# Patient Record
Sex: FEMALE | Race: WHITE | NOT HISPANIC OR LATINO | URBAN - METROPOLITAN AREA
[De-identification: names, ages, dates, MRNs, and addresses within clinical notes are randomized per-mention and may not be internally consistent; named-entity substitution may affect disease eponyms.]

---

## 2018-09-05 ENCOUNTER — INPATIENT HOSPITAL (OUTPATIENT)
Dept: URBAN - METROPOLITAN AREA HOSPITAL 76 | Facility: HOSPITAL | Age: 68
End: 2018-09-05

## 2018-09-05 DIAGNOSIS — A04.71 ENTEROCOLITIS DUE TO CLOSTRIDIUM DIFFICILE, RECURRENT: ICD-10-CM

## 2018-09-05 PROCEDURE — 99222 1ST HOSP IP/OBS MODERATE 55: CPT | Performed by: NURSE PRACTITIONER

## 2018-09-06 ENCOUNTER — INPATIENT HOSPITAL (OUTPATIENT)
Dept: URBAN - METROPOLITAN AREA HOSPITAL 76 | Facility: HOSPITAL | Age: 68
End: 2018-09-06

## 2018-09-06 DIAGNOSIS — A04.71 ENTEROCOLITIS DUE TO CLOSTRIDIUM DIFFICILE, RECURRENT: ICD-10-CM

## 2018-09-06 PROCEDURE — 99231 SBSQ HOSP IP/OBS SF/LOW 25: CPT | Performed by: NURSE PRACTITIONER

## 2018-09-07 ENCOUNTER — INPATIENT HOSPITAL (OUTPATIENT)
Dept: URBAN - METROPOLITAN AREA HOSPITAL 76 | Facility: HOSPITAL | Age: 68
End: 2018-09-07
Payer: OTHER GOVERNMENT

## 2018-09-07 DIAGNOSIS — A04.71 ENTEROCOLITIS DUE TO CLOSTRIDIUM DIFFICILE, RECURRENT: ICD-10-CM

## 2018-09-07 PROCEDURE — 99231 SBSQ HOSP IP/OBS SF/LOW 25: CPT | Performed by: NURSE PRACTITIONER

## 2020-01-07 ENCOUNTER — HOSPITAL ENCOUNTER (INPATIENT)
Facility: HOSPITAL | Age: 70
LOS: 4 days | Discharge: HOME | End: 2020-01-11
Attending: FAMILY MEDICINE | Admitting: FAMILY MEDICINE

## 2020-01-07 ENCOUNTER — APPOINTMENT (OUTPATIENT)
Dept: GENERAL RADIOLOGY | Facility: HOSPITAL | Age: 70
End: 2020-01-07

## 2020-01-07 PROBLEM — J18.9 PNEUMONIA: Status: ACTIVE | Noted: 2020-01-07

## 2020-01-07 LAB
ALBUMIN SERPL-MCNC: 4 G/DL (ref 3.5–5.2)
ALBUMIN/GLOB SERPL: 1.3 G/DL
ALP SERPL-CCNC: 78 U/L (ref 39–117)
ALT SERPL W P-5'-P-CCNC: 20 U/L (ref 1–33)
ANION GAP SERPL CALCULATED.3IONS-SCNC: 10 MMOL/L (ref 5–15)
AST SERPL-CCNC: 17 U/L (ref 1–32)
B PERT DNA SPEC QL NAA+PROBE: NOT DETECTED
BASOPHILS # BLD AUTO: 0.1 10*3/MM3 (ref 0–0.2)
BASOPHILS NFR BLD AUTO: 0.8 % (ref 0–1.5)
BILIRUB SERPL-MCNC: 0.3 MG/DL (ref 0.2–1.2)
BUN BLD-MCNC: 8 MG/DL (ref 8–23)
BUN/CREAT SERPL: 12.9 (ref 7–25)
C PNEUM DNA NPH QL NAA+NON-PROBE: NOT DETECTED
CALCIUM SPEC-SCNC: 9.5 MG/DL (ref 8.6–10.5)
CHLORIDE SERPL-SCNC: 106 MMOL/L (ref 98–107)
CO2 SERPL-SCNC: 23 MMOL/L (ref 22–29)
CREAT BLD-MCNC: 0.62 MG/DL (ref 0.57–1)
DEPRECATED RDW RBC AUTO: 46.8 FL (ref 37–54)
EOSINOPHIL # BLD AUTO: 0.2 10*3/MM3 (ref 0–0.4)
EOSINOPHIL NFR BLD AUTO: 2.1 % (ref 0.3–6.2)
ERYTHROCYTE [DISTWIDTH] IN BLOOD BY AUTOMATED COUNT: 14.5 % (ref 12.3–15.4)
FLUAV H1 2009 PAND RNA NPH QL NAA+PROBE: NOT DETECTED
FLUAV H1 HA GENE NPH QL NAA+PROBE: NOT DETECTED
FLUAV H3 RNA NPH QL NAA+PROBE: NOT DETECTED
FLUAV SUBTYP SPEC NAA+PROBE: NOT DETECTED
FLUBV RNA ISLT QL NAA+PROBE: NOT DETECTED
GFR SERPL CREATININE-BSD FRML MDRD: 95 ML/MIN/1.73
GLOBULIN UR ELPH-MCNC: 3.2 GM/DL
GLUCOSE BLD-MCNC: 128 MG/DL (ref 65–99)
GLUCOSE BLDC GLUCOMTR-MCNC: 104 MG/DL (ref 70–105)
GLUCOSE BLDC GLUCOMTR-MCNC: 154 MG/DL (ref 70–105)
HADV DNA SPEC NAA+PROBE: NOT DETECTED
HCOV 229E RNA SPEC QL NAA+PROBE: NOT DETECTED
HCOV HKU1 RNA SPEC QL NAA+PROBE: NOT DETECTED
HCOV NL63 RNA SPEC QL NAA+PROBE: NOT DETECTED
HCOV OC43 RNA SPEC QL NAA+PROBE: NOT DETECTED
HCT VFR BLD AUTO: 41.3 % (ref 34–46.6)
HGB BLD-MCNC: 13.8 G/DL (ref 12–15.9)
HMPV RNA NPH QL NAA+NON-PROBE: NOT DETECTED
HPIV1 RNA SPEC QL NAA+PROBE: NOT DETECTED
HPIV2 RNA SPEC QL NAA+PROBE: NOT DETECTED
HPIV3 RNA NPH QL NAA+PROBE: NOT DETECTED
HPIV4 P GENE NPH QL NAA+PROBE: NOT DETECTED
LYMPHOCYTES # BLD AUTO: 2 10*3/MM3 (ref 0.7–3.1)
LYMPHOCYTES NFR BLD AUTO: 24.7 % (ref 19.6–45.3)
M PNEUMO IGG SER IA-ACNC: NOT DETECTED
MCH RBC QN AUTO: 30.6 PG (ref 26.6–33)
MCHC RBC AUTO-ENTMCNC: 33.3 G/DL (ref 31.5–35.7)
MCV RBC AUTO: 91.8 FL (ref 79–97)
MONOCYTES # BLD AUTO: 0.9 10*3/MM3 (ref 0.1–0.9)
MONOCYTES NFR BLD AUTO: 10.8 % (ref 5–12)
NEUTROPHILS # BLD AUTO: 4.9 10*3/MM3 (ref 1.7–7)
NEUTROPHILS NFR BLD AUTO: 61.6 % (ref 42.7–76)
NRBC BLD AUTO-RTO: 0.1 /100 WBC (ref 0–0.2)
PLATELET # BLD AUTO: 185 10*3/MM3 (ref 140–450)
PMV BLD AUTO: 8 FL (ref 6–12)
POTASSIUM BLD-SCNC: 3.9 MMOL/L (ref 3.5–5.2)
PROT SERPL-MCNC: 7.2 G/DL (ref 6–8.5)
RBC # BLD AUTO: 4.5 10*6/MM3 (ref 3.77–5.28)
RHINOVIRUS RNA SPEC NAA+PROBE: NOT DETECTED
RSV RNA NPH QL NAA+NON-PROBE: DETECTED
SODIUM BLD-SCNC: 139 MMOL/L (ref 136–145)
WBC NRBC COR # BLD: 7.9 10*3/MM3 (ref 3.4–10.8)

## 2020-01-07 PROCEDURE — 94799 UNLISTED PULMONARY SVC/PX: CPT

## 2020-01-07 PROCEDURE — 82962 GLUCOSE BLOOD TEST: CPT

## 2020-01-07 PROCEDURE — 80053 COMPREHEN METABOLIC PANEL: CPT | Performed by: FAMILY MEDICINE

## 2020-01-07 PROCEDURE — 0099U HC BIOFIRE FILMARRAY RESP PANEL 1: CPT | Performed by: FAMILY MEDICINE

## 2020-01-07 PROCEDURE — 71046 X-RAY EXAM CHEST 2 VIEWS: CPT

## 2020-01-07 PROCEDURE — 87040 BLOOD CULTURE FOR BACTERIA: CPT | Performed by: FAMILY MEDICINE

## 2020-01-07 PROCEDURE — 94640 AIRWAY INHALATION TREATMENT: CPT

## 2020-01-07 PROCEDURE — 25010000002 METHYLPREDNISOLONE PER 125 MG: Performed by: FAMILY MEDICINE

## 2020-01-07 PROCEDURE — 85025 COMPLETE CBC W/AUTO DIFF WBC: CPT | Performed by: FAMILY MEDICINE

## 2020-01-07 PROCEDURE — 25010000002 LEVOFLOXACIN PER 250 MG: Performed by: FAMILY MEDICINE

## 2020-01-07 RX ORDER — LISINOPRIL 40 MG/1
40 TABLET ORAL NIGHTLY
COMMUNITY

## 2020-01-07 RX ORDER — BUDESONIDE 0.5 MG/2ML
0.5 INHALANT ORAL
Status: DISCONTINUED | OUTPATIENT
Start: 2020-01-07 | End: 2020-01-11 | Stop reason: HOSPADM

## 2020-01-07 RX ORDER — METOPROLOL SUCCINATE 50 MG/1
50 TABLET, EXTENDED RELEASE ORAL DAILY
Status: DISCONTINUED | OUTPATIENT
Start: 2020-01-08 | End: 2020-01-11 | Stop reason: HOSPADM

## 2020-01-07 RX ORDER — METHYLPREDNISOLONE SODIUM SUCCINATE 125 MG/2ML
60 INJECTION, POWDER, LYOPHILIZED, FOR SOLUTION INTRAMUSCULAR; INTRAVENOUS EVERY 8 HOURS
Status: DISCONTINUED | OUTPATIENT
Start: 2020-01-07 | End: 2020-01-10

## 2020-01-07 RX ORDER — ONDANSETRON 2 MG/ML
4 INJECTION INTRAMUSCULAR; INTRAVENOUS EVERY 6 HOURS PRN
Status: DISCONTINUED | OUTPATIENT
Start: 2020-01-07 | End: 2020-01-11 | Stop reason: HOSPADM

## 2020-01-07 RX ORDER — BISACODYL 5 MG/1
5 TABLET, DELAYED RELEASE ORAL DAILY PRN
Status: DISCONTINUED | OUTPATIENT
Start: 2020-01-07 | End: 2020-01-11 | Stop reason: HOSPADM

## 2020-01-07 RX ORDER — SUCRALFATE 1 G/1
1 TABLET ORAL 2 TIMES DAILY
COMMUNITY
End: 2023-03-01

## 2020-01-07 RX ORDER — ATORVASTATIN CALCIUM 20 MG/1
20 TABLET, FILM COATED ORAL DAILY
Status: DISCONTINUED | OUTPATIENT
Start: 2020-01-08 | End: 2020-01-11 | Stop reason: HOSPADM

## 2020-01-07 RX ORDER — PREDNISONE 20 MG/1
20 TABLET ORAL DAILY
COMMUNITY
End: 2020-07-21

## 2020-01-07 RX ORDER — OXYBUTYNIN CHLORIDE 5 MG/1
5 TABLET, EXTENDED RELEASE ORAL DAILY
Status: DISCONTINUED | OUTPATIENT
Start: 2020-01-08 | End: 2020-01-11 | Stop reason: HOSPADM

## 2020-01-07 RX ORDER — SERTRALINE HYDROCHLORIDE 100 MG/1
100 TABLET, FILM COATED ORAL DAILY
Status: DISCONTINUED | OUTPATIENT
Start: 2020-01-08 | End: 2020-01-11 | Stop reason: HOSPADM

## 2020-01-07 RX ORDER — AZELASTINE 1 MG/ML
2 SPRAY, METERED NASAL 2 TIMES DAILY
Status: DISCONTINUED | OUTPATIENT
Start: 2020-01-08 | End: 2020-01-11 | Stop reason: HOSPADM

## 2020-01-07 RX ORDER — ALUMINA, MAGNESIA, AND SIMETHICONE 2400; 2400; 240 MG/30ML; MG/30ML; MG/30ML
15 SUSPENSION ORAL EVERY 6 HOURS PRN
Status: DISCONTINUED | OUTPATIENT
Start: 2020-01-07 | End: 2020-01-11 | Stop reason: HOSPADM

## 2020-01-07 RX ORDER — FERROUS SULFATE TAB EC 324 MG (65 MG FE EQUIVALENT) 324 (65 FE) MG
324 TABLET DELAYED RESPONSE ORAL
Status: DISCONTINUED | OUTPATIENT
Start: 2020-01-08 | End: 2020-01-11 | Stop reason: HOSPADM

## 2020-01-07 RX ORDER — MONTELUKAST SODIUM 10 MG/1
10 TABLET ORAL NIGHTLY
Status: DISCONTINUED | OUTPATIENT
Start: 2020-01-07 | End: 2020-01-11 | Stop reason: HOSPADM

## 2020-01-07 RX ORDER — LEVOFLOXACIN 5 MG/ML
750 INJECTION, SOLUTION INTRAVENOUS EVERY 24 HOURS
Status: DISCONTINUED | OUTPATIENT
Start: 2020-01-08 | End: 2020-01-11 | Stop reason: HOSPADM

## 2020-01-07 RX ORDER — IPRATROPIUM BROMIDE AND ALBUTEROL SULFATE 2.5; .5 MG/3ML; MG/3ML
3 SOLUTION RESPIRATORY (INHALATION)
Status: DISCONTINUED | OUTPATIENT
Start: 2020-01-07 | End: 2020-01-11 | Stop reason: HOSPADM

## 2020-01-07 RX ORDER — LISINOPRIL 20 MG/1
40 TABLET ORAL DAILY
Status: DISCONTINUED | OUTPATIENT
Start: 2020-01-08 | End: 2020-01-11 | Stop reason: HOSPADM

## 2020-01-07 RX ORDER — COLESEVELAM 180 1/1
625 TABLET ORAL 2 TIMES DAILY WITH MEALS
COMMUNITY
End: 2023-03-01

## 2020-01-07 RX ORDER — MONTELUKAST SODIUM 10 MG/1
10 TABLET ORAL NIGHTLY
COMMUNITY

## 2020-01-07 RX ORDER — COLESEVELAM 180 1/1
1875 TABLET ORAL 2 TIMES DAILY WITH MEALS
Status: DISCONTINUED | OUTPATIENT
Start: 2020-01-07 | End: 2020-01-11 | Stop reason: HOSPADM

## 2020-01-07 RX ORDER — IPRATROPIUM BROMIDE AND ALBUTEROL SULFATE 2.5; .5 MG/3ML; MG/3ML
3 SOLUTION RESPIRATORY (INHALATION) AS NEEDED
COMMUNITY
End: 2023-03-01

## 2020-01-07 RX ORDER — ALBUTEROL SULFATE 90 UG/1
2 AEROSOL, METERED RESPIRATORY (INHALATION) EVERY 6 HOURS PRN
COMMUNITY

## 2020-01-07 RX ORDER — LEVOFLOXACIN 5 MG/ML
750 INJECTION, SOLUTION INTRAVENOUS ONCE
Status: COMPLETED | OUTPATIENT
Start: 2020-01-07 | End: 2020-01-07

## 2020-01-07 RX ORDER — SERTRALINE HYDROCHLORIDE 100 MG/1
100 TABLET, FILM COATED ORAL DAILY
COMMUNITY
End: 2021-05-11

## 2020-01-07 RX ORDER — SODIUM CHLORIDE 0.9 % (FLUSH) 0.9 %
10 SYRINGE (ML) INJECTION EVERY 12 HOURS SCHEDULED
Status: DISCONTINUED | OUTPATIENT
Start: 2020-01-07 | End: 2020-01-11 | Stop reason: HOSPADM

## 2020-01-07 RX ORDER — MELATONIN
5000 DAILY
COMMUNITY
End: 2020-07-21 | Stop reason: ALTCHOICE

## 2020-01-07 RX ORDER — SODIUM CHLORIDE 0.9 % (FLUSH) 0.9 %
10 SYRINGE (ML) INJECTION AS NEEDED
Status: DISCONTINUED | OUTPATIENT
Start: 2020-01-07 | End: 2020-01-11 | Stop reason: HOSPADM

## 2020-01-07 RX ORDER — FAMOTIDINE 40 MG/1
40 TABLET, FILM COATED ORAL NIGHTLY
COMMUNITY
End: 2020-01-11 | Stop reason: HOSPADM

## 2020-01-07 RX ORDER — MELATONIN
1000 DAILY
Status: DISCONTINUED | OUTPATIENT
Start: 2020-01-08 | End: 2020-01-11 | Stop reason: HOSPADM

## 2020-01-07 RX ORDER — METOPROLOL SUCCINATE 50 MG/1
50 TABLET, EXTENDED RELEASE ORAL DAILY
COMMUNITY

## 2020-01-07 RX ORDER — FERROUS SULFATE 325(65) MG
325 TABLET ORAL
COMMUNITY
End: 2022-07-13

## 2020-01-07 RX ORDER — BISACODYL 10 MG
10 SUPPOSITORY, RECTAL RECTAL DAILY PRN
Status: DISCONTINUED | OUTPATIENT
Start: 2020-01-07 | End: 2020-01-11 | Stop reason: HOSPADM

## 2020-01-07 RX ORDER — AZELASTINE 1 MG/ML
2 SPRAY, METERED NASAL AS NEEDED
COMMUNITY
End: 2023-03-01

## 2020-01-07 RX ORDER — IPRATROPIUM BROMIDE AND ALBUTEROL SULFATE 2.5; .5 MG/3ML; MG/3ML
3 SOLUTION RESPIRATORY (INHALATION)
Status: DISCONTINUED | OUTPATIENT
Start: 2020-01-07 | End: 2020-01-07

## 2020-01-07 RX ORDER — ATORVASTATIN CALCIUM 20 MG/1
20 TABLET, FILM COATED ORAL DAILY
COMMUNITY

## 2020-01-07 RX ORDER — SUCRALFATE 1 G/1
1 TABLET ORAL 2 TIMES DAILY
Status: DISCONTINUED | OUTPATIENT
Start: 2020-01-07 | End: 2020-01-11 | Stop reason: HOSPADM

## 2020-01-07 RX ORDER — PANTOPRAZOLE SODIUM 40 MG/1
40 TABLET, DELAYED RELEASE ORAL
Status: DISCONTINUED | OUTPATIENT
Start: 2020-01-08 | End: 2020-01-11 | Stop reason: HOSPADM

## 2020-01-07 RX ORDER — IPRATROPIUM BROMIDE AND ALBUTEROL SULFATE 2.5; .5 MG/3ML; MG/3ML
3 SOLUTION RESPIRATORY (INHALATION) EVERY 6 HOURS
Status: DISCONTINUED | OUTPATIENT
Start: 2020-01-07 | End: 2020-01-07

## 2020-01-07 RX ADMIN — LEVOFLOXACIN 750 MG: 5 INJECTION, SOLUTION INTRAVENOUS at 18:17

## 2020-01-07 RX ADMIN — SUCRALFATE 1 G: 1 TABLET ORAL at 20:42

## 2020-01-07 RX ADMIN — COLESEVELAM HYDROCHLORIDE 1875 MG: 625 TABLET, FILM COATED ORAL at 18:15

## 2020-01-07 RX ADMIN — MONTELUKAST SODIUM 10 MG: 10 TABLET, FILM COATED ORAL at 20:41

## 2020-01-07 RX ADMIN — Medication 10 ML: at 20:42

## 2020-01-07 RX ADMIN — METHYLPREDNISOLONE SODIUM SUCCINATE 60 MG: 125 INJECTION, POWDER, FOR SOLUTION INTRAMUSCULAR; INTRAVENOUS at 18:15

## 2020-01-07 RX ADMIN — IPRATROPIUM BROMIDE AND ALBUTEROL SULFATE 3 ML: .5; 3 SOLUTION RESPIRATORY (INHALATION) at 17:02

## 2020-01-07 RX ADMIN — BUDESONIDE 0.5 MG: 0.5 INHALANT RESPIRATORY (INHALATION) at 19:57

## 2020-01-07 NOTE — PLAN OF CARE
Problem: Patient Care Overview  Goal: Plan of Care Review  Outcome: Ongoing (interventions implemented as appropriate)  Flowsheets (Taken 1/7/2020 4056)  Progress: no change  Plan of Care Reviewed With: patient  Outcome Summary: pt arrived to floor at 1350 this afternoon. IV placed in R) wrist. 22g. waiting for orders from MD. MD aware of pt arrival. will contto monitor.

## 2020-01-08 LAB
ALBUMIN SERPL-MCNC: 4.1 G/DL (ref 3.5–5.2)
ALBUMIN/GLOB SERPL: 1.1 G/DL
ALP SERPL-CCNC: 95 U/L (ref 39–117)
ALT SERPL W P-5'-P-CCNC: 30 U/L (ref 1–33)
ANION GAP SERPL CALCULATED.3IONS-SCNC: 11 MMOL/L (ref 5–15)
AST SERPL-CCNC: 23 U/L (ref 1–32)
BACTERIA UR QL AUTO: ABNORMAL /HPF
BASOPHILS # BLD AUTO: 0 10*3/MM3 (ref 0–0.2)
BASOPHILS NFR BLD AUTO: 0.7 % (ref 0–1.5)
BILIRUB SERPL-MCNC: 0.3 MG/DL (ref 0.2–1.2)
BILIRUB UR QL STRIP: NEGATIVE
BUN BLD-MCNC: 12 MG/DL (ref 8–23)
BUN/CREAT SERPL: 19.4 (ref 7–25)
CALCIUM SPEC-SCNC: 10 MG/DL (ref 8.6–10.5)
CHLORIDE SERPL-SCNC: 106 MMOL/L (ref 98–107)
CLARITY UR: CLEAR
CO2 SERPL-SCNC: 22 MMOL/L (ref 22–29)
COLOR UR: YELLOW
CREAT BLD-MCNC: 0.62 MG/DL (ref 0.57–1)
DEPRECATED RDW RBC AUTO: 45.9 FL (ref 37–54)
EOSINOPHIL # BLD AUTO: 0 10*3/MM3 (ref 0–0.4)
EOSINOPHIL NFR BLD AUTO: 0.1 % (ref 0.3–6.2)
ERYTHROCYTE [DISTWIDTH] IN BLOOD BY AUTOMATED COUNT: 14.2 % (ref 12.3–15.4)
GFR SERPL CREATININE-BSD FRML MDRD: 95 ML/MIN/1.73
GLOBULIN UR ELPH-MCNC: 3.6 GM/DL
GLUCOSE BLD-MCNC: 166 MG/DL (ref 65–99)
GLUCOSE BLDC GLUCOMTR-MCNC: 161 MG/DL (ref 70–105)
GLUCOSE BLDC GLUCOMTR-MCNC: 162 MG/DL (ref 70–105)
GLUCOSE BLDC GLUCOMTR-MCNC: 175 MG/DL (ref 70–105)
GLUCOSE BLDC GLUCOMTR-MCNC: 189 MG/DL (ref 70–105)
GLUCOSE UR STRIP-MCNC: NEGATIVE MG/DL
HCT VFR BLD AUTO: 42.8 % (ref 34–46.6)
HGB BLD-MCNC: 14.4 G/DL (ref 12–15.9)
HGB UR QL STRIP.AUTO: NEGATIVE
HYALINE CASTS UR QL AUTO: ABNORMAL /LPF
KETONES UR QL STRIP: NEGATIVE
LEUKOCYTE ESTERASE UR QL STRIP.AUTO: ABNORMAL
LYMPHOCYTES # BLD AUTO: 0.8 10*3/MM3 (ref 0.7–3.1)
LYMPHOCYTES NFR BLD AUTO: 11.8 % (ref 19.6–45.3)
MCH RBC QN AUTO: 30.9 PG (ref 26.6–33)
MCHC RBC AUTO-ENTMCNC: 33.8 G/DL (ref 31.5–35.7)
MCV RBC AUTO: 91.7 FL (ref 79–97)
MONOCYTES # BLD AUTO: 0.1 10*3/MM3 (ref 0.1–0.9)
MONOCYTES NFR BLD AUTO: 1.1 % (ref 5–12)
NEUTROPHILS # BLD AUTO: 6.1 10*3/MM3 (ref 1.7–7)
NEUTROPHILS NFR BLD AUTO: 86.3 % (ref 42.7–76)
NITRITE UR QL STRIP: NEGATIVE
NRBC BLD AUTO-RTO: 0.1 /100 WBC (ref 0–0.2)
PH UR STRIP.AUTO: 5.5 [PH] (ref 5–8)
PLATELET # BLD AUTO: 206 10*3/MM3 (ref 140–450)
PMV BLD AUTO: 8.4 FL (ref 6–12)
POTASSIUM BLD-SCNC: 4.8 MMOL/L (ref 3.5–5.2)
PROT SERPL-MCNC: 7.7 G/DL (ref 6–8.5)
PROT UR QL STRIP: NEGATIVE
RBC # BLD AUTO: 4.66 10*6/MM3 (ref 3.77–5.28)
RBC # UR: ABNORMAL /HPF
REF LAB TEST METHOD: ABNORMAL
SODIUM BLD-SCNC: 139 MMOL/L (ref 136–145)
SP GR UR STRIP: 1.02 (ref 1–1.03)
SQUAMOUS #/AREA URNS HPF: ABNORMAL /HPF
UROBILINOGEN UR QL STRIP: ABNORMAL
WBC NRBC COR # BLD: 7.1 10*3/MM3 (ref 3.4–10.8)
WBC UR QL AUTO: ABNORMAL /HPF

## 2020-01-08 PROCEDURE — 81001 URINALYSIS AUTO W/SCOPE: CPT | Performed by: FAMILY MEDICINE

## 2020-01-08 PROCEDURE — 25010000002 LEVOFLOXACIN PER 250 MG: Performed by: FAMILY MEDICINE

## 2020-01-08 PROCEDURE — 82962 GLUCOSE BLOOD TEST: CPT

## 2020-01-08 PROCEDURE — 25010000002 METHYLPREDNISOLONE PER 125 MG: Performed by: FAMILY MEDICINE

## 2020-01-08 PROCEDURE — 85025 COMPLETE CBC W/AUTO DIFF WBC: CPT | Performed by: FAMILY MEDICINE

## 2020-01-08 PROCEDURE — 94799 UNLISTED PULMONARY SVC/PX: CPT

## 2020-01-08 PROCEDURE — 25010000002 ENOXAPARIN PER 10 MG: Performed by: FAMILY MEDICINE

## 2020-01-08 PROCEDURE — 80053 COMPREHEN METABOLIC PANEL: CPT | Performed by: FAMILY MEDICINE

## 2020-01-08 RX ADMIN — MELATONIN 1000 UNITS: at 09:31

## 2020-01-08 RX ADMIN — SUCRALFATE 1 G: 1 TABLET ORAL at 21:34

## 2020-01-08 RX ADMIN — COLESEVELAM HYDROCHLORIDE 1875 MG: 625 TABLET, FILM COATED ORAL at 17:39

## 2020-01-08 RX ADMIN — ATORVASTATIN CALCIUM 20 MG: 20 TABLET, FILM COATED ORAL at 09:59

## 2020-01-08 RX ADMIN — AZELASTINE 2 SPRAY: 1 SPRAY, METERED NASAL at 10:01

## 2020-01-08 RX ADMIN — FERROUS SULFATE TAB EC 324 MG (65 MG FE EQUIVALENT) 324 MG: 324 (65 FE) TABLET DELAYED RESPONSE at 09:31

## 2020-01-08 RX ADMIN — MONTELUKAST SODIUM 10 MG: 10 TABLET, FILM COATED ORAL at 21:34

## 2020-01-08 RX ADMIN — COLESEVELAM HYDROCHLORIDE 1875 MG: 625 TABLET, FILM COATED ORAL at 09:31

## 2020-01-08 RX ADMIN — IPRATROPIUM BROMIDE AND ALBUTEROL SULFATE 3 ML: .5; 3 SOLUTION RESPIRATORY (INHALATION) at 19:40

## 2020-01-08 RX ADMIN — ENOXAPARIN SODIUM 40 MG: 40 INJECTION SUBCUTANEOUS at 17:38

## 2020-01-08 RX ADMIN — METOPROLOL SUCCINATE 50 MG: 50 TABLET, EXTENDED RELEASE ORAL at 09:31

## 2020-01-08 RX ADMIN — IPRATROPIUM BROMIDE AND ALBUTEROL SULFATE 3 ML: .5; 3 SOLUTION RESPIRATORY (INHALATION) at 07:04

## 2020-01-08 RX ADMIN — Medication 10 ML: at 09:33

## 2020-01-08 RX ADMIN — SUCRALFATE 1 G: 1 TABLET ORAL at 09:30

## 2020-01-08 RX ADMIN — LISINOPRIL 40 MG: 20 TABLET ORAL at 09:30

## 2020-01-08 RX ADMIN — IPRATROPIUM BROMIDE AND ALBUTEROL SULFATE 3 ML: .5; 3 SOLUTION RESPIRATORY (INHALATION) at 11:42

## 2020-01-08 RX ADMIN — BUDESONIDE 0.5 MG: 0.5 INHALANT RESPIRATORY (INHALATION) at 19:40

## 2020-01-08 RX ADMIN — PANTOPRAZOLE SODIUM 40 MG: 40 TABLET, DELAYED RELEASE ORAL at 05:40

## 2020-01-08 RX ADMIN — LEVOFLOXACIN 750 MG: 5 INJECTION, SOLUTION INTRAVENOUS at 17:39

## 2020-01-08 RX ADMIN — METHYLPREDNISOLONE SODIUM SUCCINATE 60 MG: 125 INJECTION, POWDER, FOR SOLUTION INTRAMUSCULAR; INTRAVENOUS at 09:29

## 2020-01-08 RX ADMIN — Medication 10 ML: at 21:35

## 2020-01-08 RX ADMIN — SERTRALINE 100 MG: 100 TABLET, FILM COATED ORAL at 09:30

## 2020-01-08 RX ADMIN — BUDESONIDE 0.5 MG: 0.5 INHALANT RESPIRATORY (INHALATION) at 07:04

## 2020-01-08 RX ADMIN — METHYLPREDNISOLONE SODIUM SUCCINATE 60 MG: 125 INJECTION, POWDER, FOR SOLUTION INTRAMUSCULAR; INTRAVENOUS at 17:39

## 2020-01-08 RX ADMIN — OXYBUTYNIN CHLORIDE 5 MG: 5 TABLET, EXTENDED RELEASE ORAL at 09:30

## 2020-01-08 RX ADMIN — IPRATROPIUM BROMIDE AND ALBUTEROL SULFATE 3 ML: .5; 3 SOLUTION RESPIRATORY (INHALATION) at 15:24

## 2020-01-08 RX ADMIN — METHYLPREDNISOLONE SODIUM SUCCINATE 60 MG: 125 INJECTION, POWDER, FOR SOLUTION INTRAMUSCULAR; INTRAVENOUS at 01:29

## 2020-01-08 RX ADMIN — AZELASTINE 2 SPRAY: 1 SPRAY, METERED NASAL at 21:34

## 2020-01-08 NOTE — PLAN OF CARE
Problem: Patient Care Overview  Goal: Plan of Care Review  Outcome: Ongoing (interventions implemented as appropriate)  Flowsheets (Taken 1/8/2020 1603)  Progress: improving  Plan of Care Reviewed With: patient  Outcome Summary: Patient tested positive for RSV. Receiving IV antibiotics and steroids. No complaints voiced. On room air.     Problem: Patient Care Overview  Goal: Interprofessional Rounds/Family Conf  Outcome: Ongoing (interventions implemented as appropriate)  Flowsheets (Taken 1/8/2020 1603)  Summary: Patient plans to discharge home with son when medically ready.  Participants: ; nursing; pharmacy; patient

## 2020-01-08 NOTE — PLAN OF CARE
Problem: Patient Care Overview  Goal: Plan of Care Review  Outcome: Ongoing (interventions implemented as appropriate)  Flowsheets  Taken 1/8/2020 9758  Progress: improving  Outcome Summary: Patient has rested well through the night. Iv antibiotics and steroids administered. Respiratory panel positive for RSV.  Taken 1/7/2020 1901  Plan of Care Reviewed With: patient;son

## 2020-01-08 NOTE — H&P
Patient Care Team:  Candi Benson MD as PCP - General (Family Medicine)    Chief complaint SOB and cough    Subjective     Patient is a 69 y.o. female presents with increased SOB, cough, decreased appetite and feeling bad for the past several days, pt was seen in my office one day last week, was given IM Steroid, then sent home with po abx and steroid, pt felt better for couple of days, then started feeling bad, and continued to get worse, which brought her to my office    Review of Systems   Constitutional: Positive for activity change, appetite change, diaphoresis and fatigue.   Eyes: Negative.    Respiratory: Positive for cough, chest tightness, shortness of breath and wheezing.    Cardiovascular: Negative.    Gastrointestinal: Positive for nausea.   Endocrine: Negative.    Genitourinary: Negative.    Musculoskeletal: Positive for back pain.   Neurological: Positive for dizziness, weakness, light-headedness and headaches.           History  Past Medical History:   Diagnosis Date   • Arthritis    • Asthma    • Cancer (CMS/[facility])    • COPD (chronic obstructive pulmonary disease) (CMS/[facility])    • Coronary artery disease    • Elevated cholesterol    • GERD (gastroesophageal reflux disease)    • History of transfusion    • Hypertension    • Sleep apnea      Past Surgical History:   Procedure Laterality Date   • ABDOMINAL SURGERY     • ANKLE SURGERY       No family history on file.  Social History     Tobacco Use   • Smoking status: Never Smoker   • Smokeless tobacco: Never Used   Substance Use Topics   • Alcohol use: Not on file   • Drug use: Not on file     Medications Prior to Admission   Medication Sig Dispense Refill Last Dose   • albuterol sulfate  (90 Base) MCG/ACT inhaler Inhale 2 puffs Every 6 (Six) Hours.   1/7/2020 at Unknown time   • ALENDRONATE SODIUM PO Take 70 mg by mouth 1 (One) Time Per Week. Patient states takes on Wednesdays   Past Week at Unknown time   • atorvastatin (LIPITOR) 20  MG tablet Take 20 mg by mouth Daily.   1/7/2020 at Unknown time   • azelastine (ASTELIN) 0.1 % nasal spray 2 sprays into the nostril(s) as directed by provider 2 (Two) Times a Day. Use in each nostril as directed   1/7/2020 at Unknown time   • cholecalciferol (VITAMIN D3) 25 MCG (1000 UT) tablet Take 5,000 Units by mouth Daily. Per MD Benson office med list takes once weekly. Per patient has been taking over the counter vitamin daily   1/7/2020 at Unknown time   • colesevelam (WELCHOL) 625 MG tablet Take 1,875 mg by mouth 2 (Two) Times a Day With Meals.   1/7/2020 at Unknown time   • famotidine (PEPCID) 40 MG tablet Take 40 mg by mouth Every Night.   1/6/2020 at Unknown time   • ferrous sulfate 325 (65 FE) MG tablet Take 325 mg by mouth Daily With Breakfast.   1/7/2020 at Unknown time   • ipratropium-albuterol (DUO-NEB) 0.5-2.5 mg/3 ml nebulizer Take 3 mL by nebulization Every 6 (Six) Hours.   1/7/2020 at Unknown time   • lisinopril (PRINIVIL,ZESTRIL) 40 MG tablet Take 40 mg by mouth Daily.   1/7/2020 at Unknown time   • metoprolol succinate XL (TOPROL-XL) 50 MG 24 hr tablet Take 50 mg by mouth Daily.   1/7/2020 at Unknown time   • Mirabegron ER (MYRBETRIQ) 50 MG tablet sustained-release 24 hour 24 hr tablet Take 50 mg by mouth Daily.   1/7/2020 at Unknown time   • montelukast (SINGULAIR) 10 MG tablet Take 10 mg by mouth Every Night.   1/6/2020 at Unknown time   • predniSONE (DELTASONE) 20 MG tablet Take 20 mg by mouth Daily.   1/7/2020 at Unknown time   • sertraline (ZOLOFT) 100 MG tablet Take 100 mg by mouth Daily.   1/7/2020 at Unknown time   • sucralfate (CARAFATE) 1 g tablet Take 1 g by mouth 2 (Two) Times a Day.   1/7/2020 at Unknown time     Allergies:  Penicillins    Objective     Vital Signs  Temp:  [98.2 °F (36.8 °C)] 98.2 °F (36.8 °C)  Heart Rate:  [107-114] 107  Resp:  [20-23] 20  BP: (103)/(59) 103/59    Physical Exam:       General Appearance:    Alert, cooperative, in mild acute distress   Eyes:             Lids and lashes normal, conjunctivae and sclerae normal, no   icterus, no pallor, corneas clear, PERRLA   Ears:    Ears appear intact with no abnormalities noted   Throat:   No oral lesions, no thrush, oral mucosa moist   Neck:   No adenopathy, supple, trachea midline, no thyromegaly, no   carotid bruit, no JVD   Lungs:     Bilateral wheezing,respirations regular, even and                  Unlabored     Heart:    Regular rhythm and normal rate, normal S1 and S2, no            murmur, no gallop, no rub, no click   Abdomen:     Normal bowel sounds, no masses, no organomegaly, soft        non-tender, non-distended, no guarding, no rebound                tenderness   Extremities:   Moves all extremities well, no edema, no cyanosis, no             redness   Pulses:   Pulses palpable and equal bilaterally   Skin:   No bleeding, bruising or rash   Neurologic:   Cranial nerves 2 - 12 grossly intact, sensation intact, DTR       present and equal bilaterally       Results Review:  Lab Results (last 48 hours)     Procedure Component Value Units Date/Time    Respiratory Panel, PCR - Swab, Nasopharynx [[id number]]  (Abnormal) Collected:  01/07/20 1735    Specimen:  Swab from Nasopharynx Updated:  01/07/20 1912     ADENOVIRUS, PCR Not Detected     Coronavirus 229E Not Detected     Coronavirus HKU1 Not Detected     Coronavirus NL63 Not Detected     Coronavirus OC43 Not Detected     Human Metapneumovirus Not Detected     Human Rhinovirus/Enterovirus Not Detected     Influenza B PCR Not Detected     Parainfluenza Virus 1 Not Detected     Parainfluenza Virus 2 Not Detected     Parainfluenza Virus 3 Not Detected     Parainfluenza Virus 4 Not Detected     Bordetella pertussis pcr Not Detected     Influenza A H1 2009 PCR Not Detected     Chlamydophila pneumoniae PCR Not Detected     Mycoplasma pneumo by PCR Not Detected     Influenza A PCR Not Detected     Influenza A H3 Not Detected     Influenza A H1 Not Detected     RSV, PCR  Detected    Comprehensive Metabolic Panel [[id number]]  (Abnormal) Collected:  01/07/20 1729    Specimen:  Blood Updated:  01/07/20 1831     Glucose 128 mg/dL      BUN 8 mg/dL      Creatinine 0.62 mg/dL      Sodium 139 mmol/L      Potassium 3.9 mmol/L      Chloride 106 mmol/L      CO2 23.0 mmol/L      Calcium 9.5 mg/dL      Total Protein 7.2 g/dL      Albumin 4.00 g/dL      ALT (SGPT) 20 U/L      AST (SGOT) 17 U/L      Alkaline Phosphatase 78 U/L      Total Bilirubin 0.3 mg/dL      eGFR Non African Amer 95 mL/min/1.73      Globulin 3.2 gm/dL      A/G Ratio 1.3 g/dL      BUN/Creatinine Ratio 12.9     Anion Gap 10.0 mmol/L     Narrative:       GFR Normal >60  Chronic Kidney Disease <60  Kidney Failure <15      POC Glucose Once [[id number]]  (Normal) Collected:  01/07/20 1806    Specimen:  Blood Updated:  01/07/20 1808     Glucose 104 mg/dL      Comment: Serial Number: [id number]:  [id number]       CBC & Differential [[id number]] Collected:  01/07/20 1729    Specimen:  Blood Updated:  01/07/20 1756    Narrative:       The following orders were created for panel order CBC & Differential.  Procedure                               Abnormality         Status                     ---------                               -----------         ------                     CBC Auto Differential[[id number]]        Normal              Final result                 Please view results for these tests on the individual orders.    CBC Auto Differential [[id number]]  (Normal) Collected:  01/07/20 1729    Specimen:  Blood Updated:  01/07/20 1756     WBC 7.90 10*3/mm3      RBC 4.50 10*6/mm3      Hemoglobin 13.8 g/dL      Hematocrit 41.3 %      MCV 91.8 fL      MCH 30.6 pg      MCHC 33.3 g/dL      RDW 14.5 %      RDW-SD 46.8 fl      MPV 8.0 fL      Platelets 185 10*3/mm3      Neutrophil % 61.6 %      Lymphocyte % 24.7 %      Monocyte % 10.8 %      Eosinophil % 2.1 %      Basophil % 0.8 %      Neutrophils, Absolute 4.90 10*3/mm3       Lymphocytes, Absolute 2.00 10*3/mm3      Monocytes, Absolute 0.90 10*3/mm3      Eosinophils, Absolute 0.20 10*3/mm3      Basophils, Absolute 0.10 10*3/mm3      nRBC 0.1 /100 WBC     Blood Culture - Blood, Hand, Left [[id number]] Collected:  01/07/20 1730    Specimen:  Blood from Hand, Left Updated:  01/07/20 1750    Blood Culture - Blood, Hand, Right [[id number]] Collected:  01/07/20 1734    Specimen:  Blood from Hand, Right Updated:  01/07/20 1750          Imaging Results (Last 24 Hours)     Procedure Component Value Units Date/Time    XR Chest PA & Lateral [[id number]] Collected:  01/07/20 1925     Updated:  01/07/20 1928    Narrative:       XR CHEST PA AND LATERAL-     Date of Exam: 1/7/2020 7:15 PM     Indication: shortness of breath.     Comparison: None available.     Technique: Two views of the chest were obtained.     FINDINGS:  Heart size and pulmonary vessels are within normal limits.  There is  elevation of the right hemidiaphragm.  Lungs are clear bilaterally.   There are no pleural effusions.  There is a large hiatal hernia.  Bony  thorax is unremarkable.       Impression:          1. No acute cardiopulmonary disease.  2.  Large hiatal hernia.        Electronically Signed By-Maldonado Dexter On:1/7/2020 7:26 PM  This report was finalized on [id number] by  Maldonado Dexter, .           I reviewed the patient's new clinical results    Assessment/Plan     Active Problems:  Acute respiratory distress - symptomatic treatment, O2  RSV Bronchiolitis - Failure to out pt therapy, Start on IV Steroid, nebs and O2  CAD - Stable  Hyperlipidemia - continue statins  Stress ulcer/DVT prophylaxis              Plan for disposition: Home at discharge    Candi Benson MD  01/07/20  8:10 PM       01-Mar-2017 16:59

## 2020-01-08 NOTE — PROGRESS NOTES
Discharge Planning Assessment   Chris     Patient Name: Monse Donnelly  MRN: [id number]  Today's Date: 1/8/2020    Admit Date: 1/7/2020    Discharge Needs Assessment     Row Name 01/08/20 1301       Living Environment    Lives With  child(letitia), adult    Current Living Arrangements  home/apartment/condo    Primary Care Provided by  self    Provides Primary Care For  no one    Family Caregiver if Needed  child(letitia), adult    Able to Return to Prior Arrangements  yes       Resource/Environmental Concerns    Resource/Environmental Concerns  none    Transportation Concerns  car, none       Transition Planning    Patient/Family Anticipates Transition to  home with family    Patient/Family Anticipated Services at Transition  none    Transportation Anticipated  car, drives self;family or friend will provide       Discharge Needs Assessment    Readmission Within the Last 30 Days  no previous admission in last 30 days    Concerns to be Addressed  no discharge needs identified;denies needs/concerns at this time    Equipment Currently Used at Home  nebulizer;grab bar;bath bench    Anticipated Changes Related to Illness  none    Equipment Needed After Discharge  none        Discharge Plan     Row Name 01/08/20 1302       Plan    Plan  Anticipate routine home.     Patient/Family in Agreement with Plan  yes    Plan Comments  Met with patient at bedside who reports no anticipated needs at discharge. Patient reports that she lives with her son, typically drives but also has her son and brother who are available to help as needed. Barrier: IV antibiotics, IV steroids.         Expected Discharge Date and Time     Expected Discharge Date Expected Discharge Time    Stiven 10, 2020         Demographic Summary     Row Name 01/08/20 1300       General Information    Admission Type  inpatient    Arrived From  home    Required Notices Provided  Important Message from Medicare    Referral Source  admission list    Reason for Consult  discharge  planning        Functional Status     Row Name 01/08/20 1301       Functional Status    Usual Activity Tolerance  moderate    Current Activity Tolerance  moderate       Functional Status, IADL    Medications  independent    Meal Preparation  independent    Housekeeping  independent    Laundry  independent    Shopping  independent       Mental Status    General Appearance WDL  WDL       Mental Status Summary    Recent Changes in Mental Status/Cognitive Functioning  no changes          Patient Forms     Row Name 01/08/20 1301       Patient Forms    Important Message from Medicare (IMM)  Delivered 1/7          Aleena Perdomo  [phone]

## 2020-01-09 LAB
ALBUMIN SERPL-MCNC: 3.8 G/DL (ref 3.5–5.2)
ALBUMIN/GLOB SERPL: 1.2 G/DL
ALP SERPL-CCNC: 79 U/L (ref 39–117)
ALT SERPL W P-5'-P-CCNC: 24 U/L (ref 1–33)
ANION GAP SERPL CALCULATED.3IONS-SCNC: 11 MMOL/L (ref 5–15)
AST SERPL-CCNC: 15 U/L (ref 1–32)
BASOPHILS # BLD AUTO: 0 10*3/MM3 (ref 0–0.2)
BASOPHILS NFR BLD AUTO: 0.2 % (ref 0–1.5)
BILIRUB SERPL-MCNC: 0.2 MG/DL (ref 0.2–1.2)
BUN BLD-MCNC: 17 MG/DL (ref 8–23)
BUN/CREAT SERPL: 26.6 (ref 7–25)
CALCIUM SPEC-SCNC: 9.7 MG/DL (ref 8.6–10.5)
CHLORIDE SERPL-SCNC: 109 MMOL/L (ref 98–107)
CO2 SERPL-SCNC: 22 MMOL/L (ref 22–29)
CREAT BLD-MCNC: 0.64 MG/DL (ref 0.57–1)
DEPRECATED RDW RBC AUTO: 47.7 FL (ref 37–54)
EOSINOPHIL # BLD AUTO: 0 10*3/MM3 (ref 0–0.4)
EOSINOPHIL NFR BLD AUTO: 0 % (ref 0.3–6.2)
ERYTHROCYTE [DISTWIDTH] IN BLOOD BY AUTOMATED COUNT: 14.7 % (ref 12.3–15.4)
GFR SERPL CREATININE-BSD FRML MDRD: 92 ML/MIN/1.73
GLOBULIN UR ELPH-MCNC: 3.3 GM/DL
GLUCOSE BLD-MCNC: 150 MG/DL (ref 65–99)
GLUCOSE BLDC GLUCOMTR-MCNC: 120 MG/DL (ref 70–105)
GLUCOSE BLDC GLUCOMTR-MCNC: 124 MG/DL (ref 70–105)
GLUCOSE BLDC GLUCOMTR-MCNC: 138 MG/DL (ref 70–105)
GLUCOSE BLDC GLUCOMTR-MCNC: 143 MG/DL (ref 70–105)
HCT VFR BLD AUTO: 40.9 % (ref 34–46.6)
HGB BLD-MCNC: 13.7 G/DL (ref 12–15.9)
LYMPHOCYTES # BLD AUTO: 1 10*3/MM3 (ref 0.7–3.1)
LYMPHOCYTES NFR BLD AUTO: 7.6 % (ref 19.6–45.3)
MCH RBC QN AUTO: 30.3 PG (ref 26.6–33)
MCHC RBC AUTO-ENTMCNC: 33.4 G/DL (ref 31.5–35.7)
MCV RBC AUTO: 90.7 FL (ref 79–97)
MONOCYTES # BLD AUTO: 0.5 10*3/MM3 (ref 0.1–0.9)
MONOCYTES NFR BLD AUTO: 4.3 % (ref 5–12)
NEUTROPHILS # BLD AUTO: 11.1 10*3/MM3 (ref 1.7–7)
NEUTROPHILS NFR BLD AUTO: 87.9 % (ref 42.7–76)
NRBC BLD AUTO-RTO: 0.1 /100 WBC (ref 0–0.2)
PLATELET # BLD AUTO: 233 10*3/MM3 (ref 140–450)
PMV BLD AUTO: 7.9 FL (ref 6–12)
POTASSIUM BLD-SCNC: 4.4 MMOL/L (ref 3.5–5.2)
PROT SERPL-MCNC: 7.1 G/DL (ref 6–8.5)
RBC # BLD AUTO: 4.51 10*6/MM3 (ref 3.77–5.28)
SODIUM BLD-SCNC: 142 MMOL/L (ref 136–145)
WBC NRBC COR # BLD: 12.6 10*3/MM3 (ref 3.4–10.8)

## 2020-01-09 PROCEDURE — 87070 CULTURE OTHR SPECIMN AEROBIC: CPT | Performed by: FAMILY MEDICINE

## 2020-01-09 PROCEDURE — 25010000002 METHYLPREDNISOLONE PER 125 MG: Performed by: FAMILY MEDICINE

## 2020-01-09 PROCEDURE — 82962 GLUCOSE BLOOD TEST: CPT

## 2020-01-09 PROCEDURE — 25010000002 LEVOFLOXACIN PER 250 MG: Performed by: FAMILY MEDICINE

## 2020-01-09 PROCEDURE — 94799 UNLISTED PULMONARY SVC/PX: CPT

## 2020-01-09 PROCEDURE — 87205 SMEAR GRAM STAIN: CPT | Performed by: FAMILY MEDICINE

## 2020-01-09 PROCEDURE — 25010000002 ENOXAPARIN PER 10 MG: Performed by: FAMILY MEDICINE

## 2020-01-09 PROCEDURE — 85025 COMPLETE CBC W/AUTO DIFF WBC: CPT | Performed by: FAMILY MEDICINE

## 2020-01-09 PROCEDURE — 80053 COMPREHEN METABOLIC PANEL: CPT | Performed by: FAMILY MEDICINE

## 2020-01-09 RX ADMIN — METHYLPREDNISOLONE SODIUM SUCCINATE 60 MG: 125 INJECTION, POWDER, FOR SOLUTION INTRAMUSCULAR; INTRAVENOUS at 02:15

## 2020-01-09 RX ADMIN — LEVOFLOXACIN 750 MG: 5 INJECTION, SOLUTION INTRAVENOUS at 17:16

## 2020-01-09 RX ADMIN — ALUMINUM HYDROXIDE, MAGNESIUM HYDROXIDE, AND DIMETHICONE 15 ML: 400; 400; 40 SUSPENSION ORAL at 19:18

## 2020-01-09 RX ADMIN — Medication 10 ML: at 09:48

## 2020-01-09 RX ADMIN — MELATONIN 1000 UNITS: at 09:47

## 2020-01-09 RX ADMIN — PANTOPRAZOLE SODIUM 40 MG: 40 TABLET, DELAYED RELEASE ORAL at 05:36

## 2020-01-09 RX ADMIN — SUCRALFATE 1 G: 1 TABLET ORAL at 21:19

## 2020-01-09 RX ADMIN — FERROUS SULFATE TAB EC 324 MG (65 MG FE EQUIVALENT) 324 MG: 324 (65 FE) TABLET DELAYED RESPONSE at 09:47

## 2020-01-09 RX ADMIN — METOPROLOL SUCCINATE 50 MG: 50 TABLET, EXTENDED RELEASE ORAL at 09:47

## 2020-01-09 RX ADMIN — METHYLPREDNISOLONE SODIUM SUCCINATE 60 MG: 125 INJECTION, POWDER, FOR SOLUTION INTRAMUSCULAR; INTRAVENOUS at 17:16

## 2020-01-09 RX ADMIN — Medication 10 ML: at 21:20

## 2020-01-09 RX ADMIN — IPRATROPIUM BROMIDE AND ALBUTEROL SULFATE 3 ML: .5; 3 SOLUTION RESPIRATORY (INHALATION) at 14:23

## 2020-01-09 RX ADMIN — BUDESONIDE 0.5 MG: 0.5 INHALANT RESPIRATORY (INHALATION) at 07:27

## 2020-01-09 RX ADMIN — SUCRALFATE 1 G: 1 TABLET ORAL at 09:47

## 2020-01-09 RX ADMIN — ENOXAPARIN SODIUM 40 MG: 40 INJECTION SUBCUTANEOUS at 17:16

## 2020-01-09 RX ADMIN — COLESEVELAM HYDROCHLORIDE 1875 MG: 625 TABLET, FILM COATED ORAL at 09:47

## 2020-01-09 RX ADMIN — IPRATROPIUM BROMIDE AND ALBUTEROL SULFATE 3 ML: .5; 3 SOLUTION RESPIRATORY (INHALATION) at 07:27

## 2020-01-09 RX ADMIN — ATORVASTATIN CALCIUM 20 MG: 20 TABLET, FILM COATED ORAL at 09:47

## 2020-01-09 RX ADMIN — IPRATROPIUM BROMIDE AND ALBUTEROL SULFATE 3 ML: .5; 3 SOLUTION RESPIRATORY (INHALATION) at 19:30

## 2020-01-09 RX ADMIN — LISINOPRIL 40 MG: 20 TABLET ORAL at 09:47

## 2020-01-09 RX ADMIN — MONTELUKAST SODIUM 10 MG: 10 TABLET, FILM COATED ORAL at 21:19

## 2020-01-09 RX ADMIN — AZELASTINE 2 SPRAY: 1 SPRAY, METERED NASAL at 09:48

## 2020-01-09 RX ADMIN — OXYBUTYNIN CHLORIDE 5 MG: 5 TABLET, EXTENDED RELEASE ORAL at 09:47

## 2020-01-09 RX ADMIN — AZELASTINE 2 SPRAY: 1 SPRAY, METERED NASAL at 21:19

## 2020-01-09 RX ADMIN — SERTRALINE 100 MG: 100 TABLET, FILM COATED ORAL at 09:47

## 2020-01-09 RX ADMIN — METHYLPREDNISOLONE SODIUM SUCCINATE 60 MG: 125 INJECTION, POWDER, FOR SOLUTION INTRAMUSCULAR; INTRAVENOUS at 09:48

## 2020-01-09 RX ADMIN — BUDESONIDE 0.5 MG: 0.5 INHALANT RESPIRATORY (INHALATION) at 19:30

## 2020-01-09 RX ADMIN — COLESEVELAM HYDROCHLORIDE 1875 MG: 625 TABLET, FILM COATED ORAL at 17:22

## 2020-01-09 NOTE — PLAN OF CARE
Problem: Patient Care Overview  Goal: Plan of Care Review  Outcome: Ongoing (interventions implemented as appropriate)  Flowsheets  Taken 1/9/2020 2564  Progress: improving  Outcome Summary: Patient has rested through the night. Continues to receive iv steroids and breathing treatments. No complaints of pain.  Taken 1/8/2020 1901  Plan of Care Reviewed With: patient

## 2020-01-09 NOTE — PROGRESS NOTES
LOS: 1 day   Patient Care Team:  Candi Benson MD as PCP - General (Family Medicine)        Subjective     Interval History:     Patient Complaints: Still increase SOB and cough    Review of Systems     Objective     Vital Signs  Temp:  [97.6 °F (36.4 °C)-99 °F (37.2 °C)] 98.4 °F (36.9 °C)  Heart Rate:  [] 100  Resp:  [18-22] 18  BP: (121-135)/(79-84) 121/80    Physical Exam:     General Appearance:    Alert, cooperative, in mild acute distress   Ears:    Ears appear intact with no abnormalities noted   Throat:   No oral lesions, no thrush, oral mucosa moist   Neck:   No adenopathy, supple, trachea midline, no thyromegaly, no   carotid bruit, no JVD   Lungs:    wheezing ,respirations regular, even and                  Unlabored     Heart:    Regular rhythm and normal rate, normal S1 and S2, no            murmur, no gallop, no rub, no click   Abdomen:     Normal bowel sounds, no masses, no organomegaly, soft        non-tender, non-distended, no guarding, no rebound                tenderness   Extremities:   Moves all extremities well, no edema, no cyanosis, no             redness   Skin:   No bleeding, bruising or rash   Neurologic:   Cranial nerves 2 - 12 grossly intact, sensation intact, DTR       present and equal bilaterally        Results Review:    Lab Results (last 24 hours)     Procedure Component Value Units Date/Time    POC Glucose Once [[id number]]  (Abnormal) Collected:  01/08/20 2059    Specimen:  Blood Updated:  01/08/20 2102     Glucose 162 mg/dL      Comment: Serial Number: [id number]:  [id number]       Blood Culture - Blood, Hand, Right [[id number]] Collected:  01/07/20 1734    Specimen:  Blood from Hand, Right Updated:  01/08/20 1801     Blood Culture No growth at 24 hours    Blood Culture - Blood, Hand, Left [[id number]] Collected:  01/07/20 1730    Specimen:  Blood from Hand, Left Updated:  01/08/20 1801     Blood Culture No growth at 24 hours    POC Glucose Once  [[id number]]  (Abnormal) Collected:  01/08/20 1614    Specimen:  Blood Updated:  01/08/20 1615     Glucose 175 mg/dL      Comment: Serial Number: [id number]:  [id number]       POC Glucose Once [[id number]]  (Abnormal) Collected:  01/08/20 1137    Specimen:  Blood Updated:  01/08/20 1151     Glucose 189 mg/dL      Comment: Serial Number: [id number]:  [id number]       POC Glucose Once [[id number]]  (Abnormal) Collected:  01/08/20 0715    Specimen:  Blood Updated:  01/08/20 0717     Glucose 161 mg/dL      Comment: Serial Number: [id number]:  [id number]       Comprehensive Metabolic Panel [[id number]]  (Abnormal) Collected:  01/08/20 0304    Specimen:  Blood Updated:  01/08/20 0435     Glucose 166 mg/dL      BUN 12 mg/dL      Creatinine 0.62 mg/dL      Sodium 139 mmol/L      Potassium 4.8 mmol/L      Chloride 106 mmol/L      CO2 22.0 mmol/L      Calcium 10.0 mg/dL      Total Protein 7.7 g/dL      Albumin 4.10 g/dL      ALT (SGPT) 30 U/L      AST (SGOT) 23 U/L      Alkaline Phosphatase 95 U/L      Total Bilirubin 0.3 mg/dL      eGFR Non African Amer 95 mL/min/1.73      Globulin 3.6 gm/dL      A/G Ratio 1.1 g/dL      BUN/Creatinine Ratio 19.4     Anion Gap 11.0 mmol/L     Narrative:       GFR Normal >60  Chronic Kidney Disease <60  Kidney Failure <15      Urinalysis With Culture If Indicated - Urine, Clean Catch [[id number]]  (Abnormal) Collected:  01/08/20 0319    Specimen:  Urine, Clean Catch Updated:  01/08/20 0354     Color, UA Yellow     Appearance, UA Clear     pH, UA 5.5     Specific Gravity, UA 1.017     Glucose, UA Negative     Ketones, UA Negative     Bilirubin, UA Negative     Blood, UA Negative     Protein, UA Negative     Leuk Esterase, UA Small (1+)     Nitrite, UA Negative     Urobilinogen, UA 0.2 E.U./dL    Urinalysis, Microscopic Only - Urine, Clean Catch [[id number]]  (Abnormal) Collected:  01/08/20 0319    Specimen:  Urine, Clean Catch Updated:  01/08/20 0354     RBC, UA 0-2 /HPF      WBC,  UA 3-5 /HPF      Bacteria, UA None Seen /HPF      Squamous Epithelial Cells, UA None Seen /HPF      Hyaline Casts, UA 0-2 /LPF      Methodology Automated Microscopy    CBC & Differential [[id number]] Collected:  01/08/20 0304    Specimen:  Blood Updated:  01/08/20 0353    Narrative:       The following orders were created for panel order CBC & Differential.  Procedure                               Abnormality         Status                     ---------                               -----------         ------                     CBC Auto Differential[[id number]]        Abnormal            Final result                 Please view results for these tests on the individual orders.    CBC Auto Differential [[id number]]  (Abnormal) Collected:  01/08/20 0304    Specimen:  Blood Updated:  01/08/20 0353     WBC 7.10 10*3/mm3      RBC 4.66 10*6/mm3      Hemoglobin 14.4 g/dL      Hematocrit 42.8 %      MCV 91.7 fL      MCH 30.9 pg      MCHC 33.8 g/dL      RDW 14.2 %      RDW-SD 45.9 fl      MPV 8.4 fL      Platelets 206 10*3/mm3      Neutrophil % 86.3 %      Lymphocyte % 11.8 %      Monocyte % 1.1 %      Eosinophil % 0.1 %      Basophil % 0.7 %      Neutrophils, Absolute 6.10 10*3/mm3      Lymphocytes, Absolute 0.80 10*3/mm3      Monocytes, Absolute 0.10 10*3/mm3      Eosinophils, Absolute 0.00 10*3/mm3      Basophils, Absolute 0.00 10*3/mm3      nRBC 0.1 /100 WBC     POC Glucose Once [[id number]]  (Abnormal) Collected:  01/07/20 2123    Specimen:  Blood Updated:  01/07/20 2136     Glucose 154 mg/dL      Comment: Serial Number: [id number]:  [id number]            Imaging Results (Last 24 Hours)     ** No results found for the last 24 hours. **           I reviewed the patient's other test results and agree with the interpretation    Medication Review:     Current Facility-Administered Medications:   •  aluminum-magnesium hydroxide-simethicone (MAALOX MAX) 400-400-40 MG/5ML suspension 15 mL, 15 mL, Oral, Q6H PRN, Marcelino,  MD Candi  •  atorvastatin (LIPITOR) tablet 20 mg, 20 mg, Oral, Daily, Candi Benson MD, 20 mg at 01/08/20 0959  •  azelastine (ASTELIN) nasal spray 2 spray, 2 spray, Each Nare, BID, Candi Benson MD, 2 spray at 01/08/20 1001  •  bisacodyl (DULCOLAX) EC tablet 5 mg, 5 mg, Oral, Daily PRN, Candi Benson MD  •  bisacodyl (DULCOLAX) suppository 10 mg, 10 mg, Rectal, Daily PRN, Candi Benson MD  •  budesonide (PULMICORT) nebulizer solution 0.5 mg, 0.5 mg, Nebulization, BID - RT, Candi Benson MD, 0.5 mg at 01/08/20 1940  •  cholecalciferol (VITAMIN D3) tablet 1,000 Units, 1,000 Units, Oral, Daily, Candi Benson MD, 1,000 Units at 01/08/20 0931  •  colesevelam (WELCHOL) tablet 1,875 mg, 1,875 mg, Oral, BID With Meals, Candi Benson MD, 1,875 mg at 01/08/20 1739  •  enoxaparin (LOVENOX) syringe 40 mg, 40 mg, Subcutaneous, Q24H, Candi Benson MD, 40 mg at 01/08/20 1738  •  ferrous sulfate EC tablet 324 mg, 324 mg, Oral, Daily With Breakfast, Candi Benson MD, 324 mg at 01/08/20 0931  •  ipratropium-albuterol (DUO-NEB) nebulizer solution 3 mL, 3 mL, Nebulization, 4x Daily - RT, Candi Benson MD, 3 mL at 01/08/20 1940  •  levoFLOXacin (LEVAQUIN) 750 mg/150 mL D5W (premix) (LEVAQUIN) 750 mg, 750 mg, Intravenous, Q24H, Candi Benson MD, 750 mg at 01/08/20 1739  •  lisinopril (PRINIVIL,ZESTRIL) tablet 40 mg, 40 mg, Oral, Daily, Candi Benson MD, 40 mg at 01/08/20 0930  •  magnesium hydroxide (MILK OF MAGNESIA) suspension 2400 mg/10mL 10 mL, 10 mL, Oral, Daily PRN, Candi Benson MD  •  methylPREDNISolone sodium succinate (SOLU-Medrol) injection 60 mg, 60 mg, Intravenous, Q8H, Candi Benson MD, 60 mg at 01/08/20 1739  •  metoprolol succinate XL (TOPROL-XL) 24 hr tablet 50 mg, 50 mg, Oral, Daily, Candi Benson MD, 50 mg at 01/08/20 0931  •  montelukast (SINGULAIR) tablet 10 mg,  10 mg, Oral, Nightly, Candi Benson MD, 10 mg at 01/07/20 2041  •  ondansetron (ZOFRAN) injection 4 mg, 4 mg, Intravenous, Q6H PRN, Candi Benson MD  •  oxybutynin XL (DITROPAN-XL) 24 hr tablet 5 mg, 5 mg, Oral, Daily, Candi Benson MD, 5 mg at 01/08/20 0930  •  pantoprazole (PROTONIX) EC tablet 40 mg, 40 mg, Oral, Q AM, Candi Benson MD, 40 mg at 01/08/20 0540  •  sertraline (ZOLOFT) tablet 100 mg, 100 mg, Oral, Daily, Candi Benson MD, 100 mg at 01/08/20 0930  •  sodium chloride 0.9 % flush 10 mL, 10 mL, Intravenous, Q12H, Candi Benson MD, 10 mL at 01/08/20 0933  •  sodium chloride 0.9 % flush 10 mL, 10 mL, Intravenous, PRN, Candi Benson MD  •  sucralfate (CARAFATE) tablet 1 g, 1 g, Oral, BID, Candi Benson MD, 1 g at 01/08/20 0930    I have reviewed medication list.    Assessment/Plan     Active Problems:   Acute respiratory distress - symptomatic treatment, O2  RSV Bronchiolitis - Failure to out pt therapy, Started on IV Steroid, nebs and O2   Hyperglycemia - Because of steroid, continue SSI  CAD - Stable  Hyperlipidemia - continue statins  Stress ulcer/DVT prophylaxis              Plan for disposition: Home  At discharge    Candi Benson MD  01/08/20  9:11 PM

## 2020-01-10 LAB
ALBUMIN SERPL-MCNC: 3.6 G/DL (ref 3.5–5.2)
ALBUMIN/GLOB SERPL: 1.2 G/DL
ALP SERPL-CCNC: 72 U/L (ref 39–117)
ALT SERPL W P-5'-P-CCNC: 21 U/L (ref 1–33)
ANION GAP SERPL CALCULATED.3IONS-SCNC: 10 MMOL/L (ref 5–15)
AST SERPL-CCNC: 15 U/L (ref 1–32)
BASOPHILS # BLD AUTO: 0 10*3/MM3 (ref 0–0.2)
BASOPHILS NFR BLD AUTO: 0.1 % (ref 0–1.5)
BILIRUB SERPL-MCNC: <0.2 MG/DL (ref 0.2–1.2)
BUN BLD-MCNC: 21 MG/DL (ref 8–23)
BUN/CREAT SERPL: 36.8 (ref 7–25)
CALCIUM SPEC-SCNC: 9.6 MG/DL (ref 8.6–10.5)
CHLORIDE SERPL-SCNC: 107 MMOL/L (ref 98–107)
CO2 SERPL-SCNC: 21 MMOL/L (ref 22–29)
CREAT BLD-MCNC: 0.57 MG/DL (ref 0.57–1)
DEPRECATED RDW RBC AUTO: 46.4 FL (ref 37–54)
EOSINOPHIL # BLD AUTO: 0 10*3/MM3 (ref 0–0.4)
EOSINOPHIL NFR BLD AUTO: 0 % (ref 0.3–6.2)
ERYTHROCYTE [DISTWIDTH] IN BLOOD BY AUTOMATED COUNT: 14.3 % (ref 12.3–15.4)
GFR SERPL CREATININE-BSD FRML MDRD: 105 ML/MIN/1.73
GLOBULIN UR ELPH-MCNC: 3.1 GM/DL
GLUCOSE BLD-MCNC: 128 MG/DL (ref 65–99)
GLUCOSE BLDC GLUCOMTR-MCNC: 118 MG/DL (ref 70–105)
GLUCOSE BLDC GLUCOMTR-MCNC: 140 MG/DL (ref 70–105)
GLUCOSE BLDC GLUCOMTR-MCNC: 168 MG/DL (ref 70–105)
HCT VFR BLD AUTO: 40.6 % (ref 34–46.6)
HGB BLD-MCNC: 13.4 G/DL (ref 12–15.9)
LYMPHOCYTES # BLD AUTO: 1.3 10*3/MM3 (ref 0.7–3.1)
LYMPHOCYTES NFR BLD AUTO: 8.8 % (ref 19.6–45.3)
MCH RBC QN AUTO: 30.2 PG (ref 26.6–33)
MCHC RBC AUTO-ENTMCNC: 33.1 G/DL (ref 31.5–35.7)
MCV RBC AUTO: 91.3 FL (ref 79–97)
MONOCYTES # BLD AUTO: 0.6 10*3/MM3 (ref 0.1–0.9)
MONOCYTES NFR BLD AUTO: 4 % (ref 5–12)
NEUTROPHILS # BLD AUTO: 12.5 10*3/MM3 (ref 1.7–7)
NEUTROPHILS NFR BLD AUTO: 87.1 % (ref 42.7–76)
NRBC BLD AUTO-RTO: 0.1 /100 WBC (ref 0–0.2)
PLATELET # BLD AUTO: 227 10*3/MM3 (ref 140–450)
PMV BLD AUTO: 9.2 FL (ref 6–12)
POTASSIUM BLD-SCNC: 4.2 MMOL/L (ref 3.5–5.2)
PROT SERPL-MCNC: 6.7 G/DL (ref 6–8.5)
RBC # BLD AUTO: 4.44 10*6/MM3 (ref 3.77–5.28)
SODIUM BLD-SCNC: 138 MMOL/L (ref 136–145)
WBC NRBC COR # BLD: 14.3 10*3/MM3 (ref 3.4–10.8)

## 2020-01-10 PROCEDURE — 25010000002 METHYLPREDNISOLONE PER 125 MG: Performed by: FAMILY MEDICINE

## 2020-01-10 PROCEDURE — 85025 COMPLETE CBC W/AUTO DIFF WBC: CPT | Performed by: FAMILY MEDICINE

## 2020-01-10 PROCEDURE — 94799 UNLISTED PULMONARY SVC/PX: CPT

## 2020-01-10 PROCEDURE — 80053 COMPREHEN METABOLIC PANEL: CPT | Performed by: FAMILY MEDICINE

## 2020-01-10 PROCEDURE — 82962 GLUCOSE BLOOD TEST: CPT

## 2020-01-10 PROCEDURE — 25010000002 LEVOFLOXACIN PER 250 MG: Performed by: FAMILY MEDICINE

## 2020-01-10 PROCEDURE — 25010000002 ENOXAPARIN PER 10 MG: Performed by: FAMILY MEDICINE

## 2020-01-10 RX ORDER — METHYLPREDNISOLONE SODIUM SUCCINATE 40 MG/ML
40 INJECTION, POWDER, LYOPHILIZED, FOR SOLUTION INTRAMUSCULAR; INTRAVENOUS EVERY 12 HOURS
Status: DISCONTINUED | OUTPATIENT
Start: 2020-01-11 | End: 2020-01-11 | Stop reason: HOSPADM

## 2020-01-10 RX ADMIN — IPRATROPIUM BROMIDE AND ALBUTEROL SULFATE 3 ML: .5; 3 SOLUTION RESPIRATORY (INHALATION) at 18:55

## 2020-01-10 RX ADMIN — SUCRALFATE 1 G: 1 TABLET ORAL at 20:33

## 2020-01-10 RX ADMIN — COLESEVELAM HYDROCHLORIDE 1875 MG: 625 TABLET, FILM COATED ORAL at 08:14

## 2020-01-10 RX ADMIN — METOPROLOL SUCCINATE 50 MG: 50 TABLET, EXTENDED RELEASE ORAL at 08:15

## 2020-01-10 RX ADMIN — SERTRALINE 100 MG: 100 TABLET, FILM COATED ORAL at 08:14

## 2020-01-10 RX ADMIN — METHYLPREDNISOLONE SODIUM SUCCINATE 60 MG: 125 INJECTION, POWDER, FOR SOLUTION INTRAMUSCULAR; INTRAVENOUS at 17:24

## 2020-01-10 RX ADMIN — IPRATROPIUM BROMIDE AND ALBUTEROL SULFATE 3 ML: .5; 3 SOLUTION RESPIRATORY (INHALATION) at 11:22

## 2020-01-10 RX ADMIN — AZELASTINE 2 SPRAY: 1 SPRAY, METERED NASAL at 20:35

## 2020-01-10 RX ADMIN — IPRATROPIUM BROMIDE AND ALBUTEROL SULFATE 3 ML: .5; 3 SOLUTION RESPIRATORY (INHALATION) at 07:02

## 2020-01-10 RX ADMIN — IPRATROPIUM BROMIDE AND ALBUTEROL SULFATE 3 ML: .5; 3 SOLUTION RESPIRATORY (INHALATION) at 15:13

## 2020-01-10 RX ADMIN — BUDESONIDE 0.5 MG: 0.5 INHALANT RESPIRATORY (INHALATION) at 07:02

## 2020-01-10 RX ADMIN — OXYBUTYNIN CHLORIDE 5 MG: 5 TABLET, EXTENDED RELEASE ORAL at 08:15

## 2020-01-10 RX ADMIN — LEVOFLOXACIN 750 MG: 5 INJECTION, SOLUTION INTRAVENOUS at 17:24

## 2020-01-10 RX ADMIN — MELATONIN 1000 UNITS: at 08:15

## 2020-01-10 RX ADMIN — METHYLPREDNISOLONE SODIUM SUCCINATE 60 MG: 125 INJECTION, POWDER, FOR SOLUTION INTRAMUSCULAR; INTRAVENOUS at 08:17

## 2020-01-10 RX ADMIN — ATORVASTATIN CALCIUM 20 MG: 20 TABLET, FILM COATED ORAL at 08:15

## 2020-01-10 RX ADMIN — MAGNESIUM HYDROXIDE 10 ML: 2400 SUSPENSION ORAL at 20:34

## 2020-01-10 RX ADMIN — PANTOPRAZOLE SODIUM 40 MG: 40 TABLET, DELAYED RELEASE ORAL at 05:51

## 2020-01-10 RX ADMIN — Medication 10 ML: at 08:15

## 2020-01-10 RX ADMIN — COLESEVELAM HYDROCHLORIDE 1875 MG: 625 TABLET, FILM COATED ORAL at 17:23

## 2020-01-10 RX ADMIN — ENOXAPARIN SODIUM 40 MG: 40 INJECTION SUBCUTANEOUS at 17:24

## 2020-01-10 RX ADMIN — FERROUS SULFATE TAB EC 324 MG (65 MG FE EQUIVALENT) 324 MG: 324 (65 FE) TABLET DELAYED RESPONSE at 08:15

## 2020-01-10 RX ADMIN — MONTELUKAST SODIUM 10 MG: 10 TABLET, FILM COATED ORAL at 20:33

## 2020-01-10 RX ADMIN — LISINOPRIL 40 MG: 20 TABLET ORAL at 08:15

## 2020-01-10 RX ADMIN — AZELASTINE 2 SPRAY: 1 SPRAY, METERED NASAL at 08:16

## 2020-01-10 RX ADMIN — BUDESONIDE 0.5 MG: 0.5 INHALANT RESPIRATORY (INHALATION) at 18:55

## 2020-01-10 RX ADMIN — SUCRALFATE 1 G: 1 TABLET ORAL at 08:14

## 2020-01-10 RX ADMIN — METHYLPREDNISOLONE SODIUM SUCCINATE 60 MG: 125 INJECTION, POWDER, FOR SOLUTION INTRAMUSCULAR; INTRAVENOUS at 02:14

## 2020-01-10 RX ADMIN — Medication 10 ML: at 20:33

## 2020-01-10 NOTE — PROGRESS NOTES
LOS: 2 days   Patient Care Team:  Candi Benson MD as PCP - General (Family Medicine)        Subjective     Interval History:     Patient Complaints: still SOB and cough, feeling better    Review of Systems     Objective     Vital Signs  Temp:  [97.3 °F (36.3 °C)-98.2 °F (36.8 °C)] 98.2 °F (36.8 °C)  Heart Rate:  [] 101  Resp:  [18-22] 20  BP: (112-137)/(74-90) 124/74    Physical Exam:     General Appearance:    Alert, cooperative, no acute distress   Ears:    Ears appear intact with no abnormalities noted   Throat:   No oral lesions, no thrush, oral mucosa moist   Neck:   No adenopathy, supple, trachea midline, no thyromegaly, no   carotid bruit, no JVD   Lungs:    wheezing ,respirations regular, even and                  Unlabored     Heart:    Regular rhythm and normal rate, normal S1 and S2, no            murmur, no gallop, no rub, no click   Abdomen:     Normal bowel sounds, no masses, no organomegaly, soft        non-tender, non-distended, no guarding, no rebound                tenderness   Extremities:   Moves all extremities well, no edema, no cyanosis, no             redness   Skin:   No bleeding, bruising or rash   Neurologic:   Cranial nerves 2 - 12 grossly intact, sensation intact, DTR       present and equal bilaterally        Results Review:    Lab Results (last 24 hours)     Procedure Component Value Units Date/Time    Blood Culture - Blood, Hand, Right [[id number]] Collected:  01/07/20 1734    Specimen:  Blood from Hand, Right Updated:  01/09/20 1800     Blood Culture No growth at 2 days    Blood Culture - Blood, Hand, Left [[id number]] Collected:  01/07/20 1730    Specimen:  Blood from Hand, Left Updated:  01/09/20 1800     Blood Culture No growth at 2 days    POC Glucose Once [[id number]]  (Abnormal) Collected:  01/09/20 1645    Specimen:  Blood Updated:  01/09/20 1656     Glucose 143 mg/dL      Comment: Serial Number: [id number]:  [id number]       POC Glucose Once  [[id number]]  (Abnormal) Collected:  01/09/20 1144    Specimen:  Blood Updated:  01/09/20 1200     Glucose 120 mg/dL      Comment: Serial Number: [id number]:  [id number]       Respiratory Culture - Sputum, Cough [[id number]] Collected:  01/09/20 1002    Specimen:  Sputum from Cough Updated:  01/09/20 1144     Gram Stain Few (2+) WBCs per low power field      Few (2+) Epithelial cells per low power field      Few (2+) Mixed bacterial morphotypes seen on Gram Stain    POC Glucose Once [[id number]]  (Abnormal) Collected:  01/09/20 0723    Specimen:  Blood Updated:  01/09/20 0726     Glucose 138 mg/dL      Comment: Serial Number: [id number]:  [id number]       Comprehensive Metabolic Panel [[id number]]  (Abnormal) Collected:  01/09/20 0404    Specimen:  Blood Updated:  01/09/20 0511     Glucose 150 mg/dL      BUN 17 mg/dL      Creatinine 0.64 mg/dL      Sodium 142 mmol/L      Potassium 4.4 mmol/L      Chloride 109 mmol/L      CO2 22.0 mmol/L      Calcium 9.7 mg/dL      Total Protein 7.1 g/dL      Albumin 3.80 g/dL      ALT (SGPT) 24 U/L      AST (SGOT) 15 U/L      Alkaline Phosphatase 79 U/L      Total Bilirubin 0.2 mg/dL      eGFR Non African Amer 92 mL/min/1.73      Globulin 3.3 gm/dL      A/G Ratio 1.2 g/dL      BUN/Creatinine Ratio 26.6     Anion Gap 11.0 mmol/L     Narrative:       GFR Normal >60  Chronic Kidney Disease <60  Kidney Failure <15      CBC & Differential [[id number]] Collected:  01/09/20 0404    Specimen:  Blood Updated:  01/09/20 0449    Narrative:       The following orders were created for panel order CBC & Differential.  Procedure                               Abnormality         Status                     ---------                               -----------         ------                     CBC Auto Differential[[id number]]        Abnormal            Final result                 Please view results for these tests on the individual orders.    CBC Auto Differential [[id number]]  (Abnormal)  Collected:  01/09/20 0404    Specimen:  Blood Updated:  01/09/20 0449     WBC 12.60 10*3/mm3      RBC 4.51 10*6/mm3      Hemoglobin 13.7 g/dL      Hematocrit 40.9 %      MCV 90.7 fL      MCH 30.3 pg      MCHC 33.4 g/dL      RDW 14.7 %      RDW-SD 47.7 fl      MPV 7.9 fL      Platelets 233 10*3/mm3      Neutrophil % 87.9 %      Lymphocyte % 7.6 %      Monocyte % 4.3 %      Eosinophil % 0.0 %      Basophil % 0.2 %      Neutrophils, Absolute 11.10 10*3/mm3      Lymphocytes, Absolute 1.00 10*3/mm3      Monocytes, Absolute 0.50 10*3/mm3      Eosinophils, Absolute 0.00 10*3/mm3      Basophils, Absolute 0.00 10*3/mm3      nRBC 0.1 /100 WBC     POC Glucose Once [[id number]]  (Abnormal) Collected:  01/08/20 2059    Specimen:  Blood Updated:  01/08/20 2102     Glucose 162 mg/dL      Comment: Serial Number: [id number]:  [id number]            Imaging Results (Last 24 Hours)     ** No results found for the last 24 hours. **           I reviewed the patient's other test results and agree with the interpretation    Medication Review:     Current Facility-Administered Medications:   •  aluminum-magnesium hydroxide-simethicone (MAALOX MAX) 400-400-40 MG/5ML suspension 15 mL, 15 mL, Oral, Q6H PRN, Candi Benson MD, 15 mL at 01/09/20 1918  •  atorvastatin (LIPITOR) tablet 20 mg, 20 mg, Oral, Daily, Candi Benson MD, 20 mg at 01/09/20 0947  •  azelastine (ASTELIN) nasal spray 2 spray, 2 spray, Each Nare, BID, Candi Benson MD, 2 spray at 01/09/20 0948  •  bisacodyl (DULCOLAX) EC tablet 5 mg, 5 mg, Oral, Daily PRN, Candi Benson MD  •  bisacodyl (DULCOLAX) suppository 10 mg, 10 mg, Rectal, Daily PRN, Candi Benson MD  •  budesonide (PULMICORT) nebulizer solution 0.5 mg, 0.5 mg, Nebulization, BID - RT, Candi Benson MD, 0.5 mg at 01/09/20 0727  •  cholecalciferol (VITAMIN D3) tablet 1,000 Units, 1,000 Units, Oral, Daily, Candi Benson MD, 1,000 Units at 01/09/20  0947  •  colesevelam (WELCHOL) tablet 1,875 mg, 1,875 mg, Oral, BID With Meals, Candi Benson MD, 1,875 mg at 01/09/20 1722  •  enoxaparin (LOVENOX) syringe 40 mg, 40 mg, Subcutaneous, Q24H, Candi Benson MD, 40 mg at 01/09/20 1716  •  ferrous sulfate EC tablet 324 mg, 324 mg, Oral, Daily With Breakfast, Candi Benson MD, 324 mg at 01/09/20 0947  •  ipratropium-albuterol (DUO-NEB) nebulizer solution 3 mL, 3 mL, Nebulization, 4x Daily - RT, Candi Benson MD, 3 mL at 01/09/20 1423  •  levoFLOXacin (LEVAQUIN) 750 mg/150 mL D5W (premix) (LEVAQUIN) 750 mg, 750 mg, Intravenous, Q24H, Candi Benson MD, 750 mg at 01/09/20 1716  •  lisinopril (PRINIVIL,ZESTRIL) tablet 40 mg, 40 mg, Oral, Daily, Candi Benson MD, 40 mg at 01/09/20 0947  •  magnesium hydroxide (MILK OF MAGNESIA) suspension 2400 mg/10mL 10 mL, 10 mL, Oral, Daily PRN, Candi Benson MD  •  methylPREDNISolone sodium succinate (SOLU-Medrol) injection 60 mg, 60 mg, Intravenous, Q8H, Candi Benson MD, 60 mg at 01/09/20 1716  •  metoprolol succinate XL (TOPROL-XL) 24 hr tablet 50 mg, 50 mg, Oral, Daily, Candi Benson MD, 50 mg at 01/09/20 0947  •  montelukast (SINGULAIR) tablet 10 mg, 10 mg, Oral, Nightly, Candi Benson MD, 10 mg at 01/08/20 2134  •  ondansetron (ZOFRAN) injection 4 mg, 4 mg, Intravenous, Q6H PRN, Candi Benson MD  •  oxybutynin XL (DITROPAN-XL) 24 hr tablet 5 mg, 5 mg, Oral, Daily, Candi Benson MD, 5 mg at 01/09/20 0947  •  pantoprazole (PROTONIX) EC tablet 40 mg, 40 mg, Oral, Q AM, Candi Benson MD, 40 mg at 01/09/20 0536  •  sertraline (ZOLOFT) tablet 100 mg, 100 mg, Oral, Daily, Candi Benson MD, 100 mg at 01/09/20 0947  •  sodium chloride 0.9 % flush 10 mL, 10 mL, Intravenous, Q12H, Candi Benson MD, 10 mL at 01/09/20 0948  •  sodium chloride 0.9 % flush 10 mL, 10 mL, Intravenous, PRN, Marcelino,  MD Candi  •  sucralfate (CARAFATE) tablet 1 g, 1 g, Oral, BID, Candi Benson MD, 1 g at 01/09/20 0947    I have reviewed medication list.    Assessment/Plan     Active Problems:   Acute respiratory distress - symptomatic treatment, O2  RSV Bronchiolitis - Failure to out pt therapy,  on IV Steroid, nebs and O2   Hyperglycemia - Because of steroid, continue SSI  CAD - Stable  Hyperlipidemia - continue statins  Stress ulcer/DVT prophylaxis              Plan for disposition: Home  At discharge    Candi Benson MD  01/09/20  7:27 PM

## 2020-01-10 NOTE — PROGRESS NOTES
Continued Stay Note  NIKI Perez     Patient Name: Monse Donnelly  MRN: [id number]  Today's Date: 1/10/2020    Admit Date: 1/7/2020    Discharge Plan     Row Name 01/10/20 0950       Plan    Plan  Anticipate routine home.     Plan Comments  Barrier: IV antibiotics, IV steroids.         Expected Discharge Date and Time     Expected Discharge Date Expected Discharge Time    Stiven 10, 2020           Aleena Perdomo  [phone]

## 2020-01-10 NOTE — PLAN OF CARE
Problem: Patient Care Overview  Goal: Plan of Care Review  Outcome: Ongoing (interventions implemented as appropriate)  Flowsheets  Taken 1/10/2020 0450  Progress: improving  Outcome Summary: Patient has slept well through the night. Has had no complaints. IV steroids and breathing treatments administered .  Taken 1/9/2020 1901  Plan of Care Reviewed With: patient

## 2020-01-11 VITALS
HEART RATE: 71 BPM | OXYGEN SATURATION: 95 % | HEIGHT: 59 IN | SYSTOLIC BLOOD PRESSURE: 147 MMHG | TEMPERATURE: 97.7 F | DIASTOLIC BLOOD PRESSURE: 91 MMHG | WEIGHT: 175.27 LBS | RESPIRATION RATE: 18 BRPM | BODY MASS INDEX: 35.33 KG/M2

## 2020-01-11 LAB
ALBUMIN SERPL-MCNC: 3.5 G/DL (ref 3.5–5.2)
ALBUMIN/GLOB SERPL: 1.3 G/DL
ALP SERPL-CCNC: 65 U/L (ref 39–117)
ALT SERPL W P-5'-P-CCNC: 21 U/L (ref 1–33)
ANION GAP SERPL CALCULATED.3IONS-SCNC: 9 MMOL/L (ref 5–15)
AST SERPL-CCNC: 13 U/L (ref 1–32)
BACTERIA SPEC RESP CULT: NORMAL
BASOPHILS # BLD AUTO: 0 10*3/MM3 (ref 0–0.2)
BASOPHILS NFR BLD AUTO: 0.2 % (ref 0–1.5)
BILIRUB SERPL-MCNC: 0.2 MG/DL (ref 0.2–1.2)
BUN BLD-MCNC: 17 MG/DL (ref 8–23)
BUN/CREAT SERPL: 29.8 (ref 7–25)
CALCIUM SPEC-SCNC: 9.2 MG/DL (ref 8.6–10.5)
CHLORIDE SERPL-SCNC: 108 MMOL/L (ref 98–107)
CO2 SERPL-SCNC: 24 MMOL/L (ref 22–29)
CREAT BLD-MCNC: 0.57 MG/DL (ref 0.57–1)
DEPRECATED RDW RBC AUTO: 45.9 FL (ref 37–54)
EOSINOPHIL # BLD AUTO: 0 10*3/MM3 (ref 0–0.4)
EOSINOPHIL NFR BLD AUTO: 0 % (ref 0.3–6.2)
ERYTHROCYTE [DISTWIDTH] IN BLOOD BY AUTOMATED COUNT: 14.1 % (ref 12.3–15.4)
GFR SERPL CREATININE-BSD FRML MDRD: 105 ML/MIN/1.73
GLOBULIN UR ELPH-MCNC: 2.8 GM/DL
GLUCOSE BLD-MCNC: 122 MG/DL (ref 65–99)
GLUCOSE BLDC GLUCOMTR-MCNC: 105 MG/DL (ref 70–105)
GLUCOSE BLDC GLUCOMTR-MCNC: 134 MG/DL (ref 70–105)
GRAM STN SPEC: NORMAL
HCT VFR BLD AUTO: 41.1 % (ref 34–46.6)
HGB BLD-MCNC: 13.5 G/DL (ref 12–15.9)
LYMPHOCYTES # BLD AUTO: 1.3 10*3/MM3 (ref 0.7–3.1)
LYMPHOCYTES NFR BLD AUTO: 11.6 % (ref 19.6–45.3)
MCH RBC QN AUTO: 29.9 PG (ref 26.6–33)
MCHC RBC AUTO-ENTMCNC: 32.8 G/DL (ref 31.5–35.7)
MCV RBC AUTO: 91.3 FL (ref 79–97)
MONOCYTES # BLD AUTO: 0.7 10*3/MM3 (ref 0.1–0.9)
MONOCYTES NFR BLD AUTO: 5.9 % (ref 5–12)
NEUTROPHILS # BLD AUTO: 9.2 10*3/MM3 (ref 1.7–7)
NEUTROPHILS NFR BLD AUTO: 82.3 % (ref 42.7–76)
NRBC BLD AUTO-RTO: 0 /100 WBC (ref 0–0.2)
PLATELET # BLD AUTO: 248 10*3/MM3 (ref 140–450)
PMV BLD AUTO: 8.4 FL (ref 6–12)
POTASSIUM BLD-SCNC: 4.5 MMOL/L (ref 3.5–5.2)
PROT SERPL-MCNC: 6.3 G/DL (ref 6–8.5)
RBC # BLD AUTO: 4.51 10*6/MM3 (ref 3.77–5.28)
SODIUM BLD-SCNC: 141 MMOL/L (ref 136–145)
WBC NRBC COR # BLD: 11.2 10*3/MM3 (ref 3.4–10.8)

## 2020-01-11 PROCEDURE — 85025 COMPLETE CBC W/AUTO DIFF WBC: CPT | Performed by: FAMILY MEDICINE

## 2020-01-11 PROCEDURE — 82962 GLUCOSE BLOOD TEST: CPT

## 2020-01-11 PROCEDURE — 94799 UNLISTED PULMONARY SVC/PX: CPT

## 2020-01-11 PROCEDURE — 80053 COMPREHEN METABOLIC PANEL: CPT | Performed by: FAMILY MEDICINE

## 2020-01-11 PROCEDURE — 25010000002 METHYLPREDNISOLONE PER 40 MG: Performed by: FAMILY MEDICINE

## 2020-01-11 RX ORDER — PANTOPRAZOLE SODIUM 40 MG/1
40 TABLET, DELAYED RELEASE ORAL DAILY
Qty: 30 TABLET | Refills: 0 | Status: SHIPPED | OUTPATIENT
Start: 2020-01-11 | End: 2020-02-10

## 2020-01-11 RX ADMIN — Medication 10 ML: at 09:14

## 2020-01-11 RX ADMIN — SERTRALINE 100 MG: 100 TABLET, FILM COATED ORAL at 09:13

## 2020-01-11 RX ADMIN — OXYBUTYNIN CHLORIDE 5 MG: 5 TABLET, EXTENDED RELEASE ORAL at 09:13

## 2020-01-11 RX ADMIN — AZELASTINE 2 SPRAY: 1 SPRAY, METERED NASAL at 09:16

## 2020-01-11 RX ADMIN — IPRATROPIUM BROMIDE AND ALBUTEROL SULFATE 3 ML: .5; 3 SOLUTION RESPIRATORY (INHALATION) at 07:48

## 2020-01-11 RX ADMIN — METOPROLOL SUCCINATE 50 MG: 50 TABLET, EXTENDED RELEASE ORAL at 09:13

## 2020-01-11 RX ADMIN — PANTOPRAZOLE SODIUM 40 MG: 40 TABLET, DELAYED RELEASE ORAL at 05:11

## 2020-01-11 RX ADMIN — COLESEVELAM HYDROCHLORIDE 1875 MG: 625 TABLET, FILM COATED ORAL at 09:13

## 2020-01-11 RX ADMIN — MELATONIN 1000 UNITS: at 09:13

## 2020-01-11 RX ADMIN — LISINOPRIL 40 MG: 20 TABLET ORAL at 09:12

## 2020-01-11 RX ADMIN — ATORVASTATIN CALCIUM 20 MG: 20 TABLET, FILM COATED ORAL at 09:13

## 2020-01-11 RX ADMIN — METHYLPREDNISOLONE SODIUM SUCCINATE 40 MG: 40 INJECTION, POWDER, FOR SOLUTION INTRAMUSCULAR; INTRAVENOUS at 05:11

## 2020-01-11 RX ADMIN — FERROUS SULFATE TAB EC 324 MG (65 MG FE EQUIVALENT) 324 MG: 324 (65 FE) TABLET DELAYED RESPONSE at 09:13

## 2020-01-11 RX ADMIN — IPRATROPIUM BROMIDE AND ALBUTEROL SULFATE 3 ML: .5; 3 SOLUTION RESPIRATORY (INHALATION) at 11:42

## 2020-01-11 RX ADMIN — SUCRALFATE 1 G: 1 TABLET ORAL at 09:13

## 2020-01-11 RX ADMIN — BUDESONIDE 0.5 MG: 0.5 INHALANT RESPIRATORY (INHALATION) at 07:48

## 2020-01-11 NOTE — PROGRESS NOTES
LOS: 3 days   Patient Care Team:  Candi Benson MD as PCP - General (Family Medicine)        Subjective     Interval History:     Patient Complaints: still SOB and cough, feeling better    Review of Systems     Objective     Vital Signs  Temp:  [97.7 °F (36.5 °C)-98.4 °F (36.9 °C)] 97.7 °F (36.5 °C)  Heart Rate:  [63-94] 90  Resp:  [15-17] 16  BP: (108-149)/(67-87) 149/87    Physical Exam:     General Appearance:    Alert, cooperative, no acute distress   Ears:    Ears appear intact with no abnormalities noted   Throat:   No oral lesions, no thrush, oral mucosa moist   Neck:   No adenopathy, supple, trachea midline, no thyromegaly, no   carotid bruit, no JVD   Lungs:    wheezing ,respirations regular, even and                  Unlabored     Heart:    Regular rhythm and normal rate, normal S1 and S2, no            murmur, no gallop, no rub, no click   Abdomen:     Normal bowel sounds, no masses, no organomegaly, soft        non-tender, non-distended, no guarding, no rebound                tenderness   Extremities:   Moves all extremities well, no edema, no cyanosis, no             redness   Skin:   No bleeding, bruising or rash   Neurologic:   Cranial nerves 2 - 12 grossly intact, sensation intact, DTR       present and equal bilaterally        Results Review:    Lab Results (last 24 hours)     Procedure Component Value Units Date/Time    Blood Culture - Blood, Hand, Right [[id number]] Collected:  01/07/20 1734    Specimen:  Blood from Hand, Right Updated:  01/10/20 1800     Blood Culture No growth at 3 days    Blood Culture - Blood, Hand, Left [[id number]] Collected:  01/07/20 1730    Specimen:  Blood from Hand, Left Updated:  01/10/20 1800     Blood Culture No growth at 3 days    POC Glucose Once [[id number]]  (Abnormal) Collected:  01/10/20 1641    Specimen:  Blood Updated:  01/10/20 1646     Glucose 168 mg/dL      Comment: Serial Number: [id number]:  [id number]       Respiratory Culture -  Sputum, Cough [[id number]] Collected:  01/09/20 1002    Specimen:  Sputum from Cough Updated:  01/10/20 1125     Respiratory Culture Light growth (2+) Normal Respiratory Pari     Gram Stain Few (2+) WBCs per low power field      Few (2+) Epithelial cells per low power field      Few (2+) Mixed bacterial morphotypes seen on Gram Stain    POC Glucose Once [[id number]]  (Abnormal) Collected:  01/10/20 0753    Specimen:  Blood Updated:  01/10/20 0755     Glucose 118 mg/dL      Comment: Serial Number: [id number]:  [id number]       Comprehensive Metabolic Panel [[id number]]  (Abnormal) Collected:  01/10/20 0320    Specimen:  Blood Updated:  01/10/20 0450     Glucose 128 mg/dL      BUN 21 mg/dL      Creatinine 0.57 mg/dL      Sodium 138 mmol/L      Potassium 4.2 mmol/L      Chloride 107 mmol/L      CO2 21.0 mmol/L      Calcium 9.6 mg/dL      Total Protein 6.7 g/dL      Albumin 3.60 g/dL      ALT (SGPT) 21 U/L      AST (SGOT) 15 U/L      Alkaline Phosphatase 72 U/L      Total Bilirubin <0.2 mg/dL      eGFR Non African Amer 105 mL/min/1.73      Globulin 3.1 gm/dL      A/G Ratio 1.2 g/dL      BUN/Creatinine Ratio 36.8     Anion Gap 10.0 mmol/L     Narrative:       GFR Normal >60  Chronic Kidney Disease <60  Kidney Failure <15      CBC & Differential [[id number]] Collected:  01/10/20 0320    Specimen:  Blood Updated:  01/10/20 0434    Narrative:       The following orders were created for panel order CBC & Differential.  Procedure                               Abnormality         Status                     ---------                               -----------         ------                     CBC Auto Differential[[id number]]        Abnormal            Final result                 Please view results for these tests on the individual orders.    CBC Auto Differential [[id number]]  (Abnormal) Collected:  01/10/20 0320    Specimen:  Blood Updated:  01/10/20 0434     WBC 14.30 10*3/mm3      RBC 4.44 10*6/mm3      Hemoglobin 13.4  g/dL      Hematocrit 40.6 %      MCV 91.3 fL      MCH 30.2 pg      MCHC 33.1 g/dL      RDW 14.3 %      RDW-SD 46.4 fl      MPV 9.2 fL      Platelets 227 10*3/mm3      Neutrophil % 87.1 %      Lymphocyte % 8.8 %      Monocyte % 4.0 %      Eosinophil % 0.0 %      Basophil % 0.1 %      Neutrophils, Absolute 12.50 10*3/mm3      Lymphocytes, Absolute 1.30 10*3/mm3      Monocytes, Absolute 0.60 10*3/mm3      Eosinophils, Absolute 0.00 10*3/mm3      Basophils, Absolute 0.00 10*3/mm3      nRBC 0.1 /100 WBC     POC Glucose Once [[id number]]  (Abnormal) Collected:  01/09/20 2136    Specimen:  Blood Updated:  01/09/20 2138     Glucose 124 mg/dL      Comment: Serial Number: [id number]:  [id number]            Imaging Results (Last 24 Hours)     ** No results found for the last 24 hours. **           I reviewed the patient's other test results and agree with the interpretation    Medication Review:     Current Facility-Administered Medications:   •  aluminum-magnesium hydroxide-simethicone (MAALOX MAX) 400-400-40 MG/5ML suspension 15 mL, 15 mL, Oral, Q6H PRN, Candi Benson MD, 15 mL at 01/09/20 1918  •  atorvastatin (LIPITOR) tablet 20 mg, 20 mg, Oral, Daily, Candi Benson MD, 20 mg at 01/10/20 0815  •  azelastine (ASTELIN) nasal spray 2 spray, 2 spray, Each Nare, BID, Candi Benson MD, 2 spray at 01/10/20 0816  •  bisacodyl (DULCOLAX) EC tablet 5 mg, 5 mg, Oral, Daily PRN, Candi Benson MD  •  bisacodyl (DULCOLAX) suppository 10 mg, 10 mg, Rectal, Daily PRN, Candi Benson MD  •  budesonide (PULMICORT) nebulizer solution 0.5 mg, 0.5 mg, Nebulization, BID - RT, Candi Benson MD, 0.5 mg at 01/10/20 1855  •  cholecalciferol (VITAMIN D3) tablet 1,000 Units, 1,000 Units, Oral, Daily, aCndi Benson MD, 1,000 Units at 01/10/20 0815  •  colesevelam (WELCHOL) tablet 1,875 mg, 1,875 mg, Oral, BID With Meals, Candi Benson MD, 1,875 mg at 01/10/20 1723  •   enoxaparin (LOVENOX) syringe 40 mg, 40 mg, Subcutaneous, Q24H, Candi Benson MD, 40 mg at 01/10/20 1724  •  ferrous sulfate EC tablet 324 mg, 324 mg, Oral, Daily With Breakfast, Candi Benson MD, 324 mg at 01/10/20 0815  •  ipratropium-albuterol (DUO-NEB) nebulizer solution 3 mL, 3 mL, Nebulization, 4x Daily - RT, Candi Benson MD, 3 mL at 01/10/20 1855  •  levoFLOXacin (LEVAQUIN) 750 mg/150 mL D5W (premix) (LEVAQUIN) 750 mg, 750 mg, Intravenous, Q24H, Candi Benson MD, 750 mg at 01/10/20 1724  •  lisinopril (PRINIVIL,ZESTRIL) tablet 40 mg, 40 mg, Oral, Daily, Candi Benson MD, 40 mg at 01/10/20 0815  •  magnesium hydroxide (MILK OF MAGNESIA) suspension 2400 mg/10mL 10 mL, 10 mL, Oral, Daily PRN, Candi Benson MD  •  [START ON 1/11/2020] methylPREDNISolone sodium succinate (SOLU-Medrol) injection 40 mg, 40 mg, Intravenous, Q12H, Candi Benson MD  •  metoprolol succinate XL (TOPROL-XL) 24 hr tablet 50 mg, 50 mg, Oral, Daily, Candi Benson MD, 50 mg at 01/10/20 0815  •  montelukast (SINGULAIR) tablet 10 mg, 10 mg, Oral, Nightly, Candi Benson MD, 10 mg at 01/09/20 2119  •  ondansetron (ZOFRAN) injection 4 mg, 4 mg, Intravenous, Q6H PRN, Candi Benson MD  •  oxybutynin XL (DITROPAN-XL) 24 hr tablet 5 mg, 5 mg, Oral, Daily, Candi Benson MD, 5 mg at 01/10/20 0815  •  pantoprazole (PROTONIX) EC tablet 40 mg, 40 mg, Oral, Q AM, Candi Benson MD, 40 mg at 01/10/20 0551  •  sertraline (ZOLOFT) tablet 100 mg, 100 mg, Oral, Daily, Candi Benson MD, 100 mg at 01/10/20 0814  •  sodium chloride 0.9 % flush 10 mL, 10 mL, Intravenous, Q12H, Candi Benson MD, 10 mL at 01/10/20 0815  •  sodium chloride 0.9 % flush 10 mL, 10 mL, Intravenous, PRN, Candi Benson MD  •  sucralfate (CARAFATE) tablet 1 g, 1 g, Oral, BID, Candi Benson MD, 1 g at 01/10/20 0814    I have reviewed  medication list.    Assessment/Plan     Active Problems:   Acute respiratory distress - symptomatic treatment, O2  RSV Bronchiolitis - Failure to out pt therapy,  Wean off  IV Steroid, nebs and O2   Hyperglycemia - Because of steroid, continue SSI  CAD - Stable  Hyperlipidemia - continue statins  Stress ulcer/DVT prophylaxis              Plan for disposition: Home  At discharge in     Candi Benson MD  01/10/20  7:57 PM

## 2020-01-11 NOTE — PLAN OF CARE
Problem: Patient Care Overview  Goal: Plan of Care Review  Outcome: Ongoing (interventions implemented as appropriate)  Flowsheets  Taken 1/11/2020 0258  Progress: improving  Outcome Summary: patient still with some shortness of breath and cough, room air and still on scheduled IV steroids and Breathing treatment.  Taken 1/10/2020 2015  Plan of Care Reviewed With: patient  Goal: Individualization and Mutuality  Outcome: Ongoing (interventions implemented as appropriate)  Goal: Discharge Needs Assessment  Outcome: Ongoing (interventions implemented as appropriate)  Goal: Interprofessional Rounds/Family Conf  Outcome: Ongoing (interventions implemented as appropriate)     Problem: Pain, Chronic (Adult)  Goal: Identify Related Risk Factors and Signs and Symptoms  Outcome: Ongoing (interventions implemented as appropriate)  Goal: Acceptable Pain/Comfort Level and Functional Ability  Outcome: Ongoing (interventions implemented as appropriate)     Problem: Pneumonia (Adult)  Goal: Signs and Symptoms of Listed Potential Problems Will be Absent, Minimized or Managed (Pneumonia)  Outcome: Ongoing (interventions implemented as appropriate)     Problem: Breathing Pattern Ineffective (Adult)  Goal: Identify Related Risk Factors and Signs and Symptoms  Outcome: Ongoing (interventions implemented as appropriate)  Goal: Effective Oxygenation/Ventilation  Outcome: Ongoing (interventions implemented as appropriate)  Goal: Anxiety/Fear Reduction  Outcome: Ongoing (interventions implemented as appropriate)     Problem: Chronic Obstructive Pulmonary Disease (Adult)  Goal: Signs and Symptoms of Listed Potential Problems Will be Absent, Minimized or Managed (Chronic Obstructive Pulmonary Disease)  Outcome: Ongoing (interventions implemented as appropriate)     Problem: Fall Risk (Adult)  Goal: Identify Related Risk Factors and Signs and Symptoms  Outcome: Ongoing (interventions implemented as appropriate)  Goal: Absence of  Fall  Outcome: Ongoing (interventions implemented as appropriate)

## 2020-01-11 NOTE — SIGNIFICANT NOTE
01/11/20 1242   Discharge of Care   Discharge Mode wheel chair   Discharge Destination home   Discharged Accompanied by family member/friend   Discharge Teaching Done  Yes   Learning Method Explanation;Written Materials   Patient's brother is transporting her from [facility] to her home.

## 2020-01-12 ENCOUNTER — READMISSION MANAGEMENT (OUTPATIENT)
Dept: CALL CENTER | Facility: HOSPITAL | Age: 70
End: 2020-01-12

## 2020-01-12 LAB
BACTERIA SPEC AEROBE CULT: NORMAL
BACTERIA SPEC AEROBE CULT: NORMAL

## 2020-01-12 NOTE — OUTREACH NOTE
Prep Survey      Responses   Facility patient discharged from?  [facility]   Is patient eligible?  Yes   Discharge diagnosis  RSV Bronchiolitis    Does the patient have one of the following disease processes/diagnoses(primary or secondary)?  Other   Does the patient have Home health ordered?  No   Is there a DME ordered?  No   Prep survey completed?  Yes          Karin Stapleton RN

## 2020-01-12 NOTE — DISCHARGE SUMMARY
Date of Discharge:  1/11/2020    Discharge Diagnosis: Acute respiratory distress  RSV Bronchiolitis   CAD  Hyperlipidemia    Presenting Problem/History of Present Illness  Active Hospital Problems    Diagnosis  POA   • Pneumonia [J18.9]  Yes      Resolved Hospital Problems   No resolved problems to display.          Hospital Course  Patient is a 69 y.o. female presented with increased SOB and cough even with treatment was admitted and started on IV Steroid, nebs and O2 for hypoxia, patient was positive for RSV, pt slowly improved with SOB and cough on IV Steroid, .      Procedures Performed         Consults:   Consults     No orders found from 12/9/2019 to 1/8/2020.          Pertinent Test Results:  Lab Results (last 24 hours)     Procedure Component Value Units Date/Time    POC Glucose Once [[id number]]  (Abnormal) Collected:  01/11/20 1213    Specimen:  Blood Updated:  01/11/20 1222     Glucose 134 mg/dL      Comment: Serial Number: [id number]:  [id number]       Respiratory Culture - Sputum, Cough [[id number]] Collected:  01/09/20 1002    Specimen:  Sputum from Cough Updated:  01/11/20 0954     Respiratory Culture Light growth (2+) Normal Respiratory Pari     Gram Stain Few (2+) WBCs per low power field      Few (2+) Epithelial cells per low power field      Few (2+) Mixed bacterial morphotypes seen on Gram Stain    POC Glucose Once [[id number]]  (Normal) Collected:  01/11/20 0819    Specimen:  Blood Updated:  01/11/20 0820     Glucose 105 mg/dL      Comment: Serial Number: [id number]:  [id number]       Comprehensive Metabolic Panel [[id number]]  (Abnormal) Collected:  01/11/20 0349    Specimen:  Blood Updated:  01/11/20 0452     Glucose 122 mg/dL      BUN 17 mg/dL      Creatinine 0.57 mg/dL      Sodium 141 mmol/L      Potassium 4.5 mmol/L      Chloride 108 mmol/L      CO2 24.0 mmol/L      Calcium 9.2 mg/dL      Total Protein 6.3 g/dL      Albumin 3.50 g/dL      ALT (SGPT) 21 U/L      AST (SGOT) 13 U/L       Alkaline Phosphatase 65 U/L      Total Bilirubin 0.2 mg/dL      eGFR Non African Amer 105 mL/min/1.73      Globulin 2.8 gm/dL      A/G Ratio 1.3 g/dL      BUN/Creatinine Ratio 29.8     Anion Gap 9.0 mmol/L     Narrative:       GFR Normal >60  Chronic Kidney Disease <60  Kidney Failure <15      CBC & Differential [[id number]] Collected:  01/11/20 0349    Specimen:  Blood Updated:  01/11/20 0421    Narrative:       The following orders were created for panel order CBC & Differential.  Procedure                               Abnormality         Status                     ---------                               -----------         ------                     CBC Auto Differential[[id number]]        Abnormal            Final result                 Please view results for these tests on the individual orders.    CBC Auto Differential [[id number]]  (Abnormal) Collected:  01/11/20 0349    Specimen:  Blood Updated:  01/11/20 0421     WBC 11.20 10*3/mm3      RBC 4.51 10*6/mm3      Hemoglobin 13.5 g/dL      Hematocrit 41.1 %      MCV 91.3 fL      MCH 29.9 pg      MCHC 32.8 g/dL      RDW 14.1 %      RDW-SD 45.9 fl      MPV 8.4 fL      Platelets 248 10*3/mm3      Neutrophil % 82.3 %      Lymphocyte % 11.6 %      Monocyte % 5.9 %      Eosinophil % 0.0 %      Basophil % 0.2 %      Neutrophils, Absolute 9.20 10*3/mm3      Lymphocytes, Absolute 1.30 10*3/mm3      Monocytes, Absolute 0.70 10*3/mm3      Eosinophils, Absolute 0.00 10*3/mm3      Basophils, Absolute 0.00 10*3/mm3      nRBC 0.0 /100 WBC     POC Glucose Once [[id number]]  (Abnormal) Collected:  01/10/20 2147    Specimen:  Blood Updated:  01/10/20 2148     Glucose 140 mg/dL      Comment: Serial Number: [id number]:  [id number]            I have reviewed lab results.    Condition on Discharge:  Resolved     Vital Signs  Temp:  [97.7 °F (36.5 °C)] 97.7 °F (36.5 °C)  Heart Rate:  [60-74] 71  Resp:  [16-18] 18  BP: (147)/(91) 147/91    Physical Exam:     General Appearance:     Alert, cooperative, in no acute distress   Ears:    Ears appear intact with no abnormalities noted   Throat:   No oral lesions, no thrush, oral mucosa moist   Neck:   No adenopathy, supple, trachea midline, no thyromegaly, no   carotid bruit, no JVD   Lungs:     Occasional wheezing to auscultation,respirations regular, even and                  Unlabored     Heart:    Regular rhythm and normal rate, normal S1 and S2, no            murmur, no gallop, no rub, no click   Abdomen:     Normal bowel sounds, no masses, no organomegaly, soft        non-tender, non-distended, no guarding, no rebound                tenderness   Extremities:   Moves all extremities well, no edema, no cyanosis, no             redness   Skin:   No bleeding, bruising or rash   Neurologic:   Cranial nerves 2 - 12 grossly intact, sensation intact, DTR       present and equal bilaterally       Discharge Disposition  Home or Self Care    Discharge Medications     Discharge Medications      New Medications      Instructions Start Date   pantoprazole 40 MG EC tablet  Commonly known as:  PROTONIX  Replaces:  famotidine 40 MG tablet   40 mg, Oral, Daily         Continue These Medications      Instructions Start Date   albuterol sulfate  (90 Base) MCG/ACT inhaler  Commonly known as:  PROVENTIL HFA;VENTOLIN HFA;PROAIR HFA   2 puffs, Inhalation, Every 6 Hours      ALENDRONATE SODIUM PO   70 mg, Oral, Weekly, Patient states takes on Wednesdays       atorvastatin 20 MG tablet  Commonly known as:  LIPITOR   20 mg, Oral, Daily      azelastine 0.1 % nasal spray  Commonly known as:  ASTELIN   2 sprays, Nasal, 2 Times Daily, Use in each nostril as directed       cholecalciferol 25 MCG (1000 UT) tablet  Commonly known as:  VITAMIN D3   5,000 Units, Oral, Daily, Per MD Benson office med list takes once weekly. Per patient has been taking over the counter vitamin daily       ferrous sulfate 325 (65 FE) MG tablet   325 mg, Oral, Daily With Breakfast       ipratropium-albuterol 0.5-2.5 mg/3 ml nebulizer  Commonly known as:  DUO-NEB   3 mL, Nebulization, Every 6 Hours      lisinopril 40 MG tablet  Commonly known as:  PRINIVIL,ZESTRIL   40 mg, Oral, Daily      metoprolol succinate XL 50 MG 24 hr tablet  Commonly known as:  TOPROL-XL   50 mg, Oral, Daily      Mirabegron ER 50 MG tablet sustained-release 24 hour 24 hr tablet  Commonly known as:  MYRBETRIQ   50 mg, Oral, Daily      montelukast 10 MG tablet  Commonly known as:  SINGULAIR   10 mg, Oral, Nightly      predniSONE 20 MG tablet  Commonly known as:  DELTASONE   20 mg, Oral, Daily      sertraline 100 MG tablet  Commonly known as:  ZOLOFT   100 mg, Oral, Daily      sucralfate 1 g tablet  Commonly known as:  CARAFATE   1 g, Oral, 2 Times Daily      WELCHOL 625 MG tablet  Generic drug:  colesevelam   1,875 mg, Oral, 2 Times Daily With Meals         Stop These Medications    famotidine 40 MG tablet  Commonly known as:  PEPCID  Replaced by:  pantoprazole 40 MG EC tablet            Discharge Diet:   Diet Instructions     Diet: Renal      Discharge Diet:  Renal          Activity at Discharge:   Activity Instructions     Gradually Increase Activity Until at Pre-Hospitalization Level            Follow-up Appointments  No future appointments.  Additional Instructions for the Follow-ups that You Need to Schedule     Call MD With Problems / Concerns   As directed      Instructions: Called [phone] if fever greater then 101, nausea/vomiting, chest pain, shortness of breath or abdominal pain    Order Comments:  Instructions: Called [phone] if fever greater then 101, nausea/vomiting, chest pain, shortness of breath or abdominal pain          Discharge Follow-up with PCP   As directed       Currently Documented PCP:    Candi Benson MD    PCP Phone Number:    [phone]     Follow Up Details:  Hospital Follow up appointment with Dr. Benson on Wednesday 1/15/2020 145 pm.   Office number [phone]                Test Results Pending at Discharge   Order Current Status    Blood Culture - Blood, Hand, Left Preliminary result    Blood Culture - Blood, Hand, Right Preliminary result           Candi Benson MD  01/11/20  10:11 PM

## 2020-01-14 LAB — GLUCOSE BLDC GLUCOMTR-MCNC: 141 MG/DL (ref 70–105)

## 2020-01-15 ENCOUNTER — READMISSION MANAGEMENT (OUTPATIENT)
Dept: CALL CENTER | Facility: HOSPITAL | Age: 70
End: 2020-01-15

## 2020-01-15 NOTE — OUTREACH NOTE
Medical Week 1 Survey      Responses   Facility patient discharged from?  [facility]   Does the patient have one of the following disease processes/diagnoses(primary or secondary)?  Other   Is there a successful TCM telephone encounter documented?  No   Week 1 attempt successful?  No   Unsuccessful attempts  Attempt 4 [No answer/Voicemail not set up]          Shannon Serrano LPN

## 2020-01-22 ENCOUNTER — READMISSION MANAGEMENT (OUTPATIENT)
Dept: CALL CENTER | Facility: HOSPITAL | Age: 70
End: 2020-01-22

## 2020-01-22 NOTE — OUTREACH NOTE
Medical Week 2 Survey      Responses   Facility patient discharged from?  [facility]   Does the patient have one of the following disease processes/diagnoses(primary or secondary)?  Other   Week 2 attempt successful?  No   Revoke  Decline to participate [No answer/Voicemail not set up]          Shannon Serrano LPN

## 2020-02-21 ENCOUNTER — TRANSCRIBE ORDERS (OUTPATIENT)
Dept: ADMINISTRATIVE | Facility: HOSPITAL | Age: 70
End: 2020-02-21

## 2020-02-21 DIAGNOSIS — R07.9 CHEST PAIN, UNSPECIFIED TYPE: Primary | ICD-10-CM

## 2020-03-04 ENCOUNTER — HOSPITAL ENCOUNTER (OUTPATIENT)
Dept: NUCLEAR MEDICINE | Facility: HOSPITAL | Age: 70
Discharge: HOME | End: 2020-03-04

## 2020-03-04 DIAGNOSIS — R07.9 CHEST PAIN, UNSPECIFIED TYPE: ICD-10-CM

## 2020-03-04 PROCEDURE — 25010000002 REGADENOSON 0.4 MG/5ML SOLUTION: Performed by: FAMILY MEDICINE

## 2020-03-04 PROCEDURE — 0 TECHNETIUM SESTAMIBI: Performed by: FAMILY MEDICINE

## 2020-03-04 PROCEDURE — A9500 TC99M SESTAMIBI: HCPCS | Performed by: FAMILY MEDICINE

## 2020-03-04 PROCEDURE — 93016 CV STRESS TEST SUPVJ ONLY: CPT | Performed by: NURSE PRACTITIONER

## 2020-03-04 PROCEDURE — 78452 HT MUSCLE IMAGE SPECT MULT: CPT

## 2020-03-04 PROCEDURE — 93017 CV STRESS TEST TRACING ONLY: CPT

## 2020-03-04 RX ADMIN — REGADENOSON 0.4 MG: 0.08 INJECTION, SOLUTION INTRAVENOUS at 12:30

## 2020-03-04 RX ADMIN — TECHNETIUM TC 99M SESTAMIBI 1 DOSE: 1 INJECTION INTRAVENOUS at 12:30

## 2020-03-04 RX ADMIN — TECHNETIUM TC 99M SESTAMIBI 1 DOSE: 1 INJECTION INTRAVENOUS at 11:00

## 2020-03-06 LAB
BH CV NUCLEAR PRIOR STUDY: 3
BH CV STRESS BP STAGE 1: NORMAL
BH CV STRESS BP STAGE 2: NORMAL
BH CV STRESS BP STAGE 3: NORMAL
BH CV STRESS BP STAGE 4: NORMAL
BH CV STRESS COMMENTS STAGE 1: NORMAL
BH CV STRESS COMMENTS STAGE 2: NORMAL
BH CV STRESS DOSE REGADENOSON STAGE 1: 0.4
BH CV STRESS DURATION MIN STAGE 1: 0
BH CV STRESS DURATION MIN STAGE 2: 4
BH CV STRESS DURATION SEC STAGE 1: 10
BH CV STRESS DURATION SEC STAGE 2: 0
BH CV STRESS HR STAGE 1: 100
BH CV STRESS HR STAGE 2: 102
BH CV STRESS HR STAGE 3: 102
BH CV STRESS HR STAGE 4: 95
BH CV STRESS PROTOCOL 1: NORMAL
BH CV STRESS RECOVERY BP: NORMAL MMHG
BH CV STRESS RECOVERY HR: 87 BPM
BH CV STRESS STAGE 1: 1
BH CV STRESS STAGE 2: 2
BH CV STRESS STAGE 3: 3
BH CV STRESS STAGE 4: 4
LV EF NUC BP: 70 %
MAXIMAL PREDICTED HEART RATE: 151 BPM
PERCENT MAX PREDICTED HR: 67.55 %
STRESS BASELINE BP: NORMAL MMHG
STRESS BASELINE HR: 62 BPM
STRESS PERCENT HR: 79 %
STRESS POST PEAK BP: NORMAL MMHG
STRESS POST PEAK HR: 102 BPM
STRESS TARGET HR: 128 BPM

## 2020-03-06 PROCEDURE — 93018 CV STRESS TEST I&R ONLY: CPT | Performed by: INTERNAL MEDICINE

## 2020-03-06 PROCEDURE — 78452 HT MUSCLE IMAGE SPECT MULT: CPT | Performed by: INTERNAL MEDICINE

## 2020-07-20 ENCOUNTER — OFFICE VISIT (OUTPATIENT)
Dept: BARIATRICS/WEIGHT MGMT | Facility: CLINIC | Age: 70
End: 2020-07-20

## 2020-07-20 VITALS
OXYGEN SATURATION: 95 % | WEIGHT: 176 LBS | RESPIRATION RATE: 18 BRPM | TEMPERATURE: 97.5 F | DIASTOLIC BLOOD PRESSURE: 96 MMHG | BODY MASS INDEX: 35.48 KG/M2 | SYSTOLIC BLOOD PRESSURE: 138 MMHG | HEIGHT: 59 IN | HEART RATE: 77 BPM

## 2020-07-20 DIAGNOSIS — R22.31 AXILLARY MASS, RIGHT: Primary | ICD-10-CM

## 2020-07-20 DIAGNOSIS — N63.31 MASS OF AXILLARY TAIL OF RIGHT BREAST: ICD-10-CM

## 2020-07-20 PROCEDURE — 99204 OFFICE O/P NEW MOD 45 MIN: CPT | Performed by: SURGERY

## 2020-07-20 RX ORDER — OMEPRAZOLE 20 MG/1
20 CAPSULE, DELAYED RELEASE ORAL DAILY
COMMUNITY
End: 2020-09-03

## 2020-07-20 RX ORDER — ASPIRIN 81 MG/1
81 TABLET ORAL DAILY
COMMUNITY
Start: 2015-11-11 | End: 2021-05-05

## 2020-07-20 RX ORDER — NITROGLYCERIN 0.4 MG/1
0.4 TABLET SUBLINGUAL
COMMUNITY
Start: 2015-11-11 | End: 2021-05-05

## 2020-07-20 RX ORDER — SULFAMETHOXAZOLE AND TRIMETHOPRIM 800; 160 MG/1; MG/1
1 TABLET ORAL 2 TIMES DAILY
Qty: 14 TABLET | Refills: 0 | Status: SHIPPED | OUTPATIENT
Start: 2020-07-20 | End: 2020-07-27

## 2020-07-20 NOTE — PROGRESS NOTES
HISTORY AND PHYSICAL      Patient Care Team:  Candi Benson MD as PCP - General (Family Medicine)    Chief complaint right axillary mass and right breast cellulitis    Subjective     Patient is a 69 y.o. female presents with 1 month history of right axillary mass and right breast cellulitis.  She called her primary care physician who sent her to me immediately.  She says she had a normal mammogram 2 years ago [facility]..  She has no family history of breast cancer.  She also experiences some pain down along the right arm.      Review of Systems   Pertinent items are noted in HPI    History  Past Medical History:   Diagnosis Date   • Arthritis    • Asthma    • Cancer (CMS/HCC)    • COPD (chronic obstructive pulmonary disease) (CMS/HCC)    • Coronary artery disease    • Elevated cholesterol    • GERD (gastroesophageal reflux disease)    • History of transfusion    • Hypertension    • Sleep apnea      Past Surgical History:   Procedure Laterality Date   • ABDOMINAL SURGERY     • ANKLE SURGERY       Family History   Problem Relation Age of Onset   • Heart disease Father    • Stroke Father    • Diabetes Father    • Diabetes Son      Social History     Tobacco Use   • Smoking status: Never Smoker   • Smokeless tobacco: Never Used   Substance Use Topics   • Alcohol use: Never     Frequency: Never   • Drug use: Never       (Not in a hospital admission)  Allergies:  Penicillins    Objective     Vital Signs  Temp:  [97.5 °F (36.4 °C)] 97.5 °F (36.4 °C)  Heart Rate:  [77] 77  Resp:  [18] 18  BP: (138)/(96) 138/96    Physical Exam:      General Appearance:    Alert, cooperative, in no acute distress   Head:    Normocephalic, without obvious abnormality, atraumatic   Eyes:            Lids and lashes normal, conjunctivae and sclerae normal, no   icterus, no pallor, corneas clear, PERRLA   Ears:    Ears appear intact with no abnormalities noted   Throat:   No oral lesions, no thrush, oral mucosa moist    Neck:   No adenopathy, supple, trachea midline, no thyromegaly, no   carotid bruit, no JVD   Back:     No kyphosis present, no scoliosis present, no skin lesions,      erythema or scars, no tenderness to percussion or                   palpation,   range of motion normal   Lungs:     Clear to auscultation,respirations regular, even and                  unlabored    Heart:    Regular rhythm and normal rate, normal S1 and S2, no            murmur, no gallop, no rub, no click   Chest Wall:   She has very large pendulous breasts bilaterally.  The right breast has cellulitis changes and peau d'orange along the majority of the breast and there is also a palpable 4 to 5 cm axillary mass of the anterior axilla.  There is no other palpable abnormality.  Left breast has no palpable abnormality and no skin changes.   Abdomen:     Normal bowel sounds, no masses, no organomegaly, soft        non-tender, non-distended, no guarding, no rebound                tenderness   Rectal:     Deferred   Extremities:   Moves all extremities well, no edema, no cyanosis, no             redness   Pulses:   Pulses palpable and equal bilaterally   Skin:   No bleeding, bruising or rash   Lymph nodes:   No palpable adenopathy   Neurologic:   Cranial nerves 2 - 12 grossly intact, sensation intact, DTR       present and equal bilaterally     Lab Results (last 24 hours)     ** No results found for the last 24 hours. **          Imaging Results (Last 24 Hours)     ** No results found for the last 24 hours. **          Results Review:    I reviewed the patient's new clinical results.  I reviewed the patient's new imaging results and agree with the interpretation.    Assessment/Plan     69-year-old lady presents with cellulitis of the right breast with changes of peau d'orange as well as a palpable right axillary mass.  Will order a stat biopsy of the right axillary mass that could potentially be a tail of the breast mass.  We will also get a  mammogram.  I will continue antibiotics in case this is related to infection.    Taylor Kaye MD  07/20/20  16:21

## 2020-07-21 DIAGNOSIS — N63.0 BREAST MASS: Primary | ICD-10-CM

## 2020-07-22 ENCOUNTER — HOSPITAL ENCOUNTER (OUTPATIENT)
Dept: MAMMOGRAPHY | Facility: HOSPITAL | Age: 70
Discharge: HOME | End: 2020-07-22
Admitting: RADIOLOGY

## 2020-07-22 ENCOUNTER — HOSPITAL ENCOUNTER (OUTPATIENT)
Dept: ULTRASOUND IMAGING | Facility: HOSPITAL | Age: 70
Discharge: HOME | End: 2020-07-22

## 2020-07-22 ENCOUNTER — HOSPITAL ENCOUNTER (OUTPATIENT)
Dept: MAMMOGRAPHY | Facility: HOSPITAL | Age: 70
Discharge: HOME | End: 2020-07-22

## 2020-07-22 DIAGNOSIS — N63.0 BREAST MASS: ICD-10-CM

## 2020-07-22 DIAGNOSIS — R22.31 AXILLARY MASS, RIGHT: ICD-10-CM

## 2020-07-22 DIAGNOSIS — N63.31 MASS OF AXILLARY TAIL OF RIGHT BREAST: ICD-10-CM

## 2020-07-22 PROCEDURE — 88305 TISSUE EXAM BY PATHOLOGIST: CPT | Performed by: SURGERY

## 2020-07-22 PROCEDURE — 77066 DX MAMMO INCL CAD BI: CPT

## 2020-07-22 PROCEDURE — 88377 M/PHMTRC ALYS ISHQUANT/SEMIQ: CPT

## 2020-07-22 PROCEDURE — 88342 IMHCHEM/IMCYTCHM 1ST ANTB: CPT | Performed by: SURGERY

## 2020-07-22 PROCEDURE — 76642 ULTRASOUND BREAST LIMITED: CPT

## 2020-07-22 PROCEDURE — 88341 IMHCHEM/IMCYTCHM EA ADD ANTB: CPT | Performed by: SURGERY

## 2020-07-22 PROCEDURE — G0279 TOMOSYNTHESIS, MAMMO: HCPCS

## 2020-07-22 PROCEDURE — 88360 TUMOR IMMUNOHISTOCHEM/MANUAL: CPT | Performed by: SURGERY

## 2020-07-22 PROCEDURE — 88360 TUMOR IMMUNOHISTOCHEM/MANUAL: CPT

## 2020-07-22 PROCEDURE — 25010000003 LIDOCAINE 1 % SOLUTION: Performed by: SURGERY

## 2020-07-22 RX ORDER — LIDOCAINE HYDROCHLORIDE AND EPINEPHRINE BITARTRATE 20; .01 MG/ML; MG/ML
10 INJECTION, SOLUTION SUBCUTANEOUS ONCE
Status: COMPLETED | OUTPATIENT
Start: 2020-07-22 | End: 2020-07-22

## 2020-07-22 RX ORDER — LIDOCAINE HYDROCHLORIDE 10 MG/ML
10 INJECTION, SOLUTION INFILTRATION; PERINEURAL ONCE
Status: COMPLETED | OUTPATIENT
Start: 2020-07-22 | End: 2020-07-22

## 2020-07-22 RX ADMIN — LIDOCAINE HYDROCHLORIDE 5 ML: 10 INJECTION, SOLUTION INFILTRATION; PERINEURAL at 15:15

## 2020-07-22 RX ADMIN — LIDOCAINE HYDROCHLORIDE AND EPINEPHRINE 8 ML: 20; 10 INJECTION, SOLUTION INFILTRATION; PERINEURAL at 14:27

## 2020-07-27 ENCOUNTER — PREP FOR SURGERY (OUTPATIENT)
Dept: OTHER | Facility: HOSPITAL | Age: 70
End: 2020-07-27

## 2020-07-27 DIAGNOSIS — N63.0 BREAST MASS: Primary | ICD-10-CM

## 2020-07-27 RX ORDER — PANTOPRAZOLE SODIUM 40 MG/1
40 TABLET, DELAYED RELEASE ORAL DAILY
COMMUNITY
End: 2020-09-03 | Stop reason: SDUPTHER

## 2020-07-27 RX ORDER — MULTIVITAMIN/IRON/FOLIC ACID 18MG-0.4MG
1 TABLET ORAL DAILY
COMMUNITY
End: 2021-05-05

## 2020-07-28 ENCOUNTER — CONSULT (OUTPATIENT)
Dept: ONCOLOGY | Facility: CLINIC | Age: 70
End: 2020-07-28

## 2020-07-28 ENCOUNTER — APPOINTMENT (OUTPATIENT)
Dept: LAB | Facility: HOSPITAL | Age: 70
End: 2020-07-28

## 2020-07-28 ENCOUNTER — LAB (OUTPATIENT)
Dept: LAB | Facility: HOSPITAL | Age: 70
End: 2020-07-28

## 2020-07-28 VITALS
BODY MASS INDEX: 35.48 KG/M2 | DIASTOLIC BLOOD PRESSURE: 87 MMHG | HEART RATE: 73 BPM | SYSTOLIC BLOOD PRESSURE: 128 MMHG | WEIGHT: 176 LBS | RESPIRATION RATE: 16 BRPM | HEIGHT: 59 IN | TEMPERATURE: 97.9 F

## 2020-07-28 DIAGNOSIS — C50.911 MALIGNANT NEOPLASM OF RIGHT FEMALE BREAST, UNSPECIFIED ESTROGEN RECEPTOR STATUS, UNSPECIFIED SITE OF BREAST (HCC): Primary | ICD-10-CM

## 2020-07-28 DIAGNOSIS — C50.611 MALIGNANT NEOPLASM OF AXILLARY TAIL OF RIGHT FEMALE BREAST, UNSPECIFIED ESTROGEN RECEPTOR STATUS (HCC): ICD-10-CM

## 2020-07-28 LAB
ALBUMIN SERPL-MCNC: 4.8 G/DL (ref 3.5–5.2)
ALBUMIN/GLOB SERPL: 1.5 G/DL
ALP SERPL-CCNC: 79 U/L (ref 39–117)
ALT SERPL W P-5'-P-CCNC: 37 U/L (ref 1–33)
ANION GAP SERPL CALCULATED.3IONS-SCNC: 15 MMOL/L (ref 5–15)
APTT PPP: 18.8 SECONDS (ref 24–31)
AST SERPL-CCNC: 32 U/L (ref 1–32)
BASOPHILS # BLD AUTO: 0.1 10*3/MM3 (ref 0–0.2)
BASOPHILS NFR BLD AUTO: 1.5 % (ref 0–1.5)
BILIRUB SERPL-MCNC: 0.2 MG/DL (ref 0–1.2)
BUN SERPL-MCNC: 18 MG/DL (ref 8–23)
BUN SERPL-MCNC: ABNORMAL MG/DL
BUN/CREAT SERPL: ABNORMAL
CALCIUM SPEC-SCNC: 10.2 MG/DL (ref 8.6–10.5)
CHLORIDE SERPL-SCNC: 104 MMOL/L (ref 98–107)
CO2 SERPL-SCNC: 21 MMOL/L (ref 22–29)
CREAT SERPL-MCNC: 0.66 MG/DL (ref 0.57–1)
DEPRECATED RDW RBC AUTO: 47.3 FL (ref 37–54)
EOSINOPHIL # BLD AUTO: 0.2 10*3/MM3 (ref 0–0.4)
EOSINOPHIL NFR BLD AUTO: 2.1 % (ref 0.3–6.2)
ERYTHROCYTE [DISTWIDTH] IN BLOOD BY AUTOMATED COUNT: 15 % (ref 12.3–15.4)
GFR SERPL CREATININE-BSD FRML MDRD: 89 ML/MIN/1.73
GLOBULIN UR ELPH-MCNC: 3.1 GM/DL
GLUCOSE SERPL-MCNC: 101 MG/DL (ref 65–99)
HCT VFR BLD AUTO: 45.3 % (ref 34–46.6)
HGB BLD-MCNC: 15.2 G/DL (ref 12–15.9)
INR PPP: 0.94 (ref 0.9–1.1)
LYMPHOCYTES # BLD AUTO: 2.2 10*3/MM3 (ref 0.7–3.1)
LYMPHOCYTES NFR BLD AUTO: 27.4 % (ref 19.6–45.3)
MCH RBC QN AUTO: 30.5 PG (ref 26.6–33)
MCHC RBC AUTO-ENTMCNC: 33.6 G/DL (ref 31.5–35.7)
MCV RBC AUTO: 90.8 FL (ref 79–97)
MONOCYTES # BLD AUTO: 0.5 10*3/MM3 (ref 0.1–0.9)
MONOCYTES NFR BLD AUTO: 6.7 % (ref 5–12)
NEUTROPHILS NFR BLD AUTO: 4.9 10*3/MM3 (ref 1.7–7)
NEUTROPHILS NFR BLD AUTO: 62.3 % (ref 42.7–76)
NRBC BLD AUTO-RTO: 0.1 /100 WBC (ref 0–0.2)
PLATELET # BLD AUTO: 305 10*3/MM3 (ref 140–450)
PMV BLD AUTO: 8.6 FL (ref 6–12)
POTASSIUM SERPL-SCNC: 3.9 MMOL/L (ref 3.5–5.2)
PROT SERPL-MCNC: 7.9 G/DL (ref 6–8.5)
PROTHROMBIN TIME: 10 SECONDS (ref 9.6–11.7)
RBC # BLD AUTO: 4.99 10*6/MM3 (ref 3.77–5.28)
SODIUM SERPL-SCNC: 140 MMOL/L (ref 136–145)
WBC # BLD AUTO: 7.9 10*3/MM3 (ref 3.4–10.8)

## 2020-07-28 PROCEDURE — 85610 PROTHROMBIN TIME: CPT

## 2020-07-28 PROCEDURE — 86300 IMMUNOASSAY TUMOR CA 15-3: CPT | Performed by: INTERNAL MEDICINE

## 2020-07-28 PROCEDURE — C9803 HOPD COVID-19 SPEC COLLECT: HCPCS

## 2020-07-28 PROCEDURE — 85025 COMPLETE CBC W/AUTO DIFF WBC: CPT | Performed by: INTERNAL MEDICINE

## 2020-07-28 PROCEDURE — 99215 OFFICE O/P EST HI 40 MIN: CPT | Performed by: INTERNAL MEDICINE

## 2020-07-28 PROCEDURE — U0004 COV-19 TEST NON-CDC HGH THRU: HCPCS

## 2020-07-28 PROCEDURE — 85730 THROMBOPLASTIN TIME PARTIAL: CPT

## 2020-07-28 PROCEDURE — 80053 COMPREHEN METABOLIC PANEL: CPT | Performed by: INTERNAL MEDICINE

## 2020-07-28 PROCEDURE — U0002 COVID-19 LAB TEST NON-CDC: HCPCS

## 2020-07-28 PROCEDURE — 36415 COLL VENOUS BLD VENIPUNCTURE: CPT | Performed by: INTERNAL MEDICINE

## 2020-07-28 RX ORDER — BUDESONIDE 0.25 MG/2ML
0.25 INHALANT ORAL AS NEEDED
COMMUNITY
End: 2021-05-05

## 2020-07-28 NOTE — PROGRESS NOTES
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 Donnelly reports she skipped a few mammograms ever since her  passed away in 2016.  She started noticing a mass in the right axilla and sought medical attention.  · 2020 bilateral diagnostic digital mammograms and ultrasound.  A mass is present corresponding to the palpable abnormality measuring up to 4.5 cm.  Anterior to the mass there is an additional mass seen within the upper outer quadrant of the right breast measuring up to 2.8 cm.  Sonographic evaluation of the palpable abnormality within the right axillary tail region corresponds to a heterogeneous hypoechoic mass measuring up to 4.1 cm.  Within the posterior aspect of the right breast seen anteriorly there is an additional mass measuring up to 3 cm which also demonstrates irregular margins and heterogeneous hypoechoic signal.  No additional mass lesions are identified.  · 2020 -ultrasound-guided core biopsy of the right breast, and clip placement.     Right breast, core biopsy:     Poorly differentiated ductal adenocarcinoma     Rochester score (tubules=3, nuclear pleomorphism=3, mitoses=3) total 9 out of 9     Maximum size of tumor measured on a glass slide is 9 mm     Negative for lymphovascular invasion   The biopsy was stained via IHC utilizing appropriate controls for the presence of p63, returned  negative within the tumor cells, therefore argues against metaplastic carcinoma   Estrogen receptor assay +8% progesterone receptor assay negative.  HER-2/aileen pending  · Patient was sent to the cancer care center by Dr. Kaye and seen initially by me on 2020.      Past  Medical History:   Diagnosis Date   • Arthritis    • Asthma    • Cancer (CMS/HCC)    • COPD (chronic obstructive pulmonary disease) (CMS/[facility])    • Coronary artery disease    • Elevated cholesterol    • GERD (gastroesophageal reflux disease)    • H/O Bell's palsy    • History of transfusion    • Hypertension    • Sleep apnea        Past Surgical History:   Procedure Laterality Date   • ABDOMINAL SURGERY     • ANKLE SURGERY     • TUBAL ABDOMINAL LIGATION     Conization for presumed cervix cancer in her 50s      Current Outpatient Medications:   •  albuterol sulfate  (90 Base) MCG/ACT inhaler, Inhale 2 puffs Every 6 (Six) Hours., Disp: , Rfl:   •  ALENDRONATE SODIUM PO, Take 70 mg by mouth 1 (One) Time Per Week. Patient states takes on Wednesdays, Disp: , Rfl:   •  atorvastatin (LIPITOR) 20 MG tablet, Take 20 mg by mouth Daily., Disp: , Rfl:   •  azelastine (ASTELIN) 0.1 % nasal spray, 2 sprays into the nostril(s) as directed by provider 2 (Two) Times a Day. Use in each nostril as directed, Disp: , Rfl:   •  budesonide (PULMICORT) 0.25 MG/2ML nebulizer solution, Take 0.25 mg by nebulization Daily. Patient does not know strength / dose, Disp: , Rfl:   •  colesevelam (WELCHOL) 625 MG tablet, Take 1,875 mg by mouth 2 (Two) Times a Day With Meals., Disp: , Rfl:   •  ferrous sulfate 325 (65 FE) MG tablet, Take 325 mg by mouth Daily With Breakfast., Disp: , Rfl:   •  lisinopril (PRINIVIL,ZESTRIL) 40 MG tablet, Take 40 mg by mouth Daily. Do not take dos, Disp: , Rfl:   •  metoprolol succinate XL (TOPROL-XL) 50 MG 24 hr tablet, Take 50 mg by mouth Daily., Disp: , Rfl:   •  montelukast (SINGULAIR) 10 MG tablet, Take 10 mg by mouth Every Night., Disp: , Rfl:   •  Multiple Vitamins-Minerals (CENTRUM ADULTS) tablet, Take 1 tablet by mouth Daily. Do not take after 7/27 for surgery, Disp: , Rfl:   •  pantoprazole (PROTONIX) 40 MG EC tablet, Take 40 mg by mouth Daily., Disp: , Rfl:   •  sertraline (ZOLOFT) 100 MG tablet, Take  100 mg by mouth Daily., Disp: , Rfl:   •  sucralfate (CARAFATE) 1 g tablet, Take 1 g by mouth 2 (Two) Times a Day., Disp: , Rfl:   •  aspirin 81 MG EC tablet, Take 81 mg by mouth Daily., Disp: , Rfl:   •  ipratropium-albuterol (DUO-NEB) 0.5-2.5 mg/3 ml nebulizer, Take 3 mL by nebulization Every 6 (Six) Hours., Disp: , Rfl:   •  nitroglycerin (NITROSTAT) 0.4 MG SL tablet, Place 0.4 mg under the tongue., Disp: , Rfl:   •  omeprazole (priLOSEC) 20 MG capsule, Take 20 mg by mouth Daily., Disp: , Rfl:     Allergies   Allergen Reactions   • Penicillins Shortness Of Breath   • Adhesive Tape Rash         There is no immunization history on file for this patient.    Family History   Problem Relation Age of Onset   • Heart disease Father    • Stroke Father    • Diabetes Father    • Diabetes Son        Cancer-related family history is not on file.    Social History     Tobacco Use   • Smoking status: Never Smoker   • Smokeless tobacco: Never Used   Substance Use Topics   • Alcohol use: Never     Frequency: Never   • Drug use: Never     Subjective:  Patient is my office for initial visit.  Tolerated the biopsy well.  Had extensive bruising.  Reports allergy to the tape that was used.  Denies pain.  Reports that she feels a cysts in her left breast occasionally.  ROS:    Review of Systems   Constitutional: Negative for fever.   HENT: Negative for nosebleeds and trouble swallowing.    Eyes: Negative for visual disturbance.   Respiratory: Negative for cough, shortness of breath and wheezing.    Cardiovascular: Negative for chest pain.   Gastrointestinal: Negative for abdominal pain and blood in stool.   Endocrine: Negative for cold intolerance.   Genitourinary: Negative for dysuria and hematuria.   Musculoskeletal: Negative for joint swelling.   Skin: Negative for rash.   Allergic/Immunologic: Negative for environmental allergies.   Neurological: Negative for seizures.   Hematological: Does not bruise/bleed easily.  "  Psychiatric/Behavioral: The patient is not nervous/anxious.    MD performed ROS and are negative except as mentioned in Subjective.      Objective:    Vitals:    07/28/20 1439   BP: 128/87   Pulse: 73   Resp: 16   Temp: 97.9 °F (36.6 °C)   TempSrc: Skin   Weight: 79.8 kg (176 lb)   Height: 149.9 cm (59\")   PainSc:   6   PainLoc: Hip  Comment: always hurts.     Body mass index is 35.55 kg/m².    ECOG1      Physical Exam:  Physical Exam   Constitutional: She is oriented to person, place, and time. No distress.   Moderately built obese   HENT:   Head: Normocephalic and atraumatic.   Eyes: Conjunctivae and EOM are normal. Right eye exhibits no discharge. Left eye exhibits no discharge. No scleral icterus.   Neck: Normal range of motion. Neck supple. No thyromegaly present.   Cardiovascular: Normal rate, regular rhythm and normal heart sounds. Exam reveals no gallop and no friction rub.   Pulmonary/Chest: Effort normal. No stridor. No respiratory distress. She has no wheezes.   Bilateral pendulous breasts.  Extensive bruising noted from the biopsy.  Skin thickening noted.  Visible/palpable axillary tail mass.   Abdominal: Soft. Bowel sounds are normal. She exhibits no mass. There is no tenderness. There is no rebound and no guarding.   Musculoskeletal: Normal range of motion. She exhibits no tenderness.   Lymphadenopathy:     She has no cervical adenopathy.   Neurological: She is alert and oriented to person, place, and time. She exhibits normal muscle tone.   Skin: Skin is warm. No rash noted. She is not diaphoretic. No erythema.   Psychiatric: She has a normal mood and affect. Her behavior is normal.   Nursing note and vitals reviewed.      RECENT LABS  WBC   Date Value Ref Range Status   07/28/2020 7.90 3.40 - 10.80 10*3/mm3 Final     RBC   Date Value Ref Range Status   07/28/2020 4.99 3.77 - 5.28 10*6/mm3 Final     Hemoglobin   Date Value Ref Range Status   07/28/2020 15.2 12.0 - 15.9 g/dL Final     Hematocrit "   Date Value Ref Range Status   07/28/2020 45.3 34.0 - 46.6 % Final     MCV   Date Value Ref Range Status   07/28/2020 90.8 79.0 - 97.0 fL Final     MCH   Date Value Ref Range Status   07/28/2020 30.5 26.6 - 33.0 pg Final     MCHC   Date Value Ref Range Status   07/28/2020 33.6 31.5 - 35.7 g/dL Final     RDW   Date Value Ref Range Status   07/28/2020 15.0 12.3 - 15.4 % Final     RDW-SD   Date Value Ref Range Status   07/28/2020 47.3 37.0 - 54.0 fl Final     MPV   Date Value Ref Range Status   07/28/2020 8.6 6.0 - 12.0 fL Final     Platelets   Date Value Ref Range Status   07/28/2020 305 140 - 450 10*3/mm3 Final     Neutrophil %   Date Value Ref Range Status   07/28/2020 62.3 42.7 - 76.0 % Final     Lymphocyte %   Date Value Ref Range Status   07/28/2020 27.4 19.6 - 45.3 % Final     Monocyte %   Date Value Ref Range Status   07/28/2020 6.7 5.0 - 12.0 % Final     Eosinophil %   Date Value Ref Range Status   07/28/2020 2.1 0.3 - 6.2 % Final     Basophil %   Date Value Ref Range Status   07/28/2020 1.5 0.0 - 1.5 % Final     Neutrophils, Absolute   Date Value Ref Range Status   07/28/2020 4.90 1.70 - 7.00 10*3/mm3 Final     Lymphocytes, Absolute   Date Value Ref Range Status   07/28/2020 2.20 0.70 - 3.10 10*3/mm3 Final     Monocytes, Absolute   Date Value Ref Range Status   07/28/2020 0.50 0.10 - 0.90 10*3/mm3 Final     Eosinophils, Absolute   Date Value Ref Range Status   07/28/2020 0.20 0.00 - 0.40 10*3/mm3 Final     Basophils, Absolute   Date Value Ref Range Status   07/28/2020 0.10 0.00 - 0.20 10*3/mm3 Final     nRBC   Date Value Ref Range Status   07/28/2020 0.1 0.0 - 0.2 /100 WBC Final       Lab Results   Component Value Date    GLUCOSE 101 (H) 07/28/2020    BUN  07/28/2020      Comment:      Testing performed by alternate method    CREATININE 0.66 07/28/2020    EGFRIFNONA 89 07/28/2020    BCR  07/28/2020      Comment:      Testing not performed    K 3.9 07/28/2020    CO2 21.0 (L) 07/28/2020    CALCIUM 10.2  07/28/2020    ALBUMIN 4.80 07/28/2020    AST 32 07/28/2020    ALT 37 (H) 07/28/2020         Assessment/Plan   Malignant neoplasm of right female breast, unspecified estrogen receptor status, unspecified site of breast (CMS/HCC)  - CBC & Differential  - Comprehensive Metabolic Panel  - Cancer Antigen 27.29  - CBC Auto Differential  - BUN  - BUN  - MRI Breast Bilateral With & Without Contrast  - NM Pet Skull Base To Mid Thigh    Malignant neoplasm of axillary tail of right female breast, unspecified estrogen receptor status (CMS/HCC)   - NM Pet Skull Base To Mid Thigh    Assessment and plan  1. Poorly differentiated  infiltrating ductal carcinoma of right breast.  Tumor is weakly ER positive VA negative.  HER-2/aileen testing is pending at this time.  2. Given the sizes of the breast and the tumors I will obtain MRI of the bilateral breasts to delineate planes.  3. Patient reports to back pain I will obtain a PET CT scan rule out metastatic disease.  4. At this point the plan is to start her on systemic neoadjuvant chemotherapy.  I will use Adriamycin and Cytoxan.  Given the patient's performance score of ECOG 1 patient may not tolerate dose dense chemotherapy.  AC will be given every 3 weeks for 4 cycles followed by weekly Taxol.  Will administer Neulasta with the chemotherapy.  5. Dr. Kaye is placing port later this week.  6. I will obtain 2D echo  7. Schedule chemotherapy teaching  8. Start chemotherapy early next week  9. Check CMP CA-27-29 today  10. I will see her in my office after the MRI and PET CT scan are done  I have reviewed labs results, imaging, vitals, and medications with the patient today.    Patient verbalized understanding and is in agreement of the above plan.  I discussed with Dr. Kaye and Dr. Camacho.    Dana Almeida, breast cancer navigator was present during the visit.        I spent 60 total minutes, face-to-face, caring for Monse today.  Greater than 50% of this time involved counseling and/or  coordination of care as documented within this note.    Electronically signed by Deepika Cantrell MD, 07/28/20, 5:54 PM.

## 2020-07-29 ENCOUNTER — ANESTHESIA EVENT (OUTPATIENT)
Dept: PERIOP | Facility: HOSPITAL | Age: 70
End: 2020-07-29

## 2020-07-29 DIAGNOSIS — Z51.81 ENCOUNTER FOR MONITORING CARDIOTOXIC DRUG THERAPY: Primary | ICD-10-CM

## 2020-07-29 DIAGNOSIS — Z79.899 ENCOUNTER FOR MONITORING CARDIOTOXIC DRUG THERAPY: Primary | ICD-10-CM

## 2020-07-29 LAB
CANCER AG27-29 SERPL-ACNC: 9.7 U/ML (ref 0–38.6)
REF LAB TEST METHOD: NORMAL
SARS-COV-2 RNA RESP QL NAA+PROBE: NOT DETECTED

## 2020-07-30 ENCOUNTER — HOSPITAL ENCOUNTER (OUTPATIENT)
Facility: HOSPITAL | Age: 70
Setting detail: HOSPITAL OUTPATIENT SURGERY
Discharge: HOME | End: 2020-07-30
Attending: SURGERY | Admitting: SURGERY

## 2020-07-30 ENCOUNTER — ANESTHESIA (OUTPATIENT)
Dept: PERIOP | Facility: HOSPITAL | Age: 70
End: 2020-07-30

## 2020-07-30 ENCOUNTER — APPOINTMENT (OUTPATIENT)
Dept: GENERAL RADIOLOGY | Facility: HOSPITAL | Age: 70
End: 2020-07-30

## 2020-07-30 ENCOUNTER — HOSPITAL ENCOUNTER (OUTPATIENT)
Dept: GENERAL RADIOLOGY | Facility: HOSPITAL | Age: 70
Setting detail: HOSPITAL OUTPATIENT SURGERY
Discharge: HOME | End: 2020-07-30

## 2020-07-30 VITALS
TEMPERATURE: 97.1 F | OXYGEN SATURATION: 100 % | HEART RATE: 78 BPM | BODY MASS INDEX: 34.58 KG/M2 | WEIGHT: 171.52 LBS | DIASTOLIC BLOOD PRESSURE: 77 MMHG | SYSTOLIC BLOOD PRESSURE: 132 MMHG | RESPIRATION RATE: 18 BRPM | HEIGHT: 59 IN

## 2020-07-30 DIAGNOSIS — C50.411 MALIGNANT NEOPLASM OF UPPER-OUTER QUADRANT OF RIGHT BREAST IN FEMALE, ESTROGEN RECEPTOR POSITIVE (HCC): ICD-10-CM

## 2020-07-30 DIAGNOSIS — N63.0 BREAST MASS: ICD-10-CM

## 2020-07-30 DIAGNOSIS — C50.411 MALIGNANT NEOPLASM OF UPPER-OUTER QUADRANT OF RIGHT BREAST IN FEMALE, ESTROGEN RECEPTOR POSITIVE (HCC): Primary | ICD-10-CM

## 2020-07-30 DIAGNOSIS — Z17.0 MALIGNANT NEOPLASM OF UPPER-OUTER QUADRANT OF RIGHT BREAST IN FEMALE, ESTROGEN RECEPTOR POSITIVE (HCC): Primary | ICD-10-CM

## 2020-07-30 DIAGNOSIS — Z17.0 MALIGNANT NEOPLASM OF UPPER-OUTER QUADRANT OF RIGHT BREAST IN FEMALE, ESTROGEN RECEPTOR POSITIVE (HCC): ICD-10-CM

## 2020-07-30 PROCEDURE — 77001 FLUOROGUIDE FOR VEIN DEVICE: CPT | Performed by: SURGERY

## 2020-07-30 PROCEDURE — 25010000002 HEPARIN (PORCINE) PER 1000 UNITS: Performed by: SURGERY

## 2020-07-30 PROCEDURE — 25010000002 PROPOFOL 10 MG/ML EMULSION: Performed by: ANESTHESIOLOGY

## 2020-07-30 PROCEDURE — C1788 PORT, INDWELLING, IMP: HCPCS | Performed by: SURGERY

## 2020-07-30 PROCEDURE — 25010000002 HYDROMORPHONE PER 4 MG: Performed by: ANESTHESIOLOGY

## 2020-07-30 PROCEDURE — 25010000002 METOCLOPRAMIDE PER 10 MG: Performed by: ANESTHESIOLOGY

## 2020-07-30 PROCEDURE — 36558 INSERT TUNNELED CV CATH: CPT | Performed by: SURGERY

## 2020-07-30 PROCEDURE — 71045 X-RAY EXAM CHEST 1 VIEW: CPT

## 2020-07-30 PROCEDURE — 25010000002 FENTANYL CITRATE (PF) 100 MCG/2ML SOLUTION: Performed by: ANESTHESIOLOGY

## 2020-07-30 PROCEDURE — 76000 FLUOROSCOPY <1 HR PHYS/QHP: CPT

## 2020-07-30 DEVICE — POWERPORT M.R.I. IMPLANTABLE PORT WITH ATTACHABLE 8F CHRONOFLEX OPEN-ENDED SINGLE-LUMEN VENOUS CATHETER INTERMEDIATE KIT  (WITH SUTURE PLUGS)
Type: IMPLANTABLE DEVICE | Site: CHEST WALL | Status: NON-FUNCTIONAL
Brand: POWERPORT M.R.I., CHRONOFLEX
Removed: 2022-01-06

## 2020-07-30 RX ORDER — MEPERIDINE HYDROCHLORIDE 25 MG/ML
12.5 INJECTION INTRAMUSCULAR; INTRAVENOUS; SUBCUTANEOUS
Status: DISCONTINUED | OUTPATIENT
Start: 2020-07-30 | End: 2020-07-30 | Stop reason: HOSPADM

## 2020-07-30 RX ORDER — PROPOFOL 10 MG/ML
VIAL (ML) INTRAVENOUS AS NEEDED
Status: DISCONTINUED | OUTPATIENT
Start: 2020-07-30 | End: 2020-07-30 | Stop reason: SURG

## 2020-07-30 RX ORDER — ONDANSETRON 2 MG/ML
4 INJECTION INTRAMUSCULAR; INTRAVENOUS ONCE AS NEEDED
Status: DISCONTINUED | OUTPATIENT
Start: 2020-07-30 | End: 2020-07-30 | Stop reason: HOSPADM

## 2020-07-30 RX ORDER — LIDOCAINE HYDROCHLORIDE 20 MG/ML
INJECTION, SOLUTION EPIDURAL; INFILTRATION; INTRACAUDAL; PERINEURAL AS NEEDED
Status: DISCONTINUED | OUTPATIENT
Start: 2020-07-30 | End: 2020-07-30 | Stop reason: SURG

## 2020-07-30 RX ORDER — GLYCOPYRROLATE 0.2 MG/ML
INJECTION INTRAMUSCULAR; INTRAVENOUS AS NEEDED
Status: DISCONTINUED | OUTPATIENT
Start: 2020-07-30 | End: 2020-07-30 | Stop reason: SURG

## 2020-07-30 RX ORDER — FENTANYL CITRATE 50 UG/ML
INJECTION, SOLUTION INTRAMUSCULAR; INTRAVENOUS AS NEEDED
Status: DISCONTINUED | OUTPATIENT
Start: 2020-07-30 | End: 2020-07-30 | Stop reason: SURG

## 2020-07-30 RX ORDER — SODIUM CHLORIDE 9 MG/ML
INJECTION, SOLUTION INTRAVENOUS AS NEEDED
Status: DISCONTINUED | OUTPATIENT
Start: 2020-07-30 | End: 2020-07-30 | Stop reason: HOSPADM

## 2020-07-30 RX ORDER — SODIUM CHLORIDE, SODIUM LACTATE, POTASSIUM CHLORIDE, CALCIUM CHLORIDE 600; 310; 30; 20 MG/100ML; MG/100ML; MG/100ML; MG/100ML
9 INJECTION, SOLUTION INTRAVENOUS CONTINUOUS PRN
Status: DISCONTINUED | OUTPATIENT
Start: 2020-07-30 | End: 2020-07-30 | Stop reason: HOSPADM

## 2020-07-30 RX ORDER — SODIUM CHLORIDE 0.9 % (FLUSH) 0.9 %
10 SYRINGE (ML) INJECTION AS NEEDED
Status: DISCONTINUED | OUTPATIENT
Start: 2020-07-30 | End: 2020-07-30 | Stop reason: HOSPADM

## 2020-07-30 RX ORDER — HYDROMORPHONE HCL 110MG/55ML
0.5 PATIENT CONTROLLED ANALGESIA SYRINGE INTRAVENOUS
Status: DISCONTINUED | OUTPATIENT
Start: 2020-07-30 | End: 2020-07-30 | Stop reason: HOSPADM

## 2020-07-30 RX ORDER — PHENYLEPHRINE HCL IN 0.9% NACL 0.5 MG/5ML
SYRINGE (ML) INTRAVENOUS AS NEEDED
Status: DISCONTINUED | OUTPATIENT
Start: 2020-07-30 | End: 2020-07-30 | Stop reason: SURG

## 2020-07-30 RX ORDER — HEPARIN SODIUM 10000 [USP'U]/ML
INJECTION, SOLUTION INTRAVENOUS; SUBCUTANEOUS AS NEEDED
Status: DISCONTINUED | OUTPATIENT
Start: 2020-07-30 | End: 2020-07-30 | Stop reason: HOSPADM

## 2020-07-30 RX ORDER — SODIUM CHLORIDE 0.9 % (FLUSH) 0.9 %
10 SYRINGE (ML) INJECTION EVERY 12 HOURS SCHEDULED
Status: DISCONTINUED | OUTPATIENT
Start: 2020-07-30 | End: 2020-07-30 | Stop reason: HOSPADM

## 2020-07-30 RX ORDER — METOCLOPRAMIDE HYDROCHLORIDE 5 MG/ML
INJECTION INTRAMUSCULAR; INTRAVENOUS AS NEEDED
Status: DISCONTINUED | OUTPATIENT
Start: 2020-07-30 | End: 2020-07-30 | Stop reason: SURG

## 2020-07-30 RX ORDER — CLINDAMYCIN PHOSPHATE 900 MG/50ML
INJECTION, SOLUTION INTRAVENOUS AS NEEDED
Status: DISCONTINUED | OUTPATIENT
Start: 2020-07-30 | End: 2020-07-30 | Stop reason: SURG

## 2020-07-30 RX ORDER — BUPIVACAINE HYDROCHLORIDE 2.5 MG/ML
INJECTION, SOLUTION INFILTRATION; PERINEURAL AS NEEDED
Status: DISCONTINUED | OUTPATIENT
Start: 2020-07-30 | End: 2020-07-30 | Stop reason: HOSPADM

## 2020-07-30 RX ADMIN — PROPOFOL 220 MG: 10 INJECTION, EMULSION INTRAVENOUS at 07:59

## 2020-07-30 RX ADMIN — PHENYLEPHRINE HYDROCHLORIDE 200 MCG: 10 INJECTION INTRAVENOUS at 08:23

## 2020-07-30 RX ADMIN — LIDOCAINE HYDROCHLORIDE 60 MG: 20 INJECTION, SOLUTION EPIDURAL; INFILTRATION; INTRACAUDAL; PERINEURAL at 07:59

## 2020-07-30 RX ADMIN — CLINDAMYCIN PHOSPHATE 900 MG: 900 INJECTION, SOLUTION INTRAVENOUS at 08:01

## 2020-07-30 RX ADMIN — PHENYLEPHRINE HYDROCHLORIDE 200 MCG: 10 INJECTION INTRAVENOUS at 08:36

## 2020-07-30 RX ADMIN — HYDROMORPHONE HYDROCHLORIDE 0.5 MG: 2 INJECTION, SOLUTION INTRAMUSCULAR; INTRAVENOUS; SUBCUTANEOUS at 09:11

## 2020-07-30 RX ADMIN — METOCLOPRAMIDE 10 MG: 5 INJECTION, SOLUTION INTRAMUSCULAR; INTRAVENOUS at 07:58

## 2020-07-30 RX ADMIN — PHENYLEPHRINE HYDROCHLORIDE 200 MCG: 10 INJECTION INTRAVENOUS at 08:18

## 2020-07-30 RX ADMIN — PROPOFOL 30 MG: 10 INJECTION, EMULSION INTRAVENOUS at 08:04

## 2020-07-30 RX ADMIN — PHENYLEPHRINE HYDROCHLORIDE 200 MCG: 10 INJECTION INTRAVENOUS at 08:26

## 2020-07-30 RX ADMIN — GLYCOPYRROLATE 0.2 MG: 0.2 INJECTION, SOLUTION INTRAMUSCULAR; INTRAVENOUS at 07:58

## 2020-07-30 RX ADMIN — SODIUM CHLORIDE, SODIUM LACTATE, POTASSIUM CHLORIDE, AND CALCIUM CHLORIDE 9 ML/HR: 600; 310; 30; 20 INJECTION, SOLUTION INTRAVENOUS at 06:44

## 2020-07-30 RX ADMIN — PHENYLEPHRINE HYDROCHLORIDE 200 MCG: 10 INJECTION INTRAVENOUS at 08:30

## 2020-07-30 RX ADMIN — FENTANYL CITRATE 100 MCG: 50 INJECTION, SOLUTION INTRAMUSCULAR; INTRAVENOUS at 07:58

## 2020-07-30 RX ADMIN — PHENYLEPHRINE HYDROCHLORIDE 200 MCG: 10 INJECTION INTRAVENOUS at 08:14

## 2020-07-30 NOTE — ANESTHESIA PROCEDURE NOTES
Airway  Urgency: elective    Date/Time: 7/30/2020 8:00 AM  End Time:7/30/2020 8:00 AM  Difficult airway    General Information and Staff    Patient location during procedure: OR  Anesthesiologist: Maldonado Tamayo MD    Indications and Patient Condition  Indications for airway management: airway protection    Preoxygenated: yes  MILS maintained throughout  Mask difficulty assessment: 1 - vent by mask    Final Airway Details  Final airway type: supraglottic airway      Successful airway: LMA  Size 3    Cormack-Lehane Classification: grade IV - neither glottis nor epiglottis seen  Number of attempts at approach: 1  Assessment: lips, teeth, and gum same as pre-op and atraumatic intubation    Additional Comments  X1 WITH EASE MOCP GAUZE BITE BLOCK

## 2020-07-30 NOTE — ANESTHESIA PREPROCEDURE EVALUATION
Anesthesia Evaluation     Patient summary reviewed and Nursing notes reviewed   NPO Solid Status: > 8 hours  NPO Liquid Status: > 8 hours           Airway   Mallampati: I  TM distance: >3 FB  Neck ROM: full  No difficulty expected  Dental - normal exam     Pulmonary - normal exam   (+) pneumonia , COPD, sleep apnea,   Cardiovascular - normal exam    (+) hypertension, CAD, hyperlipidemia,       Neuro/Psych- negative ROS  GI/Hepatic/Renal/Endo - negative ROS     Musculoskeletal (-) negative ROS    Abdominal  - normal exam    Bowel sounds: normal.   Substance History - negative use     OB/GYN negative ob/gyn ROS         Other        ROS/Med Hx Other: STOP BANG 4 DW PT                Anesthesia Plan    ASA 3     general     intravenous induction     Anesthetic plan, all risks, benefits, and alternatives have been provided, discussed and informed consent has been obtained with: patient.

## 2020-07-30 NOTE — ANESTHESIA POSTPROCEDURE EVALUATION
Patient: Monse Donnelly    Procedure Summary     Date:  07/30/20 Room / Location:  [facility] OR  / [facility] MAIN OR    Anesthesia Start:  0755 Anesthesia Stop:  0856    Procedure:  INSERTION VENOUS ACCESS DEVICE LEFT internal jugular (Left ) Diagnosis:       Breast mass      (Breast mass [N63.0])    Surgeon:  Taylor Kaye MD Provider:  Maldonado Tamayo MD    Anesthesia Type:  general ASA Status:  3          Anesthesia Type: general    Vitals  Vitals Value Taken Time   /69 7/30/2020  9:50 AM   Temp 97.8 °F (36.6 °C) 7/30/2020  9:50 AM   Pulse 78 7/30/2020  9:53 AM   Resp 11 7/30/2020  9:50 AM   SpO2 93 % 7/30/2020  9:53 AM   Vitals shown include unvalidated device data.        Post Anesthesia Care and Evaluation    Patient location during evaluation: PACU  Patient participation: complete - patient participated  Level of consciousness: awake  Pain scale: See nurse's notes for pain score.  Pain management: adequate  Airway patency: patent  Anesthetic complications: No anesthetic complications  PONV Status: none  Cardiovascular status: acceptable  Respiratory status: acceptable  Hydration status: acceptable    Comments: Patient seen and examined postoperatively; vital signs stable; SpO2 greater than or equal to 90%; cardiopulmonary status stable; nausea/vomiting adequately controlled; pain adequately controlled; no apparent anesthesia complications; patient discharged from anesthesia care when discharge criteria were met

## 2020-07-31 ENCOUNTER — TELEPHONE (OUTPATIENT)
Dept: ONCOLOGY | Facility: HOSPITAL | Age: 70
End: 2020-07-31

## 2020-07-31 LAB
LAB AP CASE REPORT: NORMAL
LAB AP CLINICAL INFORMATION: NORMAL
LAB AP DIAGNOSIS COMMENT: NORMAL
LAB AP FISH HER2/NEU REPORT,ADDENDUM: NORMAL
Lab: NORMAL
PATH REPORT.ADDENDUM SPEC: NORMAL
PATH REPORT.FINAL DX SPEC: NORMAL
PATH REPORT.GROSS SPEC: NORMAL

## 2020-07-31 NOTE — TELEPHONE ENCOUNTER
Case Management/ Note    Patient Name: Monse Donnelly  YOB: 1950  MRN #: [id number]    OSW called patient at the request of REGGIE Cortez as patient has issues with transportation. She does not have a VM set up and was unable to leave a message.     OSW received a call back from Monse who is alert and oriented to person, place and time. She is pleasant. She lives with her son, who is disabled. Basic needs are met. She does have challenges with transportation as she does not drive long distances, and will only drive in town. She relies on her brothers and niece to take her to medical appointments. She said this has always worked but is concerned with the chemotherapy treatments as they work and she does not want this to interfere with their work. Explained that our schedulers could work with her and her family to help facilitate this when able. She gave v/u.      She said she has difficulty with her ADLs due to a fractured ankle several years ago. She said she is able to do her own ADLs but takes much longer than before her ankle fracture. She denies any falls in the last 30 days. She struggles with her IADLs, particularly housework. She is open to speaking with [facility] (she explained she lives in [place], and not [place]). She has over $2000 in assets and would not likely be able to have the A&D waiver but may be eligible for other services. A referral was placed via secure email to [facility].     Electronically signed by:   Chel Willis LCSW, OSYEN-C  07/31/20, 11:03 AM

## 2020-08-06 ENCOUNTER — TELEPHONE (OUTPATIENT)
Dept: BARIATRICS/WEIGHT MGMT | Facility: CLINIC | Age: 70
End: 2020-08-06

## 2020-08-06 ENCOUNTER — HOSPITAL ENCOUNTER (OUTPATIENT)
Dept: PET IMAGING | Facility: HOSPITAL | Age: 70
Discharge: HOME | End: 2020-08-06

## 2020-08-06 ENCOUNTER — HOSPITAL ENCOUNTER (OUTPATIENT)
Dept: CARDIOLOGY | Facility: HOSPITAL | Age: 70
Discharge: HOME | End: 2020-08-06

## 2020-08-06 ENCOUNTER — HOSPITAL ENCOUNTER (OUTPATIENT)
Dept: MRI IMAGING | Facility: HOSPITAL | Age: 70
Discharge: HOME | End: 2020-08-06
Admitting: NURSE PRACTITIONER

## 2020-08-06 VITALS
SYSTOLIC BLOOD PRESSURE: 140 MMHG | BODY MASS INDEX: 35.28 KG/M2 | HEIGHT: 59 IN | WEIGHT: 175 LBS | DIASTOLIC BLOOD PRESSURE: 100 MMHG

## 2020-08-06 DIAGNOSIS — C50.611 MALIGNANT NEOPLASM OF AXILLARY TAIL OF RIGHT FEMALE BREAST, UNSPECIFIED ESTROGEN RECEPTOR STATUS (HCC): ICD-10-CM

## 2020-08-06 DIAGNOSIS — C50.911 MALIGNANT NEOPLASM OF RIGHT FEMALE BREAST, UNSPECIFIED ESTROGEN RECEPTOR STATUS, UNSPECIFIED SITE OF BREAST (HCC): ICD-10-CM

## 2020-08-06 DIAGNOSIS — Z51.81 ENCOUNTER FOR MONITORING CARDIOTOXIC DRUG THERAPY: ICD-10-CM

## 2020-08-06 DIAGNOSIS — Z79.899 ENCOUNTER FOR MONITORING CARDIOTOXIC DRUG THERAPY: ICD-10-CM

## 2020-08-06 LAB
BH CV ECHO MEAS - % IVS THICK: 23.5 %
BH CV ECHO MEAS - % LVPW THICK: 32 %
BH CV ECHO MEAS - ACS: 1.7 CM
BH CV ECHO MEAS - AO MAX PG (FULL): 3.4 MMHG
BH CV ECHO MEAS - AO MAX PG: 8.7 MMHG
BH CV ECHO MEAS - AO MEAN PG (FULL): 1.2 MMHG
BH CV ECHO MEAS - AO MEAN PG: 4.1 MMHG
BH CV ECHO MEAS - AO ROOT AREA (BSA CORRECTED): 1.6
BH CV ECHO MEAS - AO ROOT AREA: 6 CM^2
BH CV ECHO MEAS - AO ROOT DIAM: 2.8 CM
BH CV ECHO MEAS - AO V2 MAX: 147.4 CM/SEC
BH CV ECHO MEAS - AO V2 MEAN: 94.1 CM/SEC
BH CV ECHO MEAS - AO V2 VTI: 30.3 CM
BH CV ECHO MEAS - AVA(I,A): 1.8 CM^2
BH CV ECHO MEAS - AVA(I,D): 1.8 CM^2
BH CV ECHO MEAS - AVA(V,A): 1.6 CM^2
BH CV ECHO MEAS - AVA(V,D): 1.6 CM^2
BH CV ECHO MEAS - BSA(HAYCOCK): 1.9 M^2
BH CV ECHO MEAS - BSA: 1.7 M^2
BH CV ECHO MEAS - BZI_BMI: 35.3 KILOGRAMS/M^2
BH CV ECHO MEAS - BZI_METRIC_HEIGHT: 149.9 CM
BH CV ECHO MEAS - BZI_METRIC_WEIGHT: 79.4 KG
BH CV ECHO MEAS - EDV(CUBED): 78.5 ML
BH CV ECHO MEAS - EDV(MOD-SP4): 57.7 ML
BH CV ECHO MEAS - EDV(TEICH): 82.2 ML
BH CV ECHO MEAS - EF(CUBED): 76.4 %
BH CV ECHO MEAS - EF(MOD-BP): 60 %
BH CV ECHO MEAS - EF(MOD-SP4): 60.3 %
BH CV ECHO MEAS - EF(TEICH): 68.8 %
BH CV ECHO MEAS - ESV(CUBED): 18.5 ML
BH CV ECHO MEAS - ESV(MOD-SP4): 22.9 ML
BH CV ECHO MEAS - ESV(TEICH): 25.7 ML
BH CV ECHO MEAS - FS: 38.2 %
BH CV ECHO MEAS - IVS/LVPW: 1
BH CV ECHO MEAS - IVSD: 0.87 CM
BH CV ECHO MEAS - IVSS: 1.1 CM
BH CV ECHO MEAS - LA DIMENSION(2D): 3.6 CM
BH CV ECHO MEAS - LV DIASTOLIC VOL/BSA (35-75): 33.1 ML/M^2
BH CV ECHO MEAS - LV MASS(C)D: 113.5 GRAMS
BH CV ECHO MEAS - LV MASS(C)DI: 65.1 GRAMS/M^2
BH CV ECHO MEAS - LV MASS(C)S: 78.2 GRAMS
BH CV ECHO MEAS - LV MASS(C)SI: 44.9 GRAMS/M^2
BH CV ECHO MEAS - LV MAX PG: 5.3 MMHG
BH CV ECHO MEAS - LV MEAN PG: 2.9 MMHG
BH CV ECHO MEAS - LV SYSTOLIC VOL/BSA (12-30): 13.1 ML/M^2
BH CV ECHO MEAS - LV V1 MAX: 115 CM/SEC
BH CV ECHO MEAS - LV V1 MEAN: 80.1 CM/SEC
BH CV ECHO MEAS - LV V1 VTI: 26.9 CM
BH CV ECHO MEAS - LVIDD: 4.3 CM
BH CV ECHO MEAS - LVIDS: 2.6 CM
BH CV ECHO MEAS - LVOT AREA: 2 CM^2
BH CV ECHO MEAS - LVOT DIAM: 1.6 CM
BH CV ECHO MEAS - LVPWD: 0.83 CM
BH CV ECHO MEAS - LVPWS: 1.1 CM
BH CV ECHO MEAS - MV A MAX VEL: 117.8 CM/SEC
BH CV ECHO MEAS - MV DEC SLOPE: 621.2 CM/SEC^2
BH CV ECHO MEAS - MV DEC TIME: 0.19 SEC
BH CV ECHO MEAS - MV E MAX VEL: 117.8 CM/SEC
BH CV ECHO MEAS - MV E/A: 1
BH CV ECHO MEAS - MV MAX PG: 5.7 MMHG
BH CV ECHO MEAS - MV MEAN PG: 2.5 MMHG
BH CV ECHO MEAS - MV V2 MAX: 119.2 CM/SEC
BH CV ECHO MEAS - MV V2 MEAN: 75.9 CM/SEC
BH CV ECHO MEAS - MV V2 VTI: 38.1 CM
BH CV ECHO MEAS - MVA(VTI): 1.4 CM^2
BH CV ECHO MEAS - PA ACC TIME: 0.07 SEC
BH CV ECHO MEAS - PA MAX PG (FULL): 0.45 MMHG
BH CV ECHO MEAS - PA MAX PG: 2.6 MMHG
BH CV ECHO MEAS - PA MEAN PG (FULL): 0.26 MMHG
BH CV ECHO MEAS - PA MEAN PG: 1.4 MMHG
BH CV ECHO MEAS - PA PR(ACCEL): 47.9 MMHG
BH CV ECHO MEAS - PA V2 MAX: 80.9 CM/SEC
BH CV ECHO MEAS - PA V2 MEAN: 55.2 CM/SEC
BH CV ECHO MEAS - PA V2 VTI: 17.2 CM
BH CV ECHO MEAS - PULM A REVS DUR: 0.09 SEC
BH CV ECHO MEAS - PULM A REVS VEL: 36.3 CM/SEC
BH CV ECHO MEAS - PULM DIAS VEL: 33.5 CM/SEC
BH CV ECHO MEAS - PULM S/D: 1.8
BH CV ECHO MEAS - PULM SYS VEL: 60.6 CM/SEC
BH CV ECHO MEAS - RAP SYSTOLE: 3 MMHG
BH CV ECHO MEAS - RV MAX PG: 2.2 MMHG
BH CV ECHO MEAS - RV MEAN PG: 1.1 MMHG
BH CV ECHO MEAS - RV V1 MAX: 73.8 CM/SEC
BH CV ECHO MEAS - RV V1 MEAN: 50.1 CM/SEC
BH CV ECHO MEAS - RV V1 VTI: 15.1 CM
BH CV ECHO MEAS - RVDD: 2.3 CM
BH CV ECHO MEAS - RVSP: 20.6 MMHG
BH CV ECHO MEAS - SI(AO): 103.7 ML/M^2
BH CV ECHO MEAS - SI(CUBED): 34.4 ML/M^2
BH CV ECHO MEAS - SI(LVOT): 31 ML/M^2
BH CV ECHO MEAS - SI(MOD-SP4): 20 ML/M^2
BH CV ECHO MEAS - SI(TEICH): 32.5 ML/M^2
BH CV ECHO MEAS - SV(AO): 180.7 ML
BH CV ECHO MEAS - SV(CUBED): 60 ML
BH CV ECHO MEAS - SV(LVOT): 54 ML
BH CV ECHO MEAS - SV(MOD-SP4): 34.8 ML
BH CV ECHO MEAS - SV(TEICH): 56.6 ML
BH CV ECHO MEAS - TR MAX VEL: 209.5 CM/SEC
GLUCOSE BLDC GLUCOMTR-MCNC: 80 MG/DL (ref 70–105)
LV EF 2D ECHO EST: 60 %
MAXIMAL PREDICTED HEART RATE: 151 BPM
STRESS TARGET HR: 128 BPM

## 2020-08-06 PROCEDURE — 82962 GLUCOSE BLOOD TEST: CPT

## 2020-08-06 PROCEDURE — C8937 CAD BREAST MRI: HCPCS

## 2020-08-06 PROCEDURE — 93306 TTE W/DOPPLER COMPLETE: CPT | Performed by: INTERNAL MEDICINE

## 2020-08-06 PROCEDURE — 0 FLUDEOXYGLUCOSE F18 SOLUTION: Performed by: NURSE PRACTITIONER

## 2020-08-06 PROCEDURE — 25010000002 GADOTERATE MEGLUMINE 10 MMOL/20ML SOLUTION: Performed by: NURSE PRACTITIONER

## 2020-08-06 PROCEDURE — A9575 INJ GADOTERATE MEGLUMI 0.1ML: HCPCS | Performed by: NURSE PRACTITIONER

## 2020-08-06 PROCEDURE — A9552 F18 FDG: HCPCS | Performed by: NURSE PRACTITIONER

## 2020-08-06 PROCEDURE — 78815 PET IMAGE W/CT SKULL-THIGH: CPT

## 2020-08-06 PROCEDURE — C8908 MRI W/O FOL W/CONT, BREAST,: HCPCS

## 2020-08-06 PROCEDURE — 93306 TTE W/DOPPLER COMPLETE: CPT

## 2020-08-06 RX ORDER — GADOTERATE MEGLUMINE 376.9 MG/ML
20 INJECTION INTRAVENOUS
Status: COMPLETED | OUTPATIENT
Start: 2020-08-06 | End: 2020-08-06

## 2020-08-06 RX ADMIN — GADOTERATE MEGLUMINE 20 ML: 376.9 INJECTION, SOLUTION INTRAVENOUS at 11:48

## 2020-08-06 RX ADMIN — FLUDEOXYGLUCOSE F18 1 DOSE: 300 INJECTION INTRAVENOUS at 08:28

## 2020-08-06 NOTE — TELEPHONE ENCOUNTER
[facility] called and stated that 2 nurses had tried to access [facility] for her MRI with no success.  Text to Dr. Kaye, he was given phone number to [facility] in MRI

## 2020-08-07 ENCOUNTER — OFFICE VISIT (OUTPATIENT)
Dept: BARIATRICS/WEIGHT MGMT | Facility: CLINIC | Age: 70
End: 2020-08-07

## 2020-08-07 ENCOUNTER — TELEPHONE (OUTPATIENT)
Dept: ONCOLOGY | Facility: CLINIC | Age: 70
End: 2020-08-07

## 2020-08-07 VITALS
WEIGHT: 175 LBS | DIASTOLIC BLOOD PRESSURE: 95 MMHG | TEMPERATURE: 98.7 F | OXYGEN SATURATION: 97 % | SYSTOLIC BLOOD PRESSURE: 148 MMHG | HEART RATE: 81 BPM | RESPIRATION RATE: 16 BRPM | BODY MASS INDEX: 35.28 KG/M2 | HEIGHT: 59 IN

## 2020-08-07 DIAGNOSIS — Z17.0 MALIGNANT NEOPLASM OF UPPER-OUTER QUADRANT OF RIGHT BREAST IN FEMALE, ESTROGEN RECEPTOR POSITIVE (HCC): Primary | ICD-10-CM

## 2020-08-07 DIAGNOSIS — C50.411 MALIGNANT NEOPLASM OF UPPER-OUTER QUADRANT OF RIGHT BREAST IN FEMALE, ESTROGEN RECEPTOR POSITIVE (HCC): Primary | ICD-10-CM

## 2020-08-07 PROCEDURE — 99024 POSTOP FOLLOW-UP VISIT: CPT | Performed by: SURGERY

## 2020-08-07 NOTE — TELEPHONE ENCOUNTER
----- Message from BUTCH Rosales sent at 8/6/2020 11:59 AM EDT -----  Regarding: Needs follow up appt scheduled with Isaiah   Patient needs f/u appt scheduled please now with Dr. Colon. She had had PET and MRI.  Needs follow up ASAP.     BUTCH Rosales  08/06/20  11:59

## 2020-08-10 ENCOUNTER — APPOINTMENT (OUTPATIENT)
Dept: LAB | Facility: HOSPITAL | Age: 70
End: 2020-08-10

## 2020-08-10 ENCOUNTER — OFFICE VISIT (OUTPATIENT)
Dept: ONCOLOGY | Facility: CLINIC | Age: 70
End: 2020-08-10

## 2020-08-10 VITALS — BODY MASS INDEX: 35.84 KG/M2 | RESPIRATION RATE: 20 BRPM | TEMPERATURE: 96.9 F | WEIGHT: 177.8 LBS | HEIGHT: 59 IN

## 2020-08-10 DIAGNOSIS — C50.911 MALIGNANT NEOPLASM OF RIGHT FEMALE BREAST, UNSPECIFIED ESTROGEN RECEPTOR STATUS, UNSPECIFIED SITE OF BREAST (HCC): Primary | ICD-10-CM

## 2020-08-10 LAB
ALBUMIN SERPL-MCNC: 4.3 G/DL (ref 3.5–5.2)
ALBUMIN/GLOB SERPL: 1.8 G/DL
ALP SERPL-CCNC: 88 U/L (ref 39–117)
ALT SERPL W P-5'-P-CCNC: 21 U/L (ref 1–33)
ANION GAP SERPL CALCULATED.3IONS-SCNC: 10 MMOL/L (ref 5–15)
AST SERPL-CCNC: 19 U/L (ref 1–32)
BASOPHILS # BLD AUTO: 0.06 10*3/MM3 (ref 0–0.2)
BASOPHILS NFR BLD AUTO: 0.8 % (ref 0–1.5)
BILIRUB SERPL-MCNC: 0.2 MG/DL (ref 0–1.2)
BUN SERPL-MCNC: 16 MG/DL (ref 8–23)
BUN SERPL-MCNC: ABNORMAL MG/DL
BUN/CREAT SERPL: ABNORMAL
CALCIUM SPEC-SCNC: 10 MG/DL (ref 8.6–10.5)
CHLORIDE SERPL-SCNC: 109 MMOL/L (ref 98–107)
CO2 SERPL-SCNC: 23 MMOL/L (ref 22–29)
CREAT SERPL-MCNC: 0.57 MG/DL (ref 0.57–1)
DEPRECATED RDW RBC AUTO: 46.9 FL (ref 37–54)
EOSINOPHIL # BLD AUTO: 0.26 10*3/MM3 (ref 0–0.4)
EOSINOPHIL NFR BLD AUTO: 3.5 % (ref 0.3–6.2)
ERYTHROCYTE [DISTWIDTH] IN BLOOD BY AUTOMATED COUNT: 14.3 % (ref 12.3–15.4)
GFR SERPL CREATININE-BSD FRML MDRD: 105 ML/MIN/1.73
GLOBULIN UR ELPH-MCNC: 2.4 GM/DL
GLUCOSE SERPL-MCNC: 90 MG/DL (ref 65–99)
HCT VFR BLD AUTO: 40.5 % (ref 34–46.6)
HGB BLD-MCNC: 13.2 G/DL (ref 12–15.9)
LYMPHOCYTES # BLD AUTO: 2.44 10*3/MM3 (ref 0.7–3.1)
LYMPHOCYTES NFR BLD AUTO: 32.9 % (ref 19.6–45.3)
MCH RBC QN AUTO: 29.9 PG (ref 26.6–33)
MCHC RBC AUTO-ENTMCNC: 32.6 G/DL (ref 31.5–35.7)
MCV RBC AUTO: 91.6 FL (ref 79–97)
MONOCYTES # BLD AUTO: 0.72 10*3/MM3 (ref 0.1–0.9)
MONOCYTES NFR BLD AUTO: 9.7 % (ref 5–12)
NEUTROPHILS NFR BLD AUTO: 3.94 10*3/MM3 (ref 1.7–7)
NEUTROPHILS NFR BLD AUTO: 53.1 % (ref 42.7–76)
PLATELET # BLD AUTO: 241 10*3/MM3 (ref 140–450)
PMV BLD AUTO: 9.8 FL (ref 6–12)
POTASSIUM SERPL-SCNC: 4.3 MMOL/L (ref 3.5–5.2)
PROT SERPL-MCNC: 6.7 G/DL (ref 6–8.5)
RBC # BLD AUTO: 4.42 10*6/MM3 (ref 3.77–5.28)
SODIUM SERPL-SCNC: 142 MMOL/L (ref 136–145)
WBC # BLD AUTO: 7.42 10*3/MM3 (ref 3.4–10.8)

## 2020-08-10 PROCEDURE — 99215 OFFICE O/P EST HI 40 MIN: CPT | Performed by: INTERNAL MEDICINE

## 2020-08-10 PROCEDURE — 80053 COMPREHEN METABOLIC PANEL: CPT | Performed by: INTERNAL MEDICINE

## 2020-08-10 PROCEDURE — 85025 COMPLETE CBC W/AUTO DIFF WBC: CPT | Performed by: INTERNAL MEDICINE

## 2020-08-10 PROCEDURE — 36415 COLL VENOUS BLD VENIPUNCTURE: CPT | Performed by: INTERNAL MEDICINE

## 2020-08-10 RX ORDER — TEMAZEPAM 7.5 MG/1
15 CAPSULE ORAL NIGHTLY PRN
Qty: 30 CAPSULE | Refills: 2 | Status: SHIPPED | OUTPATIENT
Start: 2020-08-10 | End: 2020-09-03 | Stop reason: SDUPTHER

## 2020-08-10 NOTE — PROGRESS NOTES
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  History of Present Illness:   · Mrs. Donnelly reports she skipped a few mammograms ever since her  passed away in 2016.  She started noticing a mass in the right axilla and sought medical attention.  · 7/22/2020 bilateral diagnostic digital mammograms and ultrasound.  A mass is present corresponding to the palpable abnormality measuring up to 4.5 cm.  Anterior to the mass there is an additional mass seen within the upper outer quadrant of the right breast measuring up to 2.8 cm.  Sonographic evaluation of the palpable abnormality within the right axillary tail region corresponds to a heterogeneous hypoechoic mass measuring up to 4.1 cm.  Within the posterior aspect of the right breast seen anteriorly there is an additional mass measuring up to 3 cm which also demonstrates irregular margins and heterogeneous hypoechoic signal.  No additional mass lesions are identified.  · 7/22/2020 -ultrasound-guided core biopsy of the right breast, and clip placement.                Right breast, core biopsy:                Poorly differentiated ductal adenocarcinoma                Blair score (tubules=3, nuclear pleomorphism=3, mitoses=3) total 9 out of 9                Maximum size of tumor measured on a glass slide is 9 mm                Negative for lymphovascular invasion               The biopsy was stained via IHC utilizing appropriate controls for the presence of p63, returned    negative within the tumor cells, therefore argues against metaplastic carcinoma               Estrogen receptor assay +8% progesterone receptor assay negative.  HER-2/aileen   negative by FISH  · Patient was [facility] to the [facility] by  Dr. Kaye and seen initially by me on 7/28/2020.  · 8/6/2020 PET CT scan-3.1 cm FDG avid right breast mass, compatible with known breast Malignancy. 2.4.2 cm FDG avid mass along right axilla, which could either be a separate breast cancer versus a metastatic axillary lymph node. 3.No evidence of metastasis within neck, abdomen, pelvis, or bones.   · 8/6/2020 MRI bilateral breasts- 1. There are 2 enhancing masses within the right breast as described above. The mass in the axillary tail region has been previously biopsied and is consistent with poorly differentiated ductal adenocarcinoma. The mass in the upper outer quadrant more anteriorly was not previously biopsied, but is also suspicious for a second separate right breast cancer. 2. The left breast is negative.  · 8/6/2020 2D echo showed an estimated ejection fraction of 60%.  Left ventricular systolic function is normal.    Past Medical History:   Diagnosis Date   • Arthritis    • Asthma    • Cancer (CMS/HCC)    • COPD (chronic obstructive pulmonary disease) (CMS/HCC)    • Coronary artery disease    • Elevated cholesterol    • GERD (gastroesophageal reflux disease)    • H/O Bell's palsy    • History of transfusion    • Hypertension    • Sleep apnea        Past Surgical History:   Procedure Laterality Date   • ABDOMINAL SURGERY     • ANKLE SURGERY     • TUBAL ABDOMINAL LIGATION     • VENOUS ACCESS DEVICE (PORT) INSERTION Left 7/30/2020    Procedure: INSERTION VENOUS ACCESS DEVICE LEFT internal jugular;  Surgeon: Taylor Kaye MD;  Location: [facility];  Service: General;  Laterality: Left;         Current Outpatient Medications:   •  albuterol sulfate  (90 Base) MCG/ACT inhaler, Inhale 2 puffs Every 6 (Six) Hours., Disp: , Rfl:   •  ALENDRONATE SODIUM PO, Take 70 mg by mouth 1 (One) Time Per Week. Patient states takes on Wednesdays, Disp: , Rfl:   •  aspirin 81 MG EC tablet, Take 81 mg by mouth Daily., Disp: , Rfl:   •  atorvastatin (LIPITOR) 20 MG  tablet, Take 20 mg by mouth Daily., Disp: , Rfl:   •  azelastine (ASTELIN) 0.1 % nasal spray, 2 sprays into the nostril(s) as directed by provider 2 (Two) Times a Day. Use in each nostril as directed, Disp: , Rfl:   •  budesonide (PULMICORT) 0.25 MG/2ML nebulizer solution, Take 0.25 mg by nebulization Daily. Patient does not know strength / dose, Disp: , Rfl:   •  colesevelam (WELCHOL) 625 MG tablet, Take 1,875 mg by mouth 2 (Two) Times a Day With Meals., Disp: , Rfl:   •  ferrous sulfate 325 (65 FE) MG tablet, Take 325 mg by mouth Daily With Breakfast., Disp: , Rfl:   •  ipratropium-albuterol (DUO-NEB) 0.5-2.5 mg/3 ml nebulizer, Take 3 mL by nebulization Every 6 (Six) Hours., Disp: , Rfl:   •  lisinopril (PRINIVIL,ZESTRIL) 40 MG tablet, Take 40 mg by mouth Daily. Do not take dos, Disp: , Rfl:   •  metoprolol succinate XL (TOPROL-XL) 50 MG 24 hr tablet, Take 50 mg by mouth Daily., Disp: , Rfl:   •  montelukast (SINGULAIR) 10 MG tablet, Take 10 mg by mouth Every Night., Disp: , Rfl:   •  Multiple Vitamins-Minerals (CENTRUM ADULTS) tablet, Take 1 tablet by mouth Daily. Do not take after 7/27 for surgery, Disp: , Rfl:   •  nitroglycerin (NITROSTAT) 0.4 MG SL tablet, Place 0.4 mg under the tongue., Disp: , Rfl:   •  omeprazole (priLOSEC) 20 MG capsule, Take 20 mg by mouth Daily., Disp: , Rfl:   •  pantoprazole (PROTONIX) 40 MG EC tablet, Take 40 mg by mouth Daily., Disp: , Rfl:   •  sertraline (ZOLOFT) 100 MG tablet, Take 100 mg by mouth Daily., Disp: , Rfl:   •  sucralfate (CARAFATE) 1 g tablet, Take 1 g by mouth 2 (Two) Times a Day., Disp: , Rfl:     Allergies   Allergen Reactions   • Penicillins Shortness Of Breath   • Adhesive Tape Rash       Family History   Problem Relation Age of Onset   • Heart disease Father    • Stroke Father    • Diabetes Father    • Diabetes Son        Cancer-related family history is not on file.    Social History     Tobacco Use   • Smoking status: Never Smoker   • Smokeless tobacco: Never  "Used   Substance Use Topics   • Alcohol use: Never     Frequency: Never   • Drug use: Never       I have reviewed the history of present illness, past medical history, family history, social history, lab results, all notes and other records since the patient was last seen at the cancer care center.  SUBJECTIVE:        Patient is my office for follow-up after the multiple tests she had for her breast cancer.  Reports to feeling well.  A lot of questions about the treatments.  She is worried about duration of each treatment.  Denies fevers night sweats or weight loss.  No pain in the breast mass or axilla.  Reports to feeling full.  Patient reports that she is unable to sleep.  Denies depression.  ROS:      Review of Systems   Constitutional: Negative for fever.   HENT: Negative for nosebleeds and trouble swallowing.    Eyes: Negative for visual disturbance.   Respiratory: Negative for cough, shortness of breath and wheezing.    Cardiovascular: Negative for chest pain.   Gastrointestinal: Negative for abdominal pain and blood in stool.   Endocrine: Negative for cold intolerance.   Genitourinary: Negative for dysuria and hematuria.   Musculoskeletal: Negative for joint swelling.   Skin: Negative for rash.   Allergic/Immunologic: Negative for environmental allergies.   Neurological: Negative for seizures.   Hematological: Does not bruise/bleed easily.   Psychiatric/Behavioral: The patient is not nervous/anxious.      MD performed ROS and are negative except as mentioned in Subjective.      Objective:       Vitals:    08/10/20 1415   Resp: 20   Temp: 96.9 °F (36.1 °C)   Weight: 80.6 kg (177 lb 12.8 oz)   Height: 149.9 cm (59\")   PainSc: 0-No pain     Temp 96.9 °F (36.1 °C)   Resp 20   Ht 149.9 cm (59\")   Wt 80.6 kg (177 lb 12.8 oz)   BMI 35.91 kg/m²       PHYSICAL EXAM:      Physical Exam   Constitutional: She is oriented to person, place, and time. No distress.   Moderately built very obese   HENT:   Head: " Normocephalic and atraumatic.   Eyes: Conjunctivae and EOM are normal. Right eye exhibits no discharge. Left eye exhibits no discharge. No scleral icterus.   Neck: Normal range of motion. Neck supple. No thyromegaly present.   Cardiovascular: Normal rate, regular rhythm and normal heart sounds. Exam reveals no gallop and no friction rub.   Pulmonary/Chest: Effort normal. No stridor. No respiratory distress. She has no wheezes.   Bilateral pendulous breasts.   Skin thickening noted.  Visible/palpable axillary tail mass.    Abdominal: Soft. Bowel sounds are normal. She exhibits no mass. There is no tenderness. There is no rebound and no guarding.   Soft obese no palpable organomegaly   Musculoskeletal: Normal range of motion. She exhibits no tenderness.   Lymphadenopathy:     She has no cervical adenopathy.   Neurological: She is alert and oriented to person, place, and time. She exhibits normal muscle tone.   Skin: Skin is warm. No rash noted. She is not diaphoretic. No erythema.   Psychiatric: She has a normal mood and affect. Her behavior is normal.   Nursing note and vitals reviewed.       RECENT LABS:     WBC   Date Value Ref Range Status   08/10/2020 7.42 3.40 - 10.80 10*3/mm3 Final     RBC   Date Value Ref Range Status   08/10/2020 4.42 3.77 - 5.28 10*6/mm3 Final     Hemoglobin   Date Value Ref Range Status   08/10/2020 13.2 12.0 - 15.9 g/dL Final     Hematocrit   Date Value Ref Range Status   08/10/2020 40.5 34.0 - 46.6 % Final     MCV   Date Value Ref Range Status   08/10/2020 91.6 79.0 - 97.0 fL Final     MCH   Date Value Ref Range Status   08/10/2020 29.9 26.6 - 33.0 pg Final     MCHC   Date Value Ref Range Status   08/10/2020 32.6 31.5 - 35.7 g/dL Final     RDW   Date Value Ref Range Status   08/10/2020 14.3 12.3 - 15.4 % Final     RDW-SD   Date Value Ref Range Status   08/10/2020 46.9 37.0 - 54.0 fl Final     MPV   Date Value Ref Range Status   08/10/2020 9.8 6.0 - 12.0 fL Final     Platelets   Date Value  Ref Range Status   08/10/2020 241 140 - 450 10*3/mm3 Final     Neutrophil %   Date Value Ref Range Status   08/10/2020 53.1 42.7 - 76.0 % Final     Lymphocyte %   Date Value Ref Range Status   08/10/2020 32.9 19.6 - 45.3 % Final     Monocyte %   Date Value Ref Range Status   08/10/2020 9.7 5.0 - 12.0 % Final     Eosinophil %   Date Value Ref Range Status   08/10/2020 3.5 0.3 - 6.2 % Final     Basophil %   Date Value Ref Range Status   08/10/2020 0.8 0.0 - 1.5 % Final     Neutrophils, Absolute   Date Value Ref Range Status   08/10/2020 3.94 1.70 - 7.00 10*3/mm3 Final     Lymphocytes, Absolute   Date Value Ref Range Status   08/10/2020 2.44 0.70 - 3.10 10*3/mm3 Final     Monocytes, Absolute   Date Value Ref Range Status   08/10/2020 0.72 0.10 - 0.90 10*3/mm3 Final     Eosinophils, Absolute   Date Value Ref Range Status   08/10/2020 0.26 0.00 - 0.40 10*3/mm3 Final     Basophils, Absolute   Date Value Ref Range Status   08/10/2020 0.06 0.00 - 0.20 10*3/mm3 Final     nRBC   Date Value Ref Range Status   07/28/2020 0.1 0.0 - 0.2 /100 WBC Final       Lab Results   Component Value Date    GLUCOSE 101 (H) 07/28/2020    BUN  07/28/2020      Comment:      Testing performed by alternate method    BUN 18 07/28/2020    CREATININE 0.66 07/28/2020    EGFRIFNONA 89 07/28/2020    BCR  07/28/2020      Comment:      Testing not performed    K 3.9 07/28/2020    CO2 21.0 (L) 07/28/2020    CALCIUM 10.2 07/28/2020    ALBUMIN 4.80 07/28/2020    AST 32 07/28/2020    ALT 37 (H) 07/28/2020         Assessment/Plan      ASSESSMENT:     1. 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  2. Insomnia  3. ECOG 0    PLAN:      1. I discussed the PET CT scan results MRI results and 2D echo with the patient.  Patient does not have systemic disease.  Patient has extensive disease in the right breast in the axillary tail.  Patient will benefit from neoadjuvant chemotherapy.  I will  treat her with Adriamycin and Cytoxan.  2D echo was normal.  She will receive treatment every 3 weeks for total of 4 cycles.  Given her age and comorbid conditions I have decided against dose dense chemotherapy.  AC will be followed by weekly Taxol.  Patient's case will be presented at tumor board.  2. Patient is requesting to undergo mastectomy at this point with the size of the masses in the location patient will benefit from mastectomy.  3. Arrange for chemotherapy teaching and start chemotherapy this week.  4. Serum tumor marker is normal.    5. I sent prescription for Restoril 15 mg to take at bedtime for her insomnia.  6. I will see her back in my office in 3 weeks recheck CBC CMP and CA-[id number] at the time.    I have reviewed labs results, imaging, vitals, and medications with the patient today.   Patient verbalized understanding and is in agreement of the above plan.  Electronically signed by Deepika Cantrell MD, 08/10/20, 6:30 PM.          This report was compiled using Dragon voice recognition software. I have made every effort to proof read this document; however, typographical errors may persist.

## 2020-08-10 NOTE — PROGRESS NOTES
She had recent [id number] placement and yesterday she went to the hospital and they could not access the port    I prepped the area over the left subclavian region and was able to use a Spencer needle and I was able to access the port and pulled back some blood and also flush it.    I reassured her that I believe the [id number] is functional and she can proceed with chemotherapy.    She also expressed interest in a prophylactic left mastectomy at the time of her right mastectomy.  I believe this is reasonable.  She is very large breasted and this is putting strain on her back.  We will see if insurance will approve this as we get closer to her surgery.

## 2020-08-13 DIAGNOSIS — C50.911 MALIGNANT NEOPLASM OF RIGHT FEMALE BREAST, UNSPECIFIED ESTROGEN RECEPTOR STATUS, UNSPECIFIED SITE OF BREAST (HCC): ICD-10-CM

## 2020-08-13 RX ORDER — DOXORUBICIN HYDROCHLORIDE 2 MG/ML
60 INJECTION, SOLUTION INTRAVENOUS ONCE
Status: CANCELLED | OUTPATIENT
Start: 2020-08-14

## 2020-08-13 RX ORDER — SODIUM CHLORIDE 9 MG/ML
250 INJECTION, SOLUTION INTRAVENOUS ONCE
Status: CANCELLED | OUTPATIENT
Start: 2020-08-14

## 2020-08-13 RX ORDER — PALONOSETRON 0.05 MG/ML
0.25 INJECTION, SOLUTION INTRAVENOUS ONCE
Status: CANCELLED | OUTPATIENT
Start: 2020-08-14

## 2020-08-13 NOTE — PROGRESS NOTES
Education for Administration of Chemotherapy and/or Biotherapy     You have chosen to receive care through a telehealth visit.  Do you consent to use a video/audio connection for your medical care today? Yes    NAME: Monse Donnelly  : 1950  MRN: [id number]  DATE OF SERVICE: 2020  REASON FOR VISIT: PATIENT EDUCATION    Ms. Monse Donnelly is here today for education on her upcoming chemotherapy and/or biotherapy recommended for treatment of her disease. She was unaccompanied.      I reviewed treatment options, obtained signed consent, and answered any questions she had regarding the administration of adriamycin and Cytoxan.     Monse Donnelly has already consulted with Deepika Cantrell MD for the treatment of breast cancer.  The patient's oncologist has discussed the same treatment options with the patient and answered her questions prior to today's visit on 8/10/2020.    TREATMENT GOALS:  The goal of the treatment is to:    [x] Curative intent - intent is cure; cure implies patient survival will not be restricted by current cancer diagnosis   [] Control  - intent is to extend survival but not long enough to meet definition of cure for patient with that diagnosis   [] Palliative - means treatment given in a non-curative setting to optimize symptom control, improve quality of life, and improve survival    This treatment has been explained to Monse Donnelly. Alternative methods of treatment, if any, have been explained to Monse Donnelly, as have the benefits and risks of each. Based on the physician's explanation of the benefits and risks of this treatment and any alternatives available, the patient agrees the potential benefits outweighs the potential risks involved. I have explained to the patient the most likely complications which might occur from this treatment. The patient understands along with the treatment additional medications may be necessary to lessen the side effects.     SIDE EFFECTS:  Possible side  effects may include but are not limited to, any of the following, or a combination of the following:    []  Abdominal pain  []  Hypersensitivity reaction []  Rash   []  Allergic Reaction [x]  Hypertension [x]  Secondary malignancies   [x]  Anemia []  Hypertensive crisis  []  Sexual side effects    []  Anxiety []  Hypertriglyceridemia []  Shortness of breath   []  Back pain []  Hypoalbuminemia [x]  Skin changes   []  Body pain []  Hyponatremia  []  Somnolence   []  Blood clots (DVT/PE) []  Immune-mediated reaction []  Sore throat   []  Bleeding []  Infection  []  Swelling   []  Bone pain []  Infusion reaction  []  Taste changes   []  Bruising [x]  Injection site pain  []  Temperature sensitivity   []  Cardiovascular events  []  Injection site ulceration [x]  Thrombocytopenia   []  Central neurotoxicity []  Insomnia []  Thyroid changes   []  Chest pain []  Itching []  Tinnitus   []  Chills []  Joint pain []  Upper respiratory tract infection    []  Confusion []  Kidney damage []  Visual changes   []  Congestive heart failure [x]  Leukopenia []  Vitlligo   []  Constipation []  LFT imbalances [x]  Vomiting   []  Cough []  Liver damage []  Watery eyes   []  Depression [x]  Loss of appetite []  Weakness   [x]  Diarrhea []  Low blood pressure []  Weight gain   []  Dizziness []  Lung damage []  Weight loss   []  Dry skin []  Menopausal symptoms []  Wound healing complication   []  Ecchymosis []  Menstrual irregularities [x]  Tumor Lysis Syndrome   []  Electrolyte imbalances []  Metallic taste  [x]  Bladder irritation   []  Elevated LDH []  Mood changes []  Other   []  Eye irritation [x]  Mouth sores []  Other   []  Fatigue []  Muscle aches  []  Other   [x]  Fertility effects  []  Nephrotic syndrome []  Other   []  Fevers []  Nail changes []  Other   []  Fistula formation [x]  Nausea  []  Other   []  Flu-like symptoms []  Neck pain  []  Other   []  Fluid retention []  Neutropenia []  Other   []  Forgetfulness []  Nosebleeds  []  Other   []  Gastrointestinal perforation []  Pain in arms/legs []  Other   []  Hand foot syndrome []  Pericardial effusion  []  Other   [x]  Hair loss/discoloration []  Peripheral neuropathy []  Other   []  Headaches []  Petechiae []  Other   []  Hearing loss/change []  Pharyngitis  []  Other   []  Heart damage []  Photosensitivity  []  Other   []  Hematuria []  Pleural effusion  []  Other   []  Hemorrhage []  Proteinuria  []  Other     VASCULAR ACCESS:  The patient was educated on the possible need for vascular access/port placement.  The patient was advised although uncommon, leakage of an infused medication from the vein or venous access device (port) may lead to skin breakdown and/or other tissue damage.  The patient was advised she may have pain, bleeding, and/or bruising from the insertion of a needle in their vein or venous access device (port).  The patient was further advised despite proper technique, infection with redness and irritation may rarely occur at the site where the needle was inserted.  The patient was advised if complications occur, additional medical treatment is available.    BLOOD COUNT MONITORING:  While receiving treatment, it has been explained to the patient blood counts will be monitored.  This may include but is not limited to a complete blood count (CBC). The patient may develop neutropenia, anemia, or thrombocytopenia. This has been explained and a handout was provided to the patient.     NUTRITION:   It was explained to the patient about nutrition and its importance while undergoing chemotherapy and/or biotherapy. Certain medications will be prescribed during the treatment which may change the way foods taste or smell. These changes may cause poor or no appetite. The patient was advised food is fuel for the body, and if it does not get the fuel it needs, she may become malnourished, which can lead to severe fatigue. It was discussed with the patient about calories and how to add  high-calorie foods to her diet.  Protein was also mentioned in regards to how it will help make new cells for the body. Information was given to Monse Donnelly regarding good protein sources.   It was also discussed with the patient the importance of  eating and drinking every 2-3 hours while awake. We discussed fluid intake of at least 6 to 8 ounce glasses of liquids per day to stay hydrated. Examples are listed below:   Water  Juice (fruit or vegetable)  Soda Sport Drinks Soup   Milk  Ensure, Boost, Glucerna Ice Cream Popsicles Jello   Milkshakes Pudding  Gatorade Sherbert Yogurt     REPRODUCTION:  Reproductive risks were discussed, including appropriate use of birth control, and protection during sexual relations. The risks of becoming pregnant while receiving chemotherapy and/or biotherapy were reviewed for females.  Males were instructed to use appropriate birth control to prevent conception during treatment.  We also discussed the importance of using reliable barrier methods while participating in intimate activities as this may expose their partners to a potentially harmful drug. The importance of pregnancy prevention was emphasized due to risks of increased chance of birth defects and miscarriages.     Monse Donnelly was provided handouts on:   1. Home instructions  2. Complete blood counts and terminology  3. Nutrition during cancer therapy   4. Fluids and dehydration  5. Mouth care  6. Cancer-related fatigue  7. Management of constipation    8. Management of diarrhea   9. Handouts from [employer] on specific drugs   10. Handouts from [employer] on specific drugs if applicable   11. A signed copy of chemotherapy/biotherapy consent     TOPICS EDUCATION PROVIDED EDUCATION REINFORCED COMMENTS   ANEMIA:  role of RBC, cause, s/s, ways to manage, role of transfusion [x] [x]    THROMBOCYTOPENIA:  role of platelet, cause, s/s, ways to prevent bleeding, things to avoid, when to seek help [x] [x]    NEUTROPENIA:  role  of WBC, cause, infection precautions, s/s of infection, when to call MD [x] [x]    NUTRITION & APPETITE CHANGES:  importance of maintaining healthy diet & weight, ways to manage to improve intake, dietary consult, exercise regimen [x] [x]    DIARRHEA:  causes, s/s of dehydration, ways to manage, dietary changes, when to call MD [x] [x]    CONSTIPATION:  causes, ways to manage, dietary changes, when to call MD [x] [x]    NAUSEA & VOMITING:  causes, use of antiemetics, dietary changes, when to call MD [x] [x]    MOUTH SORES:  causes, oral care, ways to manage [x] [x]    ALOPECIA:  causes, ways to manage, resources [x] [x]    INFERTILITY & SEXUALITY:  causes, fertility preservation options, sexuality changes, ways to manage, importance of birth control [x] [x]    NERVOUS SYSTEM CHANGES:  causes, s/s, neuropathies, cognitive changes, ways to manage [x] [x]    PAIN:  causes, ways to manage [x] [x]    SKIN & NAIL CHANGES:  cause, s/s, ways to manage [x] [x]    ORGAN TOXICITIES:  cause, s/s, need for diagnostic tests, labs, when to notify MD [x] [x]    SURVIVORSHIP:  distress, distress assessment, secondary malignancies, early/late effects, follow-up, social issues, social support [x] [x]    HOME CARE:  use of spill kits, storing of PO chemo, how to manage bodily fluids [x] [x]    MISCELLANEOUS:  drug interactions, administration, vesicants  [x] [x]      PAST MEDICAL HISTORY:  Past Medical History:   Diagnosis Date   • Arthritis    • Asthma    • Cancer (CMS/[facility])    • COPD (chronic obstructive pulmonary disease) (CMS/[facility])    • Coronary artery disease    • Elevated cholesterol    • GERD (gastroesophageal reflux disease)    • H/O Bell's palsy    • History of transfusion    • Hypertension    • Sleep apnea        PAST SURGICAL HISTORY:  Past Surgical History:   Procedure Laterality Date   • ABDOMINAL SURGERY     • ANKLE SURGERY     • TUBAL ABDOMINAL LIGATION     • VENOUS ACCESS DEVICE (PORT) INSERTION Left 7/30/2020     Procedure: INSERTION VENOUS ACCESS DEVICE LEFT internal jugular;  Surgeon: Taylor Kaye MD;  Location: [facility] MAIN OR;  Service: General;  Laterality: Left;       CURRENT MEDICATIONS:    Current Outpatient Medications:   •  albuterol sulfate  (90 Base) MCG/ACT inhaler, Inhale 2 puffs Every 6 (Six) Hours., Disp: , Rfl:   •  ALENDRONATE SODIUM PO, Take 70 mg by mouth 1 (One) Time Per Week. Patient states takes on Wednesdays, Disp: , Rfl:   •  aspirin 81 MG EC tablet, Take 81 mg by mouth Daily., Disp: , Rfl:   •  atorvastatin (LIPITOR) 20 MG tablet, Take 20 mg by mouth Daily., Disp: , Rfl:   •  azelastine (ASTELIN) 0.1 % nasal spray, 2 sprays into the nostril(s) as directed by provider 2 (Two) Times a Day. Use in each nostril as directed, Disp: , Rfl:   •  budesonide (PULMICORT) 0.25 MG/2ML nebulizer solution, Take 0.25 mg by nebulization Daily. Patient does not know strength / dose, Disp: , Rfl:   •  colesevelam (WELCHOL) 625 MG tablet, Take 1,875 mg by mouth 2 (Two) Times a Day With Meals., Disp: , Rfl:   •  dexamethasone (DECADRON) 4 MG tablet, Take 2 tablets in the morning daily on Days 2, 3 & 4.  Take with food., Disp: 6 tablet, Rfl: 3  •  ferrous sulfate 325 (65 FE) MG tablet, Take 325 mg by mouth Daily With Breakfast., Disp: , Rfl:   •  ipratropium-albuterol (DUO-NEB) 0.5-2.5 mg/3 ml nebulizer, Take 3 mL by nebulization Every 6 (Six) Hours., Disp: , Rfl:   •  lidocaine-prilocaine (EMLA) 2.5-2.5 % cream, Apply  topically to the appropriate area as directed As Needed (. for 30 minutes prior to port access. Cover with saran wrap.)., Disp: 30 g, Rfl: 5  •  lisinopril (PRINIVIL,ZESTRIL) 40 MG tablet, Take 40 mg by mouth Daily. Do not take dos, Disp: , Rfl:   •  loratadine (CLARITIN) 10 MG tablet, Take 1 tablet by mouth Daily for 5 doses., Disp: 5 tablet, Rfl: 1  •  metoprolol succinate XL (TOPROL-XL) 50 MG 24 hr tablet, Take 50 mg by mouth Daily., Disp: , Rfl:   •  montelukast (SINGULAIR) 10 MG tablet, Take 10  mg by mouth Every Night., Disp: , Rfl:   •  Multiple Vitamins-Minerals (CENTRUM ADULTS) tablet, Take 1 tablet by mouth Daily. Do not take after 7/27 for surgery, Disp: , Rfl:   •  nitroglycerin (NITROSTAT) 0.4 MG SL tablet, Place 0.4 mg under the tongue., Disp: , Rfl:   •  omeprazole (priLOSEC) 20 MG capsule, Take 20 mg by mouth Daily., Disp: , Rfl:   •  ondansetron (ZOFRAN) 8 MG tablet, Take 1 tablet by mouth 3 (Three) Times a Day As Needed for Nausea or Vomiting., Disp: 30 tablet, Rfl: 5  •  pantoprazole (PROTONIX) 40 MG EC tablet, Take 40 mg by mouth Daily., Disp: , Rfl:   •  sertraline (ZOLOFT) 100 MG tablet, Take 100 mg by mouth Daily., Disp: , Rfl:   •  sucralfate (CARAFATE) 1 g tablet, Take 1 g by mouth 2 (Two) Times a Day., Disp: , Rfl:   •  temazepam (RESTORIL) 7.5 MG capsule, Take 2 capsules by mouth At Night As Needed for Sleep., Disp: 30 capsule, Rfl: 2    ALLERGIES:  Allergies   Allergen Reactions   • Penicillins Shortness Of Breath   • Adhesive Tape Rash       FAMILY HISTORY:  Family History   Problem Relation Age of Onset   • Heart disease Father    • Stroke Father    • Diabetes Father    • Diabetes Son        ONCOLOGIC FAMILY HISTORY:  Cancer-related family history is not on file.    SOCIAL HISTORY:  Social History     Tobacco Use   • Smoking status: Never Smoker   • Smokeless tobacco: Never Used   Substance Use Topics   • Alcohol use: Never     Frequency: Never   • Drug use: Never       I have reviewed the history of present illness, past medical history, family history, and social history, since the patient was last seen on 8/10/2020.    REVIEW OF SYSTEMS:  Review of Systems   Constitutional: Negative for appetite change, chills, fatigue, fever and unexpected weight change.   HENT: Negative for congestion, hearing loss, mouth sores, nosebleeds, postnasal drip, rhinorrhea and sore throat.    Eyes: Negative for photophobia, pain and visual disturbance.   Respiratory: Negative for cough, chest  tightness, shortness of breath and wheezing.    Cardiovascular: Negative for chest pain, palpitations and leg swelling.   Gastrointestinal: Negative for abdominal distention, abdominal pain, constipation, diarrhea, nausea and vomiting.   Genitourinary: Negative for difficulty urinating, dysuria and hematuria.   Musculoskeletal: Negative for arthralgias, back pain, gait problem and myalgias.   Skin: Negative for pallor and rash.   Neurological: Negative for dizziness, weakness, light-headedness, numbness and headaches.   Hematological: Negative for adenopathy. Does not bruise/bleed easily.   Psychiatric/Behavioral: Negative for agitation, confusion and dysphoric mood. The patient is nervous/anxious (about starting treatment).    All other systems reviewed and are negative.      PHYSICAL EXAMINATION:  Physical Exam   Constitutional: She is oriented to person, place, and time. She appears well-developed and well-nourished.   HENT:   Head: Normocephalic and atraumatic.   Eyes: Right eye exhibits no discharge. Left eye exhibits no discharge.   Neck: Normal range of motion.   Pulmonary/Chest: Effort normal. No respiratory distress.   Left chest wall port    Neurological: She is alert and oriented to person, place, and time.   Skin: Skin is warm and dry.   Psychiatric: She has a normal mood and affect. Her behavior is normal.   Vitals reviewed.      LABS:  WBC   Date Value Ref Range Status   08/14/2020 6.02 3.40 - 10.80 10*3/mm3 Final     RBC   Date Value Ref Range Status   08/14/2020 4.25 3.77 - 5.28 10*6/mm3 Final     Hemoglobin   Date Value Ref Range Status   08/14/2020 12.9 12.0 - 15.9 g/dL Final     Hematocrit   Date Value Ref Range Status   08/14/2020 39.3 34.0 - 46.6 % Final     MCV   Date Value Ref Range Status   08/14/2020 92.5 79.0 - 97.0 fL Final     MCH   Date Value Ref Range Status   08/14/2020 30.4 26.6 - 33.0 pg Final     MCHC   Date Value Ref Range Status   08/14/2020 32.8 31.5 - 35.7 g/dL Final     RDW    Date Value Ref Range Status   08/14/2020 14.3 12.3 - 15.4 % Final     RDW-SD   Date Value Ref Range Status   08/14/2020 47.2 37.0 - 54.0 fl Final     MPV   Date Value Ref Range Status   08/14/2020 10.3 6.0 - 12.0 fL Final     Platelets   Date Value Ref Range Status   08/14/2020 209 140 - 450 10*3/mm3 Final     Neutrophil %   Date Value Ref Range Status   08/14/2020 57.5 42.7 - 76.0 % Final     Lymphocyte %   Date Value Ref Range Status   08/14/2020 31.4 19.6 - 45.3 % Final     Monocyte %   Date Value Ref Range Status   08/14/2020 7.6 5.0 - 12.0 % Final     Eosinophil %   Date Value Ref Range Status   08/14/2020 3.0 0.3 - 6.2 % Final     Basophil %   Date Value Ref Range Status   08/14/2020 0.5 0.0 - 1.5 % Final     Neutrophils, Absolute   Date Value Ref Range Status   08/14/2020 3.46 1.70 - 7.00 10*3/mm3 Final     Lymphocytes, Absolute   Date Value Ref Range Status   08/14/2020 1.89 0.70 - 3.10 10*3/mm3 Final     Monocytes, Absolute   Date Value Ref Range Status   08/14/2020 0.46 0.10 - 0.90 10*3/mm3 Final     Eosinophils, Absolute   Date Value Ref Range Status   08/14/2020 0.18 0.00 - 0.40 10*3/mm3 Final     Basophils, Absolute   Date Value Ref Range Status   08/14/2020 0.03 0.00 - 0.20 10*3/mm3 Final     nRBC   Date Value Ref Range Status   07/28/2020 0.1 0.0 - 0.2 /100 WBC Final     Lab Results   Component Value Date    GLUCOSE 114 (H) 08/14/2020    BUN  08/14/2020      Comment:      Testing performed by alternate method    BUN 12 08/14/2020    CREATININE 0.70 08/14/2020    EGFRIFNONA 84 08/14/2020    BCR  08/14/2020      Comment:      Testing not performed    K 4.2 08/14/2020    CO2 22.0 08/14/2020    CALCIUM 9.2 08/14/2020    ALBUMIN 3.80 08/14/2020    AST 21 08/14/2020    ALT 22 08/14/2020       Assessment/Plan   Encounter for care related to vascular access port  - lidocaine-prilocaine (EMLA) 2.5-2.5 % cream    Malignant neoplasm of right female breast, unspecified estrogen receptor status, unspecified site  of breast (CMS/HCC)  - dexamethasone (DECADRON) 4 MG tablet  - ondansetron (ZOFRAN) 8 MG tablet    ASSESSMENT   1. Encounter for medication management and education of chemotherapy/biotherapy    2. Breast cancer  3. Port-a-catheter present     PLAN  • CBC and CMP today   • Start adriamycin and Cytoxan today  Start EMLA to port site prior to port access, escribed new prescription   Start Zofran and dexamethasone as directed, escribed new prescriptions  • Notified , financial counselor, and dietician patient starting new treatment   • RTC Deepika Cantrell MD on 9/2/2020    I have reviewed labs results, imaging, vitals, and medications with the patient today.    A total of 55 minutes were spent with the patient, with greater then 50% of time spent in education and counseling.    Electronically signed by BUTCH Rosales, 08/17/20, 3:09 PM..

## 2020-08-14 ENCOUNTER — HOSPITAL ENCOUNTER (OUTPATIENT)
Dept: ONCOLOGY | Facility: HOSPITAL | Age: 70
Setting detail: INFUSION SERIES
Discharge: HOME | End: 2020-08-14

## 2020-08-14 ENCOUNTER — TELEMEDICINE (OUTPATIENT)
Dept: ONCOLOGY | Facility: CLINIC | Age: 70
End: 2020-08-14

## 2020-08-14 VITALS
WEIGHT: 173.8 LBS | HEIGHT: 59 IN | TEMPERATURE: 98.6 F | HEART RATE: 87 BPM | SYSTOLIC BLOOD PRESSURE: 117 MMHG | BODY MASS INDEX: 35.04 KG/M2 | OXYGEN SATURATION: 96 % | DIASTOLIC BLOOD PRESSURE: 80 MMHG | RESPIRATION RATE: 18 BRPM

## 2020-08-14 DIAGNOSIS — Z45.2 ENCOUNTER FOR CARE RELATED TO VASCULAR ACCESS PORT: Primary | ICD-10-CM

## 2020-08-14 DIAGNOSIS — C50.911 MALIGNANT NEOPLASM OF RIGHT FEMALE BREAST, UNSPECIFIED ESTROGEN RECEPTOR STATUS, UNSPECIFIED SITE OF BREAST (HCC): ICD-10-CM

## 2020-08-14 DIAGNOSIS — N63.0 BREAST MASS: ICD-10-CM

## 2020-08-14 DIAGNOSIS — Z79.899 ENCOUNTER FOR MEDICATION MANAGEMENT: ICD-10-CM

## 2020-08-14 DIAGNOSIS — C50.911 MALIGNANT NEOPLASM OF RIGHT FEMALE BREAST, UNSPECIFIED ESTROGEN RECEPTOR STATUS, UNSPECIFIED SITE OF BREAST (HCC): Primary | ICD-10-CM

## 2020-08-14 LAB
ALBUMIN SERPL-MCNC: 3.8 G/DL (ref 3.5–5.2)
ALBUMIN/GLOB SERPL: 1.6 G/DL
ALP SERPL-CCNC: 77 U/L (ref 39–117)
ALT SERPL W P-5'-P-CCNC: 22 U/L (ref 1–33)
ANION GAP SERPL CALCULATED.3IONS-SCNC: 10 MMOL/L (ref 5–15)
AST SERPL-CCNC: 21 U/L (ref 1–32)
BASOPHILS # BLD AUTO: 0.03 10*3/MM3 (ref 0–0.2)
BASOPHILS NFR BLD AUTO: 0.5 % (ref 0–1.5)
BILIRUB SERPL-MCNC: 0.2 MG/DL (ref 0–1.2)
BUN SERPL-MCNC: 12 MG/DL (ref 8–23)
BUN SERPL-MCNC: ABNORMAL MG/DL
BUN/CREAT SERPL: ABNORMAL
CALCIUM SPEC-SCNC: 9.2 MG/DL (ref 8.6–10.5)
CHLORIDE SERPL-SCNC: 111 MMOL/L (ref 98–107)
CO2 SERPL-SCNC: 22 MMOL/L (ref 22–29)
CREAT BLDA-MCNC: 0.7 MG/DL (ref 0.6–1.3)
CREAT SERPL-MCNC: 0.69 MG/DL (ref 0.57–1)
DEPRECATED RDW RBC AUTO: 47.2 FL (ref 37–54)
EOSINOPHIL # BLD AUTO: 0.18 10*3/MM3 (ref 0–0.4)
EOSINOPHIL NFR BLD AUTO: 3 % (ref 0.3–6.2)
ERYTHROCYTE [DISTWIDTH] IN BLOOD BY AUTOMATED COUNT: 14.3 % (ref 12.3–15.4)
GFR SERPL CREATININE-BSD FRML MDRD: 84 ML/MIN/1.73
GLOBULIN UR ELPH-MCNC: 2.4 GM/DL
GLUCOSE SERPL-MCNC: 114 MG/DL (ref 65–99)
HCT VFR BLD AUTO: 39.3 % (ref 34–46.6)
HGB BLD-MCNC: 12.9 G/DL (ref 12–15.9)
LYMPHOCYTES # BLD AUTO: 1.89 10*3/MM3 (ref 0.7–3.1)
LYMPHOCYTES NFR BLD AUTO: 31.4 % (ref 19.6–45.3)
MCH RBC QN AUTO: 30.4 PG (ref 26.6–33)
MCHC RBC AUTO-ENTMCNC: 32.8 G/DL (ref 31.5–35.7)
MCV RBC AUTO: 92.5 FL (ref 79–97)
MONOCYTES # BLD AUTO: 0.46 10*3/MM3 (ref 0.1–0.9)
MONOCYTES NFR BLD AUTO: 7.6 % (ref 5–12)
NEUTROPHILS NFR BLD AUTO: 3.46 10*3/MM3 (ref 1.7–7)
NEUTROPHILS NFR BLD AUTO: 57.5 % (ref 42.7–76)
PLATELET # BLD AUTO: 209 10*3/MM3 (ref 140–450)
PMV BLD AUTO: 10.3 FL (ref 6–12)
POTASSIUM SERPL-SCNC: 4.2 MMOL/L (ref 3.5–5.2)
PROT SERPL-MCNC: 6.2 G/DL (ref 6–8.5)
RBC # BLD AUTO: 4.25 10*6/MM3 (ref 3.77–5.28)
SODIUM SERPL-SCNC: 143 MMOL/L (ref 136–145)
WBC # BLD AUTO: 6.02 10*3/MM3 (ref 3.4–10.8)

## 2020-08-14 PROCEDURE — 99215 OFFICE O/P EST HI 40 MIN: CPT | Performed by: NURSE PRACTITIONER

## 2020-08-14 PROCEDURE — 25010000002 CYCLOPHOSPHAMIDE PER 100 MG: Performed by: INTERNAL MEDICINE

## 2020-08-14 PROCEDURE — 96367 TX/PROPH/DG ADDL SEQ IV INF: CPT

## 2020-08-14 PROCEDURE — 25010000002 FOSAPREPITANT PER 1 MG: Performed by: INTERNAL MEDICINE

## 2020-08-14 PROCEDURE — 36591 DRAW BLOOD OFF VENOUS DEVICE: CPT

## 2020-08-14 PROCEDURE — 82565 ASSAY OF CREATININE: CPT

## 2020-08-14 PROCEDURE — 80053 COMPREHEN METABOLIC PANEL: CPT | Performed by: INTERNAL MEDICINE

## 2020-08-14 PROCEDURE — 96417 CHEMO IV INFUS EACH ADDL SEQ: CPT

## 2020-08-14 PROCEDURE — 96413 CHEMO IV INFUSION 1 HR: CPT

## 2020-08-14 PROCEDURE — 96411 CHEMO IV PUSH ADDL DRUG: CPT

## 2020-08-14 PROCEDURE — 85025 COMPLETE CBC W/AUTO DIFF WBC: CPT | Performed by: INTERNAL MEDICINE

## 2020-08-14 PROCEDURE — 25010000002 DOXORUBICIN PER 10 MG: Performed by: INTERNAL MEDICINE

## 2020-08-14 PROCEDURE — 25010000002 PEGFILGRASTIM 6 MG/0.6ML PREFILLED SYRINGE KIT: Performed by: INTERNAL MEDICINE

## 2020-08-14 PROCEDURE — 25010000002 DEXAMETHASONE SODIUM PHOSPHATE 120 MG/30ML SOLUTION: Performed by: INTERNAL MEDICINE

## 2020-08-14 PROCEDURE — 96409 CHEMO IV PUSH SNGL DRUG: CPT

## 2020-08-14 PROCEDURE — 25010000002 PALONOSETRON 0.25 MG/5ML SOLUTION PREFILLED SYRINGE: Performed by: INTERNAL MEDICINE

## 2020-08-14 PROCEDURE — 96377 APPLICATON ON-BODY INJECTOR: CPT

## 2020-08-14 PROCEDURE — 25010000003 HEPARIN LOCK FLUSH PER 10 UNITS: Performed by: INTERNAL MEDICINE

## 2020-08-14 PROCEDURE — 96375 TX/PRO/DX INJ NEW DRUG ADDON: CPT

## 2020-08-14 RX ORDER — HEPARIN SODIUM (PORCINE) LOCK FLUSH IV SOLN 100 UNIT/ML 100 UNIT/ML
500 SOLUTION INTRAVENOUS AS NEEDED
Status: DISCONTINUED | OUTPATIENT
Start: 2020-08-14 | End: 2020-08-15 | Stop reason: HOSPADM

## 2020-08-14 RX ORDER — DOXORUBICIN HYDROCHLORIDE 2 MG/ML
60 INJECTION, SOLUTION INTRAVENOUS ONCE
Status: COMPLETED | OUTPATIENT
Start: 2020-08-14 | End: 2020-08-14

## 2020-08-14 RX ORDER — LIDOCAINE AND PRILOCAINE 25; 25 MG/G; MG/G
CREAM TOPICAL AS NEEDED
Qty: 30 G | Refills: 5 | Status: SHIPPED | OUTPATIENT
Start: 2020-08-14 | End: 2021-02-10 | Stop reason: SDUPTHER

## 2020-08-14 RX ORDER — HEPARIN SODIUM (PORCINE) LOCK FLUSH IV SOLN 100 UNIT/ML 100 UNIT/ML
500 SOLUTION INTRAVENOUS AS NEEDED
Status: CANCELLED | OUTPATIENT
Start: 2020-08-14

## 2020-08-14 RX ORDER — DEXAMETHASONE 4 MG/1
TABLET ORAL
Qty: 6 TABLET | Refills: 3 | Status: SHIPPED | OUTPATIENT
Start: 2020-08-14 | End: 2020-09-03 | Stop reason: SDUPTHER

## 2020-08-14 RX ORDER — SODIUM CHLORIDE 9 MG/ML
250 INJECTION, SOLUTION INTRAVENOUS ONCE
Status: COMPLETED | OUTPATIENT
Start: 2020-08-14 | End: 2020-08-14

## 2020-08-14 RX ORDER — PALONOSETRON 0.05 MG/ML
0.25 INJECTION, SOLUTION INTRAVENOUS ONCE
Status: COMPLETED | OUTPATIENT
Start: 2020-08-14 | End: 2020-08-14

## 2020-08-14 RX ORDER — SODIUM CHLORIDE 0.9 % (FLUSH) 0.9 %
10 SYRINGE (ML) INJECTION AS NEEDED
Status: DISCONTINUED | OUTPATIENT
Start: 2020-08-14 | End: 2020-08-15 | Stop reason: HOSPADM

## 2020-08-14 RX ORDER — ONDANSETRON HYDROCHLORIDE 8 MG/1
8 TABLET, FILM COATED ORAL 3 TIMES DAILY PRN
Qty: 30 TABLET | Refills: 5 | Status: SHIPPED | OUTPATIENT
Start: 2020-08-14 | End: 2020-09-03 | Stop reason: SDUPTHER

## 2020-08-14 RX ORDER — LORATADINE 10 MG/1
10 TABLET ORAL DAILY
Qty: 5 TABLET | Refills: 1 | Status: SHIPPED | OUTPATIENT
Start: 2020-08-14 | End: 2020-09-09 | Stop reason: SDUPTHER

## 2020-08-14 RX ORDER — SODIUM CHLORIDE 0.9 % (FLUSH) 0.9 %
10 SYRINGE (ML) INJECTION AS NEEDED
Status: CANCELLED | OUTPATIENT
Start: 2020-08-14

## 2020-08-14 RX ADMIN — HEPARIN 500 UNITS: 100 SYRINGE at 15:13

## 2020-08-14 RX ADMIN — SODIUM CHLORIDE 100 ML: 900 INJECTION, SOLUTION INTRAVENOUS at 13:17

## 2020-08-14 RX ADMIN — SODIUM CHLORIDE 250 ML: 900 INJECTION, SOLUTION INTRAVENOUS at 13:17

## 2020-08-14 RX ADMIN — PEGFILGRASTIM 6 MG: KIT SUBCUTANEOUS at 15:09

## 2020-08-14 RX ADMIN — DEXAMETHASONE SODIUM PHOSPHATE 12 MG: 4 INJECTION, SOLUTION INTRA-ARTICULAR; INTRALESIONAL; INTRAMUSCULAR; INTRAVENOUS; SOFT TISSUE at 13:50

## 2020-08-14 RX ADMIN — Medication 10 ML: at 15:13

## 2020-08-14 RX ADMIN — PALONOSETRON 0.25 MG: 0.25 INJECTION, SOLUTION INTRAVENOUS at 13:17

## 2020-08-14 RX ADMIN — DOXORUBICIN HYDROCHLORIDE 106 MG: 2 INJECTION, SOLUTION INTRAVENOUS at 14:14

## 2020-08-14 RX ADMIN — SODIUM CHLORIDE 1000 MG: 900 INJECTION, SOLUTION INTRAVENOUS at 14:22

## 2020-08-14 NOTE — PATIENT INSTRUCTIONS
Take loratadine 10mg daily, starting today. Continue taking loratadine 10mg daily for total of 5 days to help with bone pain associated with neulasta.    Remove neulasta tomorrow, 8/15/20, at 7:30pm.

## 2020-08-17 PROBLEM — Z45.2 ENCOUNTER FOR CARE RELATED TO VASCULAR ACCESS PORT: Status: ACTIVE | Noted: 2020-08-17

## 2020-08-17 PROBLEM — Z79.899 ENCOUNTER FOR MEDICATION MANAGEMENT: Status: ACTIVE | Noted: 2020-08-17

## 2020-08-18 DIAGNOSIS — C50.911 MALIGNANT NEOPLASM OF RIGHT FEMALE BREAST, UNSPECIFIED ESTROGEN RECEPTOR STATUS, UNSPECIFIED SITE OF BREAST (HCC): Primary | ICD-10-CM

## 2020-08-24 ENCOUNTER — TELEPHONE (OUTPATIENT)
Dept: ONCOLOGY | Facility: HOSPITAL | Age: 70
End: 2020-08-24

## 2020-08-24 NOTE — TELEPHONE ENCOUNTER
Case Management/ Note    Patient Name: Monse Donnelly  YOB: 1950  MRN #: [id number]    OSW received a phone call from staff at Dr. Benson's office who asked if we were asking the patient to sign anything related to expenses or money. Explained that patient had not been introduced to grants and only discussion of money was asking about her assets for the A&D waiver. Explained that patient can be offered grants and will meet with the patient regarding this. She said she would explain this to the patient.     Electronically signed by:   Chel Willis LCSW, OSW-C  08/24/20, 2:16 PM

## 2020-08-27 ENCOUNTER — TELEPHONE (OUTPATIENT)
Dept: ONCOLOGY | Facility: CLINIC | Age: 70
End: 2020-08-27

## 2020-08-27 NOTE — TELEPHONE ENCOUNTER
Contacted pt to let her know that a additional breast biopsy had been ordered. Pt wanted to know why er were going to biopsy the right breast for a second time. Explained that it was a separate sight and it needed to be tested to see if it was the same cancer or different.   Pt v/u.     Advised pt that Maisha with the breast center would be contacting her to set up appt. Pt requests that it be done between 9/2 and 9/8. Advised that I will request that for her.     Pt had no additional questions.

## 2020-08-31 DIAGNOSIS — C50.911 MALIGNANT NEOPLASM OF RIGHT FEMALE BREAST, UNSPECIFIED ESTROGEN RECEPTOR STATUS, UNSPECIFIED SITE OF BREAST (HCC): Primary | ICD-10-CM

## 2020-09-01 DIAGNOSIS — C50.911 MALIGNANT NEOPLASM OF RIGHT FEMALE BREAST, UNSPECIFIED ESTROGEN RECEPTOR STATUS, UNSPECIFIED SITE OF BREAST (HCC): ICD-10-CM

## 2020-09-01 RX ORDER — SODIUM CHLORIDE 9 MG/ML
250 INJECTION, SOLUTION INTRAVENOUS ONCE
Status: CANCELLED | OUTPATIENT
Start: 2020-09-04

## 2020-09-01 RX ORDER — DOXORUBICIN HYDROCHLORIDE 2 MG/ML
60 INJECTION, SOLUTION INTRAVENOUS ONCE
Status: CANCELLED | OUTPATIENT
Start: 2020-09-04

## 2020-09-01 RX ORDER — PALONOSETRON 0.05 MG/ML
0.25 INJECTION, SOLUTION INTRAVENOUS ONCE
Status: CANCELLED | OUTPATIENT
Start: 2020-09-04

## 2020-09-02 ENCOUNTER — HOSPITAL ENCOUNTER (OUTPATIENT)
Dept: ONCOLOGY | Facility: HOSPITAL | Age: 70
Setting detail: INFUSION SERIES
Discharge: HOME | End: 2020-09-02

## 2020-09-02 VITALS
TEMPERATURE: 98.4 F | BODY MASS INDEX: 34.88 KG/M2 | RESPIRATION RATE: 20 BRPM | SYSTOLIC BLOOD PRESSURE: 139 MMHG | DIASTOLIC BLOOD PRESSURE: 86 MMHG | HEART RATE: 80 BPM | HEIGHT: 59 IN | WEIGHT: 173 LBS

## 2020-09-02 DIAGNOSIS — C50.911 MALIGNANT NEOPLASM OF RIGHT FEMALE BREAST, UNSPECIFIED ESTROGEN RECEPTOR STATUS, UNSPECIFIED SITE OF BREAST (HCC): ICD-10-CM

## 2020-09-02 DIAGNOSIS — N63.0 BREAST MASS: Primary | ICD-10-CM

## 2020-09-02 LAB
ALBUMIN SERPL-MCNC: 4 G/DL (ref 3.5–5.2)
ALBUMIN/GLOB SERPL: 1.9 G/DL
ALP SERPL-CCNC: 79 U/L (ref 39–117)
ALT SERPL W P-5'-P-CCNC: 27 U/L (ref 1–33)
ANION GAP SERPL CALCULATED.3IONS-SCNC: 10 MMOL/L (ref 5–15)
AST SERPL-CCNC: 23 U/L (ref 1–32)
BASOPHILS # BLD AUTO: 0.05 10*3/MM3 (ref 0–0.2)
BASOPHILS NFR BLD AUTO: 0.7 % (ref 0–1.5)
BILIRUB SERPL-MCNC: 0.2 MG/DL (ref 0–1.2)
BUN SERPL-MCNC: 10 MG/DL (ref 8–23)
BUN SERPL-MCNC: ABNORMAL MG/DL
BUN/CREAT SERPL: ABNORMAL
CALCIUM SPEC-SCNC: 9.4 MG/DL (ref 8.6–10.5)
CHLORIDE SERPL-SCNC: 109 MMOL/L (ref 98–107)
CO2 SERPL-SCNC: 24 MMOL/L (ref 22–29)
CREAT SERPL-MCNC: 0.64 MG/DL (ref 0.57–1)
DEPRECATED RDW RBC AUTO: 45.2 FL (ref 37–54)
EOSINOPHIL # BLD AUTO: 0.01 10*3/MM3 (ref 0–0.4)
EOSINOPHIL NFR BLD AUTO: 0.1 % (ref 0.3–6.2)
ERYTHROCYTE [DISTWIDTH] IN BLOOD BY AUTOMATED COUNT: 14.7 % (ref 12.3–15.4)
GFR SERPL CREATININE-BSD FRML MDRD: 92 ML/MIN/1.73
GLOBULIN UR ELPH-MCNC: 2.1 GM/DL
GLUCOSE SERPL-MCNC: 126 MG/DL (ref 65–99)
HCT VFR BLD AUTO: 37.1 % (ref 34–46.6)
HGB BLD-MCNC: 12.4 G/DL (ref 12–15.9)
LYMPHOCYTES # BLD AUTO: 1.78 10*3/MM3 (ref 0.7–3.1)
LYMPHOCYTES NFR BLD AUTO: 26.6 % (ref 19.6–45.3)
MCH RBC QN AUTO: 30.5 PG (ref 26.6–33)
MCHC RBC AUTO-ENTMCNC: 33.4 G/DL (ref 31.5–35.7)
MCV RBC AUTO: 91.4 FL (ref 79–97)
MONOCYTES # BLD AUTO: 0.76 10*3/MM3 (ref 0.1–0.9)
MONOCYTES NFR BLD AUTO: 11.3 % (ref 5–12)
NEUTROPHILS NFR BLD AUTO: 4.1 10*3/MM3 (ref 1.7–7)
NEUTROPHILS NFR BLD AUTO: 61.3 % (ref 42.7–76)
PLATELET # BLD AUTO: 329 10*3/MM3 (ref 140–450)
PMV BLD AUTO: 9.8 FL (ref 6–12)
POTASSIUM SERPL-SCNC: 4 MMOL/L (ref 3.5–5.2)
PROT SERPL-MCNC: 6.1 G/DL (ref 6–8.5)
RBC # BLD AUTO: 4.06 10*6/MM3 (ref 3.77–5.28)
SODIUM SERPL-SCNC: 143 MMOL/L (ref 136–145)
WBC # BLD AUTO: 6.7 10*3/MM3 (ref 3.4–10.8)

## 2020-09-02 PROCEDURE — 85025 COMPLETE CBC W/AUTO DIFF WBC: CPT | Performed by: INTERNAL MEDICINE

## 2020-09-02 PROCEDURE — 96375 TX/PRO/DX INJ NEW DRUG ADDON: CPT

## 2020-09-02 PROCEDURE — 25010000002 DEXAMETHASONE SODIUM PHOSPHATE 120 MG/30ML SOLUTION: Performed by: INTERNAL MEDICINE

## 2020-09-02 PROCEDURE — 25010000002 PALONOSETRON 0.25 MG/5ML SOLUTION PREFILLED SYRINGE: Performed by: INTERNAL MEDICINE

## 2020-09-02 PROCEDURE — 96413 CHEMO IV INFUSION 1 HR: CPT

## 2020-09-02 PROCEDURE — 96367 TX/PROPH/DG ADDL SEQ IV INF: CPT

## 2020-09-02 PROCEDURE — 36591 DRAW BLOOD OFF VENOUS DEVICE: CPT

## 2020-09-02 PROCEDURE — 80053 COMPREHEN METABOLIC PANEL: CPT | Performed by: INTERNAL MEDICINE

## 2020-09-02 PROCEDURE — 96411 CHEMO IV PUSH ADDL DRUG: CPT

## 2020-09-02 PROCEDURE — 96377 APPLICATON ON-BODY INJECTOR: CPT

## 2020-09-02 PROCEDURE — 25010000002 DOXORUBICIN PER 10 MG: Performed by: INTERNAL MEDICINE

## 2020-09-02 PROCEDURE — 25010000002 FOSAPREPITANT PER 1 MG: Performed by: INTERNAL MEDICINE

## 2020-09-02 PROCEDURE — 25010000002 PEGFILGRASTIM 6 MG/0.6ML PREFILLED SYRINGE KIT: Performed by: INTERNAL MEDICINE

## 2020-09-02 PROCEDURE — 25010000002 CYCLOPHOSPHAMIDE PER 100 MG: Performed by: INTERNAL MEDICINE

## 2020-09-02 RX ORDER — HEPARIN SODIUM (PORCINE) LOCK FLUSH IV SOLN 100 UNIT/ML 100 UNIT/ML
500 SOLUTION INTRAVENOUS AS NEEDED
Status: DISCONTINUED | OUTPATIENT
Start: 2020-09-02 | End: 2020-09-03 | Stop reason: HOSPADM

## 2020-09-02 RX ORDER — SODIUM CHLORIDE 0.9 % (FLUSH) 0.9 %
10 SYRINGE (ML) INJECTION AS NEEDED
OUTPATIENT
Start: 2020-09-02

## 2020-09-02 RX ORDER — SODIUM CHLORIDE 0.9 % (FLUSH) 0.9 %
10 SYRINGE (ML) INJECTION AS NEEDED
Status: DISCONTINUED | OUTPATIENT
Start: 2020-09-02 | End: 2020-09-03 | Stop reason: HOSPADM

## 2020-09-02 RX ORDER — PALONOSETRON 0.05 MG/ML
0.25 INJECTION, SOLUTION INTRAVENOUS ONCE
Status: COMPLETED | OUTPATIENT
Start: 2020-09-02 | End: 2020-09-02

## 2020-09-02 RX ORDER — SODIUM CHLORIDE 9 MG/ML
250 INJECTION, SOLUTION INTRAVENOUS ONCE
Status: COMPLETED | OUTPATIENT
Start: 2020-09-02 | End: 2020-09-02

## 2020-09-02 RX ORDER — DOXORUBICIN HYDROCHLORIDE 2 MG/ML
60 INJECTION, SOLUTION INTRAVENOUS ONCE
Status: COMPLETED | OUTPATIENT
Start: 2020-09-02 | End: 2020-09-02

## 2020-09-02 RX ORDER — HEPARIN SODIUM (PORCINE) LOCK FLUSH IV SOLN 100 UNIT/ML 100 UNIT/ML
500 SOLUTION INTRAVENOUS AS NEEDED
OUTPATIENT
Start: 2020-09-02

## 2020-09-02 RX ADMIN — SODIUM CHLORIDE 1000 MG: 900 INJECTION, SOLUTION INTRAVENOUS at 13:42

## 2020-09-02 RX ADMIN — SODIUM CHLORIDE 250 ML: 900 INJECTION, SOLUTION INTRAVENOUS at 12:25

## 2020-09-02 RX ADMIN — PALONOSETRON 0.25 MG: 0.25 INJECTION, SOLUTION INTRAVENOUS at 12:25

## 2020-09-02 RX ADMIN — PEGFILGRASTIM 6 MG: KIT SUBCUTANEOUS at 14:33

## 2020-09-02 RX ADMIN — DOXORUBICIN HYDROCHLORIDE 106 MG: 2 INJECTION, SOLUTION INTRAVENOUS at 13:40

## 2020-09-02 RX ADMIN — DEXAMETHASONE SODIUM PHOSPHATE 12 MG: 4 INJECTION, SOLUTION INTRA-ARTICULAR; INTRALESIONAL; INTRAMUSCULAR; INTRAVENOUS; SOFT TISSUE at 13:12

## 2020-09-02 RX ADMIN — SODIUM CHLORIDE 100 ML: 900 INJECTION, SOLUTION INTRAVENOUS at 12:25

## 2020-09-02 NOTE — ADDENDUM NOTE
Encounter addended by: Charlotte Salazar RN on: 9/2/2020 3:32 PM   Actions taken: Flowsheet accepted

## 2020-09-03 DIAGNOSIS — C50.911 MALIGNANT NEOPLASM OF RIGHT FEMALE BREAST, UNSPECIFIED ESTROGEN RECEPTOR STATUS, UNSPECIFIED SITE OF BREAST (HCC): ICD-10-CM

## 2020-09-03 LAB — CANCER AG27-29 SERPL-ACNC: 15.2 U/ML (ref 0–38.6)

## 2020-09-03 RX ORDER — DEXAMETHASONE 4 MG/1
TABLET ORAL
Qty: 6 TABLET | Refills: 3 | Status: SHIPPED | OUTPATIENT
Start: 2020-09-03 | End: 2020-09-09 | Stop reason: SDUPTHER

## 2020-09-03 RX ORDER — TEMAZEPAM 7.5 MG/1
15 CAPSULE ORAL NIGHTLY PRN
Qty: 30 CAPSULE | Refills: 2 | Status: SHIPPED | OUTPATIENT
Start: 2020-09-03 | End: 2020-09-09 | Stop reason: SDUPTHER

## 2020-09-03 RX ORDER — PANTOPRAZOLE SODIUM 40 MG/1
40 TABLET, DELAYED RELEASE ORAL DAILY
Qty: 30 TABLET | Refills: 5 | Status: SHIPPED | OUTPATIENT
Start: 2020-09-03 | End: 2020-09-09 | Stop reason: SDUPTHER

## 2020-09-03 RX ORDER — ONDANSETRON HYDROCHLORIDE 8 MG/1
8 TABLET, FILM COATED ORAL 3 TIMES DAILY PRN
Qty: 30 TABLET | Refills: 5 | Status: SHIPPED | OUTPATIENT
Start: 2020-09-03 | End: 2020-09-09 | Stop reason: SDUPTHER

## 2020-09-03 NOTE — TELEPHONE ENCOUNTER
Patient is getting low on medication     [facility] pharmacy     Patient phone # [phone] call patient when this is called in

## 2020-09-08 ENCOUNTER — HOSPITAL ENCOUNTER (OUTPATIENT)
Dept: MAMMOGRAPHY | Facility: HOSPITAL | Age: 70
Discharge: HOME | End: 2020-09-08

## 2020-09-08 ENCOUNTER — HOSPITAL ENCOUNTER (OUTPATIENT)
Dept: ULTRASOUND IMAGING | Facility: HOSPITAL | Age: 70
Discharge: HOME | End: 2020-09-08

## 2020-09-08 DIAGNOSIS — N63.10 BREAST MASS, RIGHT: ICD-10-CM

## 2020-09-08 PROCEDURE — 88360 TUMOR IMMUNOHISTOCHEM/MANUAL: CPT | Performed by: INTERNAL MEDICINE

## 2020-09-08 PROCEDURE — 25010000003 LIDOCAINE 1 % SOLUTION: Performed by: INTERNAL MEDICINE

## 2020-09-08 PROCEDURE — 88305 TISSUE EXAM BY PATHOLOGIST: CPT | Performed by: INTERNAL MEDICINE

## 2020-09-08 PROCEDURE — 88342 IMHCHEM/IMCYTCHM 1ST ANTB: CPT | Performed by: INTERNAL MEDICINE

## 2020-09-08 RX ORDER — LIDOCAINE HYDROCHLORIDE 10 MG/ML
10 INJECTION, SOLUTION INFILTRATION; PERINEURAL ONCE
Status: COMPLETED | OUTPATIENT
Start: 2020-09-08 | End: 2020-09-08

## 2020-09-08 RX ORDER — LIDOCAINE HYDROCHLORIDE AND EPINEPHRINE BITARTRATE 20; .01 MG/ML; MG/ML
10 INJECTION, SOLUTION SUBCUTANEOUS ONCE
Status: COMPLETED | OUTPATIENT
Start: 2020-09-08 | End: 2020-09-08

## 2020-09-08 RX ADMIN — LIDOCAINE HYDROCHLORIDE 3 ML: 10 INJECTION, SOLUTION INFILTRATION; PERINEURAL at 14:18

## 2020-09-08 RX ADMIN — LIDOCAINE HYDROCHLORIDE AND EPINEPHRINE 10 ML: 20; 10 INJECTION, SOLUTION INFILTRATION; PERINEURAL at 14:19

## 2020-09-09 ENCOUNTER — TELEPHONE (OUTPATIENT)
Dept: ONCOLOGY | Facility: HOSPITAL | Age: 70
End: 2020-09-09

## 2020-09-09 DIAGNOSIS — C50.911 MALIGNANT NEOPLASM OF RIGHT FEMALE BREAST, UNSPECIFIED ESTROGEN RECEPTOR STATUS, UNSPECIFIED SITE OF BREAST (HCC): Primary | ICD-10-CM

## 2020-09-09 DIAGNOSIS — C50.911 MALIGNANT NEOPLASM OF RIGHT FEMALE BREAST, UNSPECIFIED ESTROGEN RECEPTOR STATUS, UNSPECIFIED SITE OF BREAST (HCC): ICD-10-CM

## 2020-09-09 RX ORDER — TEMAZEPAM 15 MG/1
15 CAPSULE ORAL NIGHTLY PRN
Qty: 30 CAPSULE | Refills: 3 | Status: SHIPPED | OUTPATIENT
Start: 2020-09-09 | End: 2021-05-05

## 2020-09-09 RX ORDER — LORATADINE 10 MG/1
10 TABLET ORAL DAILY
Qty: 5 TABLET | Refills: 3 | Status: SHIPPED | OUTPATIENT
Start: 2020-09-09 | End: 2021-05-05

## 2020-09-09 RX ORDER — DEXAMETHASONE 4 MG/1
TABLET ORAL
Qty: 6 TABLET | Refills: 3 | Status: SHIPPED | OUTPATIENT
Start: 2020-09-09 | End: 2021-05-05

## 2020-09-09 RX ORDER — CIPROFLOXACIN 500 MG/1
500 TABLET, FILM COATED ORAL 2 TIMES DAILY
Qty: 14 TABLET | Refills: 0 | Status: SHIPPED | OUTPATIENT
Start: 2020-09-09 | End: 2020-09-16

## 2020-09-09 RX ORDER — PANTOPRAZOLE SODIUM 40 MG/1
40 TABLET, DELAYED RELEASE ORAL DAILY
Qty: 30 TABLET | Refills: 5 | Status: SHIPPED | OUTPATIENT
Start: 2020-09-09 | End: 2021-05-05

## 2020-09-09 RX ORDER — ONDANSETRON HYDROCHLORIDE 8 MG/1
8 TABLET, FILM COATED ORAL 3 TIMES DAILY PRN
Qty: 30 TABLET | Refills: 5 | Status: SHIPPED | OUTPATIENT
Start: 2020-09-09 | End: 2021-05-05

## 2020-09-09 NOTE — TELEPHONE ENCOUNTER
I attempted to reach pt with no answer, her voicemail is not set up. I called Emergency contact and had to leave a voicemail with instructions to call us back.     I will send in Antibiotic per Dr. Colon for cipro 500mg bid for 7 days due to pt's WBC 0.88 and ANC 0.06 today.

## 2020-09-09 NOTE — TELEPHONE ENCOUNTER
Pt returned call to office in regards to antibiotic that was sent to pharmacy. Pt verbalized understanding. Pt also states that her medications pantoprazole and Restoril are going to cost her $400 out of pocket from local pharmacy, she asked if we can send these into mail order . I will send prescriptions.

## 2020-09-09 NOTE — PROGRESS NOTES
Received a call from Ramona at [facility] stating that pt is there for labs and they have no orders. Orders were faxed to them at [phone] electronically.

## 2020-09-10 LAB
LAB AP CASE REPORT: NORMAL
LAB AP CLINICAL INFORMATION: NORMAL
LAB AP DIAGNOSIS COMMENT: NORMAL
PATH REPORT.FINAL DX SPEC: NORMAL
PATH REPORT.GROSS SPEC: NORMAL

## 2020-09-16 ENCOUNTER — TELEPHONE (OUTPATIENT)
Dept: ONCOLOGY | Facility: CLINIC | Age: 70
End: 2020-09-16

## 2020-09-16 NOTE — TELEPHONE ENCOUNTER
SPOKE TO PATIENT AND SHE RESCHEDULED HER 9/17/20 APPT FOR 10/01/20 @ 12:00. SHE HAS CHEMO ON 9/23/20 IN THE [facility]

## 2020-09-17 ENCOUNTER — TELEPHONE (OUTPATIENT)
Dept: ONCOLOGY | Facility: HOSPITAL | Age: 70
End: 2020-09-17

## 2020-09-17 ENCOUNTER — APPOINTMENT (OUTPATIENT)
Dept: ONCOLOGY | Facility: CLINIC | Age: 70
End: 2020-09-17

## 2020-09-17 NOTE — TELEPHONE ENCOUNTER
Called patient to discuss transition of care to Dr. Vernon or Dr. Milan.  The patient stated that she has seen Dr. Milan before and would like to see him until another provider is in the [place] office.  Treatment plan changed and scheduling notified.

## 2020-09-17 NOTE — TELEPHONE ENCOUNTER
Pt is calling wanting to know her biopsy results from 9/8. Pt has a lot of questions regarding staging and treatment. Pt was scheduled with Dr. Garcia on 10/1. Ivy Morataya practice manager aware that pt is active treatment plan and needs to be scheduled with MD provider here. I let pt know that someone will be calling her with an up to date appointment, pt verbalized understanding.

## 2020-09-22 ENCOUNTER — TELEPHONE (OUTPATIENT)
Dept: ONCOLOGY | Facility: CLINIC | Age: 70
End: 2020-09-22

## 2020-09-22 DIAGNOSIS — C50.911 MALIGNANT NEOPLASM OF RIGHT FEMALE BREAST, UNSPECIFIED ESTROGEN RECEPTOR STATUS, UNSPECIFIED SITE OF BREAST (HCC): Primary | ICD-10-CM

## 2020-09-22 RX ORDER — DOXORUBICIN HYDROCHLORIDE 2 MG/ML
60 INJECTION, SOLUTION INTRAVENOUS ONCE
OUTPATIENT
Start: 2020-09-23

## 2020-09-22 RX ORDER — PALONOSETRON 0.05 MG/ML
0.25 INJECTION, SOLUTION INTRAVENOUS ONCE
OUTPATIENT
Start: 2020-09-23

## 2020-09-22 RX ORDER — SODIUM CHLORIDE 9 MG/ML
250 INJECTION, SOLUTION INTRAVENOUS ONCE
OUTPATIENT
Start: 2020-09-23

## 2020-09-22 NOTE — TELEPHONE ENCOUNTER
Ms christopher call stated to cancel her appointment for 9/23/2020 is going to see dr quigley saw family doctor that referral her to see him.

## 2020-09-23 ENCOUNTER — HOSPITAL ENCOUNTER (OUTPATIENT)
Dept: ONCOLOGY | Facility: HOSPITAL | Age: 70
Setting detail: INFUSION SERIES
End: 2020-09-23

## 2020-09-25 ENCOUNTER — TRANSCRIBE ORDERS (OUTPATIENT)
Dept: ADMINISTRATIVE | Facility: HOSPITAL | Age: 70
End: 2020-09-25

## 2020-09-25 DIAGNOSIS — Z92.21 HX ANTINEOPLASTIC CHEMO: Primary | ICD-10-CM

## 2020-09-30 ENCOUNTER — HOSPITAL ENCOUNTER (OUTPATIENT)
Dept: CARDIOLOGY | Facility: HOSPITAL | Age: 70
Discharge: HOME | End: 2020-09-30
Admitting: INTERNAL MEDICINE

## 2020-09-30 VITALS
SYSTOLIC BLOOD PRESSURE: 152 MMHG | BODY MASS INDEX: 34.88 KG/M2 | WEIGHT: 173 LBS | HEIGHT: 59 IN | DIASTOLIC BLOOD PRESSURE: 88 MMHG | HEART RATE: 67 BPM

## 2020-09-30 DIAGNOSIS — Z92.21 HX ANTINEOPLASTIC CHEMO: ICD-10-CM

## 2020-09-30 LAB
BH CV ECHO MEAS - ACS: 1.8 CM
BH CV ECHO MEAS - AO MAX PG (FULL): 2.1 MMHG
BH CV ECHO MEAS - AO MAX PG: 7.6 MMHG
BH CV ECHO MEAS - AO MEAN PG (FULL): 1.5 MMHG
BH CV ECHO MEAS - AO MEAN PG: 4 MMHG
BH CV ECHO MEAS - AO ROOT AREA (BSA CORRECTED): 1.5
BH CV ECHO MEAS - AO ROOT AREA: 5.1 CM^2
BH CV ECHO MEAS - AO ROOT DIAM: 2.6 CM
BH CV ECHO MEAS - AO V2 MAX: 137.8 CM/SEC
BH CV ECHO MEAS - AO V2 MEAN: 96.3 CM/SEC
BH CV ECHO MEAS - AO V2 VTI: 35.5 CM
BH CV ECHO MEAS - ASC AORTA: 2.8 CM
BH CV ECHO MEAS - AVA(I,A): 2.2 CM^2
BH CV ECHO MEAS - AVA(I,D): 2.2 CM^2
BH CV ECHO MEAS - AVA(V,A): 2.4 CM^2
BH CV ECHO MEAS - AVA(V,D): 2.4 CM^2
BH CV ECHO MEAS - BSA(HAYCOCK): 1.8 M^2
BH CV ECHO MEAS - BSA: 1.7 M^2
BH CV ECHO MEAS - BZI_BMI: 34.9 KILOGRAMS/M^2
BH CV ECHO MEAS - BZI_METRIC_HEIGHT: 149.9 CM
BH CV ECHO MEAS - BZI_METRIC_WEIGHT: 78.5 KG
BH CV ECHO MEAS - EDV(CUBED): 71.7 ML
BH CV ECHO MEAS - EDV(MOD-SP2): 66.4 ML
BH CV ECHO MEAS - EDV(MOD-SP4): 66 ML
BH CV ECHO MEAS - EDV(TEICH): 76.6 ML
BH CV ECHO MEAS - EF(CUBED): 66.7 %
BH CV ECHO MEAS - EF(MOD-BP): 57 %
BH CV ECHO MEAS - EF(MOD-SP2): 58.8 %
BH CV ECHO MEAS - EF(MOD-SP4): 53.6 %
BH CV ECHO MEAS - EF(TEICH): 58.7 %
BH CV ECHO MEAS - ESV(CUBED): 23.8 ML
BH CV ECHO MEAS - ESV(MOD-SP2): 27.3 ML
BH CV ECHO MEAS - ESV(MOD-SP4): 30.6 ML
BH CV ECHO MEAS - ESV(TEICH): 31.6 ML
BH CV ECHO MEAS - FS: 30.7 %
BH CV ECHO MEAS - IVS/LVPW: 1.2
BH CV ECHO MEAS - IVSD: 1.1 CM
BH CV ECHO MEAS - LA DIMENSION(2D): 2.4 CM
BH CV ECHO MEAS - LV DIASTOLIC VOL/BSA (35-75): 38.1 ML/M^2
BH CV ECHO MEAS - LV MASS(C)D: 139.5 GRAMS
BH CV ECHO MEAS - LV MASS(C)DI: 80.5 GRAMS/M^2
BH CV ECHO MEAS - LV MAX PG: 5.5 MMHG
BH CV ECHO MEAS - LV MEAN PG: 2.5 MMHG
BH CV ECHO MEAS - LV SYSTOLIC VOL/BSA (12-30): 17.7 ML/M^2
BH CV ECHO MEAS - LV V1 MAX: 117.7 CM/SEC
BH CV ECHO MEAS - LV V1 MEAN: 72.6 CM/SEC
BH CV ECHO MEAS - LV V1 VTI: 27.8 CM
BH CV ECHO MEAS - LVIDD: 4.2 CM
BH CV ECHO MEAS - LVIDS: 2.9 CM
BH CV ECHO MEAS - LVOT AREA: 2.8 CM^2
BH CV ECHO MEAS - LVOT DIAM: 1.9 CM
BH CV ECHO MEAS - LVPWD: 0.92 CM
BH CV ECHO MEAS - MR MAX PG: 107.8 MMHG
BH CV ECHO MEAS - MR MAX VEL: 519.1 CM/SEC
BH CV ECHO MEAS - MV A MAX VEL: 103.6 CM/SEC
BH CV ECHO MEAS - MV DEC SLOPE: 770.6 CM/SEC^2
BH CV ECHO MEAS - MV DEC TIME: 0.17 SEC
BH CV ECHO MEAS - MV E MAX VEL: 131.1 CM/SEC
BH CV ECHO MEAS - MV E/A: 1.3
BH CV ECHO MEAS - MV MAX PG: 5.8 MMHG
BH CV ECHO MEAS - MV MEAN PG: 2.5 MMHG
BH CV ECHO MEAS - MV V2 MAX: 120.2 CM/SEC
BH CV ECHO MEAS - MV V2 MEAN: 73.4 CM/SEC
BH CV ECHO MEAS - MV V2 VTI: 34.9 CM
BH CV ECHO MEAS - MVA(VTI): 2.2 CM^2
BH CV ECHO MEAS - PA ACC TIME: 0.19 SEC
BH CV ECHO MEAS - PA MAX PG (FULL): 0.49 MMHG
BH CV ECHO MEAS - PA MAX PG: 1.9 MMHG
BH CV ECHO MEAS - PA MEAN PG (FULL): 0.4 MMHG
BH CV ECHO MEAS - PA MEAN PG: 1.2 MMHG
BH CV ECHO MEAS - PA PR(ACCEL): -6.1 MMHG
BH CV ECHO MEAS - PA V2 MAX: 69.7 CM/SEC
BH CV ECHO MEAS - PA V2 MEAN: 52.4 CM/SEC
BH CV ECHO MEAS - PA V2 VTI: 19.1 CM
BH CV ECHO MEAS - PULM DIAS VEL: 69.9 CM/SEC
BH CV ECHO MEAS - PULM S/D: 1.2
BH CV ECHO MEAS - PULM SYS VEL: 81.3 CM/SEC
BH CV ECHO MEAS - PVA(I,A): 3 CM^2
BH CV ECHO MEAS - PVA(I,D): 3 CM^2
BH CV ECHO MEAS - PVA(V,A): 3.2 CM^2
BH CV ECHO MEAS - PVA(V,D): 3.2 CM^2
BH CV ECHO MEAS - QP/QS: 0.72
BH CV ECHO MEAS - RAP SYSTOLE: 3 MMHG
BH CV ECHO MEAS - RV MAX PG: 1.5 MMHG
BH CV ECHO MEAS - RV MEAN PG: 0.8 MMHG
BH CV ECHO MEAS - RV V1 MAX: 60.3 CM/SEC
BH CV ECHO MEAS - RV V1 MEAN: 41.4 CM/SEC
BH CV ECHO MEAS - RV V1 VTI: 15.3 CM
BH CV ECHO MEAS - RVDD: 2.6 CM
BH CV ECHO MEAS - RVOT AREA: 3.7 CM^2
BH CV ECHO MEAS - RVOT DIAM: 2.2 CM
BH CV ECHO MEAS - RVSP: 27 MMHG
BH CV ECHO MEAS - SI(AO): 104.9 ML/M^2
BH CV ECHO MEAS - SI(CUBED): 27.6 ML/M^2
BH CV ECHO MEAS - SI(LVOT): 45 ML/M^2
BH CV ECHO MEAS - SI(MOD-SP2): 22.5 ML/M^2
BH CV ECHO MEAS - SI(MOD-SP4): 20.4 ML/M^2
BH CV ECHO MEAS - SI(TEICH): 25.9 ML/M^2
BH CV ECHO MEAS - SV(AO): 182 ML
BH CV ECHO MEAS - SV(CUBED): 47.9 ML
BH CV ECHO MEAS - SV(LVOT): 78 ML
BH CV ECHO MEAS - SV(MOD-SP2): 39.1 ML
BH CV ECHO MEAS - SV(MOD-SP4): 35.4 ML
BH CV ECHO MEAS - SV(RVOT): 56.4 ML
BH CV ECHO MEAS - SV(TEICH): 44.9 ML
BH CV ECHO MEAS - TR MAX VEL: 244.7 CM/SEC

## 2020-09-30 PROCEDURE — 93306 TTE W/DOPPLER COMPLETE: CPT | Performed by: INTERNAL MEDICINE

## 2020-09-30 PROCEDURE — 93306 TTE W/DOPPLER COMPLETE: CPT

## 2020-09-30 PROCEDURE — 93356 MYOCRD STRAIN IMG SPCKL TRCK: CPT

## 2020-11-11 ENCOUNTER — TRANSCRIBE ORDERS (OUTPATIENT)
Dept: ADMINISTRATIVE | Facility: HOSPITAL | Age: 70
End: 2020-11-11

## 2020-11-11 DIAGNOSIS — Z51.11 ENCOUNTER FOR ANTINEOPLASTIC CHEMOTHERAPY: ICD-10-CM

## 2020-11-11 DIAGNOSIS — C50.911 MALIGNANT NEOPLASM OF RIGHT BREAST IN FEMALE, ESTROGEN RECEPTOR POSITIVE, UNSPECIFIED SITE OF BREAST (HCC): Primary | ICD-10-CM

## 2020-11-11 DIAGNOSIS — Z17.0 MALIGNANT NEOPLASM OF RIGHT BREAST IN FEMALE, ESTROGEN RECEPTOR POSITIVE, UNSPECIFIED SITE OF BREAST (HCC): Primary | ICD-10-CM

## 2020-11-24 ENCOUNTER — APPOINTMENT (OUTPATIENT)
Dept: MAMMOGRAPHY | Facility: HOSPITAL | Age: 70
End: 2020-11-24

## 2020-11-24 ENCOUNTER — APPOINTMENT (OUTPATIENT)
Dept: ULTRASOUND IMAGING | Facility: HOSPITAL | Age: 70
End: 2020-11-24

## 2020-11-25 ENCOUNTER — HOSPITAL ENCOUNTER (OUTPATIENT)
Dept: ULTRASOUND IMAGING | Facility: HOSPITAL | Age: 70
Discharge: HOME | End: 2020-11-25

## 2020-11-25 ENCOUNTER — HOSPITAL ENCOUNTER (OUTPATIENT)
Dept: MAMMOGRAPHY | Facility: HOSPITAL | Age: 70
Discharge: HOME | End: 2020-11-25

## 2020-11-25 DIAGNOSIS — Z51.11 ENCOUNTER FOR ANTINEOPLASTIC CHEMOTHERAPY: ICD-10-CM

## 2020-11-25 DIAGNOSIS — Z17.0 MALIGNANT NEOPLASM OF RIGHT BREAST IN FEMALE, ESTROGEN RECEPTOR POSITIVE, UNSPECIFIED SITE OF BREAST (HCC): ICD-10-CM

## 2020-11-25 DIAGNOSIS — C50.911 MALIGNANT NEOPLASM OF RIGHT BREAST IN FEMALE, ESTROGEN RECEPTOR POSITIVE, UNSPECIFIED SITE OF BREAST (HCC): ICD-10-CM

## 2020-11-25 PROCEDURE — 77065 DX MAMMO INCL CAD UNI: CPT

## 2020-11-25 PROCEDURE — G0279 TOMOSYNTHESIS, MAMMO: HCPCS

## 2020-11-25 PROCEDURE — 76642 ULTRASOUND BREAST LIMITED: CPT

## 2020-12-30 ENCOUNTER — LAB REQUISITION (OUTPATIENT)
Dept: LAB | Facility: HOSPITAL | Age: 70
End: 2020-12-30

## 2020-12-30 DIAGNOSIS — C50.411 MALIGNANT NEOPLASM OF UPPER-OUTER QUADRANT OF RIGHT FEMALE BREAST (HCC): ICD-10-CM

## 2020-12-30 LAB
ALBUMIN SERPL-MCNC: 3.9 G/DL (ref 3.5–5.2)
ALBUMIN/GLOB SERPL: 2.3 G/DL
ALP SERPL-CCNC: 79 U/L (ref 39–117)
ALT SERPL W P-5'-P-CCNC: 22 U/L (ref 1–33)
ANION GAP SERPL CALCULATED.3IONS-SCNC: 10 MMOL/L (ref 5–15)
AST SERPL-CCNC: 20 U/L (ref 1–32)
BILIRUB SERPL-MCNC: 0.2 MG/DL (ref 0–1.2)
BUN SERPL-MCNC: 11 MG/DL (ref 8–23)
BUN/CREAT SERPL: 20.8 (ref 7–25)
CALCIUM SPEC-SCNC: 9 MG/DL (ref 8.6–10.5)
CHLORIDE SERPL-SCNC: 110 MMOL/L (ref 98–107)
CO2 SERPL-SCNC: 22 MMOL/L (ref 22–29)
CREAT SERPL-MCNC: 0.53 MG/DL (ref 0.57–1)
GFR SERPL CREATININE-BSD FRML MDRD: 114 ML/MIN/1.73
GLOBULIN UR ELPH-MCNC: 1.7 GM/DL
GLUCOSE SERPL-MCNC: 86 MG/DL (ref 65–99)
POTASSIUM SERPL-SCNC: 4.2 MMOL/L (ref 3.5–5.2)
PROT SERPL-MCNC: 5.6 G/DL (ref 6–8.5)
SODIUM SERPL-SCNC: 142 MMOL/L (ref 136–145)

## 2020-12-30 PROCEDURE — 80053 COMPREHEN METABOLIC PANEL: CPT | Performed by: NURSE PRACTITIONER

## 2021-01-13 ENCOUNTER — LAB REQUISITION (OUTPATIENT)
Dept: LAB | Facility: HOSPITAL | Age: 71
End: 2021-01-13

## 2021-01-13 DIAGNOSIS — Z00.00 ENCOUNTER FOR GENERAL ADULT MEDICAL EXAMINATION WITHOUT ABNORMAL FINDINGS: ICD-10-CM

## 2021-01-13 LAB
ALBUMIN SERPL-MCNC: 4 G/DL (ref 3.5–5.2)
ALBUMIN/GLOB SERPL: 2.1 G/DL
ALP SERPL-CCNC: 76 U/L (ref 39–117)
ALT SERPL W P-5'-P-CCNC: 19 U/L (ref 1–33)
ANION GAP SERPL CALCULATED.3IONS-SCNC: 9 MMOL/L (ref 5–15)
AST SERPL-CCNC: 20 U/L (ref 1–32)
BILIRUB SERPL-MCNC: 0.2 MG/DL (ref 0–1.2)
BUN SERPL-MCNC: 10 MG/DL (ref 8–23)
BUN/CREAT SERPL: 16.9 (ref 7–25)
CALCIUM SPEC-SCNC: 9.7 MG/DL (ref 8.6–10.5)
CHLORIDE SERPL-SCNC: 107 MMOL/L (ref 98–107)
CO2 SERPL-SCNC: 24 MMOL/L (ref 22–29)
CREAT SERPL-MCNC: 0.59 MG/DL (ref 0.57–1)
GFR SERPL CREATININE-BSD FRML MDRD: 101 ML/MIN/1.73
GLOBULIN UR ELPH-MCNC: 1.9 GM/DL
GLUCOSE SERPL-MCNC: 96 MG/DL (ref 65–99)
POTASSIUM SERPL-SCNC: 4.4 MMOL/L (ref 3.5–5.2)
PROT SERPL-MCNC: 5.9 G/DL (ref 6–8.5)
SODIUM SERPL-SCNC: 140 MMOL/L (ref 136–145)

## 2021-01-13 PROCEDURE — 80053 COMPREHEN METABOLIC PANEL: CPT | Performed by: NURSE PRACTITIONER

## 2021-02-10 DIAGNOSIS — Z45.2 ENCOUNTER FOR CARE RELATED TO VASCULAR ACCESS PORT: ICD-10-CM

## 2021-02-10 RX ORDER — LIDOCAINE AND PRILOCAINE 25; 25 MG/G; MG/G
CREAM TOPICAL AS NEEDED
Qty: 30 G | Refills: 11 | Status: SHIPPED | OUTPATIENT
Start: 2021-02-10 | End: 2021-05-05

## 2021-04-09 ENCOUNTER — TRANSCRIBE ORDERS (OUTPATIENT)
Dept: ADMINISTRATIVE | Facility: HOSPITAL | Age: 71
End: 2021-04-09

## 2021-04-09 DIAGNOSIS — Z85.3 HISTORY OF RIGHT BREAST CANCER: ICD-10-CM

## 2021-04-09 DIAGNOSIS — M81.0 SENILE OSTEOPOROSIS: Primary | ICD-10-CM

## 2021-04-28 ENCOUNTER — HOSPITAL ENCOUNTER (OUTPATIENT)
Dept: MAMMOGRAPHY | Facility: HOSPITAL | Age: 71
Discharge: HOME | End: 2021-04-28

## 2021-04-28 ENCOUNTER — HOSPITAL ENCOUNTER (OUTPATIENT)
Dept: BONE DENSITY | Facility: HOSPITAL | Age: 71
Discharge: HOME | End: 2021-04-28

## 2021-04-28 ENCOUNTER — HOSPITAL ENCOUNTER (OUTPATIENT)
Dept: ULTRASOUND IMAGING | Facility: HOSPITAL | Age: 71
Discharge: HOME | End: 2021-04-28

## 2021-04-28 DIAGNOSIS — Z85.3 HISTORY OF RIGHT BREAST CANCER: ICD-10-CM

## 2021-04-28 DIAGNOSIS — M81.0 SENILE OSTEOPOROSIS: ICD-10-CM

## 2021-04-28 PROCEDURE — 76642 ULTRASOUND BREAST LIMITED: CPT

## 2021-04-28 PROCEDURE — 77065 DX MAMMO INCL CAD UNI: CPT

## 2021-04-28 PROCEDURE — 77080 DXA BONE DENSITY AXIAL: CPT

## 2021-05-05 ENCOUNTER — OFFICE VISIT (OUTPATIENT)
Dept: SURGERY | Facility: CLINIC | Age: 71
End: 2021-05-05

## 2021-05-05 VITALS
BODY MASS INDEX: 30.6 KG/M2 | DIASTOLIC BLOOD PRESSURE: 87 MMHG | HEART RATE: 87 BPM | TEMPERATURE: 97.3 F | OXYGEN SATURATION: 97 % | WEIGHT: 151.8 LBS | SYSTOLIC BLOOD PRESSURE: 126 MMHG | RESPIRATION RATE: 18 BRPM | HEIGHT: 59 IN

## 2021-05-05 DIAGNOSIS — C50.411 MALIGNANT NEOPLASM OF UPPER-OUTER QUADRANT OF RIGHT BREAST IN FEMALE, ESTROGEN RECEPTOR POSITIVE (HCC): Primary | ICD-10-CM

## 2021-05-05 DIAGNOSIS — Z17.0 MALIGNANT NEOPLASM OF UPPER-OUTER QUADRANT OF RIGHT BREAST IN FEMALE, ESTROGEN RECEPTOR POSITIVE (HCC): Primary | ICD-10-CM

## 2021-05-05 PROCEDURE — 99214 OFFICE O/P EST MOD 30 MIN: CPT | Performed by: SURGERY

## 2021-05-05 RX ORDER — SODIUM CHLORIDE 9 MG/ML
100 INJECTION, SOLUTION INTRAVENOUS CONTINUOUS
Status: CANCELLED | OUTPATIENT
Start: 2021-05-05

## 2021-05-05 RX ORDER — OMEPRAZOLE 40 MG/1
40 CAPSULE, DELAYED RELEASE ORAL EVERY MORNING
COMMUNITY
End: 2022-07-13

## 2021-05-05 RX ORDER — METOCLOPRAMIDE 5 MG/1
5 TABLET ORAL 2 TIMES DAILY
COMMUNITY
End: 2022-07-13

## 2021-05-05 RX ORDER — GABAPENTIN 100 MG/1
100 CAPSULE ORAL NIGHTLY
COMMUNITY
End: 2023-03-01

## 2021-05-05 RX ORDER — CHLORHEXIDINE GLUCONATE 0.12 MG/ML
15 RINSE ORAL EVERY 12 HOURS SCHEDULED
Status: CANCELLED | OUTPATIENT
Start: 2021-05-05

## 2021-05-05 RX ORDER — DULOXETIN HYDROCHLORIDE 30 MG/1
30 CAPSULE, DELAYED RELEASE ORAL DAILY
COMMUNITY
End: 2021-12-27 | Stop reason: DRUGHIGH

## 2021-05-05 RX ORDER — FAMOTIDINE 40 MG/1
40 TABLET, FILM COATED ORAL NIGHTLY
COMMUNITY
End: 2023-03-01

## 2021-05-05 NOTE — PROGRESS NOTES
GENERAL SURGERY ESTABLISHED PATIENT NOTE    Consult requested by: Dr. Haney    Patient Care Team:  Candi Benson MD as PCP - General (Family Medicine)    Reason for consult: Right breast cancer    Subjective     Patient is a 70 y.o. female presents after completing neoadjuvant chemotherapy for multifocal right upper outer quadrant breast cancer which was diagnosed in July 2020.  In brief, the patient underwent an ultrasound core needle biopsy of areas of concern which were seen on diagnostic mammograms and ultrasound on 7/22/2020.  The patient was found to have a mass corresponding to a palpable abnormality which measured up to 4.5 cm.  Anterior to the mass there was an additional mass seen within the upper outer quadrant of the right breast which measured 2.8 cm.  Sonographic evaluation of the palpable abnormality within the right axillary tail region corresponded to heterogenous hypoechoic mass which measured up to 4.1 cm.  Within the posterior aspect of the right breast seen anteriorly, there was an additional mass which measured up to 3 cm and also demonstrated irregular margins and heterogenous, hypoechoic signal.  The biopsy of this area demonstrated poorly differentiated ductal adenocarcinoma, [place] score 9 of 9, negative for intravascular invasion.  P63 was negative.  Estrogen receptor assay was positive at 80%, progesterone receptor was negative at 0%, and HER-2 was negative by FISH.  Patient then subsequently underwent a left IJ Port-A-Cath insertion by Dr. Kaye on 7/30/2020.  The patient was seen by medical oncology and a PET scan was performed on 8/6/2020 which revealed a 3.1 cm FDG avid right breast mass and a 4.2 cm FDG avid mass along the right axilla with an otherwise negative scan.  Patient then subsequently underwent percutaneous right breast biopsy of the second mass which revealed again poorly differentiated ductal carcinoma Blair grade 9 of 9, estrogen receptor positive, and  progesterone receptor negative.  The patient then subsequently under went chemotherapy with Cytoxan and Adriamycin and completed that course on 3/18/2021.  She only had difficulties with neuropathy.  Follow-up diagnostic mammograms and ultrasounds have demonstrated complete resolution of the mass seen previously.  Her past medical history is significant for asthma, COPD, hypertension, obesity, GERD, and hyperlipidemia.  Her family history is positive for a nephew with leukemia and family members with skin cancer.  He now returns to discuss surgical options with me.    Review of Systems   Constitutional: Positive for diaphoresis and fatigue. Negative for appetite change, chills and fever.   HENT: Positive for tinnitus. Negative for congestion and sore throat.    Eyes: Positive for photophobia and discharge.   Respiratory: Negative for cough and shortness of breath.    Cardiovascular: Negative for chest pain and palpitations.   Gastrointestinal: Negative for abdominal pain, constipation, diarrhea, nausea, vomiting and GERD.   Endocrine: Positive for cold intolerance and heat intolerance.   Genitourinary: Positive for breast discharge and breast pain. Negative for breast lump, difficulty urinating, dysuria and frequency.   Musculoskeletal: Negative for arthralgias and back pain.   Skin: Negative for rash and skin lesions.   Allergic/Immunologic: Positive for environmental allergies.   Neurological: Negative for dizziness, seizures and memory problem.   Hematological: Negative for adenopathy. Does not bruise/bleed easily.   Psychiatric/Behavioral: Negative for sleep disturbance and depressed mood.        History  Past Medical History:   Diagnosis Date   • Arthritis    • Asthma    • Breast cancer (CMS/HCC)    • Cancer (CMS/HCC)    • COPD (chronic obstructive pulmonary disease) (CMS/HCC)    • Coronary artery disease    • Drug therapy    • Elevated cholesterol    • GERD (gastroesophageal reflux disease)    • H/O Bell's  palsy    • History of transfusion    • Hypertension    • Sleep apnea      Past Surgical History:   Procedure Laterality Date   • ABDOMINAL SURGERY     • ANKLE SURGERY     • BREAST BIOPSY     • BREAST LUMPECTOMY     • TUBAL ABDOMINAL LIGATION     • VENOUS ACCESS DEVICE (PORT) INSERTION Left 7/30/2020    Procedure: INSERTION VENOUS ACCESS DEVICE LEFT internal jugular;  Surgeon: Taylor Kaye MD;  Location: [facility] MAIN OR;  Service: General;  Laterality: Left;     Family History   Problem Relation Age of Onset   • Heart disease Father    • Stroke Father    • Diabetes Father    • Diabetes Son      Social History     Tobacco Use   • Smoking status: Never Smoker   • Smokeless tobacco: Never Used   Substance Use Topics   • Alcohol use: Never   • Drug use: Never     (Not in a hospital admission)    Allergies:  Penicillins and Adhesive tape    Objective     Vital Signs  Temp:  [97.3 °F (36.3 °C)] 97.3 °F (36.3 °C)  Heart Rate:  [87] 87  Resp:  [18] 18  BP: (126)/(87) 126/87    Physical Exam  Vitals reviewed. Exam conducted with a chaperone present.   Constitutional:       Appearance: She is well-developed.   HENT:      Head: Normocephalic and atraumatic.      Comments: Alopecia noted  Eyes:      Pupils: Pupils are equal, round, and reactive to light.   Cardiovascular:      Rate and Rhythm: Normal rate and regular rhythm.   Pulmonary:      Effort: Pulmonary effort is normal.      Breath sounds: Normal breath sounds.   Chest:      Comments: Both breasts were examined in the upright and supine position.  The patient has large pendulous breasts bilaterally, along with a obese body habitus.  The right breast is markedly larger than the left, but does not have any dominant, palpable masses notable.  There is no retraction of the nipple, or discharge from the nipple.  There are no overlying skin changes to suggest cutaneous involvement such as peau d'orange.  Abdominal:      General: There is no distension.      Palpations: Abdomen  is soft.      Tenderness: There is no abdominal tenderness.      Hernia: No hernia is present.   Musculoskeletal:         General: Normal range of motion.      Cervical back: Normal range of motion.   Lymphadenopathy:      Cervical: No cervical adenopathy.      Upper Body:      Right upper body: No supraclavicular or axillary adenopathy.      Left upper body: No supraclavicular or axillary adenopathy.   Skin:     General: Skin is warm and dry.      Findings: No rash.   Neurological:      Mental Status: She is alert and oriented to person, place, and time.      Gait: Gait abnormal.      Comments: Patient ambulates with a walker and an unsteady gait   Psychiatric:         Mood and Affect: Mood normal.         Behavior: Behavior normal.         Thought Content: Thought content normal.         Judgment: Judgment normal.         Results Review:   Lab Results (last 24 hours)     ** No results found for the last 24 hours. **        No radiology results for the last day      I reviewed the patient's new imaging results and agree with the interpretation.  I reviewed the patient's other test results and agree with the interpretation    Assessment/Plan     Active Problems:  We reviewed the patient's imaging, and her pathology reports in detail.  We discussed that breast cancer is treated in a multidisciplinary fashion, with input from radiation oncology, medical oncology, and general surgery.  We discussed that the patient's case will be discussed at a multidisciplinary tumor board meeting held every other week.  We then discussed the diagnosis of breast cancer and that most breast cancers arise either from the ducts or from the lobules.  Using visual aids, we discussed the difference between invasive and in situ disease, especially whether or not the lymph nodes need to be evaluated.  Usually, with in situ disease, lymph nodes are not examined.  However, examining the lymph nodes should be considered if the area of concern is  widespread or if it is high-grade.  We also discussed hormone receptors, and discussed that there are medications which can be given if the hormone receptors were positive which can be used to treat the cancer, and to provide protection in not only the breast with cancer, but the contralateral breast as well.  The surgical options were discussed with the patient which include breast conservation therapy (lumpectomy with radiation) and mastectomy.  With regards to mastectomy, we discussed that reconstruction may be performed either at the time of the surgery, or in a delayed fashion.  We discussed that the survival benefit between these 2 procedures are equivalent, and therefore they are considered oncologically appropriate.  However, some women will choose one versus the other for a multitude of factors.  There are times when a lumpectomy is not a feasible option, these include but are not limited to tumor to breast ratio, the location of the tumor, previous radiation to the chest wall, or connective tissue disorders which would make radiation treatment ineffective.  This patient has elected to undergo mastectomy and understands that this may include an evaluation of her lymph nodes.  She understands that there may or may not be a role for radiation therapy following surgery.  There may or may not be a role for chemotherapy or hormonal therapy following her surgery as well.  This will be managed by medical oncology.  We discussed the risk, benefits, and alternatives to surgery which include but are not limited to: Bleeding, infection, damage to nerves/blood vessels, and pain.  Following surgery, the patient will have drains in place which will need to be managed at home.  These drains will be removed once the output is minimal and clear.  The patient was adamant that she receive a bilateral mastectomy.  I discussed with her that I was not sure whether the benefit of proceeding with a bilateral mastectomy would  outweigh the potential risks given her advanced age and multiple medical comorbidities.  However, the patient states that she would not consent to anything less than a bilateral mastectomy, and therefore, I do think it is reasonable to proceed with a bilateral mastectomy.  Certainly with her history of multifocal, weakly estrogen fed tumor on the right, she would be at increased risk of developing breast cancer on the left and therefore we will proceed.    The patient understands, and agrees to proceed with a bilateral mastectomy with right sentinel lymph node biopsy.    Patient's case will be discussed at the multidisciplinary tumor board.  It is not clear to me whether or not the mass is seen within the right axilla represents a additional mass within the axillary tail or whether this demonstrates a lymph node metastasis.  I would like to have further discussions with radiation oncology to discuss comfort level with performing a sentinel lymph node biopsy rather than a full axillary lymph node dissection.  In my opinion, I think the patient would be best suited treating this as multifocal cancer and obtaining a sentinel lymph node biopsy with removal of the entire breast.    I discussed the patients findings and my recommendations with the patient.     Kenrick Ragsdale MD  05/05/21  17:08 EDT

## 2021-05-06 ENCOUNTER — TELEPHONE (OUTPATIENT)
Dept: ONCOLOGY | Facility: HOSPITAL | Age: 71
End: 2021-05-06

## 2021-05-06 NOTE — TELEPHONE ENCOUNTER
Case Management/ Note    Patient Name: Monse Donnelly  YOB: 1950  MRN #: [id number]    OSW called patient at the request of Stephanie FARLEY RN as patient is wanting to have skilled nursing care after her mastectomy surgery. Patient is alert and oriented to person, place and time. She is pleasant. She verbalizes being anxious over being at home by herself; worrying about infection, and about the care needed after her mastectomy. She said she has two boys and doesn't want them to deal with care related to her breasts. OSW explained that skilled nursing care can be arranged by the  at the hospital after she has her surgery. She was told that insurance would dictate whether or not she can have skilled care. She reiterated her above concerns not understanding that some of these concerns will not influence an insurance decision. Support provided to the patient with these concerns. She gave v/u to knowing that  at the hospital would help her with this. OSW will remain available.     Electronically signed by:   Chel Willis LCSW, OSW-C  05/06/21, 15:10 EDT

## 2021-05-07 ENCOUNTER — NURSE NAVIGATOR (OUTPATIENT)
Dept: ONCOLOGY | Facility: CLINIC | Age: 71
End: 2021-05-07

## 2021-05-07 NOTE — PROGRESS NOTES
I met the patient at her appointment on 5/5/2021 with Dr. Ragsdale.  The patient sees Dr. Wright for Medical oncology.  The patient has completed neoadjuvant chemotherapy and is ready for surgery.  Dr. Ragsdale discussed the need for a right mastectomy.  The patient stated that she would like to have a bilateral mastectomy.   Dr. Ragsdale discussed his concerns with her having such a long surgery.  He discussed the risks of the bilateral mastectomy for the patient.  The patient understood the risks and stated that she would like to continue with the bilateral mastectomy.  Dr. Ragsdale requested that the patient see Dr. Stapleton and to be added to Breast conference.      The patient requested that she be discharge to a rehab facility after surgery for postop care.  Dr. Ragsdale let her know that he would have to discharge her from the hospital to a rehab and it all depends on their availability.  The patient continued to have concerns about postop care and I sent a referral to Chel Willis LCSW.      The patient is scheduled to see Dr. Stapleton on 5/11/2021 at 10:00am.  I called and notified the patient today and she voiced understanding.  She was unsure of her schedule at this time.  I left her with Cheryl's number to call to reschedule if she needed.   Oriented - self; Oriented - place; Oriented - time

## 2021-05-10 ENCOUNTER — TELEPHONE (OUTPATIENT)
Dept: RADIATION ONCOLOGY | Facility: HOSPITAL | Age: 71
End: 2021-05-10

## 2021-05-11 ENCOUNTER — APPOINTMENT (OUTPATIENT)
Dept: NUCLEAR MEDICINE | Facility: HOSPITAL | Age: 71
End: 2021-05-11

## 2021-05-11 ENCOUNTER — CONSULT (OUTPATIENT)
Dept: RADIATION ONCOLOGY | Facility: HOSPITAL | Age: 71
End: 2021-05-11

## 2021-05-11 ENCOUNTER — NURSE NAVIGATOR (OUTPATIENT)
Dept: ONCOLOGY | Facility: CLINIC | Age: 71
End: 2021-05-11

## 2021-05-11 VITALS
BODY MASS INDEX: 32.98 KG/M2 | DIASTOLIC BLOOD PRESSURE: 96 MMHG | HEIGHT: 59 IN | SYSTOLIC BLOOD PRESSURE: 150 MMHG | WEIGHT: 163.6 LBS | TEMPERATURE: 97.6 F | RESPIRATION RATE: 18 BRPM | HEART RATE: 74 BPM | OXYGEN SATURATION: 98 %

## 2021-05-11 DIAGNOSIS — Z17.0 MALIGNANT NEOPLASM OF UPPER-OUTER QUADRANT OF RIGHT BREAST IN FEMALE, ESTROGEN RECEPTOR POSITIVE (HCC): ICD-10-CM

## 2021-05-11 DIAGNOSIS — C50.411 MALIGNANT NEOPLASM OF UPPER-OUTER QUADRANT OF RIGHT BREAST IN FEMALE, ESTROGEN RECEPTOR POSITIVE (HCC): ICD-10-CM

## 2021-05-11 PROCEDURE — 99204 OFFICE O/P NEW MOD 45 MIN: CPT | Performed by: RADIOLOGY

## 2021-05-11 NOTE — PROGRESS NOTES
I accompanied the patient to her appointment with Dr. Stapleton today.  Dr. Stapleton discussed with the patient the possible need for radiation therapy and why it may be needed.  The patient stated that she was not sure that she would like to proceed with radiation therapy.  The patient would like to proceed with bilateral mastectomy.  The patient continues to have concern for after care services.  Chel Willis LCSW has followed up with the patient on this concern.  Dr. Stapleton notified the patient that if she needed radiation due to her axillary lymph node, that she would need 25 treatments to the axilla.  The patient had concerns with transportation.  I will send a message to Chel to help with this as well, if it is needed.  Dr. Stapleton feels that the patient may need additional imaging to confirm response to chemotherapy.  The patient was agreeable to the plan.  The patient will be discussed in Breast Conference tomorrow.  I let the patient know that I would call her with a plan.

## 2021-05-12 ENCOUNTER — MDT ASSESSMENT (OUTPATIENT)
Dept: OTHER | Facility: HOSPITAL | Age: 71
End: 2021-05-12

## 2021-05-12 NOTE — PROGRESS NOTES
MULTIDISCIPLINARY TREATMENT PLANNING CONFERENCE  DATE:  5/12/2021     SPECIALTY:  Breast Cancer Conference  PRESENTER:  Kenrick Ragsdale  SITE: Right Breast        Please discuss clinical/working stage, TNM, Stage Group, National Treatment Guidelines, and Prognostic Indicators.       CONFERENCE SUMMARY:  The patient has completed Adriamycin and Cytoxan followed by Taxol with Dr. Wright.  The concern was if the patient had true multifocal disease or positive axillary lymph node.  The plan is to proceed with bilateral mastectomy with right axillary lymph node dissection.  The patient is requesting the bilateral mastectomy.  Dr. Stapleton stated that depending on the final pathology, radiation therapy may still be needed and that he would follow up with the patient after surgery.  The goal for the patient is to avoid radiation therapy, per her request.                          AJCC STAGE: [id number], Stage Group III A        REFERRAL SUPPORT   Psychosocial Assessment:  []                Clinical Trials:  []                Genetic Testing:  []                Geriatric Assessment:  []                Smoking Cessation:  []                Nutrition Assessment:  []                Social Work Evaluation/Barriers to Care:  [x]                Behavioral Oncology Evaluation:  []                Palliative Care:  []    Chel Willis LCSW has spoken with the patient    EVIDENCE BASED NATIONAL TREATMENT GUIDELINES:  [x]                                 SOCIAL HISTORY:   reports that she has never smoked. She has never used smokeless tobacco. She reports that she does not drink alcohol and does not use drugs.                  PAST MEDICAL HISTORY:   has a past medical history of Arthritis, Asthma, Breast cancer (CMS/HCC), Cancer (CMS/HCC), COPD (chronic obstructive pulmonary disease) (CMS/HCC), Coronary artery disease, Drug therapy, Elevated cholesterol, GERD (gastroesophageal reflux disease), H/O Bell's palsy, History of transfusion,  Hypertension, and Sleep apnea.                  PAST SURGICAL HISTORY:  @                 IMAGING:  Mammo Diagnostic Right With CAD    Result Date: 4/28/2021   1. Interval resolution of both masses in the axillary tail region of the right breast on both the diagnostic mammogram and ultrasound. This would indicate a response to the neoadjuvant chemotherapy. 2. Stable biopsy clip markers.  ASSESSMENT: BIRADS:  6-known biopsy proven malignancy.  BI-RADS category Key: Category 0-incomplete-needs additional imaging and/or prior images for comparison. Category 1-negative. Category 2-benign findings. Category 3-probably benign-short interval follow-up suggested. Category 4-suspicious abnormality-biopsy should be considered. Category 5-highly suggestive for malignancy-appropriate action should be taken. Category 6-known biopsy-proven malignancy-appropriate action should be taken.  NOTE: There is at least a 15% false-negative rate and mammographic detection of cancer. Therefore, management of a palpable abnormality must be based on clinical grounds and a negative mammography report should not defer further breast evaluation when clinically indicated.  The patient's information is been entered into a reminder system (MRS) with a targeted due date for the next mammogram.  Patient's with mammographically dense breast will be notified by mail, according to [place] law. We believe these women should undergo a risk assessment, taking into consideration breast density and other factors, including but not limited to, family history of breast cancer and other malignancies and personal history of breast cancer are high risk lesions. Women who are found to have lifetime risk of greater than 20-25% may benefit  from additional screening, such as with breast MRI.  Electronically Signed By-Wilder Powell MD On:4/28/2021 10:30 AM This report was finalized on [id number] by  Wilder Powell MD.    DEXA Bone Density Axial    Result Date:  4/28/2021  Osteoporosis.  Copies of the computerized summary reports can be obtained from [employer] via the health information department of [facility].  Electronically Signed By-Wilder Powell MD On:4/28/2021 10:59 AM This report was finalized on [id number] by  Wilder Powell MD.    US Breast Right Limited    Result Date: 4/28/2021   1. Interval resolution of both masses in the axillary tail region of the right breast on both the diagnostic mammogram and ultrasound. This would indicate a response to the neoadjuvant chemotherapy. 2. Stable biopsy clip markers.  ASSESSMENT: BIRADS:  6-known biopsy proven malignancy.  BI-RADS category Key: Category 0-incomplete-needs additional imaging and/or prior images for comparison. Category 1-negative. Category 2-benign findings. Category 3-probably benign-short interval follow-up suggested. Category 4-suspicious abnormality-biopsy should be considered. Category 5-highly suggestive for malignancy-appropriate action should be taken. Category 6-known biopsy-proven malignancy-appropriate action should be taken.  NOTE: There is at least a 15% false-negative rate and mammographic detection of cancer. Therefore, management of a palpable abnormality must be based on clinical grounds and a negative mammography report should not defer further breast evaluation when clinically indicated.  The patient's information is been entered into a reminder system (MRS) with a targeted due date for the next mammogram.  Patient's with mammographically dense breast will be notified by mail, according to [place] law. We believe these women should undergo a risk assessment, taking into consideration breast density and other factors, including but not limited to, family history of breast cancer and other malignancies and personal history of breast cancer are high risk lesions. Women who are found to have lifetime risk of greater than 20-25% may benefit  from additional screening, such as with breast  MRI.  Electronically Signed By-Wilder Powell MD On:4/28/2021 10:30 AM This report was finalized on [id number] by  Wilder Powell MD.                    SURGICAL PROCEDURE / PATHOLOGY:  [id number] Right breast, core biopsy: Poorly differentiated ductal adenocarcinoma. Blair score (tubules=3, nuclear pleomorphism=3, mitoses=3) total 9 out of 9. Maximum size of tumor measured on a glass slide is 9 mm [id number] Mass, right breast, core biopsies: Invasive poorly differentiated ductal carcinoma. [place] grade 9/9 (tubules = 3, nuclear pleomorphism = 3, mitoses =3). No in-situ carcinoma is identified                   Labs & Biomarkers:  7/22/2020 ER 80%/OK 0%, HER2 2+ by IHC, Negative by FISH; 9/8/2020 ER 80%/OK 0%,

## 2021-05-28 RX ORDER — DOXYCYCLINE HYCLATE 100 MG
100 TABLET ORAL 2 TIMES DAILY
COMMUNITY
Start: 2021-05-28 | End: 2021-06-01

## 2021-06-01 ENCOUNTER — LAB (OUTPATIENT)
Dept: LAB | Facility: HOSPITAL | Age: 71
End: 2021-06-01

## 2021-06-01 ENCOUNTER — HOSPITAL ENCOUNTER (OUTPATIENT)
Dept: GENERAL RADIOLOGY | Facility: HOSPITAL | Age: 71
Discharge: HOME | End: 2021-06-01

## 2021-06-01 ENCOUNTER — HOSPITAL ENCOUNTER (OUTPATIENT)
Dept: CARDIOLOGY | Facility: HOSPITAL | Age: 71
Discharge: HOME | End: 2021-06-01

## 2021-06-01 DIAGNOSIS — C50.411 MALIGNANT NEOPLASM OF UPPER-OUTER QUADRANT OF RIGHT BREAST IN FEMALE, ESTROGEN RECEPTOR POSITIVE (HCC): ICD-10-CM

## 2021-06-01 DIAGNOSIS — Z17.0 MALIGNANT NEOPLASM OF UPPER-OUTER QUADRANT OF RIGHT BREAST IN FEMALE, ESTROGEN RECEPTOR POSITIVE (HCC): ICD-10-CM

## 2021-06-01 LAB
ALBUMIN SERPL-MCNC: 4.3 G/DL (ref 3.5–5.2)
ALBUMIN/GLOB SERPL: 1.9 G/DL
ALP SERPL-CCNC: 70 U/L (ref 39–117)
ALT SERPL W P-5'-P-CCNC: 18 U/L (ref 1–33)
ANION GAP SERPL CALCULATED.3IONS-SCNC: 5.6 MMOL/L (ref 5–15)
APTT PPP: 25.9 SECONDS (ref 24–31)
AST SERPL-CCNC: 21 U/L (ref 1–32)
BASOPHILS # BLD AUTO: 0.07 10*3/MM3 (ref 0–0.2)
BASOPHILS NFR BLD AUTO: 1.1 % (ref 0–1.5)
BILIRUB SERPL-MCNC: 0.3 MG/DL (ref 0–1.2)
BUN SERPL-MCNC: 13 MG/DL (ref 8–23)
BUN/CREAT SERPL: 19.7 (ref 7–25)
CALCIUM SPEC-SCNC: 9.6 MG/DL (ref 8.6–10.5)
CHLORIDE SERPL-SCNC: 105 MMOL/L (ref 98–107)
CO2 SERPL-SCNC: 29.4 MMOL/L (ref 22–29)
CREAT SERPL-MCNC: 0.66 MG/DL (ref 0.57–1)
DEPRECATED RDW RBC AUTO: 45.1 FL (ref 37–54)
EOSINOPHIL # BLD AUTO: 0.27 10*3/MM3 (ref 0–0.4)
EOSINOPHIL NFR BLD AUTO: 4.4 % (ref 0.3–6.2)
ERYTHROCYTE [DISTWIDTH] IN BLOOD BY AUTOMATED COUNT: 13.1 % (ref 12.3–15.4)
GFR SERPL CREATININE-BSD FRML MDRD: 89 ML/MIN/1.73
GLOBULIN UR ELPH-MCNC: 2.3 GM/DL
GLUCOSE SERPL-MCNC: 86 MG/DL (ref 65–99)
HCT VFR BLD AUTO: 43.3 % (ref 34–46.6)
HGB BLD-MCNC: 14.3 G/DL (ref 12–15.9)
IMM GRANULOCYTES # BLD AUTO: 0.02 10*3/MM3 (ref 0–0.05)
IMM GRANULOCYTES NFR BLD AUTO: 0.3 % (ref 0–0.5)
INR PPP: 0.96 (ref 0.93–1.1)
LYMPHOCYTES # BLD AUTO: 1.96 10*3/MM3 (ref 0.7–3.1)
LYMPHOCYTES NFR BLD AUTO: 31.8 % (ref 19.6–45.3)
MCH RBC QN AUTO: 30.9 PG (ref 26.6–33)
MCHC RBC AUTO-ENTMCNC: 33 G/DL (ref 31.5–35.7)
MCV RBC AUTO: 93.5 FL (ref 79–97)
MONOCYTES # BLD AUTO: 0.5 10*3/MM3 (ref 0.1–0.9)
MONOCYTES NFR BLD AUTO: 8.1 % (ref 5–12)
NEUTROPHILS NFR BLD AUTO: 3.35 10*3/MM3 (ref 1.7–7)
NEUTROPHILS NFR BLD AUTO: 54.3 % (ref 42.7–76)
NRBC BLD AUTO-RTO: 0.2 /100 WBC (ref 0–0.2)
PLATELET # BLD AUTO: 220 10*3/MM3 (ref 140–450)
PMV BLD AUTO: 10.6 FL (ref 6–12)
POTASSIUM SERPL-SCNC: 4.4 MMOL/L (ref 3.5–5.2)
PROT SERPL-MCNC: 6.6 G/DL (ref 6–8.5)
PROTHROMBIN TIME: 10.6 SECONDS (ref 9.6–11.7)
QT INTERVAL: 448 MS
RBC # BLD AUTO: 4.63 10*6/MM3 (ref 3.77–5.28)
SODIUM SERPL-SCNC: 140 MMOL/L (ref 136–145)
WBC # BLD AUTO: 6.17 10*3/MM3 (ref 3.4–10.8)

## 2021-06-01 PROCEDURE — 93010 ELECTROCARDIOGRAM REPORT: CPT | Performed by: INTERNAL MEDICINE

## 2021-06-01 PROCEDURE — 93005 ELECTROCARDIOGRAM TRACING: CPT | Performed by: SURGERY

## 2021-06-01 PROCEDURE — 80053 COMPREHEN METABOLIC PANEL: CPT

## 2021-06-01 PROCEDURE — 85610 PROTHROMBIN TIME: CPT

## 2021-06-01 PROCEDURE — 36415 COLL VENOUS BLD VENIPUNCTURE: CPT

## 2021-06-01 PROCEDURE — 85730 THROMBOPLASTIN TIME PARTIAL: CPT

## 2021-06-01 PROCEDURE — 85025 COMPLETE CBC W/AUTO DIFF WBC: CPT

## 2021-06-01 PROCEDURE — 71046 X-RAY EXAM CHEST 2 VIEWS: CPT

## 2021-06-08 ENCOUNTER — LAB (OUTPATIENT)
Dept: LAB | Facility: HOSPITAL | Age: 71
End: 2021-06-08

## 2021-06-08 DIAGNOSIS — Z17.0 MALIGNANT NEOPLASM OF UPPER-OUTER QUADRANT OF RIGHT BREAST IN FEMALE, ESTROGEN RECEPTOR POSITIVE (HCC): ICD-10-CM

## 2021-06-08 DIAGNOSIS — C50.411 MALIGNANT NEOPLASM OF UPPER-OUTER QUADRANT OF RIGHT BREAST IN FEMALE, ESTROGEN RECEPTOR POSITIVE (HCC): ICD-10-CM

## 2021-06-08 DIAGNOSIS — C50.911 MALIGNANT NEOPLASM OF RIGHT FEMALE BREAST, UNSPECIFIED ESTROGEN RECEPTOR STATUS, UNSPECIFIED SITE OF BREAST (HCC): ICD-10-CM

## 2021-06-08 LAB — SARS-COV-2 ORF1AB RESP QL NAA+PROBE: NOT DETECTED

## 2021-06-08 PROCEDURE — U0004 COV-19 TEST NON-CDC HGH THRU: HCPCS

## 2021-06-08 PROCEDURE — U0005 INFEC AGEN DETEC AMPLI PROBE: HCPCS

## 2021-06-08 PROCEDURE — C9803 HOPD COVID-19 SPEC COLLECT: HCPCS

## 2021-06-09 ENCOUNTER — ANESTHESIA EVENT (OUTPATIENT)
Dept: PERIOP | Facility: HOSPITAL | Age: 71
End: 2021-06-09

## 2021-06-09 NOTE — ANESTHESIA PREPROCEDURE EVALUATION
Anesthesia Evaluation     Patient summary reviewed and Nursing notes reviewed   no history of anesthetic complications:  NPO Solid Status: > 8 hours  NPO Liquid Status: > 8 hours           Airway   Dental      Pulmonary    (+) pneumonia , COPD, asthma,sleep apnea,   Cardiovascular     ECG reviewed  Patient on routine beta blocker    (+) hypertension, CAD, hyperlipidemia,       Neuro/Psych  (+) numbness,     GI/Hepatic/Renal/Endo    (+) obesity,  GERD,      Musculoskeletal     Abdominal    Substance History      OB/GYN          Other   arthritis,    history of cancer (breast)    ROS/Med Hx Other: Allergies, boils, neuropathy. Sciatica, h/o Bell's palsy    Echo  Normal LV size and contractility EF of 60 to 65%  Normal RV size  Normal atrial size  Aortic valve, mitral valve, tricuspid valve appears structurally normal, mild to moderate mitral regurgitation seen.  Trace tricuspid regurgitation seen, calculated RV systolic pressure of 29 mmHg  No pericardial effusion seen.  Proximal aorta appears normal in size.    Stress  · Left ventricular ejection fraction is hyperdynamic (Calculated EF > 70%) (Calculated EF = 70%).  · Myocardial perfusion imaging indicates a normal myocardial perfusion study with no evidence of ischemia.  · Impressions are consistent with a low risk study.  · There is no prior study available for comparison. Nuclear perfusion study demonstrates resting abnormality in the lateral apical and septal apical areas and these 2 segments which improves with stress, likely artifact, EF greater than 70% and LV is hyperdynamic with peak stress. There are no wall motion abnormalities by gated imaging. Suspect mild reversibility is artifactual, low risk study, no ECG changes, correlate with symptoms.  · Findings consistent with a normal ECG stress test.    PSH  ABDOMINAL SURGERY ANKLE SURGERY  TUBAL ABDOMINAL LIGATION VENOUS ACCESS DEVICE (PORT) INSERTION  BREAST BIOPSY BREAST LUMPECTOMY  CERVICAL  CONIZATION                     Anesthesia Plan    ASA 3     general   (Patient identified; pre-operative vital signs, all relevant labs/studies, complete medical/surgical/anesthetic history, full medication list, full allergy list, and NPO status obtained/reviewed; physical assessment performed; anesthetic options, side effects, potential complications, risks, and benefits discussed; questions answered; written anesthesia consent obtained; patient cleared for procedure; anesthesia machine and equipment checked and functioning)  intravenous induction     Anesthetic plan, all risks, benefits, and alternatives have been provided, discussed and informed consent has been obtained with: patient.

## 2021-06-10 ENCOUNTER — ANESTHESIA (OUTPATIENT)
Dept: PERIOP | Facility: HOSPITAL | Age: 71
End: 2021-06-10

## 2021-06-10 ENCOUNTER — APPOINTMENT (OUTPATIENT)
Dept: NUCLEAR MEDICINE | Facility: HOSPITAL | Age: 71
End: 2021-06-10

## 2021-06-10 ENCOUNTER — HOSPITAL ENCOUNTER (OUTPATIENT)
Facility: HOSPITAL | Age: 71
Discharge: NURSING, REHAB OR LONG-TERM CARE | End: 2021-06-13
Attending: SURGERY | Admitting: SURGERY

## 2021-06-10 DIAGNOSIS — Z17.0 MALIGNANT NEOPLASM OF UPPER-OUTER QUADRANT OF RIGHT BREAST IN FEMALE, ESTROGEN RECEPTOR POSITIVE (HCC): ICD-10-CM

## 2021-06-10 DIAGNOSIS — C50.411 MALIGNANT NEOPLASM OF UPPER-OUTER QUADRANT OF RIGHT BREAST IN FEMALE, ESTROGEN RECEPTOR POSITIVE (HCC): ICD-10-CM

## 2021-06-10 PROCEDURE — 25010000002 PROPOFOL 10 MG/ML EMULSION: Performed by: ANESTHESIOLOGY

## 2021-06-10 PROCEDURE — A9270 NON-COVERED ITEM OR SERVICE: HCPCS | Performed by: SURGERY

## 2021-06-10 PROCEDURE — 63710000001 LISINOPRIL 20 MG TABLET: Performed by: SURGERY

## 2021-06-10 PROCEDURE — 94640 AIRWAY INHALATION TREATMENT: CPT

## 2021-06-10 PROCEDURE — G0378 HOSPITAL OBSERVATION PER HR: HCPCS

## 2021-06-10 PROCEDURE — 19303 MAST SIMPLE COMPLETE: CPT | Performed by: SURGERY

## 2021-06-10 PROCEDURE — 25010000002 KETOROLAC TROMETHAMINE PER 15 MG: Performed by: ANESTHESIOLOGY

## 2021-06-10 PROCEDURE — 19307 MAST MOD RAD: CPT | Performed by: SURGERY

## 2021-06-10 PROCEDURE — 63710000001 MONTELUKAST 10 MG TABLET: Performed by: SURGERY

## 2021-06-10 PROCEDURE — 25010000002 FENTANYL CITRATE (PF) 100 MCG/2ML SOLUTION: Performed by: ANESTHESIOLOGY

## 2021-06-10 PROCEDURE — 63710000001 METOPROLOL SUCCINATE XL 50 MG TABLET SUSTAINED-RELEASE 24 HOUR: Performed by: SURGERY

## 2021-06-10 PROCEDURE — 63710000001 SUCRALFATE 1 G TABLET: Performed by: SURGERY

## 2021-06-10 PROCEDURE — 63710000001 ACETAMINOPHEN 500 MG TABLET: Performed by: SURGERY

## 2021-06-10 PROCEDURE — 25010000002 HYDROMORPHONE PER 4 MG: Performed by: ANESTHESIOLOGY

## 2021-06-10 PROCEDURE — 88307 TISSUE EXAM BY PATHOLOGIST: CPT | Performed by: SURGERY

## 2021-06-10 PROCEDURE — 63710000001 COLESEVELAM 625 MG TABLET: Performed by: SURGERY

## 2021-06-10 PROCEDURE — 63710000001 METOCLOPRAMIDE 5 MG TABLET: Performed by: SURGERY

## 2021-06-10 PROCEDURE — C1889 IMPLANT/INSERT DEVICE, NOC: HCPCS | Performed by: SURGERY

## 2021-06-10 PROCEDURE — 25010000002 ONDANSETRON PER 1 MG: Performed by: ANESTHESIOLOGY

## 2021-06-10 PROCEDURE — 63710000001 DULOXETINE 30 MG CAPSULE DELAYED-RELEASE PARTICLES: Performed by: SURGERY

## 2021-06-10 PROCEDURE — S0260 H&P FOR SURGERY: HCPCS | Performed by: SURGERY

## 2021-06-10 PROCEDURE — 63710000001 GABAPENTIN 100 MG CAPSULE: Performed by: SURGERY

## 2021-06-10 PROCEDURE — 88309 TISSUE EXAM BY PATHOLOGIST: CPT | Performed by: SURGERY

## 2021-06-10 PROCEDURE — 63710000001 ATORVASTATIN 20 MG TABLET: Performed by: SURGERY

## 2021-06-10 DEVICE — LIGACLIP MCA MULTIPLE CLIP APPLIERS, 20 MEDIUM CLIPS
Type: IMPLANTABLE DEVICE | Site: BREAST | Status: FUNCTIONAL
Brand: LIGACLIP

## 2021-06-10 RX ORDER — PROMETHAZINE HYDROCHLORIDE 12.5 MG/1
12.5 TABLET ORAL EVERY 6 HOURS PRN
Status: DISCONTINUED | OUTPATIENT
Start: 2021-06-10 | End: 2021-06-13 | Stop reason: HOSPADM

## 2021-06-10 RX ORDER — ALBUTEROL SULFATE 2.5 MG/3ML
2.5 SOLUTION RESPIRATORY (INHALATION) EVERY 4 HOURS PRN
Status: DISCONTINUED | OUTPATIENT
Start: 2021-06-10 | End: 2021-06-13 | Stop reason: HOSPADM

## 2021-06-10 RX ORDER — PROMETHAZINE HYDROCHLORIDE 12.5 MG/1
12.5 SUPPOSITORY RECTAL EVERY 6 HOURS PRN
Status: DISCONTINUED | OUTPATIENT
Start: 2021-06-10 | End: 2021-06-13 | Stop reason: HOSPADM

## 2021-06-10 RX ORDER — OXYCODONE HYDROCHLORIDE 5 MG/1
5 TABLET ORAL ONCE AS NEEDED
Status: DISCONTINUED | OUTPATIENT
Start: 2021-06-10 | End: 2021-06-10 | Stop reason: HOSPADM

## 2021-06-10 RX ORDER — COLESEVELAM 180 1/1
625 TABLET ORAL 2 TIMES DAILY WITH MEALS
Status: DISCONTINUED | OUTPATIENT
Start: 2021-06-10 | End: 2021-06-13 | Stop reason: HOSPADM

## 2021-06-10 RX ORDER — DULOXETIN HYDROCHLORIDE 30 MG/1
30 CAPSULE, DELAYED RELEASE ORAL DAILY
Status: DISCONTINUED | OUTPATIENT
Start: 2021-06-10 | End: 2021-06-13 | Stop reason: HOSPADM

## 2021-06-10 RX ORDER — FERROUS SULFATE TAB EC 324 MG (65 MG FE EQUIVALENT) 324 (65 FE) MG
324 TABLET DELAYED RESPONSE ORAL
Status: DISCONTINUED | OUTPATIENT
Start: 2021-06-11 | End: 2021-06-13 | Stop reason: HOSPADM

## 2021-06-10 RX ORDER — ONDANSETRON 4 MG/1
4 TABLET, FILM COATED ORAL EVERY 6 HOURS PRN
Status: DISCONTINUED | OUTPATIENT
Start: 2021-06-10 | End: 2021-06-13 | Stop reason: HOSPADM

## 2021-06-10 RX ORDER — BUPIVACAINE HYDROCHLORIDE 2.5 MG/ML
INJECTION, SOLUTION EPIDURAL; INFILTRATION; INTRACAUDAL AS NEEDED
Status: DISCONTINUED | OUTPATIENT
Start: 2021-06-10 | End: 2021-06-10 | Stop reason: HOSPADM

## 2021-06-10 RX ORDER — ACETAMINOPHEN 500 MG
1000 TABLET ORAL EVERY 8 HOURS
Status: DISCONTINUED | OUTPATIENT
Start: 2021-06-10 | End: 2021-06-13 | Stop reason: HOSPADM

## 2021-06-10 RX ORDER — EPHEDRINE SULFATE 50 MG/ML
INJECTION INTRAVENOUS AS NEEDED
Status: DISCONTINUED | OUTPATIENT
Start: 2021-06-10 | End: 2021-06-10 | Stop reason: SURG

## 2021-06-10 RX ORDER — FENTANYL CITRATE 50 UG/ML
INJECTION, SOLUTION INTRAMUSCULAR; INTRAVENOUS AS NEEDED
Status: DISCONTINUED | OUTPATIENT
Start: 2021-06-10 | End: 2021-06-10 | Stop reason: SURG

## 2021-06-10 RX ORDER — OXYCODONE HYDROCHLORIDE 5 MG/1
10 TABLET ORAL EVERY 4 HOURS PRN
Status: DISCONTINUED | OUTPATIENT
Start: 2021-06-10 | End: 2021-06-13 | Stop reason: HOSPADM

## 2021-06-10 RX ORDER — ONDANSETRON 2 MG/ML
INJECTION INTRAMUSCULAR; INTRAVENOUS AS NEEDED
Status: DISCONTINUED | OUTPATIENT
Start: 2021-06-10 | End: 2021-06-10 | Stop reason: SURG

## 2021-06-10 RX ORDER — SUCRALFATE 1 G/1
1 TABLET ORAL 2 TIMES DAILY
Status: DISCONTINUED | OUTPATIENT
Start: 2021-06-10 | End: 2021-06-13 | Stop reason: HOSPADM

## 2021-06-10 RX ORDER — GABAPENTIN 100 MG/1
200 CAPSULE ORAL NIGHTLY
Status: DISCONTINUED | OUTPATIENT
Start: 2021-06-10 | End: 2021-06-13 | Stop reason: HOSPADM

## 2021-06-10 RX ORDER — CLINDAMYCIN PHOSPHATE 900 MG/50ML
900 INJECTION, SOLUTION INTRAVENOUS ONCE
Status: COMPLETED | OUTPATIENT
Start: 2021-06-10 | End: 2021-06-10

## 2021-06-10 RX ORDER — DIAZEPAM 2 MG/1
2 TABLET ORAL EVERY 6 HOURS PRN
Status: DISCONTINUED | OUTPATIENT
Start: 2021-06-10 | End: 2021-06-13 | Stop reason: HOSPADM

## 2021-06-10 RX ORDER — MORPHINE SULFATE 4 MG/ML
4 INJECTION, SOLUTION INTRAMUSCULAR; INTRAVENOUS EVERY 4 HOURS PRN
Status: DISCONTINUED | OUTPATIENT
Start: 2021-06-10 | End: 2021-06-13 | Stop reason: HOSPADM

## 2021-06-10 RX ORDER — PANTOPRAZOLE SODIUM 40 MG/1
40 TABLET, DELAYED RELEASE ORAL EVERY MORNING
Status: DISCONTINUED | OUTPATIENT
Start: 2021-06-11 | End: 2021-06-13 | Stop reason: HOSPADM

## 2021-06-10 RX ORDER — SODIUM CHLORIDE, SODIUM LACTATE, POTASSIUM CHLORIDE, CALCIUM CHLORIDE 600; 310; 30; 20 MG/100ML; MG/100ML; MG/100ML; MG/100ML
INJECTION, SOLUTION INTRAVENOUS CONTINUOUS PRN
Status: DISCONTINUED | OUTPATIENT
Start: 2021-06-10 | End: 2021-06-10 | Stop reason: SURG

## 2021-06-10 RX ORDER — HYDROMORPHONE HCL 110MG/55ML
PATIENT CONTROLLED ANALGESIA SYRINGE INTRAVENOUS AS NEEDED
Status: DISCONTINUED | OUTPATIENT
Start: 2021-06-10 | End: 2021-06-10 | Stop reason: SURG

## 2021-06-10 RX ORDER — METOCLOPRAMIDE 5 MG/1
5 TABLET ORAL
Status: DISCONTINUED | OUTPATIENT
Start: 2021-06-10 | End: 2021-06-10

## 2021-06-10 RX ORDER — METOPROLOL SUCCINATE 50 MG/1
50 TABLET, EXTENDED RELEASE ORAL DAILY
Status: DISCONTINUED | OUTPATIENT
Start: 2021-06-10 | End: 2021-06-13 | Stop reason: HOSPADM

## 2021-06-10 RX ORDER — METOCLOPRAMIDE 5 MG/1
5 TABLET ORAL
Status: DISCONTINUED | OUTPATIENT
Start: 2021-06-10 | End: 2021-06-13 | Stop reason: HOSPADM

## 2021-06-10 RX ORDER — ATORVASTATIN CALCIUM 20 MG/1
20 TABLET, FILM COATED ORAL DAILY
Status: DISCONTINUED | OUTPATIENT
Start: 2021-06-10 | End: 2021-06-13 | Stop reason: HOSPADM

## 2021-06-10 RX ORDER — SODIUM CHLORIDE 9 MG/ML
100 INJECTION, SOLUTION INTRAVENOUS CONTINUOUS
Status: DISCONTINUED | OUTPATIENT
Start: 2021-06-10 | End: 2021-06-13 | Stop reason: HOSPADM

## 2021-06-10 RX ORDER — KETOROLAC TROMETHAMINE 30 MG/ML
INJECTION, SOLUTION INTRAMUSCULAR; INTRAVENOUS AS NEEDED
Status: DISCONTINUED | OUTPATIENT
Start: 2021-06-10 | End: 2021-06-10 | Stop reason: SURG

## 2021-06-10 RX ORDER — IPRATROPIUM BROMIDE AND ALBUTEROL SULFATE 2.5; .5 MG/3ML; MG/3ML
3 SOLUTION RESPIRATORY (INHALATION) EVERY 6 HOURS
Status: DISCONTINUED | OUTPATIENT
Start: 2021-06-10 | End: 2021-06-13 | Stop reason: HOSPADM

## 2021-06-10 RX ORDER — LISINOPRIL 20 MG/1
40 TABLET ORAL DAILY
Status: DISCONTINUED | OUTPATIENT
Start: 2021-06-10 | End: 2021-06-13 | Stop reason: HOSPADM

## 2021-06-10 RX ORDER — AZELASTINE 1 MG/ML
2 SPRAY, METERED NASAL 2 TIMES DAILY
Status: DISCONTINUED | OUTPATIENT
Start: 2021-06-10 | End: 2021-06-13 | Stop reason: HOSPADM

## 2021-06-10 RX ORDER — MONTELUKAST SODIUM 10 MG/1
10 TABLET ORAL NIGHTLY
Status: DISCONTINUED | OUTPATIENT
Start: 2021-06-10 | End: 2021-06-13 | Stop reason: HOSPADM

## 2021-06-10 RX ORDER — ONDANSETRON 2 MG/ML
4 INJECTION INTRAMUSCULAR; INTRAVENOUS EVERY 6 HOURS PRN
Status: DISCONTINUED | OUTPATIENT
Start: 2021-06-10 | End: 2021-06-13 | Stop reason: HOSPADM

## 2021-06-10 RX ORDER — OXYCODONE HYDROCHLORIDE 5 MG/1
5 TABLET ORAL EVERY 4 HOURS PRN
Status: DISCONTINUED | OUTPATIENT
Start: 2021-06-10 | End: 2021-06-13 | Stop reason: HOSPADM

## 2021-06-10 RX ADMIN — FENTANYL CITRATE 100 MCG: 50 INJECTION, SOLUTION INTRAMUSCULAR; INTRAVENOUS at 08:50

## 2021-06-10 RX ADMIN — IPRATROPIUM BROMIDE AND ALBUTEROL SULFATE 3 ML: 2.5; .5 SOLUTION RESPIRATORY (INHALATION) at 14:15

## 2021-06-10 RX ADMIN — PROPOFOL 120 MCG/KG/MIN: 10 INJECTION, EMULSION INTRAVENOUS at 09:02

## 2021-06-10 RX ADMIN — SODIUM CHLORIDE, SODIUM LACTATE, POTASSIUM CHLORIDE, AND CALCIUM CHLORIDE: .6; .31; .03; .02 INJECTION, SOLUTION INTRAVENOUS at 10:47

## 2021-06-10 RX ADMIN — FENTANYL CITRATE 50 MCG: 50 INJECTION, SOLUTION INTRAMUSCULAR; INTRAVENOUS at 09:17

## 2021-06-10 RX ADMIN — METOPROLOL SUCCINATE 50 MG: 50 TABLET, EXTENDED RELEASE ORAL at 16:12

## 2021-06-10 RX ADMIN — MONTELUKAST 10 MG: 10 TABLET, FILM COATED ORAL at 21:02

## 2021-06-10 RX ADMIN — SODIUM CHLORIDE, SODIUM LACTATE, POTASSIUM CHLORIDE, AND CALCIUM CHLORIDE: .6; .31; .03; .02 INJECTION, SOLUTION INTRAVENOUS at 12:00

## 2021-06-10 RX ADMIN — HYDROMORPHONE HYDROCHLORIDE 1 MG: 2 INJECTION INTRAMUSCULAR; INTRAVENOUS; SUBCUTANEOUS at 10:53

## 2021-06-10 RX ADMIN — HYDROMORPHONE HYDROCHLORIDE 1 MG: 2 INJECTION INTRAMUSCULAR; INTRAVENOUS; SUBCUTANEOUS at 11:42

## 2021-06-10 RX ADMIN — ATORVASTATIN CALCIUM 20 MG: 20 TABLET, FILM COATED ORAL at 16:13

## 2021-06-10 RX ADMIN — SODIUM CHLORIDE 100 ML/HR: 9 INJECTION, SOLUTION INTRAVENOUS at 16:13

## 2021-06-10 RX ADMIN — ONDANSETRON 4 MG: 2 INJECTION INTRAMUSCULAR; INTRAVENOUS at 12:53

## 2021-06-10 RX ADMIN — EPHEDRINE SULFATE 10 MG: 50 INJECTION INTRAVENOUS at 11:57

## 2021-06-10 RX ADMIN — SODIUM CHLORIDE, SODIUM LACTATE, POTASSIUM CHLORIDE, AND CALCIUM CHLORIDE: .6; .31; .03; .02 INJECTION, SOLUTION INTRAVENOUS at 08:50

## 2021-06-10 RX ADMIN — LISINOPRIL 40 MG: 20 TABLET ORAL at 16:13

## 2021-06-10 RX ADMIN — FENTANYL CITRATE 50 MCG: 50 INJECTION, SOLUTION INTRAMUSCULAR; INTRAVENOUS at 09:58

## 2021-06-10 RX ADMIN — GABAPENTIN 200 MG: 100 CAPSULE ORAL at 21:02

## 2021-06-10 RX ADMIN — DULOXETINE 30 MG: 30 CAPSULE, DELAYED RELEASE ORAL at 16:13

## 2021-06-10 RX ADMIN — FENTANYL CITRATE 50 MCG: 50 INJECTION, SOLUTION INTRAMUSCULAR; INTRAVENOUS at 09:48

## 2021-06-10 RX ADMIN — CLINDAMYCIN PHOSPHATE 900 MG: 900 INJECTION, SOLUTION INTRAVENOUS at 08:55

## 2021-06-10 RX ADMIN — ACETAMINOPHEN 1000 MG: 500 TABLET, FILM COATED ORAL at 16:13

## 2021-06-10 RX ADMIN — EPHEDRINE SULFATE 10 MG: 50 INJECTION INTRAVENOUS at 12:12

## 2021-06-10 RX ADMIN — COLESEVELAM HYDROCHLORIDE 625 MG: 625 TABLET, FILM COATED ORAL at 18:13

## 2021-06-10 RX ADMIN — METOCLOPRAMIDE 5 MG: 5 TABLET ORAL at 18:13

## 2021-06-10 RX ADMIN — SUCRALFATE 1 G: 1 TABLET ORAL at 21:02

## 2021-06-10 RX ADMIN — KETOROLAC TROMETHAMINE 30 MG: 30 INJECTION, SOLUTION INTRAMUSCULAR at 12:53

## 2021-06-10 NOTE — ANESTHESIA PROCEDURE NOTES
Airway  Urgency: elective    Date/Time: 6/10/2021 9:01 AM  Airway not difficult    General Information and Staff    Patient location during procedure: OR  Anesthesiologist: Maurice Martinez MD    Indications and Patient Condition  Indications for airway management: airway protection    Preoxygenated: yes  MILS maintained throughout  Mask difficulty assessment: 1 - vent by mask    Final Airway Details  Final airway type: supraglottic airway      Successful airway: unique  Size 4    Number of attempts at approach: 1  Assessment: lips, teeth, and gum same as pre-op and atraumatic intubation

## 2021-06-10 NOTE — H&P
GENERAL SURGERY ESTABLISHED PATIENT NOTE    Consult requested by: Dr. Haney    Patient Care Team:  Candi Benson MD as PCP - General (Family Medicine)    Reason for consult: Right breast cancer    Subjective     Patient is a 70 y.o. female presents after completing neoadjuvant chemotherapy for multifocal right upper outer quadrant breast cancer which was diagnosed in July 2020.  In brief, the patient underwent an ultrasound core needle biopsy of areas of concern which were seen on diagnostic mammograms and ultrasound on 7/22/2020.  The patient was found to have a mass corresponding to a palpable abnormality which measured up to 4.5 cm.  Anterior to the mass there was an additional mass seen within the upper outer quadrant of the right breast which measured 2.8 cm.  Sonographic evaluation of the palpable abnormality within the right axillary tail region corresponded to heterogenous hypoechoic mass which measured up to 4.1 cm.  Within the posterior aspect of the right breast seen anteriorly, there was an additional mass which measured up to 3 cm and also demonstrated irregular margins and heterogenous, hypoechoic signal.  The biopsy of this area demonstrated poorly differentiated ductal adenocarcinoma, Brumley score 9 of 9, negative for intravascular invasion.  P63 was negative.  Estrogen receptor assay was positive at 80%, progesterone receptor was negative at 0%, and HER-2 was negative by FISH.  Patient then subsequently underwent a left IJ Port-A-Cath insertion by Dr. Kaye on 7/30/2020.  The patient was seen by medical oncology and a PET scan was performed on 8/6/2020 which revealed a 3.1 cm FDG avid right breast mass and a 4.2 cm FDG avid mass along the right axilla with an otherwise negative scan.  Patient then subsequently underwent percutaneous right breast biopsy of the second mass which revealed again poorly differentiated ductal carcinoma Blair grade 9 of 9, estrogen receptor positive, and  progesterone receptor negative.  The patient then subsequently under went chemotherapy with Cytoxan and Adriamycin and completed that course on 3/18/2021.  She only had difficulties with neuropathy.  Follow-up diagnostic mammograms and ultrasounds have demonstrated complete resolution of the mass seen previously.  Her past medical history is significant for asthma, COPD, hypertension, obesity, GERD, and hyperlipidemia.  Her family history is positive for a nephew with leukemia and family members with skin cancer.  He now returns to discuss surgical options with me.    Review of Systems   Constitutional: Positive for diaphoresis and fatigue. Negative for appetite change, chills and fever.   HENT: Positive for tinnitus. Negative for congestion and sore throat.    Eyes: Positive for photophobia and discharge.   Respiratory: Negative for cough and shortness of breath.    Cardiovascular: Negative for chest pain and palpitations.   Gastrointestinal: Negative for abdominal pain, constipation, diarrhea, nausea, vomiting and GERD.   Endocrine: Positive for cold intolerance and heat intolerance.   Genitourinary: Positive for breast discharge and breast pain. Negative for breast lump, difficulty urinating, dysuria and frequency.   Musculoskeletal: Negative for arthralgias and back pain.   Skin: Negative for rash and skin lesions.   Allergic/Immunologic: Positive for environmental allergies.   Neurological: Negative for dizziness, seizures and memory problem.   Hematological: Negative for adenopathy. Does not bruise/bleed easily.   Psychiatric/Behavioral: Negative for sleep disturbance and depressed mood.        History  Past Medical History:   Diagnosis Date   • Arthritis    • Asthma    • Boils of multiple sites 5/28   • Breast cancer (CMS/HCC)    • Cancer (CMS/HCC)    • COPD (chronic obstructive pulmonary disease) (CMS/HCC)    • Coronary artery disease    • Drug therapy    • Elevated cholesterol    • GERD (gastroesophageal  reflux disease)    • H/O Bell's palsy    • History of transfusion    • Hypertension    • Neuropathy     feet and hands post chemo   • Sciatic nerve pain    • Sleep apnea      Past Surgical History:   Procedure Laterality Date   • ABDOMINAL SURGERY     • ANKLE SURGERY     • BREAST BIOPSY     • BREAST LUMPECTOMY     • CERVICAL CONIZATION     • TUBAL ABDOMINAL LIGATION     • VENOUS ACCESS DEVICE (PORT) INSERTION Left 7/30/2020    Procedure: INSERTION VENOUS ACCESS DEVICE LEFT internal jugular;  Surgeon: Taylor Kaye MD;  Location: [facility] MAIN OR;  Service: General;  Laterality: Left;     Family History   Problem Relation Age of Onset   • Heart disease Father    • Stroke Father    • Diabetes Father    • Diabetes Son      Social History     Tobacco Use   • Smoking status: Never Smoker   • Smokeless tobacco: Never Used   Vaping Use   • Vaping Use: Never used   Substance Use Topics   • Alcohol use: Never   • Drug use: Never     Medications Prior to Admission   Medication Sig Dispense Refill Last Dose   • albuterol sulfate  (90 Base) MCG/ACT inhaler Inhale 2 puffs Every 6 (Six) Hours As Needed.      • ALENDRONATE SODIUM PO Take 70 mg by mouth 1 (One) Time Per Week. Patient states takes on Wednesdays      • atorvastatin (LIPITOR) 20 MG tablet Take 20 mg by mouth Daily.      • azelastine (ASTELIN) 0.1 % nasal spray 2 sprays into the nostril(s) as directed by provider 2 (Two) Times a Day. Use in each nostril as directed      • colesevelam (WELCHOL) 625 MG tablet Take 625 mg by mouth 2 (Two) Times a Day With Meals.      • DULoxetine (CYMBALTA) 30 MG capsule Take 30 mg by mouth Daily.      • famotidine (PEPCID) 40 MG tablet Take 40 mg by mouth Daily.      • ferrous sulfate 325 (65 FE) MG tablet Take 325 mg by mouth Daily With Breakfast.      • gabapentin (NEURONTIN) 100 MG capsule Take 200 mg by mouth Every Night.      • lisinopril (PRINIVIL,ZESTRIL) 40 MG tablet Take 40 mg by mouth Daily. LD 5/9      • metoclopramide  (REGLAN) 5 MG tablet Take 5 mg by mouth 2 (two) times a day.      • metoprolol succinate XL (TOPROL-XL) 50 MG 24 hr tablet Take 50 mg by mouth Daily. Take DOS      • montelukast (SINGULAIR) 10 MG tablet Take 10 mg by mouth Every Night.      • omeprazole (priLOSEC) 40 MG capsule Take 40 mg by mouth Daily.      • sucralfate (CARAFATE) 1 g tablet Take 1 g by mouth 2 (Two) Times a Day.      • ipratropium-albuterol (DUO-NEB) 0.5-2.5 mg/3 ml nebulizer Take 3 mL by nebulization Every 6 (Six) Hours.        Allergies:  Penicillins and Adhesive tape    Objective     Vital Signs  Temp:  [97.1 °F (36.2 °C)] 97.1 °F (36.2 °C)  Heart Rate:  [81] 81  Resp:  [14] 14  BP: (151)/(91) 151/91    Physical Exam  Vitals reviewed. Exam conducted with a chaperone present.   Constitutional:       Appearance: She is well-developed.   HENT:      Head: Normocephalic and atraumatic.      Comments: Alopecia noted  Eyes:      Pupils: Pupils are equal, round, and reactive to light.   Cardiovascular:      Rate and Rhythm: Normal rate and regular rhythm.   Pulmonary:      Effort: Pulmonary effort is normal.      Breath sounds: Normal breath sounds.   Chest:      Comments: Both breasts were examined in the upright and supine position.  The patient has large pendulous breasts bilaterally, along with a obese body habitus.  The right breast is markedly larger than the left, but does not have any dominant, palpable masses notable.  There is no retraction of the nipple, or discharge from the nipple.  There are no overlying skin changes to suggest cutaneous involvement such as peau d'orange.  Abdominal:      General: There is no distension.      Palpations: Abdomen is soft.      Tenderness: There is no abdominal tenderness.      Hernia: No hernia is present.   Musculoskeletal:         General: Normal range of motion.      Cervical back: Normal range of motion.   Lymphadenopathy:      Cervical: No cervical adenopathy.      Upper Body:      Right upper body:  No supraclavicular or axillary adenopathy.      Left upper body: No supraclavicular or axillary adenopathy.   Skin:     General: Skin is warm and dry.      Findings: No rash.   Neurological:      Mental Status: She is alert and oriented to person, place, and time.      Gait: Gait abnormal.      Comments: Patient ambulates with a walker and an unsteady gait   Psychiatric:         Mood and Affect: Mood normal.         Behavior: Behavior normal.         Thought Content: Thought content normal.         Judgment: Judgment normal.         Results Review:   Lab Results (last 24 hours)     ** No results found for the last 24 hours. **        No radiology results for the last day      I reviewed the patient's new imaging results and agree with the interpretation.  I reviewed the patient's other test results and agree with the interpretation    Assessment/Plan     Active Problems:  Right breast cancer    We reviewed the patient's imaging, and her pathology reports in detail.  We discussed that breast cancer is treated in a multidisciplinary fashion, with input from radiation oncology, medical oncology, and general surgery.  We discussed that the patient's case will be discussed at a multidisciplinary tumor board meeting held every other week.  We then discussed the diagnosis of breast cancer and that most breast cancers arise either from the ducts or from the lobules.  Using visual aids, we discussed the difference between invasive and in situ disease, especially whether or not the lymph nodes need to be evaluated.  Usually, with in situ disease, lymph nodes are not examined.  However, examining the lymph nodes should be considered if the area of concern is widespread or if it is high-grade.  We also discussed hormone receptors, and discussed that there are medications which can be given if the hormone receptors were positive which can be used to treat the cancer, and to provide protection in not only the breast with cancer,  but the contralateral breast as well.  The surgical options were discussed with the patient which include breast conservation therapy (lumpectomy with radiation) and mastectomy.  With regards to mastectomy, we discussed that reconstruction may be performed either at the time of the surgery, or in a delayed fashion.  We discussed that the survival benefit between these 2 procedures are equivalent, and therefore they are considered oncologically appropriate.  However, some women will choose one versus the other for a multitude of factors.  There are times when a lumpectomy is not a feasible option, these include but are not limited to tumor to breast ratio, the location of the tumor, previous radiation to the chest wall, or connective tissue disorders which would make radiation treatment ineffective.  This patient has elected to undergo mastectomy and understands that this may include an evaluation of her lymph nodes.  She understands that there may or may not be a role for radiation therapy following surgery.  There may or may not be a role for chemotherapy or hormonal therapy following her surgery as well.  This will be managed by medical oncology.  We discussed the risk, benefits, and alternatives to surgery which include but are not limited to: Bleeding, infection, damage to nerves/blood vessels, and pain.  Following surgery, the patient will have drains in place which will need to be managed at home.  These drains will be removed once the output is minimal and clear.  The patient was adamant that she receive a bilateral mastectomy.  I discussed with her that I was not sure whether the benefit of proceeding with a bilateral mastectomy would outweigh the potential risks given her advanced age and multiple medical comorbidities.  However, the patient states that she would not consent to anything less than a bilateral mastectomy, and therefore, I do think it is reasonable to proceed with a bilateral mastectomy.   Certainly with her history of multifocal, weakly estrogen fed tumor on the right, she would be at increased risk of developing breast cancer on the left and therefore we will proceed.    Her case was discussed at the multidisciplinary tumor board meeting and we will proceed to the OR for a bilateral mastectomy with axillary lymph node dissection on the right.    I discussed the patients findings and my recommendations with the patient.     Kenrick Ragsdale MD  06/10/21  07:09 EDT

## 2021-06-10 NOTE — PLAN OF CARE
Goal Outcome Evaluation:              Outcome Summary: Pt is POD 0 following radical mastectomy. Will check on pt in AM.

## 2021-06-10 NOTE — PLAN OF CARE
Goal Outcome Evaluation:  Plan of Care Reviewed With: patient        Progress: no change  Outcome Summary: pt admitted after surgery. denies pain at this time. 3 LG drains in place. vitals stable now.

## 2021-06-10 NOTE — ANESTHESIA POSTPROCEDURE EVALUATION
Patient: Monse Donnelly    Procedure Summary     Date: 06/10/21 Room / Location: [facility] OR 09 / [facility] MAIN OR    Anesthesia Start: 0850 Anesthesia Stop: 1314    Procedure: MASTECTOMY MODIFIED RADICAL RIGHT/ MASTECTOMY SIMPLE-LEFT (Bilateral Breast) Diagnosis:       Malignant neoplasm of upper-outer quadrant of right breast in female, estrogen receptor positive (CMS/HCC)      (Malignant neoplasm of upper-outer quadrant of right breast in female, estrogen receptor positive (CMS/HCC) [C50.411, Z17.0])    Surgeons: Kenrick Ragsdale MD Provider: Maurice Martinez MD    Anesthesia Type: general ASA Status: 3          Anesthesia Type: general    Vitals  Vitals Value Taken Time   /132 06/10/21 1402   Temp 96.6 °F (35.9 °C) 06/10/21 1310   Pulse 75 06/10/21 1405   Resp 10 06/10/21 1340   SpO2 95 % 06/10/21 1405   Vitals shown include unvalidated device data.        Post Anesthesia Care and Evaluation    Patient location during evaluation: PACU  Patient participation: complete - patient participated  Level of consciousness: awake  Pain scale: See nurse's notes for pain score.  Pain management: adequate  Airway patency: patent  Anesthetic complications: No anesthetic complications  PONV Status: none  Cardiovascular status: acceptable  Respiratory status: acceptable  Hydration status: acceptable    Comments: Patient seen and examined postoperatively; vital signs stable; SpO2 greater than or equal to 90%; cardiopulmonary status stable; nausea/vomiting adequately controlled; pain adequately controlled; no apparent anesthesia complications; patient discharged from anesthesia care when discharge criteria were met

## 2021-06-10 NOTE — OP NOTE
MASTECTOMY MODIFIED RADICAL  Operative Note    Patient Name:  Monse Donnelly  YOB: 1950    Date of Surgery:  6/10/2021     Indications: The patient is a 70-year-old lady with a diagnosis of right breast cancer.  We discussed her surgical options, and her case was discussed at the multidisciplinary tumor board meeting.  The decision was made to proceed with a modified radical mastectomy on the right and a simple mastectomy on the left for risk reduction purposes.    Pre-op Diagnosis:   Malignant neoplasm of upper-outer quadrant of right breast in female, estrogen receptor positive (CMS/HCC) [C50.411, Z17.0]    Post-op Diagnosis:  Post-Op Diagnosis Codes:     * Malignant neoplasm of upper-outer quadrant of right breast in female, estrogen receptor positive (CMS/HCC) [C50.411, Z17.0]    Procedure/CPT® Codes:      Procedure(s):  MASTECTOMY MODIFIED RADICAL RIGHT/ MASTECTOMY SIMPLE-LEFT    Staff:  Surgeon(s):  Kenrick Ragsdale MD    Assistant: Yandel Philip was responsible for performing the following activities: Retraction, Suturing, Closing and Placing Dressing and their skilled assistance was necessary for the success of this case.     Anesthesia: General    Estimated Blood Loss: minimal    Implants:    Implant Name Type Inv. Item Serial No.  Lot No. LRB No. Used Action   CLIPAPPLR M/ ENDO LIGACLIP 20CLP 11IN MD - [id number] Implant CLIPAPPLR M/ ENDO LIGACLIP 20CLP 11IN MD  [facility]  DIV OF J [employer] J [id number] Right 1 Implanted       Specimen:          Specimens     ID Source Type Tests Collected By Collected At Frozen?    A Breast, Right Tissue · TISSUE PATHOLOGY EXAM   Kenrick Ragsdale MD 6/10/21 0919     Description: RIGHT BREAST MODIFIED RADICAL MASTECTOMY, SHORT-SUPERIOR LONG-LATERAL    B Breast, Left Tissue · TISSUE PATHOLOGY EXAM   Kenrick Ragsdale MD 6/10/21 0919     Description: LEFT BREAST, SHORT-SUPERIOR, LONG-LATERAL          Findings: Modified  radical mastectomy with all lymph nodes removed within the triangle bound by the axillary vein, long thoracic nerve and thoracodorsal nerve.    Complications: None, immediately    Description of Procedure: After obtaining informed consent in the preop holding area the patient was brought to the operating room and placed in the supine position.  SCDs were applied, and preoperative antibiotics were administered.  The patient then underwent uncomplicated induction of general endotracheal anesthesia.  After a brief timeout, the patient's bilateral chest and axilla were prepped and draped in the usual sterile fashion.  I began by marking my incisions for her bilateral mastectomies.   I then began on the right side, which was the side with the cancer.  I began by incising through the skin, and dividing the dermis using electrocautery.  Once in the level of the subcutaneous fat, I raised flaps superiorly and inferiorly.  The margin of my superior flap extending from the lateral border of the sternum to the lateral border of the pectoralis major muscle.  The superior border of my superior flap was the clavicle.  The inferior flap also had the same medial and lateral borders, but the inferior border was the IMF.  Once my flaps had been raised, I returned to the medial aspect of the incision, and divided the breast tissue away from the anterior chest wall using electrocautery.  The breast tissue was removed off of the anterior fascia of the pectoralis major muscle in a medial to lateral fashion.  Laterally, care was taken to ensure that the entire axillary tail was incorporated in the dissection and this was done so by ensuring that the latissimus dorsi muscle could be seen in the deep lateral aspect of the incision.  Without removing the breast tissue, I elected to perform my axillary lymph node dissection with the breast tissue still intact.  I divided the clavipectoral fascia and was able to find the long thoracic nerve  running along the lateral aspect of the chest wall.  This was confirmed by watching the nerve innervate the serratus anterior muscle.  I then began to track the nerve superiorly until the axillary vein could be seen at the superior aspect of the incision.  Once the axillary vein was identified, I dissected along the axillary vein from a medial to lateral approach until I could identify the takeoff of the thoracodorsal vein and nerve.  With the axillary vein, thoracodorsal nerve, and long thoracic nerve now all exposed, all of the axillary fat pad between the structures was able to be removed using a combination of blunt dissection, small clips, and electrocautery to maintain hemostasis.  With the entire axillary fat pad now removed, it was passed off the field as specimen with the breast after marking the breast with a short stitch to denote the superior margin and a long stitch to ramona the lateral margin.  I then examined the wound, and ensured hemostasis.  19 [place] LG drains x2 were placed within the wound with one drain going towards the axilla and the other going underneath the flaps.  These were secured at the level of the skin using 2-0 nylon suture.  We then placed a lap sponge within the incision, and turned our attention towards the contralateral breast.  In much the same way, the contralateral mastectomy proceeded.  We first began by changing our gowns and gloves, and reprepping and draping the patient.  A fresh set of instruments was used as we began on the cancerous side.  I began by incising through the skin, and dividing the dermis using electrocautery.  Once in the level of the subcutaneous fat, I raised flaps superiorly and inferiorly.  The margin of my superior flap extending from the lateral border of the sternum to the lateral border of the pectoralis major muscle.  The superior border of my superior flap was the clavicle.  The inferior flap also had the same medial and lateral borders, but the  inferior border was the IMF.  Once my flaps had been raised, I returned to the medial aspect of the incision, and divided the breast tissue away from the anterior chest wall using electrocautery.  The breast tissue was removed off of the anterior fascia of the pectoralis major muscle in a medial to lateral fashion.  Laterally, care was taken to ensure that the entire axillary tail was incorporated in the dissection and this was done so by ensuring that the latissimus dorsi muscle could be seen in the deep lateral aspect of the incision.  I then ensured hemostasis.  We irrigated the wound with copious amounts of warm sterile water, and then I placed a 19 [place] LG drain underneath the skin flaps and secured this at the level of the skin using a 2-0 nylon suture.  Prior to closing, I performed a 3 level intercostal block by injecting 5 mL of quarter percent Marcaine underneath the ribs at 3 levels bilaterally.  We then turned our attention towards closure of the wounds.  This was done by using interrupted 3-0 Vicryl suture to reapproximate the dermis and running 4-0 Monocryl suture to reapproximate the skin.  The wounds were dressed with skin affix skin glue, fluffs, ABD, and a brassiere.  The patient tolerated the procedure well, was extubated, and taken to PACU in satisfactory condition.    Kenrick Ragsdale MD     Date: 6/10/2021  Time: 13:26 EDT

## 2021-06-11 LAB
LAB AP CASE REPORT: NORMAL
LAB AP SYNOPTIC CHECKLIST: NORMAL
PATH REPORT.FINAL DX SPEC: NORMAL
PATH REPORT.GROSS SPEC: NORMAL

## 2021-06-11 PROCEDURE — 63710000001 LISINOPRIL 20 MG TABLET: Performed by: SURGERY

## 2021-06-11 PROCEDURE — A9270 NON-COVERED ITEM OR SERVICE: HCPCS | Performed by: SURGERY

## 2021-06-11 PROCEDURE — 97165 OT EVAL LOW COMPLEX 30 MIN: CPT

## 2021-06-11 PROCEDURE — 63710000001 ACETAMINOPHEN 500 MG TABLET: Performed by: SURGERY

## 2021-06-11 PROCEDURE — 63710000001 GABAPENTIN 100 MG CAPSULE: Performed by: SURGERY

## 2021-06-11 PROCEDURE — 94799 UNLISTED PULMONARY SVC/PX: CPT

## 2021-06-11 PROCEDURE — 63710000001 FERROUS SULFATE 324 (65 FE) MG TABLET DELAYED-RELEASE: Performed by: SURGERY

## 2021-06-11 PROCEDURE — 63710000001 METOCLOPRAMIDE 5 MG TABLET: Performed by: SURGERY

## 2021-06-11 PROCEDURE — 63710000001 OXYCODONE 5 MG TABLET: Performed by: SURGERY

## 2021-06-11 PROCEDURE — 63710000001 AZELASTINE 0.1 % SOLUTION 30 ML BOTTLE: Performed by: SURGERY

## 2021-06-11 PROCEDURE — 63710000001 DULOXETINE 30 MG CAPSULE DELAYED-RELEASE PARTICLES: Performed by: SURGERY

## 2021-06-11 PROCEDURE — 63710000001 METOPROLOL SUCCINATE XL 50 MG TABLET SUSTAINED-RELEASE 24 HOUR: Performed by: SURGERY

## 2021-06-11 PROCEDURE — 63710000001 SUCRALFATE 1 G TABLET: Performed by: SURGERY

## 2021-06-11 PROCEDURE — 63710000001 PANTOPRAZOLE 40 MG TABLET DELAYED-RELEASE: Performed by: SURGERY

## 2021-06-11 PROCEDURE — 99024 POSTOP FOLLOW-UP VISIT: CPT | Performed by: SURGERY

## 2021-06-11 PROCEDURE — 63710000001 MONTELUKAST 10 MG TABLET: Performed by: SURGERY

## 2021-06-11 PROCEDURE — G0378 HOSPITAL OBSERVATION PER HR: HCPCS

## 2021-06-11 PROCEDURE — 63710000001 COLESEVELAM 625 MG TABLET: Performed by: SURGERY

## 2021-06-11 PROCEDURE — 97161 PT EVAL LOW COMPLEX 20 MIN: CPT

## 2021-06-11 PROCEDURE — 63710000001 ATORVASTATIN 20 MG TABLET: Performed by: SURGERY

## 2021-06-11 RX ADMIN — SUCRALFATE 1 G: 1 TABLET ORAL at 20:33

## 2021-06-11 RX ADMIN — AZELASTINE HYDROCHLORIDE 2 SPRAY: 137 SPRAY, METERED NASAL at 08:49

## 2021-06-11 RX ADMIN — SUCRALFATE 1 G: 1 TABLET ORAL at 08:48

## 2021-06-11 RX ADMIN — DULOXETINE 30 MG: 30 CAPSULE, DELAYED RELEASE ORAL at 08:48

## 2021-06-11 RX ADMIN — LISINOPRIL 40 MG: 20 TABLET ORAL at 08:48

## 2021-06-11 RX ADMIN — OXYCODONE 10 MG: 5 TABLET ORAL at 20:33

## 2021-06-11 RX ADMIN — ACETAMINOPHEN 1000 MG: 500 TABLET, FILM COATED ORAL at 08:48

## 2021-06-11 RX ADMIN — ATORVASTATIN CALCIUM 20 MG: 20 TABLET, FILM COATED ORAL at 08:48

## 2021-06-11 RX ADMIN — PANTOPRAZOLE SODIUM 40 MG: 40 TABLET, DELAYED RELEASE ORAL at 07:20

## 2021-06-11 RX ADMIN — ACETAMINOPHEN 1000 MG: 500 TABLET, FILM COATED ORAL at 02:05

## 2021-06-11 RX ADMIN — GABAPENTIN 200 MG: 100 CAPSULE ORAL at 20:33

## 2021-06-11 RX ADMIN — IPRATROPIUM BROMIDE AND ALBUTEROL SULFATE 3 ML: 2.5; .5 SOLUTION RESPIRATORY (INHALATION) at 20:37

## 2021-06-11 RX ADMIN — MONTELUKAST 10 MG: 10 TABLET, FILM COATED ORAL at 20:33

## 2021-06-11 RX ADMIN — ACETAMINOPHEN 1000 MG: 500 TABLET, FILM COATED ORAL at 17:08

## 2021-06-11 RX ADMIN — FERROUS SULFATE TAB EC 324 MG (65 MG FE EQUIVALENT) 324 MG: 324 (65 FE) TABLET DELAYED RESPONSE at 08:48

## 2021-06-11 RX ADMIN — COLESEVELAM HYDROCHLORIDE 625 MG: 625 TABLET, FILM COATED ORAL at 17:08

## 2021-06-11 RX ADMIN — SODIUM CHLORIDE 100 ML/HR: 9 INJECTION, SOLUTION INTRAVENOUS at 01:58

## 2021-06-11 RX ADMIN — METOCLOPRAMIDE 5 MG: 5 TABLET ORAL at 17:08

## 2021-06-11 RX ADMIN — METOCLOPRAMIDE 5 MG: 5 TABLET ORAL at 07:20

## 2021-06-11 RX ADMIN — COLESEVELAM HYDROCHLORIDE 625 MG: 625 TABLET, FILM COATED ORAL at 08:47

## 2021-06-11 RX ADMIN — SODIUM CHLORIDE 100 ML/HR: 9 INJECTION, SOLUTION INTRAVENOUS at 14:01

## 2021-06-11 RX ADMIN — OXYCODONE 10 MG: 5 TABLET ORAL at 02:05

## 2021-06-11 RX ADMIN — METOPROLOL SUCCINATE 50 MG: 50 TABLET, EXTENDED RELEASE ORAL at 08:47

## 2021-06-11 NOTE — THERAPY EVALUATION
Patient Name: Monse Donnelly  : 1950    MRN: [id number]                              Today's Date: 2021       Admit Date: 6/10/2021    Visit Dx:     ICD-10-CM ICD-9-CM   1. Malignant neoplasm of upper-outer quadrant of right breast in female, estrogen receptor positive (CMS/HCC)  C50.411 174.4    Z17.0 V86.0     Patient Active Problem List   Diagnosis   • Pneumonia   • Breast mass   • Malignant neoplasm of right female breast (CMS/HCC)   • Encounter for medication management and education of chemotherapy/biotherapy     • Encounter for care related to vascular access port   • Malignant neoplasm of upper-outer quadrant of right breast in female, estrogen receptor positive (CMS/HCC)     Past Medical History:   Diagnosis Date   • Arthritis    • Asthma    • Boils of multiple sites    • Breast cancer (CMS/HCC)    • Cancer (CMS/HCC)    • COPD (chronic obstructive pulmonary disease) (CMS/HCC)    • Coronary artery disease    • Drug therapy    • Elevated cholesterol    • GERD (gastroesophageal reflux disease)    • H/O Bell's palsy    • History of transfusion    • Hypertension    • Neuropathy     feet and hands post chemo   • Sciatic nerve pain    • Sleep apnea      Past Surgical History:   Procedure Laterality Date   • ABDOMINAL SURGERY     • ANKLE SURGERY     • BREAST BIOPSY     • BREAST LUMPECTOMY     • CERVICAL CONIZATION     • TUBAL ABDOMINAL LIGATION     • VENOUS ACCESS DEVICE (PORT) INSERTION Left 2020    Procedure: INSERTION VENOUS ACCESS DEVICE LEFT internal jugular;  Surgeon: Taylor Kaye MD;  Location: [facility];  Service: General;  Laterality: Left;     General Information     Row Name 2104          Physical Therapy Time and Intention    Document Type  evaluation  -CM     Mode of Treatment  physical therapy  -CM     Row Name 2104          General Information    Patient Profile Reviewed  yes  -CM     Existing Precautions/Restrictions  no known precautions/restrictions  -CM      Barriers to Rehab  none identified  -CM     Row Name 06/11/21 0904          Living Environment    Lives With  sibling(s);other (see comments) just moved to be closer to siblings; lives w/ her sister, who has alzheimers; pt is caregiver for sister (sister's adult children currently providing care for sister while pt recovers)  -CM     Row Name 06/11/21 0904          Home Main Entrance    Number of Stairs, Main Entrance  none ramp  -CM     Row Name 06/11/21 0904          Stairs Within Home, Primary    Number of Stairs, Within Home, Primary  none  -CM     Row Name 06/11/21 0904          Cognition    Orientation Status (Cognition)  oriented x 4 very pleasant  -CM     Row Name 06/11/21 0904          Safety Issues, Functional Mobility    Impairments Affecting Function (Mobility)  balance;pain;strength;range of motion (ROM)  -CM       User Key  (r) = Recorded By, (t) = Taken By, (c) = Cosigned By    Initials Name Provider Type    Reba Farah, PT Physical Therapist        Mobility     Row Name 06/11/21 0906          Bed Mobility    Bed Mobility  bed mobility (all) activities  -CM     All Activities, [place] (Bed Mobility)  independent  -CM     Row Name 06/11/21 0906          Sit-Stand Transfer    Sit-Stand [place] (Transfers)  supervision  -CM     Row Name 06/11/21 0906          Gait/Stairs (Locomotion)    [place] Level (Gait)  minimum assist (75% patient effort)  -CM     Assistive Device (Gait)  other (see comments) one hand held, gait belt, wearing slippers  -CM     Distance in Feet (Gait)  75 ft shuffled gait, short step length, lateral sway  -CM     Deviations/Abnormal Patterns (Gait)  bilateral deviations  -CM       User Key  (r) = Recorded By, (t) = Taken By, (c) = Cosigned By    Initials Name Provider Type    Reba Farah, PT Physical Therapist        Obj/Interventions     Row Name 06/11/21 0907          Range of Motion Comprehensive    General Range of Motion  upper extremity range  of motion deficits identified;bilateral lower extremity ROM WFL  -CM     Comment, General Range of Motion  BUE shoulder ROM limited 50%; other UE ROM wfl  -CM     Row Name 06/11/21 0907          Strength Comprehensive (MMT)    General Manual Muscle Testing (MMT) Assessment  lower extremity strength deficits identified  -CM     Comment, General Manual Muscle Testing (MMT) Assessment  BLEs 4-/5  -CM     Row Name 06/11/21 0907          Balance    Balance Assessment  sitting static balance;sitting dynamic balance;standing static balance;standing dynamic balance  -CM     Static Sitting Balance  WNL;sitting in chair;unsupported  -CM     Dynamic Sitting Balance  WNL;unsupported;sitting in chair  -CM     Static Standing Balance  WFL;unsupported;standing  -CM     Dynamic Standing Balance  mild impairment;unsupported;standing  -CM     Row Name 06/11/21 0907          Sensory Assessment (Somatosensory)    Sensory Assessment (Somatosensory)  sensation intact;other (see comments) CIPN present  -CM       User Key  (r) = Recorded By, (t) = Taken By, (c) = Cosigned By    Initials Name Provider Type    Reba Farah, PT Physical Therapist        Goals/Plan     Row Name 06/11/21 0919          Transfer Goal 1 (PT)    Activity/Assistive Device (Transfer Goal 1, PT)  transfers, all  -CM     [place] Level/Cues Needed (Transfer Goal 1, PT)  independent  -CM     Time Frame (Transfer Goal 1, PT)  2 weeks  -CM     Row Name 06/11/21 0919          Gait Training Goal 1 (PT)    Activity/Assistive Device (Gait Training Goal 1, PT)  gait (walking locomotion)  -CM     [place] Level (Gait Training Goal 1, PT)  independent  -CM     Distance (Gait Training Goal 1, PT)  150 ft w/o loss of balance or shuffled steps.  -CM     Time Frame (Gait Training Goal 1, PT)  2 weeks  -CM       User Key  (r) = Recorded By, (t) = Taken By, (c) = Cosigned By    Initials Name Provider Type    Reba Farah, PT Physical Therapist        Clinical  "Impression     Row Name 06/11/21 0909          Pain    Additional Documentation  Pain Scale: Numbers Pre/Post-Treatment (Group)  -CM     Row Name 06/11/21 0909          Pain Scale: Numbers Pre/Post-Treatment    Pretreatment Pain Rating  0/10 - no pain  -CM     Posttreatment Pain Rating  0/10 - no pain  -CM     Pain Location - Side  Right  -CM     Pain Location - Orientation  upper  -CM     Pain Location  extremity  -CM     Pre/Posttreatment Pain Comment  denies pain; reports she has just a \"twinge\" of pain in her R axilla intermittently; reports she will ask for pain meds \"when the tinge gets stronger\"  -CM     Pain Intervention(s)  Repositioned  -CM     Row Name 06/11/21 0909          Plan of Care Review    Plan of Care Reviewed With  patient  -CM     Outcome Summary  69 yo female w/ breast ca seen s/p left simple mastectomy & R modified radical mastectomy on 6/10/21 (now POD1). Pt is normally able to amb short community distances. She uses a rw about half of the time. She reports she has to \"lay\" on a shopping cart in the grocery to help w/ her balance. She does drive some but mostly lets her brothers drive her around. Pt lives w/ her sister, who has alzheimers disease. Pt is primary caregiver for her sister. Pt reports her sister will not be able to assist her at home. Today, pt is able to come to sitting from supine independently. She is able to come to stand w/ supervision. She ambulates 75 ft w/ one hand held and minimal assistance. Gait is shuffled w/ short steps and mild lateral sway. Pt is not safe for home w/o assistance, and she has no one to help her at this time. Will need IP rehab at d/c. Will follow 3xwk.  -CM     Row Name 06/11/21 0909          Therapy Assessment/Plan (PT)    Patient/Family Therapy Goals Statement (PT)  agreeable to IP rehab at d/c.  Says she plans to go to [facility].  -CM     Rehab Potential (PT)  good, to achieve stated therapy goals  -CM     Criteria for Skilled Interventions " Met (PT)  yes;meets criteria;skilled treatment is necessary  -CM     Predicted Duration of Therapy Intervention (PT)  until d/c  -CM     Row Name 06/11/21 0909          Positioning and Restraints    Pre-Treatment Position  in bed  -CM     Post Treatment Position  chair  -CM     In Chair  notified nsg;sitting;call light within reach;encouraged to call for assist;RUE elevated;LUE elevated  -CM       User Key  (r) = Recorded By, (t) = Taken By, (c) = Cosigned By    Initials Name Provider Type    Reba Farah, PT Physical Therapist        Outcome Measures     Row Name 06/11/21 0920          How much help from another person do you currently need...    Turning from your back to your side while in flat bed without using bedrails?  4  -CM     Moving from lying on back to sitting on the side of a flat bed without bedrails?  4  -CM     Moving to and from a bed to a chair (including a wheelchair)?  4  -CM     Standing up from a chair using your arms (e.g., wheelchair, bedside chair)?  4  -CM     Climbing 3-5 steps with a railing?  3  -CM     To walk in hospital room?  3  -CM     AM-PAC 6 Clicks Score (PT)  22  -CM     Row Name 06/11/21 0920          Functional Assessment    Outcome Measure Options  AM-PAC 6 Clicks Basic Mobility (PT)  -CM       User Key  (r) = Recorded By, (t) = Taken By, (c) = Cosigned By    Initials Name Provider Type    Reba Farah, PT Physical Therapist        Physical Therapy Education                 Title: PT OT SLP Therapies (Done)     Topic: Physical Therapy (Done)     Point: Mobility training (Done)     Learning Progress Summary           Patient Acceptance, E,TB, VU by LESLIE at 6/11/2021 0920                               User Key     Initials Effective Dates Name Provider Type Discipline    LESLIE 03/01/19 -  Reba Calles, PT Physical Therapist PT              PT Recommendation and Plan  Planned Therapy Interventions (PT): balance training, gait training, ROM (range of motion),  "strengthening, patient/family education, postural re-education, transfer training  Plan of Care Reviewed With: patient  Outcome Summary: 71 yo female w/ breast ca seen s/p left simple mastectomy & R modified radical mastectomy on 6/10/21 (now POD1). Pt is normally able to amb short community distances. She uses a rw about half of the time. She reports she has to \"lay\" on a shopping cart in the grocery to help w/ her balance. She does drive some but mostly lets her brothers drive her around. Pt lives w/ her sister, who has alzheimers disease. Pt is primary caregiver for her sister. Pt reports her sister will not be able to assist her at home. Today, pt is able to come to sitting from supine independently. She is able to come to stand w/ supervision. She ambulates 75 ft w/ one hand held and minimal assistance. Gait is shuffled w/ short steps and mild lateral sway. Pt is not safe for home w/o assistance, and she has no one to help her at this time. Will need IP rehab at d/c. Will follow 3xwk.     Time Calculation:   PT Charges     Row Name 06/11/21 0921             Time Calculation    Start Time  0838  -CM      Stop Time  0901  -CM      Time Calculation (min)  23 min  -CM      PT Received On  06/11/21  -CM      PT - Next Appointment  06/14/21  -CM      PT Goal Re-Cert Due Date  06/25/21  -CM         Time Calculation- PT    Total Timed Code Minutes- PT  0 minute(s)  -CM        User Key  (r) = Recorded By, (t) = Taken By, (c) = Cosigned By    Initials Name Provider Type    Reba Farah, PT Physical Therapist        Therapy Charges for Today     Code Description Service Date Service Provider Modifiers Qty    97100386015 HC PT EVAL LOW COMPLEXITY 3 6/11/2021 Reba Calles, PT GP 1          PT G-Codes  Outcome Measure Options: AM-PAC 6 Clicks Basic Mobility (PT)  AM-PAC 6 Clicks Score (PT): 22    Reba Calles PT  6/11/2021    "

## 2021-06-11 NOTE — PLAN OF CARE
Goal Outcome Evaluation:           Progress: improving  Outcome Summary: Patient denies any pain, she has been up to the bedside comode. She has tolorated her clear diet and has been advanced to a regular. VSS. Will continue to monitor.

## 2021-06-11 NOTE — CASE MANAGEMENT/SOCIAL WORK
Continued Stay Note  [facility]     Patient Name: Monse Donnelly  MRN: [id number]  Today's Date: 6/11/2021    Admit Date: 6/10/2021    Discharge Plan     Row Name 06/11/21 1601       Plan    Plan Comments  CM sent referral to [facility] 6/11 1338. CM called  and sent follow up message at 1552. CM received message at 1553 that liaison was reveiwing case now.    Row Name 06/11/21 1326       Plan    Plan  anticipate DC to inpt rehab- [facility] referral sent 6/11 pending. No precert needed. Will need PASRR.    Patient/Family in Agreement with Plan  yes    Plan Comments  met with patient and patient brothers at bedside. patient lives at home with sister. patient does not drive. patient brothers provide transportation. patient pcp and pharm confirmed. patient denies issues affording food or meds. patient independent with ADLs. patient requesting CM to arrange Inpt rehab. patient requests to DC to [facility]. Referral sent in [facility] to [facility] and liaison notified. DC barriers: POD #1 mastectomy, LG drains        Expected Discharge Date and Time     Expected Discharge Date Expected Discharge Time    Jun 12, 2021         Phone communication or documentation only - no physical contact with patient or family.      Callie Shields RN

## 2021-06-11 NOTE — PROGRESS NOTES
GENERAL SURGERY PROGRESS NOTE    6/11/2021   LOS: 0 days   Patient Care Team:  Candi Benson MD as PCP - General (Family Medicine)    MASTECTOMY MODIFIED RADICAL  6/10/2021  Chief Complaint: post-op    Subjective   HPI: Patient is a 70 year old lady with right breast cancer who subsquently underwent a right MRM and prophylactic left mastectomy on 6/10/2021.    Interval History:   Doing well. No complaints. Worked with PT/OT who recommend inpat Rehab upon d/c    Objective     Vital Signs  Temp:  [95.9 °F (35.5 °C)-99.1 °F (37.3 °C)] 98.5 °F (36.9 °C)  Heart Rate:  [68-91] 90  Resp:  [14-20] 18  BP: (107-154)/(69-84) 143/82    Physical Exam  Vitals reviewed.   Constitutional:       General: She is not in acute distress.     Appearance: She is not ill-appearing.   Cardiovascular:      Rate and Rhythm: Normal rate and regular rhythm.   Pulmonary:      Effort: Pulmonary effort is normal. No respiratory distress.   Chest:      Comments: Incisions well approximated with skin glue overlying. Flaps are viable. LG drain output is appropriate color and quantity.   Musculoskeletal:         General: No swelling. Normal range of motion.   Skin:     General: Skin is warm and dry.   Neurological:      General: No focal deficit present.      Mental Status: She is alert and oriented to person, place, and time.   Psychiatric:         Mood and Affect: Mood normal.         Behavior: Behavior normal.         Thought Content: Thought content normal.         Judgment: Judgment normal.          Results Review:    Lab Results (last 24 hours)     Procedure Component Value Units Date/Time    Tissue Pathology Exam [[id number]] Collected: 06/10/21 0919    Specimen: Tissue from Breast, Right; Tissue from Breast, Left Updated: 06/11/21 1441     Case Report --     Surgical Pathology Report                         Case: [id number]                                  Authorizing Provider:  Kenrick Ragsdale,   Collected:            06/10/2021 09:19 AM                                 MD                                                                           Ordering Location:     [facility] MAIN  Received:            06/10/2021 01:58 PM                                 OR                                                                           Pathologist:           Saroj Javier MD                                                            Specimens:   1) - Breast, Right, RIGHT BREAST MODIFIED RADICAL MASTECTOMY, SHORT-SUPERIOR                        LONG-LATERAL                                                                                        2) - Breast, Left, LEFT BREAST, SHORT-SUPERIOR, LONG-LATERAL                                Final Diagnosis --     Specimen #1 (Breast, right, modified radical mastectomy):    Residual microinvasive ductal carcinoma    See synoptic template for additional details    Specimen #2 (Breast, left, mastectomy):    Benign breast tissue with fibrocystic changes    Breast nipple and skin with no significant pathologic changes     No malignancy identified     ANGE        Synoptic Checklist --     INVASIVE CARCINOMA OF THE BREAST: Resection  (INVASIVE CARCINOMA OF THE BREAST: COMPLETE EXCISION - 1)    8th Edition - Protocol posted: 2/26/2020    SPECIMEN     Procedure:    Total mastectomy      Specimen Laterality:    Right     TUMOR     Tumor Site:    Upper outer quadrant      Histologic Type:    Invasive carcinoma of no special type (ductal)      Glandular (Acinar) / Tubular Differentiation:    Only microinvasion present (not graded)      Nuclear Pleomorphism:    Only microinvasion present (not graded)      Mitotic Rate:    Only microinvasion present (not graded)      Overall Grade:    Only microinvasion present (not graded)      Tumor Size:    Microinvasion only (<= 1 mm)      Tumor Focality:    Multiple foci of invasive carcinoma        Number of Foci:    2        Sizes of Individual Foci  (Millimeters):    1,1 mm   Ductal Carcinoma In Situ (DCIS):    Not identified    Lobular Carcinoma In Situ (LCIS):    Not identified    Tumor Extent:       Lymphovascular Invasion:    Not identified    Dermal Lymphovascular Invasion:    Not identified    Microcalcifications:    Present in non-neoplastic tissue    Treatment Effect in the Breast:    Probable or definite response to presurgical therapy in the invasive carcinoma    Treatment Effect in the Lymph Nodes:    Probable or definite response to presurgical therapy in metastatic carcinoma     MARGINS   Invasive Carcinoma Margins:    Uninvolved by invasive carcinoma      Distance from Closest Margin (Millimeters):    20 mm     Closest Margin(s):    Posterior      Distance from Other Margins:           Anterior Margin (Millimeters):    >20 mm       Posterior Margin (Millimeters):    20 mm       Superior Margin (Millimeters):    >20 mm       Inferior Margin (Millimeters):    >20 mm       Medial Margin (Millimeters):    >20 mm       Lateral Margin (Millimeters):    >20 mm    LYMPH NODES   Regional Lymph Nodes:    Involved by tumor cells      Number of Lymph Nodes with Macrometastases (> 2 mm):    0      Number of Lymph Nodes with Micrometastases (> 0.2 mm to 2 mm and / or > 200 cells):    1      Size of Largest Metastatic Deposit (Millimeters):    1.5 mm     Extranodal Extension:    Not identified      Total Number of Lymph Nodes Examined:    16     PATHOLOGIC STAGE CLASSIFICATION (pTNM, AJCC 8th Edition)        TNM Descriptors:    m (multiple foci of invasive carcinoma)    TNM Descriptors:    y (post-treatment)    Primary Tumor (pT):    pT1mi    Regional Lymph Nodes (pN):    pN1mi     SPECIAL STUDIES   Breast Biomarker Testing Performed on Previous Biopsy:         Estrogen Receptor (ER) Status:    Positive (greater than 10% of cells demonstrate nuclear positivity)        Percentage of Cells with Nuclear Positivity:    71-80%    Breast Biomarker Testing Performed on  "Previous Biopsy:         Progesterone Receptor (PgR) Status:    Negative    Breast Biomarker Testing Performed on Previous Biopsy:         HER2 (by in situ hybridization):    Negative (not amplified)        Gross Description --     Part 1: Received in formalin designated \"Right modified radical mastectomy\" is a mastectomy specimen measuring 29 x 22 x 6.5 cm. The anterior surface is covered with a wrinkled tan skin ellipse with a centrally located nonretracted nipple. No skin scars or lesions are identified. The specimen is oriented with sutures, short superior and long lateral. A portion of axillary tail is present measuring approximately 12 x 4 x 4 cm. The breast is serially sectioned revealing yellow glistening cut surfaces admixed with thin white fibrous bands. In the upper outer quadrant is a stellate area of  fibrosis measuring 2.5 cm in greatest dimension which grossly approaches to within 2 cm of the closest (posterior) margin. No other lesions are identified within the breast. Representative sections are submitted as follows:    A Nipple sections   B-F Sections of suspected mass  G Upper outer quadrant    H Lower outer quadrant  I Lower inner quadrant  J Upper inner quadrant      Candidate lymph nodes are submitted whole in cassettes K through M. The largest lymph nodes are serially sectioned and submitted one per cassette in cassettes O and P.     Part 2: Received in formalin designated \"Left breast\" is a simple mastectomy specimen measuring 25.5 x 20 x 6.5 cm. The anterior surface is covered with a tan wrinkled skin ellipse. The nipple is present and not retracted. No skin scars or lesions are identified. The breast is serially sectioned revealing yellow glistening cut surfaces admixed with variably thick white fibrous bands. No nodules or masses are identified. Sections are submitted as follows:    A Nipple  B-C Upper inner quadrant  D-E Lower inner quadrant  F-G Lower outer quadrant  H-I Upper outer " quadrant    ANGE/[facility]          Imaging Results (Last 24 Hours)     ** No results found for the last 24 hours. **           I have reviewed the above results and noted them below    Medication Review:    Current Facility-Administered Medications:   •  acetaminophen (TYLENOL) tablet 1,000 mg, 1,000 mg, Oral, Q8H, Kenrick Ragsdale MD, 1,000 mg at 06/11/21 0848  •  albuterol (PROVENTIL) nebulizer solution 0.083% 2.5 mg/3mL, 2.5 mg, Nebulization, Q4H PRN, Kenrick Ragsdale MD  •  atorvastatin (LIPITOR) tablet 20 mg, 20 mg, Oral, Daily, Kenrick Ragsdale MD, 20 mg at 06/11/21 0848  •  azelastine (ASTELIN) nasal spray 2 spray, 2 spray, Each Nare, BID, Kenrick Ragsdale MD, 2 spray at 06/11/21 0849  •  colesevelam (WELCHOL) tablet 625 mg, 625 mg, Oral, BID With Meals, Kenrick Ragsdale MD, 625 mg at 06/11/21 0847  •  diazePAM (VALIUM) tablet 2 mg, 2 mg, Oral, Q6H PRN, Kenrick Ragsdale MD  •  DULoxetine (CYMBALTA) DR capsule 30 mg, 30 mg, Oral, Daily, Kenrick Ragsdale MD, 30 mg at 06/11/21 0848  •  ferrous sulfate EC tablet 324 mg, 324 mg, Oral, Daily With Breakfast, Kenrick Ragsdale MD, 324 mg at 06/11/21 0848  •  gabapentin (NEURONTIN) capsule 200 mg, 200 mg, Oral, Nightly, Kenrick Ragsdale MD, 200 mg at 06/10/21 2102  •  ipratropium-albuterol (DUO-NEB) nebulizer solution 3 mL, 3 mL, Nebulization, Q6H, Kenrick Ragsdale MD, 3 mL at 06/10/21 1415  •  lisinopril (PRINIVIL,ZESTRIL) tablet 40 mg, 40 mg, Oral, Daily, Kenrick Ragsdale MD, 40 mg at 06/11/21 0848  •  metoclopramide (REGLAN) tablet 5 mg, 5 mg, Oral, BID AC, Kenrick Ragsdale MD, 5 mg at 06/11/21 0720  •  metoprolol succinate XL (TOPROL-XL) 24 hr tablet 50 mg, 50 mg, Oral, Daily, Kenrick Ragsdale MD, 50 mg at 06/11/21 0847  •  montelukast (SINGULAIR) tablet 10 mg, 10 mg, Oral, Nightly, Kenrick Ragsdale MD, 10 mg at 06/10/21 2102  •  Morphine sulfate (PF) injection  4 mg, 4 mg, Intravenous, Q4H PRN, Kenrick Ragsdale MD  •  ondansetron (ZOFRAN) tablet 4 mg, 4 mg, Oral, Q6H PRN **OR** ondansetron (ZOFRAN) injection 4 mg, 4 mg, Intravenous, Q6H PRN, Kenrick Ragsdale MD  •  oxyCODONE (ROXICODONE) immediate release tablet 5 mg, 5 mg, Oral, Q4H PRN **OR** oxyCODONE (ROXICODONE) immediate release tablet 10 mg, 10 mg, Oral, Q4H PRN, Kenrick Ragsdale MD, 10 mg at 06/11/21 0205  •  pantoprazole (PROTONIX) EC tablet 40 mg, 40 mg, Oral, QAM, Kenrick Ragsdale MD, 40 mg at 06/11/21 0720  •  promethazine (PHENERGAN) tablet 12.5 mg, 12.5 mg, Oral, Q6H PRN **OR** promethazine (PHENERGAN) suppository 12.5 mg, 12.5 mg, Rectal, Q6H PRN, Kenrick Ragsdale MD  •  sodium chloride 0.9 % infusion, 100 mL/hr, Intravenous, Continuous, Kenrick Ragsdale MD, Last Rate: 100 mL/hr at 06/11/21 1401, 100 mL/hr at 06/11/21 1401  •  sucralfate (CARAFATE) tablet 1 g, 1 g, Oral, BID, Kenrick Ragsdale MD, 1 g at 06/11/21 0848    Assessment/Plan     Principal Problem:    Malignant neoplasm of upper-outer quadrant of right breast in female, estrogen receptor positive (CMS/HCC)    Surgically stable for d/c once placed in Rehab    Plan for disposition: awaiting placement    Kenrick Ragsdale MD  06/11/21  15:22 EDT

## 2021-06-11 NOTE — PLAN OF CARE
Goal Outcome Evaluation:  Plan of Care Reviewed With: patient           Outcome Summary: Pt. denies pain at this time. Pt. hopeful to discharge tomorrow, awaiting rehab placement. Pt. resting in bed with call light in reach.

## 2021-06-11 NOTE — PLAN OF CARE
"Goal Outcome Evaluation:  Plan of Care Reviewed With: patient           Outcome Summary: 71 yo female w/ breast ca seen s/p left simple mastectomy & R modified radical mastectomy on 6/10/21 (now POD1). Pt is normally able to amb short community distances. She uses a rw about half of the time. She reports she has to \"lay\" on a shopping cart in the grocery to help w/ her balance. She does drive some but mostly lets her brothers drive her around. Pt lives w/ her sister, who has alzheimers disease. Pt is primary caregiver for her sister. Pt reports her sister will not be able to assist her at home. Today, pt is able to come to sitting from supine independently. She is able to come to stand w/ supervision. She ambulates 75 ft w/ one hand held and minimal assistance. Gait is shuffled w/ short steps and mild lateral sway. Pt is not safe for home w/o assistance, and she has no one to help her at this time. Will need IP rehab at d/c. Will follow 3xwk.  "

## 2021-06-11 NOTE — PROGRESS NOTES
LOS: 0 days   Patient Care Team:  Candi Benson MD as PCP - General (Family Medicine)        Subjective  - resting comfortably     Interval History:     Patient Complaints: denies any pain    Review of Systems   Constitutional: Positive for activity change, appetite change and fatigue.   Eyes: Negative.    Respiratory: Negative.    Cardiovascular: Negative.    Gastrointestinal: Negative.    Endocrine: Negative.    Genitourinary: Negative.    Musculoskeletal: Positive for gait problem.   Neurological: Positive for weakness.   Psychiatric/Behavioral: The patient is nervous/anxious.         Objective     Vital Signs  Temp:  [96.6 °F (35.9 °C)-97.5 °F (36.4 °C)] 97.5 °F (36.4 °C)  Heart Rate:  [68-81] 68  Resp:  [9-16] 16  BP: (116-165)/() 116/73    Physical Exam:     General Appearance:    Alert, cooperative, in no acute distress   Ears:    Ears appear intact with no abnormalities noted   Throat:   No oral lesions, no thrush, oral mucosa moist   Neck:   No adenopathy, supple, trachea midline, no thyromegaly, no   carotid bruit, no JVD   Lungs:     Clear to auscultation,respirations regular, even and                  Unlabored     Heart:    Regular rhythm and normal rate, normal S1 and S2, no            murmur, no gallop, no rub, no click   Abdomen:     Normal bowel sounds, no masses, no organomegaly, soft        non-tender, non-distended, no guarding, no rebound                tenderness   Extremities:   Moves all extremities well, no edema, no cyanosis, no             redness   Skin:   No bleeding, bruising or rash   Neurologic:   Cranial nerves 2 - 12 grossly intact, sensation intact, DTR       present and equal bilaterally        Results Review:    Lab Results (last 24 hours)     Procedure Component Value Units Date/Time    Tissue Pathology Exam [[id number]] Collected: 06/10/21 0906    Specimen: Tissue from Breast, Right; Tissue from Breast, Left Updated: 06/10/21 6030         Imaging Results  (Last 24 Hours)     ** No results found for the last 24 hours. **           I reviewed the patient's other test results and agree with the interpretation    Medication Review:     Current Facility-Administered Medications:   •  acetaminophen (TYLENOL) tablet 1,000 mg, 1,000 mg, Oral, Q8H, Kenrick Ragsdale MD, 1,000 mg at 06/10/21 1613  •  albuterol (PROVENTIL) nebulizer solution 0.083% 2.5 mg/3mL, 2.5 mg, Nebulization, Q4H PRN, Kenrick Ragsdale MD  •  atorvastatin (LIPITOR) tablet 20 mg, 20 mg, Oral, Daily, Kenrick Ragsdale MD, 20 mg at 06/10/21 1613  •  azelastine (ASTELIN) nasal spray 2 spray, 2 spray, Each Nare, BID, Kenrick Ragsdale MD  •  colesevelam (WELCHOL) tablet 625 mg, 625 mg, Oral, BID With Meals, Kenrick Ragsdale MD, 625 mg at 06/10/21 1813  •  diazePAM (VALIUM) tablet 2 mg, 2 mg, Oral, Q6H PRN, Kenrick Ragsdale MD  •  DULoxetine (CYMBALTA) DR capsule 30 mg, 30 mg, Oral, Daily, Kenrick Ragsdale MD, 30 mg at 06/10/21 1613  •  [START ON 6/11/2021] ferrous sulfate EC tablet 324 mg, 324 mg, Oral, Daily With Breakfast, Kenrick Ragsdale MD  •  gabapentin (NEURONTIN) capsule 200 mg, 200 mg, Oral, Nightly, Kenrick Ragsdale MD, 200 mg at 06/10/21 2102  •  ipratropium-albuterol (DUO-NEB) nebulizer solution 3 mL, 3 mL, Nebulization, Q6H, Kenrick Ragsdale MD, 3 mL at 06/10/21 1415  •  lisinopril (PRINIVIL,ZESTRIL) tablet 40 mg, 40 mg, Oral, Daily, Kenrick Ragsdale MD, 40 mg at 06/10/21 1613  •  metoclopramide (REGLAN) tablet 5 mg, 5 mg, Oral, BID AC, Kenrick Ragsdale MD, 5 mg at 06/10/21 1813  •  metoprolol succinate XL (TOPROL-XL) 24 hr tablet 50 mg, 50 mg, Oral, Daily, Kenrick Ragsdale MD, 50 mg at 06/10/21 1612  •  montelukast (SINGULAIR) tablet 10 mg, 10 mg, Oral, Nightly, Kenrick Ragsdale MD, 10 mg at 06/10/21 2102  •  Morphine sulfate (PF) injection 4 mg, 4 mg, Intravenous, Q4H PRN, Kenrick Ragsdale  MD Maurice  •  ondansetron (ZOFRAN) tablet 4 mg, 4 mg, Oral, Q6H PRN **OR** ondansetron (ZOFRAN) injection 4 mg, 4 mg, Intravenous, Q6H PRN, Kenrick Ragsdale MD  •  oxyCODONE (ROXICODONE) immediate release tablet 5 mg, 5 mg, Oral, Q4H PRN **OR** oxyCODONE (ROXICODONE) immediate release tablet 10 mg, 10 mg, Oral, Q4H PRN, Kenrick Ragsdale MD  •  [START ON 6/11/2021] pantoprazole (PROTONIX) EC tablet 40 mg, 40 mg, Oral, QAM, Kenrick Ragsdale MD  •  promethazine (PHENERGAN) tablet 12.5 mg, 12.5 mg, Oral, Q6H PRN **OR** promethazine (PHENERGAN) suppository 12.5 mg, 12.5 mg, Rectal, Q6H PRN, Kenrick Ragsdale MD  •  sodium chloride 0.9 % infusion, 100 mL/hr, Intravenous, Continuous, Kenrick Ragsdale MD, Last Rate: 100 mL/hr at 06/10/21 2334, 100 mL/hr at 06/10/21 2334  •  sucralfate (CARAFATE) tablet 1 g, 1 g, Oral, BID, Kenrick Ragsdale MD, 1 g at 06/10/21 2102    I have reviewed medication list.    Assessment/Plan     Principal Problem:    Malignant neoplasm of upper-outer quadrant of right breast in female, estrogen receptor positive (CMS/HCC) - s/p Modified radical mastectomy to right and simple mastectomy to left    HTN - continue home medicine and monitor    Asthma - stable , continue nebs    Generalized weakness - PT/OT    Stress ulcer/DVT prophylaxis                    Plan for disposition: rehab at discharge    Candi Benson MD  06/10/21  23:49 EDT

## 2021-06-11 NOTE — CASE MANAGEMENT/SOCIAL WORK
Discharge Planning Assessment   Chris     Patient Name: Monse Donnelly  MRN: [id number]  Today's Date: 6/11/2021    Admit Date: 6/10/2021    Discharge Needs Assessment     Row Name 06/11/21 1325       Living Environment    Lives With  sibling(s)    Current Living Arrangements  home/apartment/condo    Primary Care Provided by  self    Provides Primary Care For  other (see comments) sister    Family Caregiver if Needed  sibling(s)    Able to Return to Prior Arrangements  yes       Resource/Environmental Concerns    Resource/Environmental Concerns  none    Transportation Concerns  car, none       Transition Planning    Patient/Family Anticipates Transition to  inpatient rehabilitation facility    Patient/Family Anticipated Services at Transition      Transportation Anticipated  family or friend will provide       Discharge Needs Assessment    Readmission Within the Last 30 Days  no previous admission in last 30 days    Equipment Currently Used at Home  cane, straight;walker, standard    Concerns to be Addressed  discharge planning    Anticipated Changes Related to Illness  none    Equipment Needed After Discharge  none    Outpatient/Agency/Support Group Needs  skilled nursing facility    Discharge Facility/Level of Care Needs  nursing facility, skilled    Provided Post Acute Provider List?  N/A    Provided Post Acute Provider Quality & Resource List?  N/A        Discharge Plan     Row Name 06/11/21 1322       Plan    Plan  anticipate DC to inpt rehab- [facility] referral sent 6/11 pending. No precert needed. Will need PASRR.    Patient/Family in Agreement with Plan  yes    Plan Comments  met with patient and patient brothers at bedside. patient lives at home with sister. patient does not drive. patient brothers provide transportation. patient pcp and pharm confirmed. patient denies issues affording food or meds. patient independent with ADLs. patient requesting CM to arrange Inpt rehab. patient requests to  DC to [facility]. Referral sent in [facility] to [facility] and liaison notified. DC barriers: POD #1 mastectomy, LG drains        Continued Care and Services - Admitted Since 6/10/2021     Destination     Service Provider Request Status Selected Services Address Phone Fax Patient Preferred    [facility] [place]  Pending - [facility] N[facility]A [address] [facility] [place] [id number] [phone] --              Expected Discharge Date and Time     Expected Discharge Date Expected Discharge Time    Jun 12, 2021         Demographic Summary     Row Name 06/11/21 1325       General Information    Admission Type  observation    Arrived From  emergency department    Required Notices Provided  Observation Status Notice    Referral Source  admission list    Reason for Consult  discharge planning    Preferred Language  English     Used During This Interaction  no        Functional Status     Row Name 06/11/21 1325       Functional Status    Usual Activity Tolerance  fair    Current Activity Tolerance  fair       Functional Status, IADL    Medications  assistive person    Meal Preparation  assistive person    Housekeeping  assistive person    Laundry  assistive person    Shopping  assistive person        Patient Forms     Row Name 06/11/21 1330       Patient Forms    Important Message from Medicare ([facility])  -- LONG 6/11    Patient Observation Letter  Delivered    Delivered to  Patient        Met with patient in room wearing PPE: mask, face shield/goggles    Maintained distance greater than six feet and spent less than 15 minutes in the room.          Callie Shields RN

## 2021-06-11 NOTE — PLAN OF CARE
Goal Outcome Evaluation:  Plan of Care Reviewed With: patient, PT    Pt is a 69 y/o F admitted with R breast cancer. Pt is now POD 1 s/p modified radical mastectomy on the right and a simple mastectomy on the left  d/t malignant neoplasm of upper-outer quadrant of right breast. Prior to admission, pt lives with sibling in a SSH with ramp to enter, independent with ADLs, drives but has not in awhile and siblings able to transport, independent with func mob and has RW at home. Pt req SUP/CGA for func mob and transfers this date, s/u for seated grooming, mod A for dressing. Pt will benefit from skilled OT to increase safety and independence with ADLs. [facility]    Latesha Perry, BRUCE, OTR

## 2021-06-11 NOTE — THERAPY EVALUATION
Patient Name: Monse Donnelly  : 1950    MRN: [id number]                              Today's Date: 2021       Admit Date: 6/10/2021    Visit Dx:     ICD-10-CM ICD-9-CM   1. Malignant neoplasm of upper-outer quadrant of right breast in female, estrogen receptor positive (CMS/HCC)  C50.411 174.4    Z17.0 V86.0     Patient Active Problem List   Diagnosis   • Pneumonia   • Breast mass   • Malignant neoplasm of right female breast (CMS/HCC)   • Encounter for medication management and education of chemotherapy/biotherapy     • Encounter for care related to vascular access port   • Malignant neoplasm of upper-outer quadrant of right breast in female, estrogen receptor positive (CMS/HCC)     Past Medical History:   Diagnosis Date   • Arthritis    • Asthma    • Boils of multiple sites    • Breast cancer (CMS/HCC)    • Cancer (CMS/HCC)    • COPD (chronic obstructive pulmonary disease) (CMS/HCC)    • Coronary artery disease    • Drug therapy    • Elevated cholesterol    • GERD (gastroesophageal reflux disease)    • H/O Bell's palsy    • History of transfusion    • Hypertension    • Neuropathy     feet and hands post chemo   • Sciatic nerve pain    • Sleep apnea      Past Surgical History:   Procedure Laterality Date   • ABDOMINAL SURGERY     • ANKLE SURGERY     • BREAST BIOPSY     • BREAST LUMPECTOMY     • CERVICAL CONIZATION     • TUBAL ABDOMINAL LIGATION     • VENOUS ACCESS DEVICE (PORT) INSERTION Left 2020    Procedure: INSERTION VENOUS ACCESS DEVICE LEFT internal jugular;  Surgeon: Taylor Kaye MD;  Location: [facility] OR;  Service: General;  Laterality: Left;     General Information     Row Name 21 1430          OT Time and Intention    Document Type  evaluation  -NA     Mode of Treatment  occupational therapy  -NA     Row Name 21 1430          General Information    Patient Profile Reviewed  yes  -NA     Prior Level of Function  independent:;ADL's;transfer;gait;all household mobility;home  management;feeding;grooming;dressing;bathing;bed mobility  -NA     Existing Precautions/Restrictions  -- B masectomy  -NA     Barriers to Rehab  medically complex  -NA     Row Name 06/11/21 1430          Living Environment    Lives With  sibling(s)  -NA     Row Name 06/11/21 1430          Home Main Entrance    Number of Stairs, Main Entrance  other (see comments) ramp  -NA     Row Name 06/11/21 1430          Stairs Within Home, Primary    Number of Stairs, Within Home, Primary  none  -NA     Stair Railings, Within Home, Primary  none  -NA     Row Name 06/11/21 1430          Cognition    Orientation Status (Cognition)  oriented x 4  -NA     Row Name 06/11/21 1430          Safety Issues, Functional Mobility    Safety Issues Affecting Function (Mobility)  safety precaution awareness;safety precautions follow-through/compliance  -NA     Impairments Affecting Function (Mobility)  balance;pain;strength;range of motion (ROM)  -NA       User Key  (r) = Recorded By, (t) = Taken By, (c) = Cosigned By    Initials Name Provider Type    NA Latesha Perry OT Occupational Therapist          Mobility/ADL's     Row Name 06/11/21 1431          Bed Mobility    Bed Mobility  bed mobility (all) activities  -NA     All Activities, [place] (Bed Mobility)  modified independence  -NA     Assistive Device (Bed Mobility)  bed rails  -NA     [facility] Name 06/11/21 1431          Transfers    Transfers  sit-stand transfer;bed-chair transfer;toilet transfer  -NA     Bed-Chair [place] (Transfers)  supervision;verbal cues  -NA     Sit-Stand [place] (Transfers)  supervision;contact guard  -NA     [place] Level (Toilet Transfer)  contact guard  -NA     Assistive Device (Toilet Transfer)  commode, 3-in-1  -NA     Row Name 06/11/21 1431          Toilet Transfer    Type (Toilet Transfer)  sit-stand;stand-sit  -NA     [facility] Name 06/11/21 1431          Functional Mobility    Functional Mobility- Ind. Level  contact guard  assist;supervision required;nonverbal cues required (demo/gesture)  -     Functional Mobility- Safety Issues  balance decreased during turns;step length decreased  -NA     Row Name 06/11/21 1431          Activities of Daily Living    BADL Assessment/Intervention  lower body dressing;upper body dressing;grooming;feeding  -NA     Row Name 06/11/21 [facility]          Lower Body Dressing Assessment/Training    [place] Level (Lower Body Dressing)  lower body dressing skills;don;socks;moderate assist (50% patient effort)  -NA     Row Name 06/11/21 [facility] [facility]          Upper Body Dressing Assessment/Training    [place] Level (Upper Body Dressing)  maximum assist (25% patient effort)  -NA     Row Name 06/11/21 1431          Grooming Assessment/Training    [place] Level (Grooming)  set up  -NA     Position (Grooming)  supported sitting  -NA     Row Name 06/11/21 [facility]          Self-Feeding Assessment/Training    [place] Level (Feeding)  set up  -NA     Position (Self-Feeding)  supported sitting  -       User Key  (r) = Recorded By, (t) = Taken By, (c) = Cosigned By    Initials Name Provider Type    NA Latesha Perry OT Occupational Therapist        Obj/Interventions     Row Name 06/11/21 1432          Sensory Assessment (Somatosensory)    Sensory Assessment (Somatosensory)  sensation intact  -NA     Row Name 06/11/21 1432          Vision Assessment/Intervention    Visual Impairment/Limitations  corrective lenses full-time  -NA     Row Name 06/11/21 1432          Range of Motion Comprehensive    General Range of Motion  upper extremity range of motion deficits identified  -NA     Row Name 06/11/21 1432          Strength Comprehensive (MMT)    General Manual Muscle Testing (MMT) Assessment  upper extremity strength deficits identified  -NA     Row Name 06/11/21 1432          Motor Skills    Motor Skills  functional endurance  -NA     Functional Endurance  fair  -       User Key  (r) = Recorded By, (t) = Taken  By, (c) = Cosigned By    Initials Name Provider Type    BERENICE Latesha Perry OT Occupational Therapist        Goals/Plan     Row Name 06/11/21 1440          Transfer Goal 1 (OT)    Activity/Assistive Device (Transfer Goal 1, OT)  toilet  -NA     [place] Level/Cues Needed (Transfer Goal 1, OT)  independent  -NA     Time Frame (Transfer Goal 1, OT)  2 weeks  -NA     Row Name 06/11/21 1440          Dressing Goal 1 (OT)    Activity/Device (Dressing Goal 1, OT)  dressing skills, all  -NA     [place]/Cues Needed (Dressing Goal 1, OT)  set-up required  -NA     Time Frame (Dressing Goal 1, OT)  2 weeks  -NA     Row Name 06/11/21 1440          Grooming Goal 1 (OT)    Activity/Device (Grooming Goal 1, OT)  hair care;oral care;wash face, hands standing at sink  -NA     [place] (Grooming Goal 1, OT)  set-up required  -NA     Time Frame (Grooming Goal 1, OT)  2 weeks  -NA     Row Name 06/11/21 1440          Therapy Assessment/Plan (OT)    Planned Therapy Interventions (OT)  activity tolerance training;BADL retraining;functional balance retraining;neuromuscular control/coordination retraining;occupation/activity based interventions;patient/caregiver education/training;ROM/therapeutic exercise;strengthening exercise;transfer/mobility retraining  -NA       User Key  (r) = Recorded By, (t) = Taken By, (c) = Cosigned By    Initials Name Provider Type    BERENICE Latesha Perry OT Occupational Therapist        Clinical Impression     Row Name 06/11/21 1438          Pain Assessment    Additional Documentation  Pain Scale: Numbers Pre/Post-Treatment (Group)  -NA     Row Name 06/11/21 1438          Pain Scale: Numbers Pre/Post-Treatment    Pretreatment Pain Rating  2/10  -NA     Posttreatment Pain Rating  2/10  -NA     Pain Location - Side  Bilateral  -NA     Pain Location - Orientation  incisional  -NA     Pain Location  breast  -NA     Pain Intervention(s)  Repositioned  -NA     Row Name 06/11/21 1447 06/11/21 1434        Plan of Care Review    Plan of Care Reviewed With  --  patient  -NA    Outcome Summary  Pt is a 69 y/o F admitted with R breast cancer. Pt is now POD 1 s/p modified radical mastectomy on the right and a simple mastectomy on the left  d/t malignant neoplasm of upper-outer quadrant of right breast. Prior to admission, pt lives with sibling in a SSH with ramp to enter, independent with ADLs, drives but has not in awhile and siblings able to transport, independent with func mob and has RW at home. Pt req SUP/CGA for func mob and transfers this date, s/u for seated grooming, mod A for dressing. Pt will benefit from skilled OT to increase safety and independence with ADLs. REC IP rehab at [place]  -NA  --    Row Name 06/11/21 1448 06/11/21 1447       Therapy Assessment/Plan (OT)    Patient/Family Therapy Goal Statement (OT)  --  open to rehab  -NA    Therapy Frequency (OT)  3 times/wk  -NA  --    Row Name 06/11/21 1438          Therapy Assessment/Plan (OT)    Rehab Potential (OT)  good, to achieve stated therapy goals  -NA     Criteria for Skilled Therapeutic Interventions Met (OT)  yes;skilled treatment is necessary;meets criteria  -NA     Therapy Frequency (OT)  5 times/wk  -NA     Row Name 06/11/21 1438          Therapy Plan Review/Discharge Plan (OT)    Anticipated Discharge Disposition (OT)  inpatient rehabilitation facility  -NA     Row Name 06/11/21 1438          Vital Signs    Post Systolic BP Rehab  143  -NA     Post Treatment Diastolic BP  83  -NA     Pre SpO2 (%)  98  -NA     O2 Delivery Pre Treatment  room air  -NA     O2 Delivery Intra Treatment  room air  -NA     Post SpO2 (%)  96  -NA     O2 Delivery Post Treatment  room air  -NA     Rest Breaks   1  -NA     Row Name 06/11/21 1438          Positioning and Restraints    Pre-Treatment Position  in bed  -NA     Post Treatment Position  bed  -NA     In Bed  notified nsg;supine;call light within reach;encouraged to call for assist;side rails up x2  -NA       User Key   (r) = Recorded By, (t) = Taken By, (c) = Cosigned By    Initials Name Provider Type    Latesha Tim OT Occupational Therapist        Outcome Measures     Row Name 06/11/21 1441          How much help from another is currently needed...    Putting on and taking off regular lower body clothing?  3  -NA     Bathing (including washing, rinsing, and drying)  3  -NA     Toileting (which includes using toilet bed pan or urinal)  3  -NA     Putting on and taking off regular upper body clothing  3  -NA     Taking care of personal grooming (such as brushing teeth)  4  -NA     Eating meals  4  -NA     AM-PAC 6 Clicks Score (OT)  20  -NA     Row Name 06/11/21 1441 06/11/21 0920       How much help from another person do you currently need...    Turning from your back to your side while in flat bed without using bedrails?  4  -NA  4  -CM    Moving from lying on back to sitting on the side of a flat bed without bedrails?  4  -NA  4  -CM    Moving to and from a bed to a chair (including a wheelchair)?  4  -NA  4  -CM    Standing up from a chair using your arms (e.g., wheelchair, bedside chair)?  3  -NA  4  -CM    Climbing 3-5 steps with a railing?  2  -NA  3  -CM    To walk in hospital room?  3  -NA  3  -CM    AM-PAC 6 Clicks Score (PT)  20  -NA  22  -CM    Row Name 06/11/21 1441          Modified Yaya Scale    Pre-Stroke Modified Sherman Scale  6 - Unable to determine (UTD) from the medical record documentation  -NA     Modified Sherman Scale  4 - Moderately severe disability.  Unable to walk without assistance, and unable to attend to own bodily needs without assistance.  -NA     Row Name 06/11/21 1441 06/11/21 0920       Functional Assessment    Outcome Measure Options  AM-PAC 6 Clicks Basic Mobility (PT);Modified Yaya;AM-PAC 6 Clicks Daily Activity (OT)  -NA  AM-PAC 6 Clicks Basic Mobility (PT)  -CM      User Key  (r) = Recorded By, (t) = Taken By, (c) = Cosigned By    Initials Name Provider Type    BERENICE Perry  Latesha OT Occupational Therapist    Reba Farah, PT Physical Therapist        Occupational Therapy Education                 Title: PT OT SLP Therapies (Done)     Topic: Occupational Therapy (Done)     Point: ADL training (Done)     Description:   Instruct learner(s) on proper safety adaptation and remediation techniques during self care or transfers.   Instruct in proper use of assistive devices.              Learning Progress Summary           Patient Acceptance, E,TB,D, VU,NR by NA at 6/11/2021 1443    Comment: role and purpose of OT, DC rec, using call light, not getting up on own, OT POC                   Point: Home exercise program (Done)     Description:   Instruct learner(s) on appropriate technique for monitoring, assisting and/or progressing therapeutic exercises/activities.              Learning Progress Summary           Patient Acceptance, E,TB,D, VU,NR by NA at 6/11/2021 1443    Comment: role and purpose of OT, DC rec, using call light, not getting up on own, OT POC                   Point: Precautions (Done)     Description:   Instruct learner(s) on prescribed precautions during self-care and functional transfers.              Learning Progress Summary           Patient Acceptance, E,TB,D, VU,NR by NA at 6/11/2021 1443    Comment: role and purpose of OT, DC rec, using call light, not getting up on own, OT POC                   Point: Body mechanics (Done)     Description:   Instruct learner(s) on proper positioning and spine alignment during self-care, functional mobility activities and/or exercises.              Learning Progress Summary           Patient Acceptance, E,TB,D, VU,NR by NA at 6/11/2021 1443    Comment: role and purpose of OT, DC rec, using call light, not getting up on own, OT POC                               User Key     Initials Effective Dates Name Provider Type Discipline    BERENICE 09/30/20 -  Latesha Perry OT Occupational Therapist OT              OT Recommendation  and Plan  Planned Therapy Interventions (OT): activity tolerance training, BADL retraining, functional balance retraining, neuromuscular control/coordination retraining, occupation/activity based interventions, patient/caregiver education/training, ROM/therapeutic exercise, strengthening exercise, transfer/mobility retraining  Therapy Frequency (OT): 3 times/wk  Plan of Care Review  Plan of Care Reviewed With: patient  Outcome Summary: Pt is a 69 y/o F admitted with R breast cancer. Pt is now POD 1 s/p modified radical mastectomy on the right and a simple mastectomy on the left  d/t malignant neoplasm of upper-outer quadrant of right breast. Prior to admission, pt lives with sibling in a SSH with ramp to enter, independent with ADLs, drives but has not in awhile and siblings able to transport, independent with func mob and has RW at home. Pt req SUP/CGA for func mob and transfers this date, s/u for seated grooming, mod A for dressing. Pt will benefit from skilled OT to increase safety and independence with ADLs. REC IP rehab at [place]     Time Calculation:   Time Calculation- OT     Row Name 06/11/21 1429             Time Calculation- OT    OT Start Time  1341  -NA      OT Stop Time  1404  -NA      OT Time Calculation (min)  23 min  -NA      Total Timed Code Minutes- OT  23 minute(s)  -NA      OT Received On  06/11/21  -NA      OT - Next Appointment  06/14/21  -NA      OT Goal Re-Cert Due Date  06/25/21  -NA        User Key  (r) = Recorded By, (t) = Taken By, (c) = Cosigned By    Initials Name Provider Type    NA Latesha Perry OT Occupational Therapist        Therapy Charges for Today     Code Description Service Date Service Provider Modifiers Qty    [id number]  OT EVAL LOW COMPLEXITY 4 6/11/2021 Latesha Perry OT GO 1               Latesha Perry OT  6/11/2021

## 2021-06-11 NOTE — DISCHARGE PLACEMENT REQUEST
"Monse Zarate (70 y.o. Female)     Date of Birth Social Security Number Address Home Phone MRN    [id number]  [id number]   [place] [facility] [phone] [id number]    [employer] Marital Status          Denominational        Admission Date Admission Type Admitting Provider Attending Provider Department, Room/Bed    6/10/21 Elective Kenrick Ragsdale MD McCormick, John Patrick, MD [facility] SURGICAL INPATIENT, 4127/1    Discharge Date Discharge Disposition Discharge Destination                       Attending Provider: Kenrick Ragsdale MD    Allergies: Penicillins, Adhesive Tape    Isolation: None   Infection: None   Code Status: CPR    Ht: 149.9 cm (59\")   Wt: 72.2 kg (159 lb 2.8 oz)    Admission Cmt: None   Principal Problem: Malignant neoplasm of upper-outer quadrant of right breast in female, estrogen receptor positive (CMS/HCC) [C50.411,Z17.0] More...                 Active Insurance as of 6/10/2021     Primary Coverage     Payor Plan Insurance Group Employer/Plan Group    MEDICARE MEDICARE A & B      Payor Plan Address Payor Plan Phone Number Payor Plan Fax Number Effective Dates    [address] [phone]  12/1/2015 - None Entered    [facility] [id number]       Subscriber Name Subscriber Birth Date Member ID       MONSE ZARATE 1950 [id number]           Secondary Coverage     Payor Plan Insurance Group Employer/Plan Group           Payor Plan Address Payor Plan Phone Number Payor Plan Fax Number Effective Dates    [address] [phone]  9/9/2003 - None Entered    [place] [address]       Subscriber Name Subscriber Birth Date Member ID       MONSE ZARATE 1950 [id number]                 Emergency Contacts      (Rel.) Home Phone Work Phone Mobile Phone    LYNN ZARATE (Son) -- -- [phone]    PAULA HOPE (Brother) [phone] -- [phone]    APPLE ZARATE (Son) -- -- [phone]    KULWINDER HOPE (Relative) -- -- " [phone]    Chico Munoz (Brother) -- -- [phone]

## 2021-06-12 LAB
ALBUMIN SERPL-MCNC: 3.2 G/DL (ref 3.5–5.2)
ALBUMIN/GLOB SERPL: 1.8 G/DL
ALP SERPL-CCNC: 70 U/L (ref 39–117)
ALT SERPL W P-5'-P-CCNC: 14 U/L (ref 1–33)
ANION GAP SERPL CALCULATED.3IONS-SCNC: 4 MMOL/L (ref 5–15)
AST SERPL-CCNC: 18 U/L (ref 1–32)
BASOPHILS # BLD AUTO: 0 10*3/MM3 (ref 0–0.2)
BASOPHILS NFR BLD AUTO: 0.7 % (ref 0–1.5)
BILIRUB SERPL-MCNC: 0.2 MG/DL (ref 0–1.2)
BUN SERPL-MCNC: 7 MG/DL (ref 8–23)
BUN/CREAT SERPL: 16.7 (ref 7–25)
CALCIUM SPEC-SCNC: 8 MG/DL (ref 8.6–10.5)
CHLORIDE SERPL-SCNC: 112 MMOL/L (ref 98–107)
CO2 SERPL-SCNC: 25 MMOL/L (ref 22–29)
CREAT SERPL-MCNC: 0.42 MG/DL (ref 0.57–1)
DEPRECATED RDW RBC AUTO: 45.9 FL (ref 37–54)
EOSINOPHIL # BLD AUTO: 0.3 10*3/MM3 (ref 0–0.4)
EOSINOPHIL NFR BLD AUTO: 4.7 % (ref 0.3–6.2)
ERYTHROCYTE [DISTWIDTH] IN BLOOD BY AUTOMATED COUNT: 14.1 % (ref 12.3–15.4)
GFR SERPL CREATININE-BSD FRML MDRD: 149 ML/MIN/1.73
GLOBULIN UR ELPH-MCNC: 1.8 GM/DL
GLUCOSE BLDC GLUCOMTR-MCNC: 100 MG/DL (ref 70–105)
GLUCOSE SERPL-MCNC: 95 MG/DL (ref 65–99)
HCT VFR BLD AUTO: 31.8 % (ref 34–46.6)
HGB BLD-MCNC: 10.6 G/DL (ref 12–15.9)
LYMPHOCYTES # BLD AUTO: 1.5 10*3/MM3 (ref 0.7–3.1)
LYMPHOCYTES NFR BLD AUTO: 25.6 % (ref 19.6–45.3)
MCH RBC QN AUTO: 30.8 PG (ref 26.6–33)
MCHC RBC AUTO-ENTMCNC: 33.2 G/DL (ref 31.5–35.7)
MCV RBC AUTO: 92.6 FL (ref 79–97)
MONOCYTES # BLD AUTO: 0.5 10*3/MM3 (ref 0.1–0.9)
MONOCYTES NFR BLD AUTO: 7.9 % (ref 5–12)
NEUTROPHILS NFR BLD AUTO: 3.5 10*3/MM3 (ref 1.7–7)
NEUTROPHILS NFR BLD AUTO: 61.1 % (ref 42.7–76)
NRBC BLD AUTO-RTO: 0.1 /100 WBC (ref 0–0.2)
PLATELET # BLD AUTO: 158 10*3/MM3 (ref 140–450)
PMV BLD AUTO: 7.8 FL (ref 6–12)
POTASSIUM SERPL-SCNC: 3.8 MMOL/L (ref 3.5–5.2)
PROT SERPL-MCNC: 5 G/DL (ref 6–8.5)
RBC # BLD AUTO: 3.44 10*6/MM3 (ref 3.77–5.28)
SODIUM SERPL-SCNC: 141 MMOL/L (ref 136–145)
WBC # BLD AUTO: 5.7 10*3/MM3 (ref 3.4–10.8)

## 2021-06-12 PROCEDURE — 63710000001 OXYCODONE 5 MG TABLET: Performed by: SURGERY

## 2021-06-12 PROCEDURE — A9270 NON-COVERED ITEM OR SERVICE: HCPCS | Performed by: SURGERY

## 2021-06-12 PROCEDURE — 63710000001 LISINOPRIL 20 MG TABLET: Performed by: SURGERY

## 2021-06-12 PROCEDURE — 63710000001 ACETAMINOPHEN 500 MG TABLET: Performed by: SURGERY

## 2021-06-12 PROCEDURE — 63710000001 GABAPENTIN 100 MG CAPSULE: Performed by: SURGERY

## 2021-06-12 PROCEDURE — 63710000001 FERROUS SULFATE 324 (65 FE) MG TABLET DELAYED-RELEASE: Performed by: SURGERY

## 2021-06-12 PROCEDURE — 94799 UNLISTED PULMONARY SVC/PX: CPT

## 2021-06-12 PROCEDURE — 63710000001 DULOXETINE 30 MG CAPSULE DELAYED-RELEASE PARTICLES: Performed by: SURGERY

## 2021-06-12 PROCEDURE — 63710000001 ATORVASTATIN 20 MG TABLET: Performed by: SURGERY

## 2021-06-12 PROCEDURE — 94760 N-INVAS EAR/PLS OXIMETRY 1: CPT

## 2021-06-12 PROCEDURE — 99024 POSTOP FOLLOW-UP VISIT: CPT | Performed by: SURGERY

## 2021-06-12 PROCEDURE — 63710000001 METOCLOPRAMIDE 5 MG TABLET: Performed by: SURGERY

## 2021-06-12 PROCEDURE — 63710000001 PANTOPRAZOLE 40 MG TABLET DELAYED-RELEASE: Performed by: SURGERY

## 2021-06-12 PROCEDURE — G0378 HOSPITAL OBSERVATION PER HR: HCPCS

## 2021-06-12 PROCEDURE — 63710000001 METOPROLOL SUCCINATE XL 50 MG TABLET SUSTAINED-RELEASE 24 HOUR: Performed by: SURGERY

## 2021-06-12 PROCEDURE — 80053 COMPREHEN METABOLIC PANEL: CPT | Performed by: FAMILY MEDICINE

## 2021-06-12 PROCEDURE — 82962 GLUCOSE BLOOD TEST: CPT

## 2021-06-12 PROCEDURE — 63710000001 COLESEVELAM 625 MG TABLET: Performed by: SURGERY

## 2021-06-12 PROCEDURE — 63710000001 MONTELUKAST 10 MG TABLET: Performed by: SURGERY

## 2021-06-12 PROCEDURE — 85025 COMPLETE CBC W/AUTO DIFF WBC: CPT | Performed by: FAMILY MEDICINE

## 2021-06-12 PROCEDURE — 63710000001 SUCRALFATE 1 G TABLET: Performed by: SURGERY

## 2021-06-12 RX ADMIN — FERROUS SULFATE TAB EC 324 MG (65 MG FE EQUIVALENT) 324 MG: 324 (65 FE) TABLET DELAYED RESPONSE at 07:40

## 2021-06-12 RX ADMIN — GABAPENTIN 200 MG: 100 CAPSULE ORAL at 20:51

## 2021-06-12 RX ADMIN — IPRATROPIUM BROMIDE AND ALBUTEROL SULFATE 3 ML: 2.5; .5 SOLUTION RESPIRATORY (INHALATION) at 19:57

## 2021-06-12 RX ADMIN — DULOXETINE 30 MG: 30 CAPSULE, DELAYED RELEASE ORAL at 07:39

## 2021-06-12 RX ADMIN — METOPROLOL SUCCINATE 50 MG: 50 TABLET, EXTENDED RELEASE ORAL at 07:40

## 2021-06-12 RX ADMIN — ACETAMINOPHEN 1000 MG: 500 TABLET, FILM COATED ORAL at 07:39

## 2021-06-12 RX ADMIN — OXYCODONE 10 MG: 5 TABLET ORAL at 03:29

## 2021-06-12 RX ADMIN — AZELASTINE HYDROCHLORIDE 2 SPRAY: 137 SPRAY, METERED NASAL at 07:41

## 2021-06-12 RX ADMIN — SODIUM CHLORIDE 100 ML/HR: 9 INJECTION, SOLUTION INTRAVENOUS at 20:51

## 2021-06-12 RX ADMIN — MONTELUKAST 10 MG: 10 TABLET, FILM COATED ORAL at 20:51

## 2021-06-12 RX ADMIN — IPRATROPIUM BROMIDE AND ALBUTEROL SULFATE 3 ML: 2.5; .5 SOLUTION RESPIRATORY (INHALATION) at 11:33

## 2021-06-12 RX ADMIN — METOCLOPRAMIDE 5 MG: 5 TABLET ORAL at 17:17

## 2021-06-12 RX ADMIN — METOCLOPRAMIDE 5 MG: 5 TABLET ORAL at 07:40

## 2021-06-12 RX ADMIN — COLESEVELAM HYDROCHLORIDE 625 MG: 625 TABLET, FILM COATED ORAL at 17:16

## 2021-06-12 RX ADMIN — COLESEVELAM HYDROCHLORIDE 625 MG: 625 TABLET, FILM COATED ORAL at 07:39

## 2021-06-12 RX ADMIN — SUCRALFATE 1 G: 1 TABLET ORAL at 20:51

## 2021-06-12 RX ADMIN — IPRATROPIUM BROMIDE AND ALBUTEROL SULFATE 3 ML: 2.5; .5 SOLUTION RESPIRATORY (INHALATION) at 23:47

## 2021-06-12 RX ADMIN — IPRATROPIUM BROMIDE AND ALBUTEROL SULFATE 3 ML: 2.5; .5 SOLUTION RESPIRATORY (INHALATION) at 07:40

## 2021-06-12 RX ADMIN — ATORVASTATIN CALCIUM 20 MG: 20 TABLET, FILM COATED ORAL at 07:40

## 2021-06-12 RX ADMIN — PANTOPRAZOLE SODIUM 40 MG: 40 TABLET, DELAYED RELEASE ORAL at 07:40

## 2021-06-12 RX ADMIN — AZELASTINE HYDROCHLORIDE 2 SPRAY: 137 SPRAY, METERED NASAL at 20:51

## 2021-06-12 RX ADMIN — ACETAMINOPHEN 1000 MG: 500 TABLET, FILM COATED ORAL at 17:16

## 2021-06-12 RX ADMIN — LISINOPRIL 40 MG: 20 TABLET ORAL at 07:40

## 2021-06-12 RX ADMIN — SUCRALFATE 1 G: 1 TABLET ORAL at 07:39

## 2021-06-12 NOTE — PROGRESS NOTES
GENERAL SURGERY PROGRESS NOTE    6/12/2021   LOS: 0 days   Patient Care Team:  Candi Benson MD as PCP - General (Family Medicine)    MASTECTOMY MODIFIED RADICAL  6/10/2021  Chief Complaint: post-op    Subjective   HPI: Patient is a 70 year old lady with right breast cancer who subsquently underwent a right MRM and prophylactic left mastectomy on 6/10/2021.    Interval History:   Awaiting placement. No complaints    Objective     Vital Signs  Temp:  [98 °F (36.7 °C)-98.5 °F (36.9 °C)] 98.4 °F (36.9 °C)  Heart Rate:  [80-97] 89  Resp:  [16-18] 16  BP: (118-149)/(73-95) 149/95    Physical Exam  Vitals reviewed.   Constitutional:       General: She is not in acute distress.     Appearance: She is not ill-appearing.   Cardiovascular:      Rate and Rhythm: Normal rate and regular rhythm.   Pulmonary:      Effort: Pulmonary effort is normal. No respiratory distress.   Chest:      Comments: Incisions well approximated with skin glue overlying. Flaps are viable. LG drain output is appropriate color and quantity.   Musculoskeletal:         General: No swelling. Normal range of motion.   Skin:     General: Skin is warm and dry.   Neurological:      General: No focal deficit present.      Mental Status: She is alert and oriented to person, place, and time.   Psychiatric:         Mood and Affect: Mood normal.         Behavior: Behavior normal.         Thought Content: Thought content normal.         Judgment: Judgment normal.          Results Review:    Lab Results (last 24 hours)     Procedure Component Value Units Date/Time    Comprehensive Metabolic Panel [[id number]]  (Abnormal) Collected: 06/12/21 0526    Specimen: Blood Updated: 06/12/21 0612     Glucose 95 mg/dL      BUN 7 mg/dL      Creatinine 0.42 mg/dL      Sodium 141 mmol/L      Potassium 3.8 mmol/L      Chloride 112 mmol/L      CO2 25.0 mmol/L      Calcium 8.0 mg/dL      Total Protein 5.0 g/dL      Albumin 3.20 g/dL      ALT (SGPT) 14 U/L      AST (SGOT) 18  U/L      Alkaline Phosphatase 70 U/L      Total Bilirubin 0.2 mg/dL      eGFR Non African Amer 149 mL/min/1.73      Globulin 1.8 gm/dL      A/G Ratio 1.8 g/dL      BUN/Creatinine Ratio 16.7     Anion Gap 4.0 mmol/L     Narrative:      GFR Normal >60  Chronic Kidney Disease <60  Kidney Failure <15      CBC & Differential [[id number]]  (Abnormal) Collected: 06/12/21 0526    Specimen: Blood Updated: 06/12/21 0543    Narrative:      The following orders were created for panel order CBC & Differential.  Procedure                               Abnormality         Status                     ---------                               -----------         ------                     CBC Auto Differential[[id number]]        Abnormal            Final result                 Please view results for these tests on the individual orders.    CBC Auto Differential [[id number]]  (Abnormal) Collected: 06/12/21 0526    Specimen: Blood Updated: 06/12/21 0543     WBC 5.70 10*3/mm3      RBC 3.44 10*6/mm3      Hemoglobin 10.6 g/dL      Hematocrit 31.8 %      MCV 92.6 fL      MCH 30.8 pg      MCHC 33.2 g/dL      RDW 14.1 %      RDW-SD 45.9 fl      MPV 7.8 fL      Platelets 158 10*3/mm3      Neutrophil % 61.1 %      Lymphocyte % 25.6 %      Monocyte % 7.9 %      Eosinophil % 4.7 %      Basophil % 0.7 %      Neutrophils, Absolute 3.50 10*3/mm3      Lymphocytes, Absolute 1.50 10*3/mm3      Monocytes, Absolute 0.50 10*3/mm3      Eosinophils, Absolute 0.30 10*3/mm3      Basophils, Absolute 0.00 10*3/mm3      nRBC 0.1 /100 WBC         Imaging Results (Last 24 Hours)     ** No results found for the last 24 hours. **           I have reviewed the above results and noted them below    Medication Review:    Current Facility-Administered Medications:   •  acetaminophen (TYLENOL) tablet 1,000 mg, 1,000 mg, Oral, Q8H, Kenrick Ragsdale MD, 1,000 mg at 06/12/21 0739  •  albuterol (PROVENTIL) nebulizer solution 0.083% 2.5 mg/3mL, 2.5 mg, Nebulization, Q4H  PRN, Kenrick Ragsdale MD  •  atorvastatin (LIPITOR) tablet 20 mg, 20 mg, Oral, Daily, Kenrick Ragsdale MD, 20 mg at 06/12/21 0740  •  azelastine (ASTELIN) nasal spray 2 spray, 2 spray, Each Nare, BID, Kenrick Ragsdale MD, 2 spray at 06/12/21 0741  •  colesevelam (WELCHOL) tablet 625 mg, 625 mg, Oral, BID With Meals, Kenrick Ragsdale MD, 625 mg at 06/12/21 0739  •  diazePAM (VALIUM) tablet 2 mg, 2 mg, Oral, Q6H PRN, Kenrick Ragsdale MD  •  DULoxetine (CYMBALTA) DR capsule 30 mg, 30 mg, Oral, Daily, Kenrick Ragsdale MD, 30 mg at 06/12/21 0739  •  ferrous sulfate EC tablet 324 mg, 324 mg, Oral, Daily With Breakfast, Kenrick Ragsdale MD, 324 mg at 06/12/21 0740  •  gabapentin (NEURONTIN) capsule 200 mg, 200 mg, Oral, Nightly, Kenrick Ragsdale MD, 200 mg at 06/11/21 2033  •  ipratropium-albuterol (DUO-NEB) nebulizer solution 3 mL, 3 mL, Nebulization, Q6H, Kenrick Ragsdale MD, 3 mL at 06/12/21 1133  •  lisinopril (PRINIVIL,ZESTRIL) tablet 40 mg, 40 mg, Oral, Daily, Kenrick Ragsdale MD, 40 mg at 06/12/21 0740  •  metoclopramide (REGLAN) tablet 5 mg, 5 mg, Oral, BID AC, Kenrick Ragsdale MD, 5 mg at 06/12/21 0740  •  metoprolol succinate XL (TOPROL-XL) 24 hr tablet 50 mg, 50 mg, Oral, Daily, Kenrick Ragsdale MD, 50 mg at 06/12/21 0740  •  montelukast (SINGULAIR) tablet 10 mg, 10 mg, Oral, Nightly, Kenrick Ragsdale MD, 10 mg at 06/11/21 2033  •  Morphine sulfate (PF) injection 4 mg, 4 mg, Intravenous, Q4H PRN, Kenrick Ragsdale MD  •  ondansetron (ZOFRAN) tablet 4 mg, 4 mg, Oral, Q6H PRN **OR** ondansetron (ZOFRAN) injection 4 mg, 4 mg, Intravenous, Q6H PRN, Kenrick Ragsdale MD  •  oxyCODONE (ROXICODONE) immediate release tablet 5 mg, 5 mg, Oral, Q4H PRN **OR** oxyCODONE (ROXICODONE) immediate release tablet 10 mg, 10 mg, Oral, Q4H PRN, Kenrick Ragsdale MD, 10 mg at 06/12/21 9419  •  pantoprazole  (PROTONIX) EC tablet 40 mg, 40 mg, Oral, QAM, Kenrick Ragsdale MD, 40 mg at 06/12/21 0740  •  promethazine (PHENERGAN) tablet 12.5 mg, 12.5 mg, Oral, Q6H PRN **OR** promethazine (PHENERGAN) suppository 12.5 mg, 12.5 mg, Rectal, Q6H PRN, Kenrick Ragsdale MD  •  sodium chloride 0.9 % infusion, 100 mL/hr, Intravenous, Continuous, Kenrick Ragsdale MD, Last Rate: 100 mL/hr at 06/12/21 0338, 100 mL/hr at 06/12/21 0338  •  sucralfate (CARAFATE) tablet 1 g, 1 g, Oral, BID, Kenrick Ragsdale MD, 1 g at 06/12/21 0739    Assessment/Plan     Principal Problem:    Malignant neoplasm of upper-outer quadrant of right breast in female, estrogen receptor positive (CMS/HCC)    Surgically stable for d/c once placed in Rehab    Plan for disposition: awaiting placement    Kenrick Ragsdale MD  06/12/21  12:01 EDT

## 2021-06-12 NOTE — PROGRESS NOTES
Case Management/Social Work    Patient Name:  Monse Donnelly  YOB: 1950  MRN: [id number]  Admit Date:  6/10/2021        Message sent to Lizbeth frye referral status. Awaiting response.      Electronically signed by:  Swapna Solano RN  06/12/21 10:28 EDT

## 2021-06-12 NOTE — PLAN OF CARE
Goal Outcome Evaluation:           Patient with no c/o of pain today. Bed will be ready at [facility] tomorrow for possible D/C. Patient currently sitting up in bed with call light in reach. Will continue to monitor.

## 2021-06-12 NOTE — PROGRESS NOTES
LOS: 0 days   Patient Care Team:  Candi Benson MD as PCP - General (Family Medicine)        Subjective  - resting comfortably     Interval History:     Patient Complaints: denies any pain    Review of Systems   Constitutional: Positive for activity change, appetite change and fatigue.   Eyes: Negative.    Respiratory: Negative.    Cardiovascular: Negative.    Gastrointestinal: Negative.    Endocrine: Negative.    Genitourinary: Negative.    Musculoskeletal: Positive for gait problem.   Neurological: Positive for weakness.   Psychiatric/Behavioral: The patient is nervous/anxious.         Objective     Vital Signs  Temp:  [95.9 °F (35.5 °C)-99.1 °F (37.3 °C)] 98 °F (36.7 °C)  Heart Rate:  [68-91] 86  Resp:  [16-20] 16  BP: (107-143)/(69-82) 118/73    Physical Exam:     General Appearance:    Alert, cooperative, in no acute distress   Ears:    Ears appear intact with no abnormalities noted   Throat:   No oral lesions, no thrush, oral mucosa moist   Neck:   No adenopathy, supple, trachea midline, no thyromegaly, no   carotid bruit, no JVD   Lungs:     Clear to auscultation,respirations regular, even and                  Unlabored     Heart:    Regular rhythm and normal rate, normal S1 and S2, no            murmur, no gallop, no rub, no click   Abdomen:     Normal bowel sounds, no masses, no organomegaly, soft        non-tender, non-distended, no guarding, no rebound                tenderness   Extremities:   Moves all extremities well, no edema, no cyanosis, no             redness   Skin:   No bleeding, bruising or rash   Neurologic:   Cranial nerves 2 - 12 grossly intact, sensation intact, DTR       present and equal bilaterally        Results Review:    Lab Results (last 24 hours)     Procedure Component Value Units Date/Time    Tissue Pathology Exam [[id number]] Collected: 06/10/21 0919    Specimen: Tissue from Breast, Right; Tissue from Breast, Left Updated: 06/11/21 1441     Case Report --      Surgical Pathology Report                         Case: [id number]                                  Authorizing Provider:  Kenrick Ragsdale,   Collected:           06/10/2021 09:19 AM                                 MD                                                                           Ordering Location:     [facility] MAIN  Received:            06/10/2021 01:58 PM                                 OR                                                                           Pathologist:           Saroj Javier MD                                                            Specimens:   1) - Breast, Right, RIGHT BREAST MODIFIED RADICAL MASTECTOMY, SHORT-SUPERIOR                        LONG-LATERAL                                                                                        2) - Breast, Left, LEFT BREAST, SHORT-SUPERIOR, LONG-LATERAL                                Final Diagnosis --     Specimen #1 (Breast, right, modified radical mastectomy):    Residual microinvasive ductal carcinoma    See synoptic template for additional details    Specimen #2 (Breast, left, mastectomy):    Benign breast tissue with fibrocystic changes    Breast nipple and skin with no significant pathologic changes     No malignancy identified     ANGE        Synoptic Checklist --     INVASIVE CARCINOMA OF THE BREAST: Resection  (INVASIVE CARCINOMA OF THE BREAST: COMPLETE EXCISION - 1)    8th Edition - Protocol posted: 2/26/2020    SPECIMEN     Procedure:    Total mastectomy      Specimen Laterality:    Right     TUMOR     Tumor Site:    Upper outer quadrant      Histologic Type:    Invasive carcinoma of no special type (ductal)      Glandular (Acinar) / Tubular Differentiation:    Only microinvasion present (not graded)      Nuclear Pleomorphism:    Only microinvasion present (not graded)      Mitotic Rate:    Only microinvasion present (not graded)      Overall Grade:    Only microinvasion present (not graded)       Tumor Size:    Microinvasion only (<= 1 mm)      Tumor Focality:    Multiple foci of invasive carcinoma        Number of Foci:    2        Sizes of Individual Foci (Millimeters):    1,1 mm   Ductal Carcinoma In Situ (DCIS):    Not identified    Lobular Carcinoma In Situ (LCIS):    Not identified    Tumor Extent:       Lymphovascular Invasion:    Not identified    Dermal Lymphovascular Invasion:    Not identified    Microcalcifications:    Present in non-neoplastic tissue    Treatment Effect in the Breast:    Probable or definite response to presurgical therapy in the invasive carcinoma    Treatment Effect in the Lymph Nodes:    Probable or definite response to presurgical therapy in metastatic carcinoma     MARGINS   Invasive Carcinoma Margins:    Uninvolved by invasive carcinoma      Distance from Closest Margin (Millimeters):    20 mm     Closest Margin(s):    Posterior      Distance from Other Margins:           Anterior Margin (Millimeters):    >20 mm       Posterior Margin (Millimeters):    20 mm       Superior Margin (Millimeters):    >20 mm       Inferior Margin (Millimeters):    >20 mm       Medial Margin (Millimeters):    >20 mm       Lateral Margin (Millimeters):    >20 mm    LYMPH NODES   Regional Lymph Nodes:    Involved by tumor cells      Number of Lymph Nodes with Macrometastases (> 2 mm):    0      Number of Lymph Nodes with Micrometastases (> 0.2 mm to 2 mm and / or > 200 cells):    1      Size of Largest Metastatic Deposit (Millimeters):    1.5 mm     Extranodal Extension:    Not identified      Total Number of Lymph Nodes Examined:    16     PATHOLOGIC STAGE CLASSIFICATION (pTNM, AJCC 8th Edition)        TNM Descriptors:    m (multiple foci of invasive carcinoma)    TNM Descriptors:    y (post-treatment)    Primary Tumor (pT):    pT1mi    Regional Lymph Nodes (pN):    pN1mi     SPECIAL STUDIES   Breast Biomarker Testing Performed on Previous Biopsy:         Estrogen Receptor (ER) Status:     "Positive (greater than 10% of cells demonstrate nuclear positivity)        Percentage of Cells with Nuclear Positivity:    71-80%    Breast Biomarker Testing Performed on Previous Biopsy:         Progesterone Receptor (PgR) Status:    Negative    Breast Biomarker Testing Performed on Previous Biopsy:         HER2 (by in situ hybridization):    Negative (not amplified)        Gross Description --     Part 1: Received in formalin designated \"Right modified radical mastectomy\" is a mastectomy specimen measuring 29 x 22 x 6.5 cm. The anterior surface is covered with a wrinkled tan skin ellipse with a centrally located nonretracted nipple. No skin scars or lesions are identified. The specimen is oriented with sutures, short superior and long lateral. A portion of axillary tail is present measuring approximately 12 x 4 x 4 cm. The breast is serially sectioned revealing yellow glistening cut surfaces admixed with thin white fibrous bands. In the upper outer quadrant is a stellate area of  fibrosis measuring 2.5 cm in greatest dimension which grossly approaches to within 2 cm of the closest (posterior) margin. No other lesions are identified within the breast. Representative sections are submitted as follows:    A Nipple sections   B-F Sections of suspected mass  G Upper outer quadrant    H Lower outer quadrant  I Lower inner quadrant  J Upper inner quadrant      Candidate lymph nodes are submitted whole in cassettes K through M. The largest lymph nodes are serially sectioned and submitted one per cassette in cassettes O and P.     Part 2: Received in formalin designated \"Left breast\" is a simple mastectomy specimen measuring 25.5 x 20 x 6.5 cm. The anterior surface is covered with a tan wrinkled skin ellipse. The nipple is present and not retracted. No skin scars or lesions are identified. The breast is serially sectioned revealing yellow glistening cut surfaces admixed with variably thick white fibrous bands. No nodules " or masses are identified. Sections are submitted as follows:    A Nipple  B-C Upper inner quadrant  D-E Lower inner quadrant  F-G Lower outer quadrant  H-I Upper outer quadrant    ANGE/sms           Imaging Results (Last 24 Hours)     ** No results found for the last 24 hours. **           I reviewed the patient's other test results and agree with the interpretation    Medication Review:     Current Facility-Administered Medications:   •  acetaminophen (TYLENOL) tablet 1,000 mg, 1,000 mg, Oral, Q8H, Kenrick Ragsdale MD, 1,000 mg at 06/11/21 1708  •  albuterol (PROVENTIL) nebulizer solution 0.083% 2.5 mg/3mL, 2.5 mg, Nebulization, Q4H PRN, Kenrick Ragsdale MD  •  atorvastatin (LIPITOR) tablet 20 mg, 20 mg, Oral, Daily, Kenrick Ragsdale MD, 20 mg at 06/11/21 0848  •  azelastine (ASTELIN) nasal spray 2 spray, 2 spray, Each Nare, BID, Kenrick Ragsdale MD, 2 spray at 06/11/21 0849  •  colesevelam (WELCHOL) tablet 625 mg, 625 mg, Oral, BID With Meals, Kenrick Ragsdale MD, 625 mg at 06/11/21 1708  •  diazePAM (VALIUM) tablet 2 mg, 2 mg, Oral, Q6H PRN, Kenrick Ragsdale MD  •  DULoxetine (CYMBALTA) DR capsule 30 mg, 30 mg, Oral, Daily, Kenrick Ragsdale MD, 30 mg at 06/11/21 0848  •  ferrous sulfate EC tablet 324 mg, 324 mg, Oral, Daily With Breakfast, Kenrick Ragsdale MD, 324 mg at 06/11/21 0848  •  gabapentin (NEURONTIN) capsule 200 mg, 200 mg, Oral, Nightly, Kenrick Ragsdale MD, 200 mg at 06/10/21 2102  •  ipratropium-albuterol (DUO-NEB) nebulizer solution 3 mL, 3 mL, Nebulization, Q6H, Kenrick Ragsdale MD, 3 mL at 06/10/21 1415  •  lisinopril (PRINIVIL,ZESTRIL) tablet 40 mg, 40 mg, Oral, Daily, Kenrick Ragsdale MD, 40 mg at 06/11/21 0848  •  metoclopramide (REGLAN) tablet 5 mg, 5 mg, Oral, BID AC, Kenrick Ragsdale MD, 5 mg at 06/11/21 1708  •  metoprolol succinate XL (TOPROL-XL) 24 hr tablet 50 mg, 50 mg, Oral, Daily, Jn  Kenrick Richards MD, 50 mg at 06/11/21 0847  •  montelukast (SINGULAIR) tablet 10 mg, 10 mg, Oral, Nightly, Kenrick Ragsdale MD, 10 mg at 06/10/21 2102  •  Morphine sulfate (PF) injection 4 mg, 4 mg, Intravenous, Q4H PRN, Kenrick Ragsdale MD  •  ondansetron (ZOFRAN) tablet 4 mg, 4 mg, Oral, Q6H PRN **OR** ondansetron (ZOFRAN) injection 4 mg, 4 mg, Intravenous, Q6H PRN, Kenrick Ragsdale MD  •  oxyCODONE (ROXICODONE) immediate release tablet 5 mg, 5 mg, Oral, Q4H PRN **OR** oxyCODONE (ROXICODONE) immediate release tablet 10 mg, 10 mg, Oral, Q4H PRN, Kenrick Ragsdale MD, 10 mg at 06/11/21 0205  •  pantoprazole (PROTONIX) EC tablet 40 mg, 40 mg, Oral, QAM, Kenrick Ragsdale MD, 40 mg at 06/11/21 0720  •  promethazine (PHENERGAN) tablet 12.5 mg, 12.5 mg, Oral, Q6H PRN **OR** promethazine (PHENERGAN) suppository 12.5 mg, 12.5 mg, Rectal, Q6H PRN, Kenrick Ragsdale MD  •  sodium chloride 0.9 % infusion, 100 mL/hr, Intravenous, Continuous, Kenrick Ragsdale MD, Last Rate: 100 mL/hr at 06/11/21 1401, 100 mL/hr at 06/11/21 1401  •  sucralfate (CARAFATE) tablet 1 g, 1 g, Oral, BID, Kenrick Ragsdale MD, 1 g at 06/11/21 0848    I have reviewed medication list.    Assessment/Plan     Principal Problem:    Malignant neoplasm of upper-outer quadrant of right breast in female, estrogen receptor positive (CMS/HCC) - s/p Modified radical mastectomy to right and simple mastectomy to left    HTN - continue home medicine and monitor    Asthma - stable , continue nebs    Generalized weakness - PT/OT    Stress ulcer/DVT prophylaxis              Bed will be available in AM for discharge       Plan for disposition: rehab at discharge    Candi Benson MD  06/11/21  20:26 EDT

## 2021-06-12 NOTE — PLAN OF CARE
Goal Outcome Evaluation:  Patient doing well, minimal complaints of pain, albaro drains intact and draining bloody drainage, awaiting placement in facility

## 2021-06-12 NOTE — CASE MANAGEMENT/SOCIAL WORK
Discharge Planning Assessment  [facility]     Patient Name: Monse Donnelly  MRN: [id number]  Today's Date: 6/12/2021    Admit Date: 6/10/2021      Discharge Plan     Row Name 06/12/21 1704       Plan  DC plan: accepted at [facility]. Bed available 6/13/21. No precert required. [facility] is approved.     Continued Care and Services - Admitted Since 6/10/2021     Destination     Service Provider Request Status Selected Services Address Phone Fax Patient Preferred    [facility]  Accepted Yes [address] [place] [id number] [phone] --              Brynn Tamayo  [facility] [place]   Care Management Dept  Cell [phone]  Weekday Department [phone]

## 2021-06-12 NOTE — PROGRESS NOTES
Discharge Planning Assessment   [facility]     Patient Name: Monse Donnelly  MRN: [id number]  Today's Date: 6/12/2021    Admit Date: 6/10/2021      Discharge Plan     Row Name 06/12/21 1645       Plan    Plan  DC plan: accepted at [facility]. Bed available 6/13/21. No precert required. Will require PASRR.    Plan Comments  S/W [facility] Lizbeth frye. Pt accepeted and be will be available on 6/13/21.        Continued Care and Services - Admitted Since 6/10/2021     Destination     Service Provider Request Status Selected Services Address Phone Fax Patient Preferred    [facility]  Pending - [facility] N[facility]A [address] [facility] [place] [address] [phone] --              Expected Discharge Date and Time     Expected Discharge Date Expected Discharge Time    Jun 12, 2021         Swapna Solano RN

## 2021-06-13 VITALS
BODY MASS INDEX: 32.09 KG/M2 | TEMPERATURE: 98.2 F | SYSTOLIC BLOOD PRESSURE: 136 MMHG | RESPIRATION RATE: 18 BRPM | DIASTOLIC BLOOD PRESSURE: 84 MMHG | WEIGHT: 159.17 LBS | OXYGEN SATURATION: 96 % | HEART RATE: 86 BPM | HEIGHT: 59 IN

## 2021-06-13 LAB
ALBUMIN SERPL-MCNC: 2.8 G/DL (ref 3.5–5.2)
ALBUMIN/GLOB SERPL: 1.6 G/DL
ALP SERPL-CCNC: 60 U/L (ref 39–117)
ALT SERPL W P-5'-P-CCNC: 11 U/L (ref 1–33)
ANION GAP SERPL CALCULATED.3IONS-SCNC: 6 MMOL/L (ref 5–15)
AST SERPL-CCNC: 14 U/L (ref 1–32)
BASOPHILS # BLD AUTO: 0.1 10*3/MM3 (ref 0–0.2)
BASOPHILS NFR BLD AUTO: 1.4 % (ref 0–1.5)
BILIRUB SERPL-MCNC: 0.2 MG/DL (ref 0–1.2)
BUN SERPL-MCNC: 8 MG/DL (ref 8–23)
BUN/CREAT SERPL: 23.5 (ref 7–25)
CALCIUM SPEC-SCNC: 8 MG/DL (ref 8.6–10.5)
CHLORIDE SERPL-SCNC: 116 MMOL/L (ref 98–107)
CO2 SERPL-SCNC: 22 MMOL/L (ref 22–29)
CREAT SERPL-MCNC: 0.34 MG/DL (ref 0.57–1)
DEPRECATED RDW RBC AUTO: 46.4 FL (ref 37–54)
EOSINOPHIL # BLD AUTO: 0.2 10*3/MM3 (ref 0–0.4)
EOSINOPHIL NFR BLD AUTO: 4.9 % (ref 0.3–6.2)
ERYTHROCYTE [DISTWIDTH] IN BLOOD BY AUTOMATED COUNT: 14.2 % (ref 12.3–15.4)
GFR SERPL CREATININE-BSD FRML MDRD: >150 ML/MIN/1.73
GLOBULIN UR ELPH-MCNC: 1.7 GM/DL
GLUCOSE SERPL-MCNC: 91 MG/DL (ref 65–99)
HCT VFR BLD AUTO: 29.9 % (ref 34–46.6)
HGB BLD-MCNC: 10.4 G/DL (ref 12–15.9)
LYMPHOCYTES # BLD AUTO: 1.9 10*3/MM3 (ref 0.7–3.1)
LYMPHOCYTES NFR BLD AUTO: 37.3 % (ref 19.6–45.3)
MCH RBC QN AUTO: 32.5 PG (ref 26.6–33)
MCHC RBC AUTO-ENTMCNC: 34.8 G/DL (ref 31.5–35.7)
MCV RBC AUTO: 93.2 FL (ref 79–97)
MONOCYTES # BLD AUTO: 0.4 10*3/MM3 (ref 0.1–0.9)
MONOCYTES NFR BLD AUTO: 8.5 % (ref 5–12)
NEUTROPHILS NFR BLD AUTO: 2.4 10*3/MM3 (ref 1.7–7)
NEUTROPHILS NFR BLD AUTO: 47.9 % (ref 42.7–76)
NRBC BLD AUTO-RTO: 0.1 /100 WBC (ref 0–0.2)
PLATELET # BLD AUTO: 146 10*3/MM3 (ref 140–450)
PMV BLD AUTO: 8.1 FL (ref 6–12)
POTASSIUM SERPL-SCNC: 3.5 MMOL/L (ref 3.5–5.2)
PROT SERPL-MCNC: 4.5 G/DL (ref 6–8.5)
RBC # BLD AUTO: 3.21 10*6/MM3 (ref 3.77–5.28)
SODIUM SERPL-SCNC: 144 MMOL/L (ref 136–145)
WBC # BLD AUTO: 5.1 10*3/MM3 (ref 3.4–10.8)

## 2021-06-13 PROCEDURE — 94799 UNLISTED PULMONARY SVC/PX: CPT

## 2021-06-13 PROCEDURE — A9270 NON-COVERED ITEM OR SERVICE: HCPCS | Performed by: SURGERY

## 2021-06-13 PROCEDURE — 63710000001 FERROUS SULFATE 324 (65 FE) MG TABLET DELAYED-RELEASE: Performed by: SURGERY

## 2021-06-13 PROCEDURE — 85025 COMPLETE CBC W/AUTO DIFF WBC: CPT | Performed by: FAMILY MEDICINE

## 2021-06-13 PROCEDURE — 63710000001 METOCLOPRAMIDE 5 MG TABLET: Performed by: SURGERY

## 2021-06-13 PROCEDURE — 63710000001 PANTOPRAZOLE 40 MG TABLET DELAYED-RELEASE: Performed by: SURGERY

## 2021-06-13 PROCEDURE — 63710000001 LISINOPRIL 20 MG TABLET: Performed by: SURGERY

## 2021-06-13 PROCEDURE — 94760 N-INVAS EAR/PLS OXIMETRY 1: CPT

## 2021-06-13 PROCEDURE — 99024 POSTOP FOLLOW-UP VISIT: CPT | Performed by: SURGERY

## 2021-06-13 PROCEDURE — 63710000001 ATORVASTATIN 20 MG TABLET: Performed by: SURGERY

## 2021-06-13 PROCEDURE — 63710000001 DULOXETINE 30 MG CAPSULE DELAYED-RELEASE PARTICLES: Performed by: SURGERY

## 2021-06-13 PROCEDURE — 63710000001 SUCRALFATE 1 G TABLET: Performed by: SURGERY

## 2021-06-13 PROCEDURE — 63710000001 ACETAMINOPHEN 500 MG TABLET: Performed by: SURGERY

## 2021-06-13 PROCEDURE — 63710000001 METOPROLOL SUCCINATE XL 50 MG TABLET SUSTAINED-RELEASE 24 HOUR: Performed by: SURGERY

## 2021-06-13 PROCEDURE — G0378 HOSPITAL OBSERVATION PER HR: HCPCS

## 2021-06-13 PROCEDURE — 80053 COMPREHEN METABOLIC PANEL: CPT | Performed by: FAMILY MEDICINE

## 2021-06-13 PROCEDURE — 63710000001 COLESEVELAM 625 MG TABLET: Performed by: SURGERY

## 2021-06-13 RX ORDER — DIAZEPAM 2 MG/1
2 TABLET ORAL EVERY 6 HOURS PRN
Qty: 15 TABLET | Refills: 0 | Status: SHIPPED | OUTPATIENT
Start: 2021-06-13 | End: 2021-06-17

## 2021-06-13 RX ORDER — OXYCODONE HYDROCHLORIDE 5 MG/1
5 TABLET ORAL EVERY 4 HOURS PRN
Qty: 30 TABLET | Refills: 0 | Status: SHIPPED | OUTPATIENT
Start: 2021-06-13 | End: 2021-06-20

## 2021-06-13 RX ORDER — ONDANSETRON 4 MG/1
4 TABLET, FILM COATED ORAL EVERY 6 HOURS PRN
Qty: 30 TABLET | Refills: 1 | Status: SHIPPED | OUTPATIENT
Start: 2021-06-13 | End: 2021-06-13

## 2021-06-13 RX ORDER — OXYCODONE HYDROCHLORIDE 5 MG/1
5 TABLET ORAL EVERY 4 HOURS PRN
Qty: 30 TABLET | Refills: 0 | Status: SHIPPED | OUTPATIENT
Start: 2021-06-13 | End: 2021-06-13

## 2021-06-13 RX ORDER — ONDANSETRON 4 MG/1
4 TABLET, FILM COATED ORAL EVERY 6 HOURS PRN
Qty: 30 TABLET | Refills: 1 | Status: SHIPPED | OUTPATIENT
Start: 2021-06-13 | End: 2022-07-13

## 2021-06-13 RX ORDER — DIAZEPAM 2 MG/1
2 TABLET ORAL EVERY 6 HOURS PRN
Qty: 15 TABLET | Refills: 0 | Status: SHIPPED | OUTPATIENT
Start: 2021-06-13 | End: 2021-06-13

## 2021-06-13 RX ADMIN — FERROUS SULFATE TAB EC 324 MG (65 MG FE EQUIVALENT) 324 MG: 324 (65 FE) TABLET DELAYED RESPONSE at 08:42

## 2021-06-13 RX ADMIN — SODIUM CHLORIDE 100 ML/HR: 9 INJECTION, SOLUTION INTRAVENOUS at 07:09

## 2021-06-13 RX ADMIN — ATORVASTATIN CALCIUM 20 MG: 20 TABLET, FILM COATED ORAL at 08:42

## 2021-06-13 RX ADMIN — SUCRALFATE 1 G: 1 TABLET ORAL at 08:43

## 2021-06-13 RX ADMIN — IPRATROPIUM BROMIDE AND ALBUTEROL SULFATE 3 ML: 2.5; .5 SOLUTION RESPIRATORY (INHALATION) at 08:03

## 2021-06-13 RX ADMIN — AZELASTINE HYDROCHLORIDE 2 SPRAY: 137 SPRAY, METERED NASAL at 08:43

## 2021-06-13 RX ADMIN — COLESEVELAM HYDROCHLORIDE 625 MG: 625 TABLET, FILM COATED ORAL at 08:42

## 2021-06-13 RX ADMIN — ACETAMINOPHEN 1000 MG: 500 TABLET, FILM COATED ORAL at 00:16

## 2021-06-13 RX ADMIN — LISINOPRIL 40 MG: 20 TABLET ORAL at 08:42

## 2021-06-13 RX ADMIN — DULOXETINE 30 MG: 30 CAPSULE, DELAYED RELEASE ORAL at 08:42

## 2021-06-13 RX ADMIN — METOPROLOL SUCCINATE 50 MG: 50 TABLET, EXTENDED RELEASE ORAL at 08:42

## 2021-06-13 RX ADMIN — PANTOPRAZOLE SODIUM 40 MG: 40 TABLET, DELAYED RELEASE ORAL at 06:17

## 2021-06-13 RX ADMIN — ACETAMINOPHEN 1000 MG: 500 TABLET, FILM COATED ORAL at 08:42

## 2021-06-13 RX ADMIN — METOCLOPRAMIDE 5 MG: 5 TABLET ORAL at 08:42

## 2021-06-13 NOTE — DISCHARGE INSTRUCTIONS
Ok for discharge  Follow-up with me (Dr. Ragsdale) in 2 weeks  Regular diet as tolerated  Ok to shower starting tomorrow. No tub baths, pools, lakes, or streams for 1 week.  Call my office or present to the ED with: fevers greater than 101.5, redness around the incisions, drainage from the incisions, intractable nausea/vomiting, or pain that is getting worse instead of better    Total or Modified Radical Mastectomy, Care After  This sheet gives you information about how to care for yourself after your procedure. Your health care provider may also give you more specific instructions. If you have problems or questions, contact your health care provider.  What can I expect after the procedure?  After the procedure, it is common to have:  · Pain.  · Numbness.  · Stiffness in the arm or shoulder.  · Feelings of stress, sadness, or depression.  If the lymph nodes under your arm were removed, you may have arm swelling, weakness, or numbness on the same side of your body as your surgery.  Follow these instructions at home:  Incision care    · Follow instructions from your health care provider about how to take care of your incision. Make sure you:  ? Wash your hands with soap and water before you change your bandage (dressing). If soap and water are not available, use hand .  ? Change your dressing as told by your health care provider.  ? Leave stitches (sutures), skin glue, or adhesive strips in place. These skin closures may need to stay in place for 2 weeks or longer. If adhesive strip edges start to loosen and curl up, you may trim the loose edges. Do not remove adhesive strips completely unless your health care provider tells you to do that.  · Check your incision area every day for signs of infection. Check for:  ? Redness, swelling, or more pain.  ? Fluid or blood.  ? Warmth.  ? Pus or a bad smell.  · If you were sent home with a surgical drain in place, follow instructions from your health care provider  about emptying it.  Bathing  · Do not take baths, swim, or use a hot tub until your health care provider approves. Ask your health care provider if you may take showers. You may only be allowed to take sponge baths.  Activity  · Return to your normal activities as told by your health care provider. Ask your health care provider what activities are safe for you.  · Avoid activities that take a lot of effort.  · Be careful to avoid any activities that could cause an injury to your arm on the side of your surgery.  · Do not lift anything that is heavier than 10 lb (4.5 kg), or the limit that you are told, until your health care provider says that it is safe.  · Avoid lifting with the arm on the side of your surgery.  · Do not carry heavy objects on your shoulder.  · After your drain is removed, do exercises to prevent stiffness and swelling in your arm. Talk with your health care provider about which exercises are safe for you.  General instructions  · Take over-the-counter and prescription medicines only as told by your health care provider.  · You may eat what you usually do.  · Keep your arm raised (elevated) above the level of your heart when you are sitting or lying down.  · Do not wear tight jewelry on your arm, wrist, or fingers on the side of your surgery.  · You may be given a tight sleeve (compression bandage) to wear over your arm on the side of your surgery. Wear this sleeve as told by your health care provider.  · Ask your health care provider when you can start wearing a bra or using a breast prosthesis.  · Before you are involved in certain procedures such as giving blood or having your blood pressure checked, tell all your health care providers if lymph nodes under your arm were removed. This is important information.  Follow-up  · Keep all follow-up visits as told by your health care provider. This is important.  · Get checked for extra fluid around your lymph nodes (lymphedema) as often as told by  your health care provider.  Contact a health care provider if:  · You have a fever.  · Your pain medicine is not working.  · Your arm swelling, weakness, or numbness has not improved after a few weeks.  · You have new swelling in your breast area or arm.  · You have redness, swelling, or more pain in your incision area.  · You have fluid or blood coming from your incision.  · Your incision feels warm to the touch.  · You have pus or a bad smell coming from your incision.  Get help right away if:  · You have very bad pain in your breast area or arm.  · You have chest pain.  · You have difficulty breathing.  Summary  · Follow instructions from your health care provider about how to take care of your incision. Check your incision area every day for signs of infection.  · Ask your health care provider what activities are safe for you.  · Keep all follow-up visits as told by your health care provider. This is important.  · Make sure you know which symptoms should cause you to contact your health care provider or to get help right away.  This information is not intended to replace advice given to you by your health care provider. Make sure you discuss any questions you have with your health care provider.  Document Revised: 02/21/2020 Document Reviewed: 09/21/2018  [employer] Patient Education © 2021 [employer]    Surgical Drain Home Care  Surgical drains are used to remove extra fluid that normally builds up in a surgical wound after surgery. A surgical drain helps to heal a surgical wound. Different kinds of surgical drains include:  · Active drains. These drains use suction to pull drainage away from the surgical wound. Drainage flows through a tube to a container outside of the body. With these drains, you need to keep the bulb or the drainage container flat (compressed) at all times, except while you empty it. Flattening the bulb or container creates suction.  · Passive drains. These drains allow fluid to drain  naturally, by gravity. Drainage flows through a tube to a bandage (dressing) or a container outside of the body. Passive drains do not need to be emptied.  A drain is placed during surgery. Right after surgery, drainage is usually bright red and a little thicker than water. The drainage may gradually turn yellow or pink and become thinner. It is likely that your health care provider will remove the drain when the drainage stops or when the amount decreases to 1-2 Tbsp (15-30 mL) during a 24-hour period.  Supplies needed:  · Tape.  · Germ-free cleaning solution (sterile saline).  · Cotton swabs.  · Split gauze drain sponge: 4 x 4 inches (10 x 10 cm).  · Gauze square: 4 x 4 inches (10 x 10 cm).  How to care for your surgical drain  Care for your drain as told by your health care provider. This is important to help prevent infection. If your drain is placed at your back, or any other hard-to-reach area, ask another person to assist you in performing the following tasks:  General care  · Keep the skin around the drain dry and covered with a dressing at all times.  · Check your drain area every day for signs of infection. Check for:  ? Redness, swelling, or pain.  ? Pus or a bad smell.  ? Cloudy drainage.  ? Tenderness or pressure at the drain exit site.  Changing the dressing  Follow instructions from your health care provider about how to change your dressing. Change your dressing at least once a day. Change it more often if needed to keep the dressing dry. Make sure you:  1. Gather your supplies.  2. Wash your hands with soap and water before you change your dressing. If soap and water are not available, use hand .  3. Remove the old dressing. Avoid using scissors to do that.  4. Wash your hands with soap and water again after removing the old dressing.  5. Use sterile saline to clean your skin around the drain. You may need to use a cotton swab to clean the skin.  6. Place the tube through the slit in a  drain sponge. Place the drain sponge so that it covers your wound.  7. Place the gauze square or another drain sponge on top of the drain sponge that is on the wound. Make sure the tube is between those layers.  8. Tape the dressing to your skin.  9. Tape the drainage tube to your skin 1-2 inches (2.5-5 cm) below the place where the tube enters your body. Taping keeps the tube from pulling on any stitches (sutures) that you have.  10. Wash your hands with soap and water.  11. Write down the color of your drainage and how often you change your dressing.  How to empty your active drain    1. Make sure that you have a measuring cup that you can empty your drainage into.  2. Wash your hands with soap and water. If soap and water are not available, use hand .  3. Loosen any pins or clips that hold the tube in place.  4. If your health care provider tells you to strip the tube to prevent clots and tube blockages:  ? Hold the tube at the skin with one hand. Use your other hand to pinch the tubing with your thumb and first finger.  ? Gently move your fingers down the tube while squeezing very lightly. This clears any drainage, clots, or tissue from the tube.  ? You may need to do this several times each day to keep the tube clear. Do not pull on the tube.  5. Open the bulb cap or the drain plug. Do not touch the inside of the cap or the bottom of the plug.  6. Turn the device upside down and gently squeeze.  7. Empty all of the drainage into the measuring cup.  8. Compress the bulb or the container and replace the cap or the plug. To compress the bulb or the container, squeeze it firmly in the middle while you close the cap or plug the container.  9. Write down the amount of drainage that you have in each 24-hour period. If you have less than 2 Tbsp (30 mL) of drainage during 24 hours, contact your health care provider.  10. Flush the drainage down the toilet.  11. Wash your hands with soap and water.  Contact a  health care provider if:  · You have redness, swelling, or pain around your drain area.  · You have pus or a bad smell coming from your drain area.  · You have a fever or chills.  · The skin around your drain is warm to the touch.  · The amount of drainage that you have is increasing instead of decreasing.  · You have drainage that is cloudy.  · There is a sudden stop or a sudden decrease in the amount of drainage that you have.  · Your drain tube falls out.  · Your active drain does not stay compressed after you empty it.  Summary  · Surgical drains are used to remove extra fluid that normally builds up in a surgical wound after surgery.  · Different kinds of surgical drains include active drains and passive drains. Active drains use suction to pull drainage away from the surgical wound, and passive drains allow fluid to drain naturally.  · It is important to care for your drain to prevent infection. If your drain is placed at your back, or any other hard-to-reach area, ask another person to assist you.  · Contact your health care provider if you have redness, swelling, or pain around your drain area.  This information is not intended to replace advice given to you by your health care provider. Make sure you discuss any questions you have with your health care provider.  Document Revised: 01/22/2020 Document Reviewed: 01/22/2020  [employer] Patient Education © 2021 [employer]

## 2021-06-13 NOTE — PLAN OF CARE
Goal Outcome Evaluation:            Patient had minimal incisional pain. Drains had moderate amount of output.

## 2021-06-13 NOTE — DISCHARGE SUMMARY
GENERAL SURGERY DISCHARGE SUMMARY    Date of Discharge:  6/13/2021    Discharge Diagnosis: Right breast cancer    Presenting Problem/History of Present Illness  Active Hospital Problems    Diagnosis  POA   • **Malignant neoplasm of upper-outer quadrant of right breast in female, estrogen receptor positive (CMS/HCC) [C50.411, Z17.0]  Yes      Resolved Hospital Problems   No resolved problems to display.      Hospital Course  Patient is a 70 y.o. female presented with right breast cancer.  We discussed her surgical options and the patient agreed to proceed to the operating room for a modified radical mastectomy on the right with a simple mastectomy on the left.  This was done on 6/10/2021.  Patient was admitted to the hospital postoperatively where she was observed.  Her pain was well controlled, she worked with physical therapy, and on postop day 1 she was felt to be stable for discharge.  However, the patient had no one to take care of her at home, and in working with physical therapy, it was felt that the patient would benefit from inpatient rehab.  The patient wished to go to [facility] for rehab, and we awaited placement in rehab which finally occurred on 6/13/2021..      Procedures Performed    Procedure(s):  MASTECTOMY MODIFIED RADICAL RIGHT/ MASTECTOMY SIMPLE-LEFT  -------------------       Consults:   Consults     No orders found from 5/12/2021 to 6/11/2021.          Pertinent Test Results: None    Condition on Discharge: Improved    Vital Signs  Temp:  [98.2 °F (36.8 °C)-98.7 °F (37.1 °C)] 98.2 °F (36.8 °C)  Heart Rate:  [77-89] 86  Resp:  [16-18] 18  BP: (105-138)/(66-86) 136/84    Physical Exam:  Physical Exam  Vitals reviewed.   Constitutional:       General: She is not in acute distress.     Appearance: She is not ill-appearing.   Cardiovascular:      Rate and Rhythm: Normal rate and regular rhythm.   Pulmonary:      Effort: Pulmonary effort is normal. No respiratory distress.   Chest:      Comments:  Drains with serosanguineous output.  Incisions are well approximated without any surrounding erythema, induration, or drainage to suggest infection.  Musculoskeletal:         General: No swelling. Normal range of motion.   Skin:     General: Skin is warm and dry.   Neurological:      General: No focal deficit present.      Mental Status: She is alert and oriented to person, place, and time.   Psychiatric:         Mood and Affect: Mood normal.         Behavior: Behavior normal.         Thought Content: Thought content normal.         Judgment: Judgment normal.         Discharge Disposition  Rehab Facility or Unit (DC - External)    Discharge Medications     Discharge Medications      New Medications      Instructions Start Date   diazePAM 2 MG tablet  Commonly known as: VALIUM   2 mg, Oral, Every 6 Hours PRN      ondansetron 4 MG tablet  Commonly known as: ZOFRAN   4 mg, Oral, Every 6 Hours PRN      oxyCODONE 5 MG immediate release tablet  Commonly known as: ROXICODONE   5 mg, Oral, Every 4 Hours PRN         Continue These Medications      Instructions Start Date   albuterol sulfate  (90 Base) MCG/ACT inhaler  Commonly known as: PROVENTIL HFA;VENTOLIN HFA;PROAIR HFA   2 puffs, Inhalation, Every 6 Hours PRN      ALENDRONATE SODIUM PO   70 mg, Oral, Weekly, Patient states takes on Wednesdays       atorvastatin 20 MG tablet  Commonly known as: LIPITOR   20 mg, Oral, Daily      azelastine 0.1 % nasal spray  Commonly known as: ASTELIN   2 sprays, Nasal, 2 Times Daily, Use in each nostril as directed       DULoxetine 30 MG capsule  Commonly known as: CYMBALTA   30 mg, Oral, Daily      famotidine 40 MG tablet  Commonly known as: PEPCID   40 mg, Oral, Daily      ferrous sulfate 325 (65 FE) MG tablet   325 mg, Oral, Daily With Breakfast      gabapentin 100 MG capsule  Commonly known as: NEURONTIN   200 mg, Oral, Nightly      ipratropium-albuterol 0.5-2.5 mg/3 ml nebulizer  Commonly known as: DUO-NEB   3 mL,  Nebulization, Every 6 Hours      lisinopril 40 MG tablet  Commonly known as: PRINIVIL,ZESTRIL   40 mg, Oral, Daily, LD 5/9      metoclopramide 5 MG tablet  Commonly known as: REGLAN   5 mg, Oral, 2 times daily      metoprolol succinate XL 50 MG 24 hr tablet  Commonly known as: TOPROL-XL   50 mg, Oral, Daily, Take DOS      montelukast 10 MG tablet  Commonly known as: SINGULAIR   10 mg, Oral, Nightly      omeprazole 40 MG capsule  Commonly known as: priLOSEC   40 mg, Oral, Daily      sucralfate 1 g tablet  Commonly known as: CARAFATE   1 g, Oral, 2 Times Daily      Welchol 625 MG tablet  Generic drug: colesevelam   625 mg, Oral, 2 Times Daily With Meals         Stop These Medications    doxycycline 100 MG tablet  Commonly known as: VIBRAMYICN            Discharge Diet:     Activity at Discharge:     Follow-up Appointments  No future appointments.      Test Results Pending at Discharge       Kenrick Ragsdale MD  06/13/21  11:16 EDT

## 2021-06-13 NOTE — PROGRESS NOTES
LOS: 0 days   Patient Care Team:  Candi Benson MD as PCP - General (Family Medicine)        Subjective  - sitting     Interval History:     Patient Complaints: denies any pain, anxious to get to rehab     Review of Systems   Constitutional: Positive for activity change, appetite change and fatigue.   Eyes: Negative.    Respiratory: Negative.    Cardiovascular: Negative.    Gastrointestinal: Negative.    Endocrine: Negative.    Genitourinary: Negative.    Musculoskeletal: Positive for gait problem.   Neurological: Positive for weakness.   Psychiatric/Behavioral: The patient is nervous/anxious.         Objective     Vital Signs  Temp:  [98.2 °F (36.8 °C)-98.7 °F (37.1 °C)] 98.2 °F (36.8 °C)  Heart Rate:  [78-87] 86  Resp:  [16-18] 18  BP: (126-138)/(77-86) 136/84    Physical Exam:     General Appearance:    Alert, cooperative, in no acute distress   Ears:    Ears appear intact with no abnormalities noted   Throat:   No oral lesions, no thrush, oral mucosa moist   Neck:   No adenopathy, supple, trachea midline, no thyromegaly, no   carotid bruit, no JVD   Lungs:     Clear to auscultation,respirations regular, even and                  Unlabored     Heart:    Regular rhythm and normal rate, normal S1 and S2, no            murmur, no gallop, no rub, no click   Abdomen:     Normal bowel sounds, no masses, no organomegaly, soft        non-tender, non-distended, no guarding, no rebound                tenderness   Extremities:   Moves all extremities well, no edema, no cyanosis, no             redness   Skin:   No bleeding, bruising or rash   Neurologic:   Cranial nerves 2 - 12 grossly intact, sensation intact, DTR       present and equal bilaterally        Results Review:    Lab Results (last 24 hours)     Procedure Component Value Units Date/Time    Comprehensive Metabolic Panel [[id number]]  (Abnormal) Collected: 06/13/21 0115    Specimen: Blood Updated: 06/13/21 0156     Glucose 91 mg/dL      BUN 8 mg/dL       Creatinine 0.34 mg/dL      Sodium 144 mmol/L      Potassium 3.5 mmol/L      Chloride 116 mmol/L      CO2 22.0 mmol/L      Calcium 8.0 mg/dL      Total Protein 4.5 g/dL      Albumin 2.80 g/dL      ALT (SGPT) 11 U/L      AST (SGOT) 14 U/L      Alkaline Phosphatase 60 U/L      Total Bilirubin 0.2 mg/dL      eGFR Non African Amer >150 mL/min/1.73      Globulin 1.7 gm/dL      A/G Ratio 1.6 g/dL      BUN/Creatinine Ratio 23.5     Anion Gap 6.0 mmol/L     Narrative:      GFR Normal >60  Chronic Kidney Disease <60  Kidney Failure <15      CBC & Differential [[id number]]  (Abnormal) Collected: 06/13/21 0115    Specimen: Blood Updated: 06/13/21 0140    Narrative:      The following orders were created for panel order CBC & Differential.  Procedure                               Abnormality         Status                     ---------                               -----------         ------                     CBC Auto Differential[[id number]]        Abnormal            Final result                 Please view results for these tests on the individual orders.    CBC Auto Differential [[id number]]  (Abnormal) Collected: 06/13/21 0115    Specimen: Blood Updated: 06/13/21 0140     WBC 5.10 10*3/mm3      RBC 3.21 10*6/mm3      Hemoglobin 10.4 g/dL      Hematocrit 29.9 %      MCV 93.2 fL      MCH 32.5 pg      MCHC 34.8 g/dL      RDW 14.2 %      RDW-SD 46.4 fl      MPV 8.1 fL      Platelets 146 10*3/mm3      Neutrophil % 47.9 %      Lymphocyte % 37.3 %      Monocyte % 8.5 %      Eosinophil % 4.9 %      Basophil % 1.4 %      Neutrophils, Absolute 2.40 10*3/mm3      Lymphocytes, Absolute 1.90 10*3/mm3      Monocytes, Absolute 0.40 10*3/mm3      Eosinophils, Absolute 0.20 10*3/mm3      Basophils, Absolute 0.10 10*3/mm3      nRBC 0.1 /100 WBC     POC Glucose Once [[id number]]  (Normal) Collected: 06/12/21 2258    Specimen: Blood Updated: 06/12/21 2259     Glucose 100 mg/dL      Comment: Serial Number: [id number]:  [id number]             Imaging Results (Last 24 Hours)     ** No results found for the last 24 hours. **           I reviewed the patient's other test results and agree with the interpretation    Medication Review:   No current facility-administered medications for this encounter.    Current Outpatient Medications:   •  ALENDRONATE SODIUM PO, Take 70 mg by mouth 1 (One) Time Per Week. Patient states takes on Wednesdays, Disp: , Rfl:   •  atorvastatin (LIPITOR) 20 MG tablet, Take 20 mg by mouth Daily., Disp: , Rfl:   •  colesevelam (WELCHOL) 625 MG tablet, Take 625 mg by mouth 2 (Two) Times a Day With Meals., Disp: , Rfl:   •  DULoxetine (CYMBALTA) 30 MG capsule, Take 30 mg by mouth Daily., Disp: , Rfl:   •  famotidine (PEPCID) 40 MG tablet, Take 40 mg by mouth Daily., Disp: , Rfl:   •  ferrous sulfate 325 (65 FE) MG tablet, Take 325 mg by mouth Daily With Breakfast., Disp: , Rfl:   •  gabapentin (NEURONTIN) 100 MG capsule, Take 200 mg by mouth Every Night., Disp: , Rfl:   •  lisinopril (PRINIVIL,ZESTRIL) 40 MG tablet, Take 40 mg by mouth Daily. LD 5/9, Disp: , Rfl:   •  metoclopramide (REGLAN) 5 MG tablet, Take 5 mg by mouth 2 (two) times a day., Disp: , Rfl:   •  metoprolol succinate XL (TOPROL-XL) 50 MG 24 hr tablet, Take 50 mg by mouth Daily. Take DOS, Disp: , Rfl:   •  montelukast (SINGULAIR) 10 MG tablet, Take 10 mg by mouth Every Night., Disp: , Rfl:   •  omeprazole (priLOSEC) 40 MG capsule, Take 40 mg by mouth Daily., Disp: , Rfl:   •  sucralfate (CARAFATE) 1 g tablet, Take 1 g by mouth 2 (Two) Times a Day., Disp: , Rfl:   •  albuterol sulfate  (90 Base) MCG/ACT inhaler, Inhale 2 puffs Every 6 (Six) Hours As Needed., Disp: , Rfl:   •  azelastine (ASTELIN) 0.1 % nasal spray, 2 sprays into the nostril(s) as directed by provider 2 (Two) Times a Day. Use in each nostril as directed, Disp: , Rfl:   •  diazePAM (VALIUM) 2 MG tablet, Take 1 tablet by mouth Every 6 (Six) Hours As Needed for Anxiety or Muscle Spasms for up to 4  days., Disp: 15 tablet, Rfl: 0  •  ipratropium-albuterol (DUO-NEB) 0.5-2.5 mg/3 ml nebulizer, Take 3 mL by nebulization Every 6 (Six) Hours., Disp: , Rfl:   •  ondansetron (ZOFRAN) 4 MG tablet, Take 1 tablet by mouth Every 6 (Six) Hours As Needed for Nausea or Vomiting., Disp: 30 tablet, Rfl: 1  •  oxyCODONE (ROXICODONE) 5 MG immediate release tablet, Take 1 tablet by mouth Every 4 (Four) Hours As Needed for Moderate Pain  for up to 7 days., Disp: 30 tablet, Rfl: 0    I have reviewed medication list.    Assessment/Plan     Principal Problem:    Malignant neoplasm of upper-outer quadrant of right breast in female, estrogen receptor positive (CMS/HCC) - s/p Modified radical mastectomy to right and simple mastectomy to left    HTN - continue home medicine and monitor    Asthma - stable , continue nebs    Generalized weakness - PT/OT    Stress ulcer/DVT prophylaxis              Bed will be available in AM for discharge       Plan for disposition: rehab at discharge    Candi Benson MD  06/13/21  14:27 EDT

## 2021-06-13 NOTE — PROGRESS NOTES
LOS: 0 days   Patient Care Team:  Candi Benson MD as PCP - General (Family Medicine)        Subjective  - sitting and having breakfast    Interval History:     Patient Complaints: denies any pain    Review of Systems   Constitutional: Positive for activity change, appetite change and fatigue.   Eyes: Negative.    Respiratory: Negative.    Cardiovascular: Negative.    Gastrointestinal: Negative.    Endocrine: Negative.    Genitourinary: Negative.    Musculoskeletal: Positive for gait problem.   Neurological: Positive for weakness.   Psychiatric/Behavioral: The patient is nervous/anxious.         Objective     Vital Signs  Temp:  [98.4 °F (36.9 °C)-98.6 °F (37 °C)] 98.6 °F (37 °C)  Heart Rate:  [77-97] 87  Resp:  [16-18] 16  BP: (105-149)/(66-95) 138/86    Physical Exam:     General Appearance:    Alert, cooperative, in no acute distress   Ears:    Ears appear intact with no abnormalities noted   Throat:   No oral lesions, no thrush, oral mucosa moist   Neck:   No adenopathy, supple, trachea midline, no thyromegaly, no   carotid bruit, no JVD   Lungs:     Clear to auscultation,respirations regular, even and                  Unlabored     Heart:    Regular rhythm and normal rate, normal S1 and S2, no            murmur, no gallop, no rub, no click   Abdomen:     Normal bowel sounds, no masses, no organomegaly, soft        non-tender, non-distended, no guarding, no rebound                tenderness   Extremities:   Moves all extremities well, no edema, no cyanosis, no             redness   Skin:   No bleeding, bruising or rash   Neurologic:   Cranial nerves 2 - 12 grossly intact, sensation intact, DTR       present and equal bilaterally        Results Review:    Lab Results (last 24 hours)     Procedure Component Value Units Date/Time    POC Glucose Once [[id number]]  (Normal) Collected: 06/12/21 2258    Specimen: Blood Updated: 06/12/21 2259     Glucose 100 mg/dL      Comment: Serial Number:  [id number]:  [id number]       Comprehensive Metabolic Panel [[id number]]  (Abnormal) Collected: 06/12/21 0526    Specimen: Blood Updated: 06/12/21 0612     Glucose 95 mg/dL      BUN 7 mg/dL      Creatinine 0.42 mg/dL      Sodium 141 mmol/L      Potassium 3.8 mmol/L      Chloride 112 mmol/L      CO2 25.0 mmol/L      Calcium 8.0 mg/dL      Total Protein 5.0 g/dL      Albumin 3.20 g/dL      ALT (SGPT) 14 U/L      AST (SGOT) 18 U/L      Alkaline Phosphatase 70 U/L      Total Bilirubin 0.2 mg/dL      eGFR Non African Amer 149 mL/min/1.73      Globulin 1.8 gm/dL      A/G Ratio 1.8 g/dL      BUN/Creatinine Ratio 16.7     Anion Gap 4.0 mmol/L     Narrative:      GFR Normal >60  Chronic Kidney Disease <60  Kidney Failure <15      CBC & Differential [[id number]]  (Abnormal) Collected: 06/12/21 0526    Specimen: Blood Updated: 06/12/21 0543    Narrative:      The following orders were created for panel order CBC & Differential.  Procedure                               Abnormality         Status                     ---------                               -----------         ------                     CBC Auto Differential[[id number]]        Abnormal            Final result                 Please view results for these tests on the individual orders.    CBC Auto Differential [[id number]]  (Abnormal) Collected: 06/12/21 0526    Specimen: Blood Updated: 06/12/21 0543     WBC 5.70 10*3/mm3      RBC 3.44 10*6/mm3      Hemoglobin 10.6 g/dL      Hematocrit 31.8 %      MCV 92.6 fL      MCH 30.8 pg      MCHC 33.2 g/dL      RDW 14.1 %      RDW-SD 45.9 fl      MPV 7.8 fL      Platelets 158 10*3/mm3      Neutrophil % 61.1 %      Lymphocyte % 25.6 %      Monocyte % 7.9 %      Eosinophil % 4.7 %      Basophil % 0.7 %      Neutrophils, Absolute 3.50 10*3/mm3      Lymphocytes, Absolute 1.50 10*3/mm3      Monocytes, Absolute 0.50 10*3/mm3      Eosinophils, Absolute 0.30 10*3/mm3      Basophils, Absolute 0.00 10*3/mm3      nRBC 0.1 /100 WBC           Imaging Results (Last 24 Hours)     ** No results found for the last 24 hours. **           I reviewed the patient's other test results and agree with the interpretation    Medication Review:     Current Facility-Administered Medications:   •  acetaminophen (TYLENOL) tablet 1,000 mg, 1,000 mg, Oral, Q8H, Kenrick Ragsdale MD, 1,000 mg at 06/12/21 1716  •  albuterol (PROVENTIL) nebulizer solution 0.083% 2.5 mg/3mL, 2.5 mg, Nebulization, Q4H PRN, Kenrick Ragsdale MD  •  atorvastatin (LIPITOR) tablet 20 mg, 20 mg, Oral, Daily, Kenrick Ragsdale MD, 20 mg at 06/12/21 0740  •  azelastine (ASTELIN) nasal spray 2 spray, 2 spray, Each Nare, BID, Kenrick Ragsdale MD, 2 spray at 06/12/21 2051  •  colesevelam (WELCHOL) tablet 625 mg, 625 mg, Oral, BID With Meals, Kenrick Ragsdale MD, 625 mg at 06/12/21 1716  •  diazePAM (VALIUM) tablet 2 mg, 2 mg, Oral, Q6H PRN, Kenrick Ragsdale MD  •  DULoxetine (CYMBALTA) DR capsule 30 mg, 30 mg, Oral, Daily, Kenrick Ragsdale MD, 30 mg at 06/12/21 0739  •  ferrous sulfate EC tablet 324 mg, 324 mg, Oral, Daily With Breakfast, Kenrick Ragsdale MD, 324 mg at 06/12/21 0740  •  gabapentin (NEURONTIN) capsule 200 mg, 200 mg, Oral, Nightly, Kenrick Ragsdale MD, 200 mg at 06/12/21 2051  •  ipratropium-albuterol (DUO-NEB) nebulizer solution 3 mL, 3 mL, Nebulization, Q6H, Kenrick Ragsdale MD, 3 mL at 06/12/21 2347  •  lisinopril (PRINIVIL,ZESTRIL) tablet 40 mg, 40 mg, Oral, Daily, Kenrick Ragsdale MD, 40 mg at 06/12/21 0740  •  metoclopramide (REGLAN) tablet 5 mg, 5 mg, Oral, BID AC, Kenrick Ragsdale MD, 5 mg at 06/12/21 1717  •  metoprolol succinate XL (TOPROL-XL) 24 hr tablet 50 mg, 50 mg, Oral, Daily, Kenrick Ragsdale MD, 50 mg at 06/12/21 0740  •  montelukast (SINGULAIR) tablet 10 mg, 10 mg, Oral, Nightly, Kenrick Ragsdale MD, 10 mg at 06/12/21 2051  •  Morphine sulfate (PF)  injection 4 mg, 4 mg, Intravenous, Q4H PRN, Kenrick Ragsdale MD  •  ondansetron (ZOFRAN) tablet 4 mg, 4 mg, Oral, Q6H PRN **OR** ondansetron (ZOFRAN) injection 4 mg, 4 mg, Intravenous, Q6H PRN, Kenrick Ragsdale MD  •  oxyCODONE (ROXICODONE) immediate release tablet 5 mg, 5 mg, Oral, Q4H PRN **OR** oxyCODONE (ROXICODONE) immediate release tablet 10 mg, 10 mg, Oral, Q4H PRN, Kenrick Ragsdale MD, 10 mg at 06/12/21 0329  •  pantoprazole (PROTONIX) EC tablet 40 mg, 40 mg, Oral, QAM, Kenrick Ragsdale MD, 40 mg at 06/12/21 0740  •  promethazine (PHENERGAN) tablet 12.5 mg, 12.5 mg, Oral, Q6H PRN **OR** promethazine (PHENERGAN) suppository 12.5 mg, 12.5 mg, Rectal, Q6H PRN, Kenrick Ragsdale MD  •  sodium chloride 0.9 % infusion, 100 mL/hr, Intravenous, Continuous, Kenrick Ragsdale MD, Last Rate: 100 mL/hr at 06/12/21 2051, 100 mL/hr at 06/12/21 2051  •  sucralfate (CARAFATE) tablet 1 g, 1 g, Oral, BID, Kenrick Ragsdale MD, 1 g at 06/12/21 2051    I have reviewed medication list.    Assessment/Plan     Principal Problem:    Malignant neoplasm of upper-outer quadrant of right breast in female, estrogen receptor positive (CMS/HCC) - s/p Modified radical mastectomy to right and simple mastectomy to left    HTN - continue home medicine and monitor    Asthma - stable , continue nebs    Generalized weakness - PT/OT    Stress ulcer/DVT prophylaxis              Bed will be available in AM for discharge       Plan for disposition: rehab at discharge    Candi Benson MD  06/12/21  23:56 EDT

## 2021-06-14 NOTE — CASE MANAGEMENT/SOCIAL WORK
Case Management Discharge Note      Final Note: [facility]      Selected Continued Care - Discharged on 6/13/2021 Admission date: 6/10/2021 - Discharge disposition: Rehab Facility or Unit (DC - External)    Destination Coordination complete    Service Provider Selected Services Address Phone Fax Patient Preferred    [facility]  Skilled Nursing [address] [place] [address] [phone] --           Final Discharge Disposition Code: 03 - skilled nursing facility (SNF)

## 2021-06-21 ENCOUNTER — TELEPHONE (OUTPATIENT)
Dept: ONCOLOGY | Facility: CLINIC | Age: 71
End: 2021-06-21

## 2021-06-21 NOTE — TELEPHONE ENCOUNTER
I called and left a message for the patient to return my call to schedule her a follow up appointment with Dr. Stapleton this week.

## 2021-06-23 ENCOUNTER — OFFICE VISIT (OUTPATIENT)
Dept: SURGERY | Facility: CLINIC | Age: 71
End: 2021-06-23

## 2021-06-23 VITALS
WEIGHT: 155.4 LBS | TEMPERATURE: 97.8 F | RESPIRATION RATE: 18 BRPM | OXYGEN SATURATION: 95 % | BODY MASS INDEX: 31.33 KG/M2 | HEART RATE: 99 BPM | DIASTOLIC BLOOD PRESSURE: 88 MMHG | HEIGHT: 59 IN | SYSTOLIC BLOOD PRESSURE: 132 MMHG

## 2021-06-23 DIAGNOSIS — C50.411 MALIGNANT NEOPLASM OF UPPER-OUTER QUADRANT OF RIGHT BREAST IN FEMALE, ESTROGEN RECEPTOR POSITIVE (HCC): Primary | ICD-10-CM

## 2021-06-23 DIAGNOSIS — Z17.0 MALIGNANT NEOPLASM OF UPPER-OUTER QUADRANT OF RIGHT BREAST IN FEMALE, ESTROGEN RECEPTOR POSITIVE (HCC): Primary | ICD-10-CM

## 2021-06-23 PROCEDURE — 99024 POSTOP FOLLOW-UP VISIT: CPT | Performed by: SURGERY

## 2021-06-23 NOTE — PROGRESS NOTES
"Post-op Note    Subjective   Monse Donnelly is a 70 y.o. female status post modified radical mastectomy on the right and prophylactic left mastectomy on 6/10/2021.  Overall, the patient has done well postoperatively.  She was discharged to rehab where she has stayed since surgery.  Her drain outputs are decreasing in less than 50 cc/day per drain.  She is not having any fevers, significant pain, or difficulty moving her arms.      Objective   /88 (BP Location: Left arm, Patient Position: Sitting, Cuff Size: Adult)   Pulse 99   Temp 97.8 °F (36.6 °C) (Infrared)   Resp 18   Ht 149.9 cm (59\")   Wt 70.5 kg (155 lb 6.4 oz)   SpO2 95%   BMI 31.39 kg/m²   Incisions are well-healed without any surrounding erythema, induration, or drainage to suggest infection.  Her surgical drains have an appropriate amount of fluid coming out of them.  These were removed at bedside today.      Assessment/Plan   Patient is a 70-year-old lady status post modified radical mastectomy on the right and prophylactic mastectomy on the left performed on 6/10/2021.  Overall, she is doing quite well.    Pathology reviewed with patient which demonstrates:  Specimen #1 (Breast, right, modified radical mastectomy):    Residual microinvasive ductal carcinoma    See synoptic template for additional details     Specimen #2 (Breast, left, mastectomy):    Benign breast tissue with fibrocystic changes    Breast nipple and skin with no significant pathologic changes     No malignancy identified   Recommend continued follow-up with occupational therapy as an outpatient for lymphedema management with the right arm especially with axillary lymph node dissection being performed.  Patient to follow-up with Dr. Haney to discuss adjuvant chemotherapy  Patient can follow-up with radiation oncology, however do not believe that she will need any radiation therapy.  Follow-up with me in 6 months    Kenrick Ragsdale MD  6/23/2021  13:53 EDT  "

## 2021-06-24 ENCOUNTER — NURSE NAVIGATOR (OUTPATIENT)
Dept: ONCOLOGY | Facility: CLINIC | Age: 71
End: 2021-06-24

## 2021-06-24 ENCOUNTER — TELEPHONE (OUTPATIENT)
Dept: ONCOLOGY | Facility: CLINIC | Age: 71
End: 2021-06-24

## 2021-06-24 NOTE — TELEPHONE ENCOUNTER
The patient called me today to get her scheduled for a follow up with Dr. Stapleton.  She stated that she was notified that there was an appointment available for Monday at 1:00pm and she stated that she found someone to bring her at that time.  I spoke with Cheryl and she stated that the appointment is still available and that she would place the patient on the schedule.  The patient was notified of the appointment.

## 2021-06-24 NOTE — PROGRESS NOTES
I accompanied the patient to her appointment with Dr. Ragsdale on 6/23/2021.  The patient has been in rehab at [facility] in [place], [place] after having bilateral mastectomy.  The patient is healing well and her drains were removed.  The patient states that she is doing well.  I discussed with the patient the need to follow up with Dr. Stapleton.  She stated that she would be discharged from [facility] on 6/24/2021 and requested that I call her then.

## 2021-06-28 ENCOUNTER — OFFICE VISIT (OUTPATIENT)
Dept: RADIATION ONCOLOGY | Facility: HOSPITAL | Age: 71
End: 2021-06-28

## 2021-06-28 VITALS
HEART RATE: 78 BPM | DIASTOLIC BLOOD PRESSURE: 101 MMHG | BODY MASS INDEX: 29.07 KG/M2 | SYSTOLIC BLOOD PRESSURE: 161 MMHG | RESPIRATION RATE: 18 BRPM | HEIGHT: 59 IN | WEIGHT: 144.2 LBS | OXYGEN SATURATION: 98 % | TEMPERATURE: 98.2 F

## 2021-06-28 DIAGNOSIS — C50.411 MALIGNANT NEOPLASM OF UPPER-OUTER QUADRANT OF RIGHT BREAST IN FEMALE, ESTROGEN RECEPTOR POSITIVE (HCC): Primary | ICD-10-CM

## 2021-06-28 DIAGNOSIS — Z17.0 MALIGNANT NEOPLASM OF UPPER-OUTER QUADRANT OF RIGHT BREAST IN FEMALE, ESTROGEN RECEPTOR POSITIVE (HCC): Primary | ICD-10-CM

## 2021-06-28 PROCEDURE — 99213 OFFICE O/P EST LOW 20 MIN: CPT | Performed by: RADIOLOGY

## 2021-09-21 ENCOUNTER — OFFICE (OUTPATIENT)
Dept: URBAN - METROPOLITAN AREA CLINIC 64 | Facility: CLINIC | Age: 71
End: 2021-09-21
Payer: OTHER GOVERNMENT

## 2021-09-21 VITALS
HEIGHT: 59 IN | SYSTOLIC BLOOD PRESSURE: 129 MMHG | DIASTOLIC BLOOD PRESSURE: 87 MMHG | HEART RATE: 78 BPM | WEIGHT: 147 LBS

## 2021-09-21 DIAGNOSIS — D50.9 IRON DEFICIENCY ANEMIA, UNSPECIFIED: ICD-10-CM

## 2021-09-21 DIAGNOSIS — R13.10 DYSPHAGIA, UNSPECIFIED: ICD-10-CM

## 2021-09-21 PROCEDURE — 99213 OFFICE O/P EST LOW 20 MIN: CPT | Performed by: NURSE PRACTITIONER

## 2021-10-14 ENCOUNTER — ON CAMPUS - OUTPATIENT (OUTPATIENT)
Dept: URBAN - METROPOLITAN AREA HOSPITAL 77 | Facility: HOSPITAL | Age: 71
End: 2021-10-14
Payer: OTHER GOVERNMENT

## 2021-10-14 DIAGNOSIS — K92.1 MELENA: ICD-10-CM

## 2021-10-14 DIAGNOSIS — R13.10 DYSPHAGIA, UNSPECIFIED: ICD-10-CM

## 2021-10-14 DIAGNOSIS — K44.9 DIAPHRAGMATIC HERNIA WITHOUT OBSTRUCTION OR GANGRENE: ICD-10-CM

## 2021-10-14 DIAGNOSIS — R10.9 UNSPECIFIED ABDOMINAL PAIN: ICD-10-CM

## 2021-10-14 PROCEDURE — 43239 EGD BIOPSY SINGLE/MULTIPLE: CPT | Performed by: INTERNAL MEDICINE

## 2021-11-03 DIAGNOSIS — C50.411 MALIGNANT NEOPLASM OF UPPER-OUTER QUADRANT OF RIGHT BREAST IN FEMALE, ESTROGEN RECEPTOR POSITIVE (HCC): Primary | ICD-10-CM

## 2021-11-03 DIAGNOSIS — Z17.0 MALIGNANT NEOPLASM OF UPPER-OUTER QUADRANT OF RIGHT BREAST IN FEMALE, ESTROGEN RECEPTOR POSITIVE (HCC): Primary | ICD-10-CM

## 2021-12-27 ENCOUNTER — OFFICE VISIT (OUTPATIENT)
Dept: SURGERY | Facility: CLINIC | Age: 71
End: 2021-12-27

## 2021-12-27 VITALS
SYSTOLIC BLOOD PRESSURE: 142 MMHG | HEART RATE: 74 BPM | OXYGEN SATURATION: 97 % | TEMPERATURE: 98 F | RESPIRATION RATE: 18 BRPM | WEIGHT: 154.4 LBS | HEIGHT: 59 IN | BODY MASS INDEX: 31.12 KG/M2 | DIASTOLIC BLOOD PRESSURE: 91 MMHG

## 2021-12-27 DIAGNOSIS — N64.89 OTHER SPECIFIED DISORDERS OF BREAST: ICD-10-CM

## 2021-12-27 DIAGNOSIS — Z95.828 PORT-A-CATH IN PLACE: Primary | ICD-10-CM

## 2021-12-27 PROCEDURE — 99214 OFFICE O/P EST MOD 30 MIN: CPT | Performed by: SURGERY

## 2021-12-27 RX ORDER — SERTRALINE HYDROCHLORIDE 100 MG/1
100 TABLET, FILM COATED ORAL DAILY
COMMUNITY

## 2021-12-27 RX ORDER — SODIUM CHLORIDE 9 MG/ML
100 INJECTION, SOLUTION INTRAVENOUS CONTINUOUS
Status: CANCELLED | OUTPATIENT
Start: 2021-12-27

## 2021-12-27 RX ORDER — CHLORHEXIDINE GLUCONATE 0.12 MG/ML
15 RINSE ORAL EVERY 12 HOURS SCHEDULED
Status: CANCELLED | OUTPATIENT
Start: 2021-12-27

## 2021-12-28 NOTE — PROGRESS NOTES
GENERAL SURGERY ESTABLISHED PATIENT NOTE    Patient Care Team:  Candi Benson MD as PCP - General (Family Medicine)    Reason for consult: Follow-up from mastectomy    Subjective     Patient is a 71 y.o. female presents as a follow-up after undergoing a modified radical mastectomy on the right and left mastectomy.  Overall, the patient is doing well.  She denies any issues.  She is not noticing swelling in her right arm.  She still has her Port-A-Cath in place on the left, but states that it is not functioning.     Review of Systems   Constitutional: Negative for appetite change, chills and fever.   HENT: Negative for congestion and sore throat.    Respiratory: Negative for cough and shortness of breath.    Cardiovascular: Negative for chest pain and palpitations.   Gastrointestinal: Negative for abdominal pain, constipation, diarrhea, nausea, vomiting and GERD.   Genitourinary: Negative for breast lump, breast pain, difficulty urinating, dysuria and frequency.   Musculoskeletal: Negative for arthralgias and back pain.   Skin: Negative for rash and skin lesions.   Neurological: Negative for dizziness, seizures and memory problem.   Hematological: Negative for adenopathy. Does not bruise/bleed easily.   Psychiatric/Behavioral: Negative for sleep disturbance and depressed mood.        History  Past Medical History:   Diagnosis Date   • Arthritis    • Asthma    • Boils of multiple sites 5/28   • Breast cancer (HCC)    • Cancer (HCC)    • COPD (chronic obstructive pulmonary disease) (HCC)    • Coronary artery disease    • Drug therapy    • Elevated cholesterol    • GERD (gastroesophageal reflux disease)    • H/O Bell's palsy    • History of transfusion    • Hypertension    • Neuropathy     feet and hands post chemo   • Sciatic nerve pain    • Sleep apnea      Past Surgical History:   Procedure Laterality Date   • ABDOMINAL SURGERY     • ANKLE SURGERY     • BREAST BIOPSY     • BREAST LUMPECTOMY     • CERVICAL  CONIZATION     • MASTECTOMY Right 6/10/2021    Procedure: MASTECTOMY MODIFIED RADICAL RIGHT;  Surgeon: Kenrick Ragsdale MD;  Location: [facility] MAIN OR;  Service: General;  Laterality: Right;   • SIMPLE MASTECTOMY Left 6/10/2021    Procedure: MASTECTOMY SIMPLE;  Surgeon: Kenrick Ragsdale MD;  Location: [facility] MAIN OR;  Service: General;  Laterality: Left;   • TUBAL ABDOMINAL LIGATION     • VENOUS ACCESS DEVICE (PORT) INSERTION Left 7/30/2020    Procedure: INSERTION VENOUS ACCESS DEVICE LEFT internal jugular;  Surgeon: Taylor Kaye MD;  Location: [facility] MAIN OR;  Service: General;  Laterality: Left;     Family History   Problem Relation Age of Onset   • Heart disease Father    • Stroke Father    • Diabetes Father    • Diabetes Son      Social History     Tobacco Use   • Smoking status: Never Smoker   • Smokeless tobacco: Never Used   Vaping Use   • Vaping Use: Never used   Substance Use Topics   • Alcohol use: Never   • Drug use: Never     (Not in a hospital admission)    Allergies:  Penicillins and Adhesive tape    Objective     Vital Signs       Physical Exam  Vitals reviewed.   Constitutional:       General: She is not in acute distress.     Appearance: She is obese. She is not ill-appearing.   HENT:      Head: Normocephalic and atraumatic.   Cardiovascular:      Rate and Rhythm: Normal rate and regular rhythm.   Pulmonary:      Effort: Pulmonary effort is normal. No respiratory distress.   Chest:   Breasts:      Right: No axillary adenopathy or supraclavicular adenopathy.      Left: No axillary adenopathy or supraclavicular adenopathy.        Comments: Bilateral mastectomy incisions are well healed without any surrounding erythema, induration, or drainage to suggest infection.  There is no palpable masses beneath the subcutaneous skin flaps superiorly or inferiorly bilaterally.  There is a left IJ Port-A-Cath in place without any surrounding erythema, duration, or drainage to suggest  infection.  Abdominal:      General: There is no distension.      Palpations: Abdomen is soft.      Tenderness: There is no abdominal tenderness.   Musculoskeletal:         General: No swelling. Normal range of motion.   Lymphadenopathy:      Upper Body:      Right upper body: No supraclavicular or axillary adenopathy.      Left upper body: No supraclavicular or axillary adenopathy.   Skin:     General: Skin is warm and dry.      Coloration: Skin is not jaundiced.   Neurological:      General: No focal deficit present.      Mental Status: She is alert and oriented to person, place, and time.   Psychiatric:         Mood and Affect: Mood normal.         Behavior: Behavior normal.         Thought Content: Thought content normal.         Judgment: Judgment normal.         Results Review:   Lab Results (last 24 hours)     ** No results found for the last 24 hours. **        No radiology results for the last day      I reviewed the patient's new imaging results and agree with the interpretation.  I reviewed the patient's other test results and agree with the interpretation    Assessment/Plan     Active Problems:  Right breast cancer  Port-A-Cath in place    Patient states that she is no longer using her Port-A-Cath.  We discussed the risk, benefits, and alternatives to Port-A-Cath removal, the patient agrees to proceed to the operating room for removal of her Port-A-Cath.  Overall she is doing well without any complaints.  Follow-up with me in 6 months after removal of her Port-A-Cath    I discussed the patients findings and my recommendations with the patient.     Kenrick Ragsdale MD  12/28/21  17:12 EST

## 2022-01-04 ENCOUNTER — LAB (OUTPATIENT)
Dept: LAB | Facility: HOSPITAL | Age: 72
End: 2022-01-04

## 2022-01-04 ENCOUNTER — HOSPITAL ENCOUNTER (OUTPATIENT)
Dept: CARDIOLOGY | Facility: HOSPITAL | Age: 72
Discharge: HOME | End: 2022-01-04

## 2022-01-04 ENCOUNTER — HOSPITAL ENCOUNTER (OUTPATIENT)
Dept: GENERAL RADIOLOGY | Facility: HOSPITAL | Age: 72
Discharge: HOME | End: 2022-01-04

## 2022-01-04 DIAGNOSIS — Z95.828 PORT-A-CATH IN PLACE: ICD-10-CM

## 2022-01-04 DIAGNOSIS — N64.89 OTHER SPECIFIED DISORDERS OF BREAST: ICD-10-CM

## 2022-01-04 LAB
ALBUMIN SERPL-MCNC: 4.4 G/DL (ref 3.5–5.2)
ALBUMIN/GLOB SERPL: 2 G/DL
ALP SERPL-CCNC: 68 U/L (ref 39–117)
ALT SERPL W P-5'-P-CCNC: 22 U/L (ref 1–33)
ANION GAP SERPL CALCULATED.3IONS-SCNC: 10.4 MMOL/L (ref 5–15)
APTT PPP: 22.7 SECONDS (ref 24–31)
AST SERPL-CCNC: 23 U/L (ref 1–32)
BASOPHILS # BLD AUTO: 0.06 10*3/MM3 (ref 0–0.2)
BASOPHILS NFR BLD AUTO: 1.1 % (ref 0–1.5)
BILIRUB SERPL-MCNC: 0.2 MG/DL (ref 0–1.2)
BUN SERPL-MCNC: 13 MG/DL (ref 8–23)
BUN/CREAT SERPL: 20.6 (ref 7–25)
CALCIUM SPEC-SCNC: 9.6 MG/DL (ref 8.6–10.5)
CHLORIDE SERPL-SCNC: 106 MMOL/L (ref 98–107)
CO2 SERPL-SCNC: 25.6 MMOL/L (ref 22–29)
CREAT SERPL-MCNC: 0.63 MG/DL (ref 0.57–1)
DEPRECATED RDW RBC AUTO: 42 FL (ref 37–54)
EOSINOPHIL # BLD AUTO: 0.16 10*3/MM3 (ref 0–0.4)
EOSINOPHIL NFR BLD AUTO: 2.8 % (ref 0.3–6.2)
ERYTHROCYTE [DISTWIDTH] IN BLOOD BY AUTOMATED COUNT: 12.8 % (ref 12.3–15.4)
GFR SERPL CREATININE-BSD FRML MDRD: 93 ML/MIN/1.73
GLOBULIN UR ELPH-MCNC: 2.2 GM/DL
GLUCOSE SERPL-MCNC: 86 MG/DL (ref 65–99)
HCT VFR BLD AUTO: 40.6 % (ref 34–46.6)
HGB BLD-MCNC: 13.8 G/DL (ref 12–15.9)
IMM GRANULOCYTES # BLD AUTO: 0.01 10*3/MM3 (ref 0–0.05)
IMM GRANULOCYTES NFR BLD AUTO: 0.2 % (ref 0–0.5)
LYMPHOCYTES # BLD AUTO: 1.92 10*3/MM3 (ref 0.7–3.1)
LYMPHOCYTES NFR BLD AUTO: 33.9 % (ref 19.6–45.3)
MCH RBC QN AUTO: 30.9 PG (ref 26.6–33)
MCHC RBC AUTO-ENTMCNC: 34 G/DL (ref 31.5–35.7)
MCV RBC AUTO: 90.8 FL (ref 79–97)
MONOCYTES # BLD AUTO: 0.47 10*3/MM3 (ref 0.1–0.9)
MONOCYTES NFR BLD AUTO: 8.3 % (ref 5–12)
NEUTROPHILS NFR BLD AUTO: 3.04 10*3/MM3 (ref 1.7–7)
NEUTROPHILS NFR BLD AUTO: 53.7 % (ref 42.7–76)
NRBC BLD AUTO-RTO: 0 /100 WBC (ref 0–0.2)
PLATELET # BLD AUTO: 191 10*3/MM3 (ref 140–450)
PMV BLD AUTO: 11 FL (ref 6–12)
POTASSIUM SERPL-SCNC: 4.2 MMOL/L (ref 3.5–5.2)
PROT SERPL-MCNC: 6.6 G/DL (ref 6–8.5)
RBC # BLD AUTO: 4.47 10*6/MM3 (ref 3.77–5.28)
SODIUM SERPL-SCNC: 142 MMOL/L (ref 136–145)
WBC NRBC COR # BLD: 5.66 10*3/MM3 (ref 3.4–10.8)

## 2022-01-04 PROCEDURE — 80053 COMPREHEN METABOLIC PANEL: CPT

## 2022-01-04 PROCEDURE — 85730 THROMBOPLASTIN TIME PARTIAL: CPT

## 2022-01-04 PROCEDURE — 85025 COMPLETE CBC W/AUTO DIFF WBC: CPT

## 2022-01-04 PROCEDURE — U0005 INFEC AGEN DETEC AMPLI PROBE: HCPCS

## 2022-01-04 PROCEDURE — U0004 COV-19 TEST NON-CDC HGH THRU: HCPCS

## 2022-01-04 PROCEDURE — 36415 COLL VENOUS BLD VENIPUNCTURE: CPT

## 2022-01-04 PROCEDURE — C9803 HOPD COVID-19 SPEC COLLECT: HCPCS

## 2022-01-04 PROCEDURE — 93010 ELECTROCARDIOGRAM REPORT: CPT | Performed by: INTERNAL MEDICINE

## 2022-01-04 PROCEDURE — 93005 ELECTROCARDIOGRAM TRACING: CPT | Performed by: SURGERY

## 2022-01-04 PROCEDURE — 71046 X-RAY EXAM CHEST 2 VIEWS: CPT

## 2022-01-05 LAB
QT INTERVAL: 420 MS
SARS-COV-2 ORF1AB RESP QL NAA+PROBE: NOT DETECTED

## 2022-01-06 ENCOUNTER — ANESTHESIA (OUTPATIENT)
Dept: PERIOP | Facility: HOSPITAL | Age: 72
End: 2022-01-06

## 2022-01-06 ENCOUNTER — ANESTHESIA EVENT (OUTPATIENT)
Dept: PERIOP | Facility: HOSPITAL | Age: 72
End: 2022-01-06

## 2022-01-06 ENCOUNTER — HOSPITAL ENCOUNTER (OUTPATIENT)
Facility: HOSPITAL | Age: 72
Setting detail: HOSPITAL OUTPATIENT SURGERY
Discharge: HOME | End: 2022-01-06
Attending: SURGERY | Admitting: SURGERY

## 2022-01-06 VITALS
SYSTOLIC BLOOD PRESSURE: 135 MMHG | BODY MASS INDEX: 31.17 KG/M2 | OXYGEN SATURATION: 93 % | WEIGHT: 154.6 LBS | DIASTOLIC BLOOD PRESSURE: 87 MMHG | RESPIRATION RATE: 16 BRPM | HEIGHT: 59 IN | HEART RATE: 73 BPM | TEMPERATURE: 95.7 F

## 2022-01-06 DIAGNOSIS — Z95.828 PORT-A-CATH IN PLACE: ICD-10-CM

## 2022-01-06 PROBLEM — Z45.2 ENCOUNTER FOR CARE RELATED TO VASCULAR ACCESS PORT: Status: RESOLVED | Noted: 2020-08-17 | Resolved: 2022-01-06

## 2022-01-06 PROCEDURE — 25010000002 PROPOFOL 500 MG/50ML EMULSION: Performed by: ANESTHESIOLOGY

## 2022-01-06 PROCEDURE — 36590 REMOVAL TUNNELED CV CATH: CPT | Performed by: SURGERY

## 2022-01-06 PROCEDURE — 25010000002 FENTANYL CITRATE (PF) 100 MCG/2ML SOLUTION: Performed by: ANESTHESIOLOGY

## 2022-01-06 PROCEDURE — 25010000002 PROPOFOL 200 MG/20ML EMULSION: Performed by: ANESTHESIOLOGY

## 2022-01-06 RX ORDER — PROPOFOL 10 MG/ML
INJECTION, EMULSION INTRAVENOUS CONTINUOUS PRN
Status: DISCONTINUED | OUTPATIENT
Start: 2022-01-06 | End: 2022-01-06 | Stop reason: SURG

## 2022-01-06 RX ORDER — SODIUM CHLORIDE 9 MG/ML
100 INJECTION, SOLUTION INTRAVENOUS CONTINUOUS
Status: DISCONTINUED | OUTPATIENT
Start: 2022-01-06 | End: 2022-01-06 | Stop reason: HOSPADM

## 2022-01-06 RX ORDER — ONDANSETRON 2 MG/ML
4 INJECTION INTRAMUSCULAR; INTRAVENOUS ONCE AS NEEDED
Status: DISCONTINUED | OUTPATIENT
Start: 2022-01-06 | End: 2022-01-06 | Stop reason: HOSPADM

## 2022-01-06 RX ORDER — DIPHENHYDRAMINE HYDROCHLORIDE 50 MG/ML
12.5 INJECTION INTRAMUSCULAR; INTRAVENOUS
Status: DISCONTINUED | OUTPATIENT
Start: 2022-01-06 | End: 2022-01-06 | Stop reason: HOSPADM

## 2022-01-06 RX ORDER — PROPOFOL 10 MG/ML
INJECTION, EMULSION INTRAVENOUS AS NEEDED
Status: DISCONTINUED | OUTPATIENT
Start: 2022-01-06 | End: 2022-01-06 | Stop reason: SURG

## 2022-01-06 RX ORDER — MORPHINE SULFATE 4 MG/ML
2 INJECTION, SOLUTION INTRAMUSCULAR; INTRAVENOUS
Status: DISCONTINUED | OUTPATIENT
Start: 2022-01-06 | End: 2022-01-06 | Stop reason: HOSPADM

## 2022-01-06 RX ORDER — DIPHENHYDRAMINE HCL 25 MG
25 CAPSULE ORAL
Status: DISCONTINUED | OUTPATIENT
Start: 2022-01-06 | End: 2022-01-06 | Stop reason: HOSPADM

## 2022-01-06 RX ORDER — CLINDAMYCIN PHOSPHATE 900 MG/50ML
900 INJECTION, SOLUTION INTRAVENOUS ONCE
Status: DISCONTINUED | OUTPATIENT
Start: 2022-01-06 | End: 2022-01-06 | Stop reason: HOSPADM

## 2022-01-06 RX ORDER — ACETAMINOPHEN 325 MG/1
650 TABLET ORAL ONCE AS NEEDED
Status: DISCONTINUED | OUTPATIENT
Start: 2022-01-06 | End: 2022-01-06 | Stop reason: HOSPADM

## 2022-01-06 RX ORDER — ACETAMINOPHEN 650 MG/1
650 SUPPOSITORY RECTAL ONCE AS NEEDED
Status: DISCONTINUED | OUTPATIENT
Start: 2022-01-06 | End: 2022-01-06 | Stop reason: HOSPADM

## 2022-01-06 RX ORDER — SODIUM CHLORIDE, SODIUM LACTATE, POTASSIUM CHLORIDE, AND CALCIUM CHLORIDE .6; .31; .03; .02 G/100ML; G/100ML; G/100ML; G/100ML
50 INJECTION, SOLUTION INTRAVENOUS ONCE
Status: DISCONTINUED | OUTPATIENT
Start: 2022-01-06 | End: 2022-01-06 | Stop reason: HOSPADM

## 2022-01-06 RX ORDER — LIDOCAINE HYDROCHLORIDE AND EPINEPHRINE 10; 10 MG/ML; UG/ML
INJECTION, SOLUTION INFILTRATION; PERINEURAL AS NEEDED
Status: DISCONTINUED | OUTPATIENT
Start: 2022-01-06 | End: 2022-01-06 | Stop reason: HOSPADM

## 2022-01-06 RX ORDER — IPRATROPIUM BROMIDE AND ALBUTEROL SULFATE 2.5; .5 MG/3ML; MG/3ML
3 SOLUTION RESPIRATORY (INHALATION) ONCE AS NEEDED
Status: DISCONTINUED | OUTPATIENT
Start: 2022-01-06 | End: 2022-01-06 | Stop reason: HOSPADM

## 2022-01-06 RX ORDER — LORAZEPAM 2 MG/ML
0.5 INJECTION INTRAMUSCULAR
Status: DISCONTINUED | OUTPATIENT
Start: 2022-01-06 | End: 2022-01-06 | Stop reason: HOSPADM

## 2022-01-06 RX ORDER — MEPERIDINE HYDROCHLORIDE 25 MG/ML
12.5 INJECTION INTRAMUSCULAR; INTRAVENOUS; SUBCUTANEOUS
Status: DISCONTINUED | OUTPATIENT
Start: 2022-01-06 | End: 2022-01-06 | Stop reason: HOSPADM

## 2022-01-06 RX ORDER — CLINDAMYCIN PHOSPHATE 900 MG/50ML
INJECTION, SOLUTION INTRAVENOUS AS NEEDED
Status: DISCONTINUED | OUTPATIENT
Start: 2022-01-06 | End: 2022-01-06 | Stop reason: SURG

## 2022-01-06 RX ORDER — MIDAZOLAM HYDROCHLORIDE 1 MG/ML
1 INJECTION INTRAMUSCULAR; INTRAVENOUS
Status: DISCONTINUED | OUTPATIENT
Start: 2022-01-06 | End: 2022-01-06 | Stop reason: HOSPADM

## 2022-01-06 RX ORDER — NALOXONE HCL 0.4 MG/ML
0.4 VIAL (ML) INJECTION AS NEEDED
Status: DISCONTINUED | OUTPATIENT
Start: 2022-01-06 | End: 2022-01-06 | Stop reason: HOSPADM

## 2022-01-06 RX ORDER — SODIUM CHLORIDE, SODIUM LACTATE, POTASSIUM CHLORIDE, CALCIUM CHLORIDE 600; 310; 30; 20 MG/100ML; MG/100ML; MG/100ML; MG/100ML
INJECTION, SOLUTION INTRAVENOUS CONTINUOUS PRN
Status: DISCONTINUED | OUTPATIENT
Start: 2022-01-06 | End: 2022-01-06 | Stop reason: SURG

## 2022-01-06 RX ORDER — PROMETHAZINE HYDROCHLORIDE 25 MG/1
25 SUPPOSITORY RECTAL ONCE AS NEEDED
Status: DISCONTINUED | OUTPATIENT
Start: 2022-01-06 | End: 2022-01-06 | Stop reason: HOSPADM

## 2022-01-06 RX ORDER — CHLORHEXIDINE GLUCONATE 0.12 MG/ML
15 RINSE ORAL EVERY 12 HOURS SCHEDULED
Status: DISCONTINUED | OUTPATIENT
Start: 2022-01-06 | End: 2022-01-06

## 2022-01-06 RX ORDER — HYDROCODONE BITARTRATE AND ACETAMINOPHEN 5; 325 MG/1; MG/1
1-2 TABLET ORAL EVERY 4 HOURS PRN
Qty: 10 TABLET | Refills: 0 | Status: SHIPPED | OUTPATIENT
Start: 2022-01-06 | End: 2022-07-13

## 2022-01-06 RX ORDER — FENTANYL CITRATE 50 UG/ML
INJECTION, SOLUTION INTRAMUSCULAR; INTRAVENOUS AS NEEDED
Status: DISCONTINUED | OUTPATIENT
Start: 2022-01-06 | End: 2022-01-06 | Stop reason: SURG

## 2022-01-06 RX ORDER — FLUMAZENIL 0.1 MG/ML
0.2 INJECTION INTRAVENOUS AS NEEDED
Status: DISCONTINUED | OUTPATIENT
Start: 2022-01-06 | End: 2022-01-06 | Stop reason: HOSPADM

## 2022-01-06 RX ORDER — FENTANYL CITRATE 50 UG/ML
50 INJECTION, SOLUTION INTRAMUSCULAR; INTRAVENOUS
Status: DISCONTINUED | OUTPATIENT
Start: 2022-01-06 | End: 2022-01-06 | Stop reason: HOSPADM

## 2022-01-06 RX ORDER — PROMETHAZINE HYDROCHLORIDE 25 MG/1
25 TABLET ORAL ONCE AS NEEDED
Status: DISCONTINUED | OUTPATIENT
Start: 2022-01-06 | End: 2022-01-06 | Stop reason: HOSPADM

## 2022-01-06 RX ADMIN — SODIUM CHLORIDE, SODIUM LACTATE, POTASSIUM CHLORIDE, AND CALCIUM CHLORIDE: .6; .31; .03; .02 INJECTION, SOLUTION INTRAVENOUS at 14:25

## 2022-01-06 RX ADMIN — PROPOFOL 30 MG: 10 INJECTION, EMULSION INTRAVENOUS at 14:39

## 2022-01-06 RX ADMIN — CLINDAMYCIN PHOSPHATE 900 MG: 900 INJECTION, SOLUTION INTRAVENOUS at 14:40

## 2022-01-06 RX ADMIN — FENTANYL CITRATE 50 MCG: 50 INJECTION INTRAMUSCULAR; INTRAVENOUS at 14:33

## 2022-01-06 RX ADMIN — PROPOFOL INJECTABLE EMULSION 75 MCG/KG/MIN: 10 INJECTION, EMULSION INTRAVENOUS at 14:35

## 2022-01-06 NOTE — DISCHARGE INSTRUCTIONS
Ok for discharge  Follow-up with me (Dr. Ragsdale) in 2 weeks  Regular diet as tolerated  Ok to shower starting tomorrow. No tub baths, pools, lakes, or streams for 1 week.  Ok to apply ice to incisions  Call my office or present to the ED with: fevers greater than 101.5, redness around the incisions, drainage from the incisions, intractable nausea/vomiting, or pain that is getting worse instead of better    Implanted Port Removal, Care After  This sheet gives you information about how to care for yourself after your procedure. Your health care provider may also give you more specific instructions. If you have problems or questions, contact your health care provider.  What can I expect after the procedure?  After the procedure, it is common to have:  · Soreness or pain near your incision.  · Some swelling or bruising near your incision.  Follow these instructions at home:  Medicines  · Take over-the-counter and prescription medicines only as told by your health care provider.  · If you were prescribed an antibiotic medicine, take it as told by your health care provider. Do not stop taking the antibiotic even if you start to feel better.  Bathing  · Do not take baths, swim, or use a hot tub until your health care provider approves. Ask your health care provider if you can take showers. You may only be allowed to take sponge baths.  Incision care    · Follow instructions from your health care provider about how to take care of your incision. Make sure you:  ? Wash your hands with soap and water before you change your bandage (dressing). If soap and water are not available, use hand .  ? Change your dressing as told by your health care provider.  ? Keep your dressing dry.  ? Leave stitches (sutures), skin glue, or adhesive strips in place. These skin closures may need to stay in place for 2 weeks or longer. If adhesive strip edges start to loosen and curl up, you may trim the loose edges. Do not remove  adhesive strips completely unless your health care provider tells you to do that.  · Check your incision area every day for signs of infection. Check for:  ? More redness, swelling, or pain.  ? More fluid or blood.  ? Warmth.  ? Pus or a bad smell.    Driving    · Do not drive for 24 hours if you were given a medicine to help you relax (sedative) during your procedure.  · If you did not receive a sedative, ask your health care provider when it is safe to drive.    Activity  · Return to your normal activities as told by your health care provider. Ask your health care provider what activities are safe for you.  · Do not lift anything that is heavier than 10 lb (4.5 kg), or the limit that you are told, until your health care provider says that it is safe.  · Do not do activities that involve lifting your arms over your head.  General instructions  · Do not use any products that contain nicotine or tobacco, such as cigarettes and e-cigarettes. These can delay healing. If you need help quitting, ask your health care provider.  · Keep all follow-up visits as told by your health care provider. This is important.  Contact a health care provider if:  · You have more redness, swelling, or pain around your incision.  · You have more fluid or blood coming from your incision.  · Your incision feels warm to the touch.  · You have pus or a bad smell coming from your incision.  · You have pain that is not relieved by your pain medicine.  Get help right away if you have:  · A fever or chills.  · Chest pain.  · Difficulty breathing.  Summary  · After the procedure, it is common to have pain, soreness, swelling, or bruising near your incision.  · If you were prescribed an antibiotic medicine, take it as told by your health care provider. Do not stop taking the antibiotic even if you start to feel better.  · Do not drive for 24 hours if you were given a sedative during your procedure.  · Return to your normal activities as told by your  health care provider. Ask your health care provider what activities are safe for you.  This information is not intended to replace advice given to you by your health care provider. Make sure you discuss any questions you have with your health care provider.  Document Revised: 01/31/2019 Document Reviewed: 01/31/2019  Elsevier Patient Education © 2021 [employer]

## 2022-01-06 NOTE — ANESTHESIA POSTPROCEDURE EVALUATION
Patient: Monse Donnelly    Procedure Summary     Date: 01/06/22 Room / Location: [facility] OR 09 / [facility] MAIN OR    Anesthesia Start: 1425 Anesthesia Stop: 1503    Procedure: REMOVAL VENOUS ACCESS DEVICE (Left ) Diagnosis:       Port-A-Cath in place      (Port-A-Cath in place [Z95.828])    Surgeons: Kenrick Ragsdale MD Provider: Amish Tam MD    Anesthesia Type: MAC ASA Status: 3          Anesthesia Type: MAC    Vitals  Vitals Value Taken Time   /73 01/06/22 1537   Temp     Pulse 70 01/06/22 1542   Resp 22 01/06/22 1533   SpO2 94 % 01/06/22 1542   Vitals shown include unvalidated device data.        Post Anesthesia Care and Evaluation    Patient location during evaluation: PACU  Patient participation: complete - patient participated  Level of consciousness: awake  Pain scale: See nurse's notes for pain score.  Pain management: adequate  Airway patency: patent  Anesthetic complications: No anesthetic complications  PONV Status: none  Cardiovascular status: acceptable  Respiratory status: acceptable  Hydration status: acceptable    Comments: Patient seen and examined postoperatively; vital signs stable; SpO2 greater than or equal to 90%; cardiopulmonary status stable; nausea/vomiting adequately controlled; pain adequately controlled; no apparent anesthesia complications; patient discharged from anesthesia care when discharge criteria were met

## 2022-01-06 NOTE — OP NOTE
REMOVAL VENOUS ACCESS DEVICE  Operative Note    Patient Name:  Monse Donnelly  YOB: 1950    Date of Surgery:  1/6/2022     Indications: Patient is a 71-year-old lady with history of breast cancer who had an implanted Port-A-Cath for chemotherapy as part of her cancer treatment.  She now no longer needs it, and wishes to have her Port-A-Cath removed.    Pre-op Diagnosis:   Port-A-Cath in place [Z95.828]    Post-op Diagnosis:  Post-Op Diagnosis Codes:     * Port-A-Cath in place [Z95.828]    Procedure/CPT® Codes:      Procedure(s):  REMOVAL VENOUS ACCESS DEVICE    Staff:  Surgeon(s):  Kenrick Ragsdale MD         Anesthesia: Monitored Anesthesia Care    Estimated Blood Loss: minimal    Implants:    Implant Name Type Inv. Item Serial No.  Lot No. LRB No. Used Action   POWERPORT MRI CATH/POLY INTERMED 1L JUAN/H 8F CLR - [id number] Implant POWERPORT MRI CATH/POLY INTERMED 1L JUAN/H 8F CLR  [employer] [id number] 1 Explanted       Specimen:          None    Findings: Port-A-Cath in place in the left IJ position    Complications: None, immediately    Description of Procedure: After obtaining informed consent in the preop holding area, the patient was brought to the operating room and placed in the supine position.  SCDs were applied, and preoperative antibiotics were administered.  After a brief timeout the patient underwent uncomplicated induction of MAC anesthesia.  The patient's neck and chest were then prepped and draped in the usual sterile fashion.  I began by infiltrating local anesthesia over the patient's prior incision site, and the making a skin incision and carried this down through the dermis to the subcutaneous fat layer.  Within the subcutaneous fat layer of the Port-A-Cath could be seen, and the help of the port was grasped with a clamp and elevated through the incision.  Using 3-0 Vicryl a U stitch was placed around the tract of the catheter, and the catheter was  completely removed.  We then sutured the 3-0 Vicryl U stitch to close the catheter tract.  The entire port and catheter were removed from the subcutaneous space and passed off the field.  There was a pseudocapsule still left the subcutaneous tissues which was removed using electrocautery to maintain hemostasis.  I then infiltrated more local anesthesia into the port pocket cavity.  The subcutaneous fat layer was then closed using interrupted 3-0 Vicryl suture, and the skin was reapproximated using 4-0 Vicryl in a running subcuticular fashion.  The wound was dressed using skin glue.  The patient tolerated the procedure well, was awoken, and taken to PACU in satisfactory condition.    Kenrick Ragsdale MD     Date: 1/6/2022  Time: 14:58 EST

## 2022-01-06 NOTE — ANESTHESIA PREPROCEDURE EVALUATION
Anesthesia Evaluation     Patient summary reviewed and Nursing notes reviewed   NPO Solid Status: > 8 hours  NPO Liquid Status: > 8 hours           Airway   Mallampati: II  TM distance: >3 FB  Neck ROM: full  No difficulty expected  Dental - normal exam     Pulmonary - normal exam    breath sounds clear to auscultation  (+) pneumonia , COPD, asthma,sleep apnea,   Cardiovascular - normal exam  Exercise tolerance: unable to assess    ECG reviewed  Rhythm: regular  Rate: normal    (+) hypertension, CAD, hyperlipidemia,     ROS comment: Interpretation Summary       Normal LV size and contractility EF of 60 to 65%  Normal RV size  Normal atrial size  Aortic valve, mitral valve, tricuspid valve appears structurally normal, mild to moderate mitral regurgitation seen.  Trace tricuspid regurgitation seen, calculated RV systolic pressure of 29 mmHg  No pericardial effusion seen.  Proximal aorta appears normal in size.      Neuro/Psych  (+) numbness,     GI/Hepatic/Renal/Endo    (+)  GERD,      Musculoskeletal     Abdominal  - normal exam   Substance History - negative use     OB/GYN negative ob/gyn ROS         Other   arthritis,    history of cancer remission                    Anesthesia Plan    ASA 3     MAC   (MAC vs LMA)  intravenous induction     Anesthetic plan, all risks, benefits, and alternatives have been provided, discussed and informed consent has been obtained with: patient.

## 2022-01-07 ENCOUNTER — TELEPHONE (OUTPATIENT)
Dept: SURGERY | Facility: CLINIC | Age: 72
End: 2022-01-07

## 2022-01-07 NOTE — TELEPHONE ENCOUNTER
PO FU Call- spoke with pt. States doing well no issues. No 2 wk po appt needed unless issues. Still follow up with Dr. Ragsdale in 07/2022. Stated understood. Will call if anything changes.

## 2022-07-13 ENCOUNTER — OFFICE VISIT (OUTPATIENT)
Dept: SURGERY | Facility: CLINIC | Age: 72
End: 2022-07-13

## 2022-07-13 VITALS
DIASTOLIC BLOOD PRESSURE: 98 MMHG | BODY MASS INDEX: 32.74 KG/M2 | HEART RATE: 71 BPM | TEMPERATURE: 97.5 F | OXYGEN SATURATION: 96 % | SYSTOLIC BLOOD PRESSURE: 150 MMHG | RESPIRATION RATE: 18 BRPM | HEIGHT: 59 IN | WEIGHT: 162.4 LBS

## 2022-07-13 DIAGNOSIS — C50.411 MALIGNANT NEOPLASM OF UPPER-OUTER QUADRANT OF RIGHT BREAST IN FEMALE, ESTROGEN RECEPTOR POSITIVE: Primary | ICD-10-CM

## 2022-07-13 DIAGNOSIS — Z17.0 MALIGNANT NEOPLASM OF UPPER-OUTER QUADRANT OF RIGHT BREAST IN FEMALE, ESTROGEN RECEPTOR POSITIVE: Primary | ICD-10-CM

## 2022-07-13 PROCEDURE — 99213 OFFICE O/P EST LOW 20 MIN: CPT | Performed by: SURGERY

## 2022-07-13 RX ORDER — ANASTROZOLE 1 MG/1
TABLET ORAL DAILY
COMMUNITY
End: 2023-03-01

## 2022-07-13 RX ORDER — PANTOPRAZOLE SODIUM 40 MG/1
40 TABLET, DELAYED RELEASE ORAL DAILY
COMMUNITY

## 2022-07-13 RX ORDER — DULOXETIN HYDROCHLORIDE 30 MG/1
30 CAPSULE, DELAYED RELEASE ORAL DAILY
COMMUNITY
End: 2023-03-01

## 2022-07-15 NOTE — PROGRESS NOTES
GENERAL SURGERY ESTABLISHED PATIENT NOTE    Patient Care Team:  Candi Benson MD as PCP - General (Family Medicine)  Kenrick Ragsdale MD as Surgeon (General Surgery)    Reason for consult: Follow-up from mastectomy    Subjective     Patient is a 71 y.o. female presents as a follow-up after undergoing a modified radical mastectomy on the right and left mastectomy.  Overall, the patient is doing well.  She denies any issues.  She states that her right arm is slightly more swollen than her left.  She has not been performing physical therapy, and has not been fitted for a sleeve on the right side.    Review of Systems   Constitutional: Negative for appetite change, chills and fever.   HENT: Negative for congestion and sore throat.    Respiratory: Negative for cough and shortness of breath.    Cardiovascular: Negative for chest pain and palpitations.   Gastrointestinal: Negative for abdominal pain, constipation, diarrhea, nausea, vomiting and GERD.   Genitourinary: Negative for breast lump, breast pain, difficulty urinating, dysuria and frequency.   Musculoskeletal: Negative for arthralgias and back pain.   Skin: Negative for rash and skin lesions.   Neurological: Negative for dizziness, seizures and memory problem.   Hematological: Negative for adenopathy. Does not bruise/bleed easily.   Psychiatric/Behavioral: Negative for sleep disturbance and depressed mood.        History  Past Medical History:   Diagnosis Date   • Arthritis    • Asthma    • Boils of multiple sites 5/28   • Breast cancer (HCC)    • Cancer (HCC)    • COPD (chronic obstructive pulmonary disease) (HCC)    • Coronary artery disease    • Drug therapy    • Elevated cholesterol    • GERD (gastroesophageal reflux disease)    • H/O Bell's palsy    • History of transfusion    • Hypertension    • Neuropathy     feet and hands post chemo   • Sciatic nerve pain    • Sleep apnea      Past Surgical History:   Procedure Laterality Date   •  ABDOMINAL SURGERY     • ANKLE SURGERY     • BREAST BIOPSY     • BREAST LUMPECTOMY     • CERVICAL CONIZATION     • MASTECTOMY Right 6/10/2021    Procedure: MASTECTOMY MODIFIED RADICAL RIGHT;  Surgeon: Kenrick Ragsdale MD;  Location: [facility] MAIN OR;  Service: General;  Laterality: Right;   • SIMPLE MASTECTOMY Left 6/10/2021    Procedure: MASTECTOMY SIMPLE;  Surgeon: Kenrick Ragsdale MD;  Location: [facility] MAIN OR;  Service: General;  Laterality: Left;   • TUBAL ABDOMINAL LIGATION     • VENOUS ACCESS DEVICE (PORT) INSERTION Left 7/30/2020    Procedure: INSERTION VENOUS ACCESS DEVICE LEFT internal jugular;  Surgeon: Taylor Kaye MD;  Location: [facility] MAIN OR;  Service: General;  Laterality: Left;   • VENOUS ACCESS DEVICE (PORT) REMOVAL Left 1/6/2022    Procedure: REMOVAL VENOUS ACCESS DEVICE;  Surgeon: Kenrick Ragsdale MD;  Location: [facility] MAIN OR;  Service: General;  Laterality: Left;     Family History   Problem Relation Age of Onset   • Heart disease Father    • Stroke Father    • Diabetes Father    • Diabetes Son      Social History     Tobacco Use   • Smoking status: Never Smoker   • Smokeless tobacco: Never Used   Vaping Use   • Vaping Use: Never used   Substance Use Topics   • Alcohol use: Never   • Drug use: Never     (Not in a hospital admission)    Allergies:  Penicillins and Adhesive tape    Objective     Vital Signs       Physical Exam  Vitals reviewed. Exam conducted with a chaperone present.   Constitutional:       General: She is not in acute distress.     Appearance: She is obese. She is not ill-appearing.   HENT:      Head: Normocephalic and atraumatic.   Cardiovascular:      Rate and Rhythm: Normal rate and regular rhythm.   Pulmonary:      Effort: Pulmonary effort is normal. No respiratory distress.   Chest:   Breasts:      Right: No axillary adenopathy or supraclavicular adenopathy.      Left: No axillary adenopathy or supraclavicular adenopathy.        Comments: Bilateral  mastectomy incisions are well healed without any surrounding erythema, induration, or drainage to suggest infection.  There is no palpable masses beneath the subcutaneous skin flaps superiorly or inferiorly bilaterally.  Left IJ Port-A-Cath site is well-healed.  Right arm is slightly more swollen than the left, but both arms are neurologically intact.  Abdominal:      General: There is no distension.      Palpations: Abdomen is soft.      Tenderness: There is no abdominal tenderness.   Musculoskeletal:         General: No swelling. Normal range of motion.   Lymphadenopathy:      Upper Body:      Right upper body: No supraclavicular or axillary adenopathy.      Left upper body: No supraclavicular or axillary adenopathy.   Skin:     General: Skin is warm and dry.      Coloration: Skin is not jaundiced.   Neurological:      General: No focal deficit present.      Mental Status: She is alert and oriented to person, place, and time.   Psychiatric:         Mood and Affect: Mood normal.         Behavior: Behavior normal.         Thought Content: Thought content normal.         Judgment: Judgment normal.         Results Review:   Lab Results (last 24 hours)     ** No results found for the last 24 hours. **        No radiology results for the last day      I reviewed the patient's new imaging results and agree with the interpretation.  I reviewed the patient's other test results and agree with the interpretation    Assessment & Plan     Active Problems:  Right breast cancer    Recommend OT for lymphedema  Continue to follow-up with medical oncology as directed  Follow-up with me in 6 months    I discussed the patients findings and my recommendations with the patient.     Kenrick Ragsdale MD  07/15/22  16:44 EDT

## 2022-08-23 ENCOUNTER — TREATMENT (OUTPATIENT)
Dept: PHYSICAL THERAPY | Facility: CLINIC | Age: 72
End: 2022-08-23

## 2022-08-23 DIAGNOSIS — Z17.0 MALIGNANT NEOPLASM OF UPPER-OUTER QUADRANT OF RIGHT BREAST IN FEMALE, ESTROGEN RECEPTOR POSITIVE: Primary | ICD-10-CM

## 2022-08-23 DIAGNOSIS — C50.411 MALIGNANT NEOPLASM OF UPPER-OUTER QUADRANT OF RIGHT BREAST IN FEMALE, ESTROGEN RECEPTOR POSITIVE: Primary | ICD-10-CM

## 2022-08-23 PROCEDURE — 97110 THERAPEUTIC EXERCISES: CPT | Performed by: PHYSICAL THERAPIST

## 2022-08-23 PROCEDURE — 97161 PT EVAL LOW COMPLEX 20 MIN: CPT | Performed by: PHYSICAL THERAPIST

## 2022-08-23 NOTE — PROGRESS NOTES
Physical Therapy Initial Evaluation and Plan of Care    Patient: Monse Donnelly   : 1950  Diagnosis/ICD-10 Code:  Malignant neoplasm of upper-outer quadrant of right breast in female, estrogen receptor positive (HCC) [C50.411, Z17.0]  Referring practitioner: Kenrick Ragsdale,*  Date of Initial Visit: 2022  Today's Date: 2022  Patient seen for 1 sessions           Subjective Questionnaire: QuickDASH: 34%      Subjective Evaluation    History of Present Illness  Mechanism of injury: Pt is referred to therapy with Dx of R breast ca. She is s/p L simple mastectomy and R modified Radical mastectomy in 2021.  She had chemotherapy prior to her surgery. She has neuropathy in her hands and feet from the chemo. She has mild pain in R arm but it is getting better. Her R hand swells up every once in a while. She  had to go to NH after her surgery. She lives with her son who is disabled and they take care of each other. She doesn't drive and her brother has to bring her to therapy. She lives very far and will be difficult for her to come to therapy .       Aggravating factors: nothing specific bother her arm, may have more pain in R shoulder and arm if use it too much    Functional limitation: she doesn't have any limitations, she gets tired but is able to do what she needs to do around the house. She does her cooking, cleaning and grocery shopping.     PMH: breast ca, COPD, arthritis, CAD, L ankle surgery          Patient Occupation: retired Quality of life: fair    Pain  Current pain ratin  At best pain ratin  At worst pain rating: 3    Social Support  Lives with: adult children    Hand dominance: right    Patient Goals  Patient goals for therapy: decreased edema, decreased pain, increased motion and increased strength           Precautions:     Objective          Functional Assessment     Comments  Obs: B mastectomy, well healed horizontal incision scars ant chest wall. Presents with  rounded shoulders , poor sitting posture    Palp: mild TTP R axilla and upper arm    ROM: wfl B , pulling sensation R axilla and upper arm    Strength: wfl B     Circumferential measurements: in cm    Hand:      R 17, L 16.5  Wrist:      R 16, L 15  Forearm: R 23.5, L 23  Elbow:      R 24.5, L 24  Upper arm: R 29, L 29                Assessment & Plan     Assessment  Impairments: activity intolerance and lacks appropriate home exercise program  Functional Limitations: carrying objects, lifting, uncomfortable because of pain, reaching behind back and reaching overhead  Assessment details: Pt is a 71 y.o. female referred to therapy s/p B mastectomy in June/ 2021.  Pt had R breast ca, with R modified radical mastectomy and L simple mastectomy.   She has chemo prior to her surgery.   Pt denies any swelling in R arm.  She has mild pain in R upper arm with over use of R UE.  Her ROM is wfl with only tightness at end range , no pain.   She doesn't have any specific limitation due to her R arm, reports she feels tired most of the time but she is able to do her house work and daily activities. She has neuropathy in her hands and feet due to chemo treatment.      Pt doesn't drive and her brother has to bring her to therapy, she lives about an hr from our clinic.  Presently she doesn't have any swelling or pain in R arm and requested HEP to do on her own.     Prognosis: good    Goals  Plan Goals: STG:   Pt to be instructed in HEP and demonstrate understanding of her exercises. ( MET)    Plan  Therapy options: will not be seen for skilled therapy services  Plan details: Pt was instructed in HEP and will not be followed for therapy per pt's request and her current condition.         See flow sheet for treatment detail    History # of Personal Factors and/or Comorbidities: LOW (0)  Examination of Body System(s): # of elements: LOW (1-2)  Clinical Presentation: STABLE   Clinical Decision Making: LOW           Timed:         Manual  Therapy:         mins  64948;     Therapeutic Exercise:   20      mins  04597;     Neuromuscular Ja:        mins  04049;    Therapeutic Activity:          mins  00210;     Gait Training:           mins  75907;     Ultrasound:          mins  25090;    Ionto                                   mins   61901  Self Care                            mins   79403  Canal repositioning           mins    97254      Un-Timed:  Electrical Stimulation:         mins  44669 ( );  Dry Needling          mins self-pay  Traction          mins 44249  Low Eval     25     Mins  72300  Mod Eval          Mins  02443  High Eval                            Mins  63996  Re-Eval                               mins  61501        Timed Treatment:  20    mins   Total Treatment:     45   mins    PT SIGNATURE: Matthieu Petty PT, CLT  [place] License: # [id number]     DATE TREATMENT INITIATED: 8/23/2022    Initial Certification  Certification Period: 8/23/2022 thru 11/20/2022  I certify that the therapy services are furnished while this patient is under my care.  The services outlined above are required by this patient, and will be reviewed every 90 days.     PHYSICIAN: Kenrick Ragsdale MD   NPI: [id number]                                      DATE:     Please sign and return via fax to [phone].. Thank you, [facility].

## 2022-09-29 ENCOUNTER — DOCUMENTATION (OUTPATIENT)
Dept: PHYSICAL THERAPY | Facility: CLINIC | Age: 72
End: 2022-09-29

## 2022-09-29 NOTE — PROGRESS NOTES
Discharge Summary  Discharge Summary from Physical/Occupational Therapy Report    Patient: Monse Donnelly   : 1950  Today's Date: 2022    Patient seen for 1 visit  Dates of Service: 22      Comments : Pt has not returned for additional therapy since the initial evaluation and has been DC from our services.      Thank you for this referral to [facility].    SIGNATURE: Matthieu Petty PT

## 2022-11-10 ENCOUNTER — APPOINTMENT (OUTPATIENT)
Dept: MRI IMAGING | Facility: HOSPITAL | Age: 72
End: 2022-11-10

## 2022-11-10 ENCOUNTER — HOSPITAL ENCOUNTER (INPATIENT)
Facility: HOSPITAL | Age: 72
LOS: 3 days | Discharge: HOME | End: 2022-11-14
Attending: EMERGENCY MEDICINE | Admitting: FAMILY MEDICINE

## 2022-11-10 ENCOUNTER — APPOINTMENT (OUTPATIENT)
Dept: CT IMAGING | Facility: HOSPITAL | Age: 72
End: 2022-11-10

## 2022-11-10 DIAGNOSIS — F05 CONFUSION AFTER A SEIZURE: ICD-10-CM

## 2022-11-10 DIAGNOSIS — R56.9 NEW ONSET SEIZURE: Primary | ICD-10-CM

## 2022-11-10 DIAGNOSIS — R51.9 ACUTE NONINTRACTABLE HEADACHE, UNSPECIFIED HEADACHE TYPE: ICD-10-CM

## 2022-11-10 LAB
ALBUMIN SERPL-MCNC: 4.5 G/DL (ref 3.5–5.2)
ALBUMIN/GLOB SERPL: 1.8 G/DL
ALP SERPL-CCNC: 103 U/L (ref 39–117)
ALT SERPL W P-5'-P-CCNC: 16 U/L (ref 1–33)
ANION GAP SERPL CALCULATED.3IONS-SCNC: 12 MMOL/L (ref 5–15)
ANISOCYTOSIS BLD QL: ABNORMAL
AST SERPL-CCNC: 15 U/L (ref 1–32)
BILIRUB SERPL-MCNC: 0.3 MG/DL (ref 0–1.2)
BUN SERPL-MCNC: 15 MG/DL (ref 8–23)
BUN/CREAT SERPL: 17.9 (ref 7–25)
CALCIUM SPEC-SCNC: 10.4 MG/DL (ref 8.6–10.5)
CHLORIDE SERPL-SCNC: 107 MMOL/L (ref 98–107)
CO2 SERPL-SCNC: 26 MMOL/L (ref 22–29)
CREAT SERPL-MCNC: 0.84 MG/DL (ref 0.57–1)
DEPRECATED RDW RBC AUTO: 49 FL (ref 37–54)
EGFRCR SERPLBLD CKD-EPI 2021: 74.4 ML/MIN/1.73
ERYTHROCYTE [DISTWIDTH] IN BLOOD BY AUTOMATED COUNT: 14.3 % (ref 12.3–15.4)
GLOBULIN UR ELPH-MCNC: 2.5 GM/DL
GLUCOSE BLDC GLUCOMTR-MCNC: 122 MG/DL (ref 70–105)
GLUCOSE SERPL-MCNC: 122 MG/DL (ref 65–99)
HCT VFR BLD AUTO: 44.1 % (ref 34–46.6)
HGB BLD-MCNC: 15 G/DL (ref 12–15.9)
HOLD SPECIMEN: NORMAL
HOLD SPECIMEN: NORMAL
LYMPHOCYTES # BLD MANUAL: 2.02 10*3/MM3 (ref 0.7–3.1)
LYMPHOCYTES NFR BLD MANUAL: 2 % (ref 5–12)
MCH RBC QN AUTO: 31.6 PG (ref 26.6–33)
MCHC RBC AUTO-ENTMCNC: 34 G/DL (ref 31.5–35.7)
MCV RBC AUTO: 93 FL (ref 79–97)
METAMYELOCYTES NFR BLD MANUAL: 1 % (ref 0–0)
MONOCYTES # BLD: 0.13 10*3/MM3 (ref 0.1–0.9)
NEUTROPHILS # BLD AUTO: 4.1 10*3/MM3 (ref 1.7–7)
NEUTROPHILS NFR BLD MANUAL: 63 % (ref 42.7–76)
NEUTS BAND NFR BLD MANUAL: 2 % (ref 0–5)
PLAT MORPH BLD: NORMAL
PLATELET # BLD AUTO: 240 10*3/MM3 (ref 140–450)
PMV BLD AUTO: 8.2 FL (ref 6–12)
POIKILOCYTOSIS BLD QL SMEAR: ABNORMAL
POTASSIUM SERPL-SCNC: 4.2 MMOL/L (ref 3.5–5.2)
PROT SERPL-MCNC: 7 G/DL (ref 6–8.5)
RBC # BLD AUTO: 4.75 10*6/MM3 (ref 3.77–5.28)
SCAN SLIDE: NORMAL
SODIUM SERPL-SCNC: 145 MMOL/L (ref 136–145)
VARIANT LYMPHS NFR BLD MANUAL: 32 % (ref 19.6–45.3)
WBC MORPH BLD: NORMAL
WBC NRBC COR # BLD: 6.3 10*3/MM3 (ref 3.4–10.8)
WHOLE BLOOD HOLD COAG: NORMAL
WHOLE BLOOD HOLD SPECIMEN: NORMAL

## 2022-11-10 PROCEDURE — 70553 MRI BRAIN STEM W/O & W/DYE: CPT

## 2022-11-10 PROCEDURE — 82962 GLUCOSE BLOOD TEST: CPT

## 2022-11-10 PROCEDURE — 25010000002 GADOTERIDOL PER 1 ML: Performed by: EMERGENCY MEDICINE

## 2022-11-10 PROCEDURE — 99285 EMERGENCY DEPT VISIT HI MDM: CPT

## 2022-11-10 PROCEDURE — 85007 BL SMEAR W/DIFF WBC COUNT: CPT | Performed by: NURSE PRACTITIONER

## 2022-11-10 PROCEDURE — 85025 COMPLETE CBC W/AUTO DIFF WBC: CPT | Performed by: NURSE PRACTITIONER

## 2022-11-10 PROCEDURE — 70450 CT HEAD/BRAIN W/O DYE: CPT

## 2022-11-10 PROCEDURE — A9579 GAD-BASE MR CONTRAST NOS,1ML: HCPCS | Performed by: EMERGENCY MEDICINE

## 2022-11-10 PROCEDURE — 80053 COMPREHEN METABOLIC PANEL: CPT | Performed by: NURSE PRACTITIONER

## 2022-11-10 RX ORDER — ALBUTEROL SULFATE 90 UG/1
2 AEROSOL, METERED RESPIRATORY (INHALATION) EVERY 6 HOURS PRN
Status: DISCONTINUED | OUTPATIENT
Start: 2022-11-10 | End: 2022-11-11

## 2022-11-10 RX ORDER — ANASTROZOLE 1 MG/1
1 TABLET ORAL DAILY
Status: DISCONTINUED | OUTPATIENT
Start: 2022-11-11 | End: 2022-11-11

## 2022-11-10 RX ORDER — PANTOPRAZOLE SODIUM 40 MG/1
40 TABLET, DELAYED RELEASE ORAL
Status: DISCONTINUED | OUTPATIENT
Start: 2022-11-11 | End: 2022-11-11

## 2022-11-10 RX ORDER — METOPROLOL SUCCINATE 50 MG/1
50 TABLET, EXTENDED RELEASE ORAL
Status: DISCONTINUED | OUTPATIENT
Start: 2022-11-11 | End: 2022-11-11

## 2022-11-10 RX ORDER — DULOXETIN HYDROCHLORIDE 30 MG/1
30 CAPSULE, DELAYED RELEASE ORAL DAILY
Status: DISCONTINUED | OUTPATIENT
Start: 2022-11-11 | End: 2022-11-11

## 2022-11-10 RX ORDER — GABAPENTIN 100 MG/1
100 CAPSULE ORAL NIGHTLY
Status: DISCONTINUED | OUTPATIENT
Start: 2022-11-11 | End: 2022-11-11

## 2022-11-10 RX ORDER — LISINOPRIL 20 MG/1
40 TABLET ORAL
Status: DISCONTINUED | OUTPATIENT
Start: 2022-11-11 | End: 2022-11-11

## 2022-11-10 RX ORDER — SODIUM CHLORIDE 0.9 % (FLUSH) 0.9 %
10 SYRINGE (ML) INJECTION AS NEEDED
Status: DISCONTINUED | OUTPATIENT
Start: 2022-11-10 | End: 2022-11-11

## 2022-11-10 RX ORDER — ATORVASTATIN CALCIUM 20 MG/1
20 TABLET, FILM COATED ORAL NIGHTLY
Status: DISCONTINUED | OUTPATIENT
Start: 2022-11-11 | End: 2022-11-11

## 2022-11-10 RX ORDER — MONTELUKAST SODIUM 10 MG/1
10 TABLET ORAL NIGHTLY
Status: DISCONTINUED | OUTPATIENT
Start: 2022-11-11 | End: 2022-11-11

## 2022-11-10 RX ORDER — SUCRALFATE 1 G/1
1 TABLET ORAL 2 TIMES DAILY
Status: DISCONTINUED | OUTPATIENT
Start: 2022-11-11 | End: 2022-11-11

## 2022-11-10 RX ORDER — SERTRALINE HYDROCHLORIDE 100 MG/1
100 TABLET, FILM COATED ORAL DAILY
Status: DISCONTINUED | OUTPATIENT
Start: 2022-11-11 | End: 2022-11-11

## 2022-11-10 RX ADMIN — GADOTERIDOL 15 ML: 279.3 INJECTION, SOLUTION INTRAVENOUS at 17:48

## 2022-11-10 NOTE — ED PROVIDER NOTES
Subjective   History of Present Illness  71-year-old female presents from [facility] status post new onset seizure.  Patient was there for a CT of the head without contrast.  Patient reports that she has had a right parietal headache that radiates to her vertex.  Patient is confused and she is noted to have slurred speech.  She has some difficulties recalling today's events.  She is alert to self and place, confused on time.  She denies known history of seizures.    1. Location: Right parietal  2. Quality: Headache  3. Severity: Moderate  4. Worsening factors: Denies  5. Alleviating factors: Denies  6. Onset: Ongoing  7. Radiation: Vertex  8. Frequency: Constant  9. Co-morbidities: Past Medical History:  No date: Arthritis  No date: Asthma  5/28: Boils of multiple sites  No date: Breast cancer (HCC)  No date: Cancer (HCC)  No date: COPD (chronic obstructive pulmonary disease) (HCC)  No date: Coronary artery disease  No date: Drug therapy  No date: Elevated cholesterol  No date: GERD (gastroesophageal reflux disease)  No date: H/O Bell's palsy  No date: History of transfusion  No date: Hypertension  No date: Neuropathy      Comment:  feet and hands post chemo  No date: Sciatic nerve pain  No date: Sleep apnea      History provided by:  Patient and EMS personnel   used: No        Review of Systems   Unable to perform ROS: Mental status change       Past Medical History:   Diagnosis Date   • Arthritis    • Asthma    • Boils of multiple sites 5/28   • Breast cancer (HCC)    • Cancer (HCC)    • COPD (chronic obstructive pulmonary disease) (HCC)    • Coronary artery disease    • Drug therapy    • Elevated cholesterol    • GERD (gastroesophageal reflux disease)    • H/O Bell's palsy    • History of transfusion    • Hypertension    • Neuropathy     feet and hands post chemo   • Sciatic nerve pain    • Sleep apnea        Allergies   Allergen Reactions   • Penicillins Anaphylaxis   • Adhesive Tape  Rash     Blisters         Past Surgical History:   Procedure Laterality Date   • ABDOMINAL SURGERY     • ANKLE SURGERY     • BREAST BIOPSY     • BREAST LUMPECTOMY     • CERVICAL CONIZATION     • MASTECTOMY Right 6/10/2021    Procedure: MASTECTOMY MODIFIED RADICAL RIGHT;  Surgeon: Kenrick Ragsdale MD;  Location: [facility] MAIN OR;  Service: General;  Laterality: Right;   • SIMPLE MASTECTOMY Left 6/10/2021    Procedure: MASTECTOMY SIMPLE;  Surgeon: Kenrick Ragsdale MD;  Location: [facility] MAIN OR;  Service: General;  Laterality: Left;   • TUBAL ABDOMINAL LIGATION     • VENOUS ACCESS DEVICE (PORT) INSERTION Left 7/30/2020    Procedure: INSERTION VENOUS ACCESS DEVICE LEFT internal jugular;  Surgeon: Taylor Kaye MD;  Location: [facility] MAIN OR;  Service: General;  Laterality: Left;   • VENOUS ACCESS DEVICE (PORT) REMOVAL Left 1/6/2022    Procedure: REMOVAL VENOUS ACCESS DEVICE;  Surgeon: Kenrick Ragsdale MD;  Location: [facility] MAIN OR;  Service: General;  Laterality: Left;       Family History   Problem Relation Age of Onset   • Heart disease Father    • Stroke Father    • Diabetes Father    • Diabetes Son        Social History     Socioeconomic History   • Marital status:    Tobacco Use   • Smoking status: Never   • Smokeless tobacco: Never   Vaping Use   • Vaping Use: Never used   Substance and Sexual Activity   • Alcohol use: Never   • Drug use: Never   • Sexual activity: Defer           Objective   Physical Exam  Vitals and nursing note reviewed.   Constitutional:       General: She is awake. She is not in acute distress.     Appearance: Normal appearance. She is well-developed and normal weight.   HENT:      Head: Normocephalic and atraumatic. No raccoon eyes or Castelan's sign.      Right Ear: External ear normal. No drainage.      Left Ear: External ear normal. No drainage.      Nose: Nose normal. No rhinorrhea.      Mouth/Throat:      Lips: Pink. No lesions.      Mouth: Mucous membranes are  moist.      Pharynx: Oropharynx is clear. Uvula midline.   Eyes:      General: Lids are normal. Vision grossly intact. Gaze aligned appropriately.      Extraocular Movements: Extraocular movements intact.      Conjunctiva/sclera: Conjunctivae normal.      Pupils: Pupils are equal, round, and reactive to light.      Slit lamp exam:     Right eye: No hyphema.      Left eye: No hyphema.   Neck:      Trachea: Trachea and phonation normal.   Cardiovascular:      Rate and Rhythm: Normal rate and regular rhythm.      Pulses: Normal pulses.      Heart sounds: Normal heart sounds, S1 normal and S2 normal. Heart sounds not distant. No murmur heard.    No friction rub. No gallop.   Pulmonary:      Effort: Pulmonary effort is normal.      Breath sounds: Normal breath sounds and air entry.   Musculoskeletal:         General: No tenderness or signs of injury. Normal range of motion.      Cervical back: Full passive range of motion without pain, normal range of motion and neck supple. No erythema or rigidity. No spinous process tenderness. Normal range of motion.   Skin:     General: Skin is warm and dry.      Capillary Refill: Capillary refill takes less than 2 seconds.   Neurological:      General: No focal deficit present.      Mental Status: She is alert and oriented to person, place, and time.      GCS: GCS eye subscore is 4. GCS verbal subscore is 5. GCS motor subscore is 6.      Cranial Nerves: Dysarthria present. No cranial nerve deficit.      Sensory: Sensation is intact. No sensory deficit.      Motor: Motor function is intact. No weakness or abnormal muscle tone.      Coordination: Coordination is intact. Coordination normal. Finger-Nose-Finger Test and Heel to Shin Test normal.      Deep Tendon Reflexes: Reflexes normal.      Reflex Scores:       Patellar reflexes are 2+ on the right side and 2+ on the left side.     Comments: Confusion and dysarthria are noted.  Dysarthria could be due to missing dentition.    Psychiatric:         Mood and Affect: Mood normal.         Behavior: Behavior normal. Behavior is cooperative.         Thought Content: Thought content normal.         Judgment: Judgment normal.         Procedures           ED Course  ED Course as of 11/11/22 0223   Thu Nov 10, 2022   1655 Delay in care related to MRI questions not being done. [AL]   1725 Awaiting MRI results. [AL]   1820 Awaiting MRI results. [AL]   2011 Awaiting call back from Dr. Cornell Chu. [AL]      ED Course User Index  [AL] Chary Loco, BUTCH      CT Head Without Contrast    Result Date: 11/10/2022  1.     An acute intracranial abnormality is not appreciated.  Electronically Signed By-Cole Perez MD On:11/10/2022 3:29 PM This report was finalized on [id number] by  Cole Perez MD.    MRI Brain With & Without Contrast    Result Date: 11/10/2022  1.     There are some T2 signal changes involving the cerebral hemispheres and toma which could reflect more chronic small vessel ischemic change. 2.     No definite evidence for metastatic disease.  Electronically Signed By-Cole Perez MD On:11/10/2022 6:34 PM This report was finalized on [id number] by  Cole Perez MD.    Medications   sodium chloride 0.9 % flush 10 mL (10 mL Intravenous Given 11/11/22 0125)   albuterol sulfate HFA (PROVENTIL HFA;VENTOLIN HFA;PROAIR HFA) inhaler 2 puff (has no administration in time range)   anastrozole (ARIMIDEX) tablet 1 mg (has no administration in time range)   atorvastatin (LIPITOR) tablet 20 mg (20 mg Oral Given 11/11/22 0125)   DULoxetine (CYMBALTA) DR capsule 30 mg (has no administration in time range)   gabapentin (NEURONTIN) capsule 100 mg (100 mg Oral Given 11/11/22 0125)   lisinopril (PRINIVIL,ZESTRIL) tablet 40 mg (has no administration in time range)   metoprolol succinate XL (TOPROL-XL) 24 hr tablet 50 mg (has no administration in time range)   montelukast (SINGULAIR) tablet 10 mg (10 mg Oral Given 11/11/22 0125)   pantoprazole (PROTONIX)  EC tablet 40 mg (has no administration in time range)   sertraline (ZOLOFT) tablet 100 mg (has no administration in time range)   sucralfate (CARAFATE) tablet 1 g (1 g Oral Given 11/11/22 0125)   gadoteridol (PROHANCE) injection 15 mL (15 mL Intravenous Given 11/10/22 1984)     Labs Reviewed   COMPREHENSIVE METABOLIC PANEL - Abnormal; Notable for the following components:       Result Value    Glucose 122 (*)     All other components within normal limits    Narrative:     GFR Normal >60  Chronic Kidney Disease <60  Kidney Failure <15    The GFR formula is only valid for adults with stable renal function between ages 18 and 70.   MANUAL DIFFERENTIAL - Abnormal; Notable for the following components:    Monocyte % 2.0 (*)     Metamyelocyte % 1.0 (*)     All other components within normal limits   POCT GLUCOSE FINGERSTICK - Abnormal; Notable for the following components:    Glucose 122 (*)     All other components within normal limits   CBC WITH AUTO DIFFERENTIAL - Normal   RAINBOW DRAW    Narrative:     The following orders were created for panel order [place] Draw.  Procedure                               Abnormality         Status                     ---------                               -----------         ------                     Green Top (Gel)[[id number]]                                  Final result               Lavender Top[[id number]]                                     Final result               Gold Top - SST[[id number]]                                   Final result               Light Blue Top[[id number]]                                   Final result                 Please view results for these tests on the individual orders.   SCAN SLIDE   POCT GLUCOSE FINGERSTICK   CBC AND DIFFERENTIAL    Narrative:     The following orders were created for panel order CBC & Differential.  Procedure                               Abnormality         Status                     ---------                                -----------         ------                     CBC Auto Differential[[id number]]        Normal              Final result               Scan Slide[[id number]]                                       Final result                 Please view results for these tests on the individual orders.   GREEN TOP   LAVENDER TOP   GOLD TOP - SST   LIGHT BLUE TOP                                          MDM  Number of Diagnoses or Management Options  Acute nonintractable headache, unspecified headache type  Confusion after a seizure  New onset seizure (HCC)  Diagnosis management comments: Chart Review: 7/26/2022 patient was seen by primary care physician for annual wellness visit.  Unable to view note.  Comorbidity: Past Medical History:  No date: Arthritis  No date: Asthma  5/28: Boils of multiple sites  No date: Breast cancer (HCC)  No date: Cancer (HCC)  No date: COPD (chronic obstructive pulmonary disease) (HCC)  No date: Coronary artery disease  No date: Drug therapy  No date: Elevated cholesterol  No date: GERD (gastroesophageal reflux disease)  No date: H/O Bell's palsy  No date: History of transfusion  No date: Hypertension  No date: Neuropathy      Comment:  feet and hands post chemo  No date: Sciatic nerve pain  No date: Sleep apnea  Imaging: Was interpreted by physician and reviewed by myself: MRI Brain With & Without Contrast   Final Result    1.     There are some T2 signal changes involving the cerebral    hemispheres and toma which could reflect more chronic small vessel    ischemic change.    2.     No definite evidence for metastatic disease.         Electronically Signed By-Cole Perez MD On:11/10/2022 6:34 PM    This report was finalized on [id number] by  Cole Perez MD.     CT Head Without Contrast   Final Result    1.     An acute intracranial abnormality is not appreciated.         Electronically Signed By-Cole Perez MD On:11/10/2022 3:29 PM    This report was finalized on [id number] by  Cole Perez  MD.    Patient undressed and placed in gown for exam.  Appropriate PPE worn during patient exam.  Patient is alert and oriented x2.  Unsure if this is her baseline as there is no family at bedside.  Patient is noted to have slurred speech on exam.  No weakness noted on exam. Neuro work-up was initiated.  Labs are found to be unremarkable.  CT showed no acute abnormalities.  MRI with and without IV contrast was obtained due to the patient's history of breast cancer.  There was a T2 signal change in the cerebral hemispheres and toma.  No evidence of metastatic disease.  Spoke with Dr. Cornell Chu who accepted admission.    Disposition/Treatment: Discussed results with patient, verbalized understanding. Agreeable with plan of care.  Patient was stable upon admission.       Part of this note may be an electronic transcription/translation of spoken language to printed text using the Dragon Dictation System.            Amount and/or Complexity of Data Reviewed  Clinical lab tests: reviewed  Tests in the radiology section of CPT®: reviewed    Patient Progress  Patient progress: stable      Final diagnoses:   New onset seizure (HCC)   Confusion after a seizure   Acute nonintractable headache, unspecified headache type       ED Disposition  ED Disposition     ED Disposition   Decision to Admit    Condition   --    Comment   Level of Care: Telemetry [5]   Admitting Physician: MARY BETANCOURT [[id number]]   Attending Physician: MARY BETANCOURT [[id number]]   Bed Request Comments: [facility]               No follow-up provider specified.       Medication List      No changes were made to your prescriptions during this visit.          Chary Loco, APRN  11/11/22 0223

## 2022-11-11 PROBLEM — R56.9 NEW ONSET SEIZURE: Status: ACTIVE | Noted: 2022-11-11

## 2022-11-11 LAB
ALBUMIN SERPL-MCNC: 4.1 G/DL (ref 3.5–5.2)
ALBUMIN/GLOB SERPL: 1.8 G/DL
ALP SERPL-CCNC: 80 U/L (ref 39–117)
ALT SERPL W P-5'-P-CCNC: 13 U/L (ref 1–33)
ANION GAP SERPL CALCULATED.3IONS-SCNC: 12 MMOL/L (ref 5–15)
AST SERPL-CCNC: 18 U/L (ref 1–32)
BACTERIA UR QL AUTO: ABNORMAL /HPF
BASOPHILS # BLD AUTO: 0.1 10*3/MM3 (ref 0–0.2)
BASOPHILS NFR BLD AUTO: 1.3 % (ref 0–1.5)
BILIRUB SERPL-MCNC: 0.4 MG/DL (ref 0–1.2)
BILIRUB UR QL STRIP: NEGATIVE
BLADDER EPITHELIAL: ABNORMAL /LPF
BUN SERPL-MCNC: 11 MG/DL (ref 8–23)
BUN/CREAT SERPL: 18.6 (ref 7–25)
CALCIUM SPEC-SCNC: 9.3 MG/DL (ref 8.6–10.5)
CHLORIDE SERPL-SCNC: 104 MMOL/L (ref 98–107)
CLARITY UR: CLEAR
CO2 SERPL-SCNC: 24 MMOL/L (ref 22–29)
COLOR UR: YELLOW
CREAT SERPL-MCNC: 0.59 MG/DL (ref 0.57–1)
DEPRECATED RDW RBC AUTO: 48.1 FL (ref 37–54)
EGFRCR SERPLBLD CKD-EPI 2021: 96.5 ML/MIN/1.73
EOSINOPHIL # BLD AUTO: 0.1 10*3/MM3 (ref 0–0.4)
EOSINOPHIL NFR BLD AUTO: 2 % (ref 0.3–6.2)
ERYTHROCYTE [DISTWIDTH] IN BLOOD BY AUTOMATED COUNT: 14.5 % (ref 12.3–15.4)
GLOBULIN UR ELPH-MCNC: 2.3 GM/DL
GLUCOSE SERPL-MCNC: 123 MG/DL (ref 65–99)
GLUCOSE UR STRIP-MCNC: NEGATIVE MG/DL
HCT VFR BLD AUTO: 42.4 % (ref 34–46.6)
HGB BLD-MCNC: 13.7 G/DL (ref 12–15.9)
HGB UR QL STRIP.AUTO: NEGATIVE
HYALINE CASTS UR QL AUTO: ABNORMAL /LPF
KETONES UR QL STRIP: NEGATIVE
LEUKOCYTE ESTERASE UR QL STRIP.AUTO: ABNORMAL
LYMPHOCYTES # BLD AUTO: 1.7 10*3/MM3 (ref 0.7–3.1)
LYMPHOCYTES NFR BLD AUTO: 37.9 % (ref 19.6–45.3)
MCH RBC QN AUTO: 30.8 PG (ref 26.6–33)
MCHC RBC AUTO-ENTMCNC: 32.2 G/DL (ref 31.5–35.7)
MCV RBC AUTO: 95.5 FL (ref 79–97)
MONOCYTES # BLD AUTO: 0.3 10*3/MM3 (ref 0.1–0.9)
MONOCYTES NFR BLD AUTO: 7.4 % (ref 5–12)
NEUTROPHILS NFR BLD AUTO: 2.3 10*3/MM3 (ref 1.7–7)
NEUTROPHILS NFR BLD AUTO: 51.4 % (ref 42.7–76)
NITRITE UR QL STRIP: NEGATIVE
NRBC BLD AUTO-RTO: 0.1 /100 WBC (ref 0–0.2)
PH UR STRIP.AUTO: 7.5 [PH] (ref 5–8)
PLATELET # BLD AUTO: 209 10*3/MM3 (ref 140–450)
PMV BLD AUTO: 8.6 FL (ref 6–12)
POTASSIUM SERPL-SCNC: 4.1 MMOL/L (ref 3.5–5.2)
PROT SERPL-MCNC: 6.4 G/DL (ref 6–8.5)
PROT UR QL STRIP: NEGATIVE
RBC # BLD AUTO: 4.44 10*6/MM3 (ref 3.77–5.28)
RBC # UR STRIP: ABNORMAL /HPF
REF LAB TEST METHOD: ABNORMAL
SODIUM SERPL-SCNC: 140 MMOL/L (ref 136–145)
SP GR UR STRIP: <=1.005 (ref 1–1.03)
SQUAMOUS #/AREA URNS HPF: ABNORMAL /HPF
UROBILINOGEN UR QL STRIP: ABNORMAL
WBC # UR STRIP: ABNORMAL /HPF
WBC NRBC COR # BLD: 4.5 10*3/MM3 (ref 3.4–10.8)

## 2022-11-11 PROCEDURE — 84100 ASSAY OF PHOSPHORUS: CPT | Performed by: NURSE PRACTITIONER

## 2022-11-11 PROCEDURE — 81001 URINALYSIS AUTO W/SCOPE: CPT | Performed by: FAMILY MEDICINE

## 2022-11-11 PROCEDURE — 84443 ASSAY THYROID STIM HORMONE: CPT | Performed by: NURSE PRACTITIONER

## 2022-11-11 PROCEDURE — 83735 ASSAY OF MAGNESIUM: CPT | Performed by: NURSE PRACTITIONER

## 2022-11-11 PROCEDURE — 0 LEVETIRACETAM IN NACL 0.75% 1000 MG/100ML SOLUTION: Performed by: NURSE PRACTITIONER

## 2022-11-11 PROCEDURE — 85025 COMPLETE CBC W/AUTO DIFF WBC: CPT | Performed by: FAMILY MEDICINE

## 2022-11-11 PROCEDURE — 80053 COMPREHEN METABOLIC PANEL: CPT | Performed by: FAMILY MEDICINE

## 2022-11-11 PROCEDURE — 80061 LIPID PANEL: CPT | Performed by: NURSE PRACTITIONER

## 2022-11-11 PROCEDURE — 83036 HEMOGLOBIN GLYCOSYLATED A1C: CPT | Performed by: NURSE PRACTITIONER

## 2022-11-11 PROCEDURE — 99223 1ST HOSP IP/OBS HIGH 75: CPT | Performed by: NURSE PRACTITIONER

## 2022-11-11 PROCEDURE — 25010000002 ENOXAPARIN PER 10 MG: Performed by: FAMILY MEDICINE

## 2022-11-11 RX ORDER — SUCRALFATE 1 G/1
1 TABLET ORAL 2 TIMES DAILY
Status: DISCONTINUED | OUTPATIENT
Start: 2022-11-11 | End: 2022-11-14 | Stop reason: HOSPADM

## 2022-11-11 RX ORDER — PANTOPRAZOLE SODIUM 40 MG/1
40 TABLET, DELAYED RELEASE ORAL DAILY
Status: DISCONTINUED | OUTPATIENT
Start: 2022-11-12 | End: 2022-11-14 | Stop reason: HOSPADM

## 2022-11-11 RX ORDER — ANASTROZOLE 1 MG/1
1 TABLET ORAL DAILY
Status: DISCONTINUED | OUTPATIENT
Start: 2022-11-12 | End: 2022-11-14 | Stop reason: HOSPADM

## 2022-11-11 RX ORDER — SERTRALINE HYDROCHLORIDE 100 MG/1
100 TABLET, FILM COATED ORAL DAILY
Status: DISCONTINUED | OUTPATIENT
Start: 2022-11-12 | End: 2022-11-14 | Stop reason: HOSPADM

## 2022-11-11 RX ORDER — LISINOPRIL 20 MG/1
40 TABLET ORAL NIGHTLY
Status: DISCONTINUED | OUTPATIENT
Start: 2022-11-11 | End: 2022-11-11

## 2022-11-11 RX ORDER — FAMOTIDINE 20 MG/1
40 TABLET, FILM COATED ORAL NIGHTLY
Status: DISCONTINUED | OUTPATIENT
Start: 2022-11-11 | End: 2022-11-14 | Stop reason: HOSPADM

## 2022-11-11 RX ORDER — ALBUTEROL SULFATE 90 UG/1
2 AEROSOL, METERED RESPIRATORY (INHALATION) EVERY 6 HOURS PRN
Status: DISCONTINUED | OUTPATIENT
Start: 2022-11-11 | End: 2022-11-14 | Stop reason: HOSPADM

## 2022-11-11 RX ORDER — ALUMINA, MAGNESIA, AND SIMETHICONE 2400; 2400; 240 MG/30ML; MG/30ML; MG/30ML
15 SUSPENSION ORAL EVERY 6 HOURS PRN
Status: DISCONTINUED | OUTPATIENT
Start: 2022-11-11 | End: 2022-11-14 | Stop reason: HOSPADM

## 2022-11-11 RX ORDER — LEVETIRACETAM 10 MG/ML
1000 INJECTION INTRAVASCULAR ONCE
Status: COMPLETED | OUTPATIENT
Start: 2022-11-11 | End: 2022-11-11

## 2022-11-11 RX ORDER — IPRATROPIUM BROMIDE AND ALBUTEROL SULFATE 2.5; .5 MG/3ML; MG/3ML
3 SOLUTION RESPIRATORY (INHALATION) EVERY 6 HOURS PRN
Status: DISCONTINUED | OUTPATIENT
Start: 2022-11-11 | End: 2022-11-14 | Stop reason: HOSPADM

## 2022-11-11 RX ORDER — ATORVASTATIN CALCIUM 20 MG/1
20 TABLET, FILM COATED ORAL DAILY
Status: DISCONTINUED | OUTPATIENT
Start: 2022-11-11 | End: 2022-11-14 | Stop reason: HOSPADM

## 2022-11-11 RX ORDER — METOPROLOL SUCCINATE 50 MG/1
50 TABLET, EXTENDED RELEASE ORAL DAILY
Status: DISCONTINUED | OUTPATIENT
Start: 2022-11-12 | End: 2022-11-14 | Stop reason: HOSPADM

## 2022-11-11 RX ORDER — DULOXETIN HYDROCHLORIDE 30 MG/1
30 CAPSULE, DELAYED RELEASE ORAL DAILY
Status: DISCONTINUED | OUTPATIENT
Start: 2022-11-12 | End: 2022-11-14 | Stop reason: HOSPADM

## 2022-11-11 RX ORDER — CHOLESTYRAMINE LIGHT 4 G/5.7G
1 POWDER, FOR SUSPENSION ORAL EVERY 12 HOURS SCHEDULED
Status: DISCONTINUED | OUTPATIENT
Start: 2022-11-11 | End: 2022-11-14 | Stop reason: HOSPADM

## 2022-11-11 RX ORDER — LEVETIRACETAM 5 MG/ML
500 INJECTION INTRAVASCULAR EVERY 12 HOURS SCHEDULED
Status: DISCONTINUED | OUTPATIENT
Start: 2022-11-12 | End: 2022-11-14

## 2022-11-11 RX ORDER — ACETAMINOPHEN 325 MG/1
650 TABLET ORAL EVERY 6 HOURS PRN
Status: DISCONTINUED | OUTPATIENT
Start: 2022-11-11 | End: 2022-11-14 | Stop reason: HOSPADM

## 2022-11-11 RX ORDER — ONDANSETRON 2 MG/ML
4 INJECTION INTRAMUSCULAR; INTRAVENOUS EVERY 6 HOURS PRN
Status: DISCONTINUED | OUTPATIENT
Start: 2022-11-11 | End: 2022-11-14 | Stop reason: HOSPADM

## 2022-11-11 RX ORDER — ENOXAPARIN SODIUM 100 MG/ML
40 INJECTION SUBCUTANEOUS DAILY
Status: DISCONTINUED | OUTPATIENT
Start: 2022-11-11 | End: 2022-11-14 | Stop reason: HOSPADM

## 2022-11-11 RX ORDER — SODIUM CHLORIDE 0.9 % (FLUSH) 0.9 %
3-10 SYRINGE (ML) INJECTION AS NEEDED
Status: DISCONTINUED | OUTPATIENT
Start: 2022-11-11 | End: 2022-11-14 | Stop reason: HOSPADM

## 2022-11-11 RX ORDER — GABAPENTIN 100 MG/1
100 CAPSULE ORAL NIGHTLY
Status: DISCONTINUED | OUTPATIENT
Start: 2022-11-11 | End: 2022-11-14 | Stop reason: HOSPADM

## 2022-11-11 RX ORDER — AZELASTINE 1 MG/ML
2 SPRAY, METERED NASAL DAILY PRN
Status: DISCONTINUED | OUTPATIENT
Start: 2022-11-11 | End: 2022-11-14 | Stop reason: HOSPADM

## 2022-11-11 RX ORDER — ACETAMINOPHEN 500 MG
500 TABLET ORAL ONCE
Status: COMPLETED | OUTPATIENT
Start: 2022-11-11 | End: 2022-11-11

## 2022-11-11 RX ORDER — MONTELUKAST SODIUM 10 MG/1
10 TABLET ORAL NIGHTLY
Status: DISCONTINUED | OUTPATIENT
Start: 2022-11-11 | End: 2022-11-14 | Stop reason: HOSPADM

## 2022-11-11 RX ORDER — LISINOPRIL 20 MG/1
40 TABLET ORAL NIGHTLY
Status: DISCONTINUED | OUTPATIENT
Start: 2022-11-11 | End: 2022-11-14 | Stop reason: HOSPADM

## 2022-11-11 RX ORDER — SODIUM CHLORIDE 0.9 % (FLUSH) 0.9 %
3 SYRINGE (ML) INJECTION EVERY 12 HOURS SCHEDULED
Status: DISCONTINUED | OUTPATIENT
Start: 2022-11-11 | End: 2022-11-14 | Stop reason: HOSPADM

## 2022-11-11 RX ORDER — HYDRALAZINE HYDROCHLORIDE 20 MG/ML
10 INJECTION INTRAMUSCULAR; INTRAVENOUS EVERY 6 HOURS PRN
Status: DISCONTINUED | OUTPATIENT
Start: 2022-11-11 | End: 2022-11-14 | Stop reason: HOSPADM

## 2022-11-11 RX ADMIN — ANASTROZOLE 1 MG: 1 TABLET ORAL at 09:04

## 2022-11-11 RX ADMIN — MONTELUKAST 10 MG: 10 TABLET, FILM COATED ORAL at 21:47

## 2022-11-11 RX ADMIN — ACETAMINOPHEN 650 MG: 325 TABLET, FILM COATED ORAL at 21:46

## 2022-11-11 RX ADMIN — LISINOPRIL 40 MG: 20 TABLET ORAL at 15:45

## 2022-11-11 RX ADMIN — LEVETIRACETAM 1000 MG: 10 INJECTION INTRAVENOUS at 21:48

## 2022-11-11 RX ADMIN — ACETAMINOPHEN 500 MG: 500 TABLET ORAL at 09:03

## 2022-11-11 RX ADMIN — ENOXAPARIN SODIUM 40 MG: 100 INJECTION SUBCUTANEOUS at 15:45

## 2022-11-11 RX ADMIN — CHOLESTYRAMINE 4 G: 4 POWDER, FOR SUSPENSION ORAL at 21:48

## 2022-11-11 RX ADMIN — SUCRALFATE 1 G: 1 TABLET ORAL at 21:47

## 2022-11-11 RX ADMIN — SUCRALFATE 1 G: 1 TABLET ORAL at 09:15

## 2022-11-11 RX ADMIN — FAMOTIDINE 40 MG: 20 TABLET, FILM COATED ORAL at 21:46

## 2022-11-11 RX ADMIN — Medication 3 ML: at 21:49

## 2022-11-11 RX ADMIN — DULOXETINE HYDROCHLORIDE 30 MG: 30 CAPSULE, DELAYED RELEASE ORAL at 09:04

## 2022-11-11 RX ADMIN — GABAPENTIN 100 MG: 100 CAPSULE ORAL at 21:47

## 2022-11-11 RX ADMIN — PANTOPRAZOLE SODIUM 40 MG: 40 TABLET, DELAYED RELEASE ORAL at 06:07

## 2022-11-11 RX ADMIN — Medication 10 ML: at 01:25

## 2022-11-11 RX ADMIN — Medication 10 ML: at 09:05

## 2022-11-11 RX ADMIN — SUCRALFATE 1 G: 1 TABLET ORAL at 01:25

## 2022-11-11 RX ADMIN — SERTRALINE 100 MG: 100 TABLET, FILM COATED ORAL at 09:04

## 2022-11-11 RX ADMIN — MONTELUKAST 10 MG: 10 TABLET, FILM COATED ORAL at 01:25

## 2022-11-11 RX ADMIN — ATORVASTATIN CALCIUM 20 MG: 20 TABLET, FILM COATED ORAL at 21:47

## 2022-11-11 RX ADMIN — ATORVASTATIN CALCIUM 20 MG: 20 TABLET, FILM COATED ORAL at 01:25

## 2022-11-11 RX ADMIN — GABAPENTIN 100 MG: 100 CAPSULE ORAL at 01:25

## 2022-11-11 RX ADMIN — METOPROLOL SUCCINATE 50 MG: 25 TABLET, EXTENDED RELEASE ORAL at 09:04

## 2022-11-11 NOTE — CASE MANAGEMENT/SOCIAL WORK
Social Work Assessment  [facility]     Patient Name: Monse Donnelly  MRN: [id number]  Today's Date: 11/11/2022    Admit Date: 11/10/2022     Discharge Needs Assessment     Row Name 11/11/22 1324       Living Environment    People in Home child(letitia), adult    Name(s) of People in Home Adult son- Bobo Donnelly.    Current Living Arrangements home    Primary Care Provided by self    Provides Primary Care For no one    Family Caregiver if Needed child(letitia), adult    Family Caregiver Names Bobo Donnelly.    Quality of Family Relationships involved    Able to Return to Prior Arrangements yes    Living Arrangement Comments None       Resource/Environmental Concerns    Resource/Environmental Concerns none    Transportation Concerns none       Transition Planning    Patient/Family Anticipates Transition to home with family    Patient/Family Anticipated Services at Transition none    Transportation Anticipated family or friend will provide       Discharge Needs Assessment    Readmission Within the Last 30 Days no previous admission in last 30 days    Current Outpatient/Agency/Support Group --  None    Equipment Currently Used at Home nebulizer    Concerns to be Addressed denies needs/concerns at this time    Anticipated Changes Related to Illness none    Equipment Needed After Discharge none               Discharge Plan     Row Name 11/11/22 8171       Plan    Plan Home with adult son, Bobo Donnelly. Family will transport at d/c.    Patient/Family in Agreement with Plan yes    Plan Comments Pt reports she lives with her adult son who can assist her at dc if needed.Reports she is normally independent with all ADLs.Reports her brother will pick her up at dc.She confirms pcp and pharmacy and denies problems affording medications.She intends to return home at dc and currently denies dc needs.             Expected Discharge Date and Time     Expected Discharge Date Expected Discharge Time    Nov 13, 2022          Demographic Summary     Row Name  11/11/22 1313       General Information    Admission Type inpatient    Arrived From emergency department    Referral Source admission list    Reason for Consult care coordination/care conference;discharge planning    Preferred Language English               Functional Status     Row Name 11/11/22 1321       Functional Status    Usual Activity Tolerance moderate    Current Activity Tolerance moderate       Functional Status, IADL    Medications independent    Meal Preparation independent    Housekeeping independent    Laundry independent    Shopping independent       Mental Status    General Appearance WDL WDL              Patient in room wearing PPE: Mask  Maintained distance greater than 6 ft and spent less than 15 minutes in the room.      KANWAL Hernadez, MSW    Phone: [phone]  Cell: [phone]  Fax: [phone]  [email or web address]

## 2022-11-11 NOTE — CONSULTS
Primary Care Provider: Candi Benson,*     Consult requested by:  Dr. Benson    Reason for Consultation: Neurological evaluation    History taken from: patient chart RN    Chief complaint: Seizure       SUBJECTIVE:    History of present illness: Background per H&P: Monse Donnelly is a 71 y.o. year old female who presented to the ED from [facility] on 11/10/22 with reports of a seizure. Pt reported a right parietal headache as well. She was noted to be confused with slurred speech upon arrival. History was limited due to pt's mental status and amnesia regarding the event.     Pt is noted to have a hx of breast cancer and is on chemo. She had a severe headache recently and made an appointment with [facility] for a CT head. While she was on the table for the procedure, staff witnessed a seizure and sent her to the ED.     BP elevated on admission with intermittent elevations up to 183/105 today.   - Portions of the above HPI were copied from previous encounters and edited as appropriate. PMH as detailed below.     Pt's RN states she witnessed pt have a seizure when she arrived up to [facility]. The episode lasted about 30 seconds and she states the pt had increased tone in BUE and was jerking bilaterally. She states her head was jerking up and down as well. The pt was unresponsive and not responding to external stimuli. After the seizure, pt was confused for about 15 minutes. I evaluated pt shortly after and she is currently awake and alert, but is slow to respond and not fully back to baseline. No focal deficits. Pt continues to have a headache. /98. Nurse administering BP meds now. Pt denies any hx of seizures prior to yesterday. She denies any other neurologic symptoms besides a headache and generalized weakness/fatigue.     Review of Systems   Constitutional: Positive for fatigue. Negative for chills and diaphoresis.   Eyes: Negative for photophobia and visual disturbance.   Neurological:  Positive for seizures and weakness. Negative for dizziness, tremors, syncope, facial asymmetry, speech difficulty, light-headedness, numbness and headaches.        PATIENT HISTORY:  Past Medical History:   Diagnosis Date   • Arthritis    • Asthma    • Boils of multiple sites 5/28   • Breast cancer (HCC)    • Cancer (HCC)    • COPD (chronic obstructive pulmonary disease) (HCC)    • Coronary artery disease    • Drug therapy    • Elevated cholesterol    • GERD (gastroesophageal reflux disease)    • H/O Bell's palsy    • History of transfusion    • Hypertension    • Neuropathy     feet and hands post chemo   • Sciatic nerve pain    • Sleep apnea    ,   Past Surgical History:   Procedure Laterality Date   • ABDOMINAL SURGERY     • ANKLE SURGERY     • BREAST BIOPSY     • BREAST LUMPECTOMY     • CERVICAL CONIZATION     • MASTECTOMY Right 6/10/2021    Procedure: MASTECTOMY MODIFIED RADICAL RIGHT;  Surgeon: Kenrick Ragsdale MD;  Location: [facility] MAIN OR;  Service: General;  Laterality: Right;   • SIMPLE MASTECTOMY Left 6/10/2021    Procedure: MASTECTOMY SIMPLE;  Surgeon: Kenrick Ragsdale MD;  Location: [facility] MAIN OR;  Service: General;  Laterality: Left;   • TUBAL ABDOMINAL LIGATION     • VENOUS ACCESS DEVICE (PORT) INSERTION Left 7/30/2020    Procedure: INSERTION VENOUS ACCESS DEVICE LEFT internal jugular;  Surgeon: Taylor Kaye MD;  Location: [facility] MAIN OR;  Service: General;  Laterality: Left;   • VENOUS ACCESS DEVICE (PORT) REMOVAL Left 1/6/2022    Procedure: REMOVAL VENOUS ACCESS DEVICE;  Surgeon: Kenrick Ragsdale MD;  Location: [facility] MAIN OR;  Service: General;  Laterality: Left;   ,   Family History   Problem Relation Age of Onset   • Heart disease Father    • Stroke Father    • Diabetes Father    • Diabetes Son    ,   Social History     Tobacco Use   • Smoking status: Never   • Smokeless tobacco: Never   Vaping Use   • Vaping Use: Never used   Substance Use Topics   • Alcohol use: Never   •  Drug use: Never   ,   Prior to Admission medications    Medication Sig Start Date End Date Taking? Authorizing Provider   atorvastatin (LIPITOR) 20 MG tablet Take 20 mg by mouth Daily.   Yes Esperanza Watters MD   colesevelam (WELCHOL) 625 MG tablet Take 625 mg by mouth 2 (Two) Times a Day With Meals.   Yes Esperanza Watters MD   DULoxetine (CYMBALTA) 30 MG capsule Take 30 mg by mouth Daily.   Yes Esperanza Watters MD   gabapentin (NEURONTIN) 100 MG capsule Take 100 mg by mouth Every Night.   Yes Esperanza Watters MD   lisinopril (PRINIVIL,ZESTRIL) 40 MG tablet Take 40 mg by mouth Every Night. LD 1/4   Yes Esperanza Watters MD   metoprolol succinate XL (TOPROL-XL) 50 MG 24 hr tablet Take 50 mg by mouth Daily. Take DOS   Yes Esperanza Watters MD   pantoprazole (PROTONIX) 40 MG EC tablet Take 40 mg by mouth Daily.   Yes Esperanza Watters MD   sertraline (ZOLOFT) 100 MG tablet Take 100 mg by mouth Daily. Take DOS   Yes Esperanza Watters MD   sucralfate (CARAFATE) 1 g tablet Take 1 g by mouth 2 (Two) Times a Day.   Yes Esperanza Watters MD   albuterol sulfate  (90 Base) MCG/ACT inhaler Inhale 2 puffs Every 6 (Six) Hours As Needed.    Esperanza Watters MD   ALENDRONATE SODIUM PO Take 70 mg by mouth 1 (One) Time Per Week. Patient states takes on Wednesdays    Esperanza Watters MD   anastrozole (ARIMIDEX) 1 MG tablet Take  by mouth Daily.    Esperanza Watters MD   azelastine (ASTELIN) 0.1 % nasal spray 2 sprays into the nostril(s) as directed by provider As Needed. Use in each nostril as directed    Esperanza Watters MD   famotidine (PEPCID) 40 MG tablet Take 40 mg by mouth Every Night.    Esperanza Watters MD   ipratropium-albuterol (DUO-NEB) 0.5-2.5 mg/3 ml nebulizer Take 3 mL by nebulization As Needed.    Esperanza Watters MD   montelukast (SINGULAIR) 10 MG tablet Take 10 mg by mouth Every Night.    Esperanza Watters MD    Allergies:  Penicillins and  Adhesive tape    Current Facility-Administered Medications   Medication Dose Route Frequency Provider Last Rate Last Admin   • acetaminophen (TYLENOL) tablet 650 mg  650 mg Oral Q6H PRN Candi Benson MD   650 mg at 11/11/22 2146   • albuterol sulfate HFA (PROVENTIL HFA;VENTOLIN HFA;PROAIR HFA) inhaler 2 puff  2 puff Inhalation Q6H PRN Candi Benson MD       • aluminum-magnesium hydroxide-simethicone (MAALOX MAX) 400-400-40 MG/5ML suspension 15 mL  15 mL Oral Q6H PRN Candi Benson MD       • anastrozole (ARIMIDEX) tablet 1 mg  1 mg Oral Daily Candi Benson MD   1 mg at 11/12/22 0850   • atorvastatin (LIPITOR) tablet 20 mg  20 mg Oral Daily Candi Benson MD   20 mg at 11/11/22 2147   • azelastine (ASTELIN) nasal spray 2 spray  2 spray Each Nare Daily PRN Candi Benson MD       • cholestyramine light packet 4 g  1 packet Oral Q12H Candi Benson MD   4 g at 11/12/22 1023   • DULoxetine (CYMBALTA) DR capsule 30 mg  30 mg Oral Daily Candi Benson MD   30 mg at 11/12/22 0850   • Enoxaparin Sodium (LOVENOX) syringe 40 mg  40 mg Subcutaneous Daily Candi Benson MD   40 mg at 11/11/22 1545   • famotidine (PEPCID) tablet 40 mg  40 mg Oral Nightly Candi Benson MD   40 mg at 11/11/22 2146   • gabapentin (NEURONTIN) capsule 100 mg  100 mg Oral Nightly Candi Benson MD   100 mg at 11/11/22 2147   • hydrALAZINE (APRESOLINE) injection 10 mg  10 mg Intravenous Q6H PRN Candi Benson MD       • ipratropium-albuterol (DUO-NEB) nebulizer solution 3 mL  3 mL Nebulization Q6H PRN Candi Benson MD       • levETIRAcetam in NaCl 0.82% (KEPPRA) IVPB 500 mg  500 mg Intravenous Q12H Lorrie Ling APRN   500 mg at 11/12/22 0849   • lisinopril (PRINIVIL,ZESTRIL) tablet 40 mg  40 mg Oral Nightly Candi Benson MD   40 mg at 11/11/22 1545   • metoprolol succinate XL (TOPROL-XL) 24 hr tablet 50 mg  50 mg  Oral Daily Candi Benson MD   50 mg at 11/12/22 0850   • montelukast (SINGULAIR) tablet 10 mg  10 mg Oral Nightly Candi Benson MD   10 mg at 11/11/22 2147   • ondansetron (ZOFRAN) injection 4 mg  4 mg Intravenous Q6H PRN Candi Benson MD       • pantoprazole (PROTONIX) EC tablet 40 mg  40 mg Oral Daily Candi Benson MD   40 mg at 11/12/22 0850   • sertraline (ZOLOFT) tablet 100 mg  100 mg Oral Daily Candi Benson MD   100 mg at 11/12/22 0850   • sodium chloride 0.9 % flush 3 mL  3 mL Intravenous Q12H Candi Benson MD   3 mL at 11/12/22 0853   • sodium chloride 0.9 % flush 3-10 mL  3-10 mL Intravenous PRN Candi Benson MD       • sucralfate (CARAFATE) tablet 1 g  1 g Oral BID Candi Benson MD   1 g at 11/12/22 0850        ________________________________________________________        OBJECTIVE:  Upon today's exam, pt is awake and alert. Appears somewhat dazed and slow to respond. Answers are appropriate. No focal deficits.      Neurologic Exam  PHYSICAL EXAM:    CONSTITUTIONAL: The patient is in no apparent distress, awake and alert.      HEENT: There is no tenderness over the temporal arteries bilaterally. Normocephalic, atraumatic. TMJ open symmetrically without tenderness.    NECK: ROM normal, supple    RESPIRATORY: Breathing unlabored, Breath sounds are normal    CARDIAC: Regular rate and rhythm.     EXTREMITIES:  No clubbing, cyanosis or edema.    PSYCHIATRIC: Mood/affect flat, calm, cooperative, appropriate    NEUROLOGICAL:    Cognition:   Fully oriented.  Fund of knowledge good.  Concentration and attention normal.   Language: normal comprehension, intact repetition and naming. Speech is slow, but appropriate. No dysphasia  Poor historian at this time     Cranial nerves;    II - pupils bilaterally equal reacting to light,  No new Visual field deficits;  Fundoscopic exam- Not able to be done, non-dilated exam  III,IV,VI: EOMI with  no diplopia  V: Normal facial sensations  VII: Mild right facial asymmetry (chronic 2nd to hx Bell's Palsy)  VIII: No New hearing abnormality  IX, X, XI: normal gag and shoulder shrug;  XII: tongue is in the midline.    Sensory:  Intact to light touch in all extremities.     Motor: Strength 5-/5 bilaterally upper and lower extremities. No involuntary movements present. Normal tone and bulk.  Deep tendon reflexes: 1/4 and symmetrical in biceps, brachioradialis, triceps, bilateral 1/4 knees and ankles. Both plantars are flexor.    Cerebellar: Finger to nose and mirror movements normal bilaterally.    Gait and balance: deferred    ________________________________________________________   RESULTS REVIEW:    VITAL SIGNS:   Temp:  [97.4 °F (36.3 °C)-98.6 °F (37 °C)] 98.5 °F (36.9 °C)  Heart Rate:  [64-88] 69  Resp:  [13-18] 18  BP: (122-183)/() 131/90     LABS:      Lab 11/11/22  2307 11/11/22  1627 11/10/22  1453   WBC 4.90 4.50 6.30   HEMOGLOBIN 13.8 13.7 15.0   HEMATOCRIT 42.6 42.4 44.1   PLATELETS 197 209 240   NEUTROS ABS 2.30 2.30 4.10   LYMPHS ABS 2.00 1.70  --    MONOS ABS 0.40 0.30  --    EOS ABS 0.10 0.10  --    MCV 93.9 95.5 93.0         Lab 11/11/22  2307 11/11/22  1627 11/10/22  1453   SODIUM 140 140 145   POTASSIUM 3.6 4.1 4.2   CHLORIDE 105 104 107   CO2 24.0 24.0 26.0   ANION GAP 11.0 12.0 12.0   BUN 10 11 15   CREATININE 0.56* 0.59 0.84   EGFR 97.7 96.5 74.4   GLUCOSE 92 123* 122*   CALCIUM 9.2 9.3 10.4         Lab 11/11/22  2307 11/11/22  1627 11/10/22  1453   TOTAL PROTEIN 6.2 6.4 7.0   ALBUMIN 3.90 4.10 4.50   GLOBULIN 2.3 2.3 2.5   ALT (SGPT) 15 13 16   AST (SGOT) 17 18 15   BILIRUBIN 0.3 0.4 0.3   ALK PHOS 77 80 103                     UA    Urinalysis 11/11/22 11/11/22    1905 1905   Squamous Epithelial Cells, UA  None Seen   Specific Gravity, UA <=1.005    Ketones, UA Negative    Blood, UA Negative    Leukocytes, UA Trace (A)    Nitrite, UA Negative    RBC, UA  None Seen   WBC, UA  0-2 (A)    Bacteria, UA  None Seen   (A) Abnormal value       Comments are available for some flowsheets but are not being displayed.             No results found for: TSH, LDL, HGBA1C, [id number], PHENYTOIN, PHENOBARB, VALPROATE, CBMZ    IMAGING STUDIES:  CT Head Without Contrast    Result Date: 11/10/2022  1.     An acute intracranial abnormality is not appreciated.  Electronically Signed By-Cole Perez MD On:11/10/2022 3:29 PM This report was finalized on [id number] by  Cole Perez MD.    MRI Brain With & Without Contrast    Result Date: 11/10/2022  1.     There are some T2 signal changes involving the cerebral hemispheres and toma which could reflect more chronic small vessel ischemic change. 2.     No definite evidence for metastatic disease.  Electronically Signed By-Cole Perez MD On:11/10/2022 6:34 PM This report was finalized on [id number] by  Cole Perez MD.      I reviewed the patient's new clinical results.    ________________________________________________________     PROBLEM LIST:    New onset seizure (HCC)    Primary hypertension        ASSESSMENT/PLAN:  1. New onset seizures, 2 witnessed events. Etiology unclear, no obvious provoking factors except elevated blood pressure. MRI is unremarkable with no evidence of PRES, malignancy, stroke. Nothing to suggest CNS infection.   - CT Head: No acute intracranial abnormality   - MRI brain: There are some T2 signal changes involving the cerebral hemispheres and toma which could reflect more chronic small vessel ischemic change. No definite evidence for metastatic disease.  - Carotid duplex: Pending   - EKG: Pending   - EEG is not currently available at this facility. Recommend outpt EEG.   - Labs: A1C: P, B12: P, LDL: P, TSH: P, mag: P, phos: P, sodium: P, calcium: P, UA neg  - Seizures/Syncope precautions (see below)  - If pt continues to have seizure activity this admission, recommend transfer to a tertiary center with EEG capability.   - Start Keppra  500mg BID with 1000mg load now (ok to change to 500mg BID PO if pt tolerating)  - Further recommendations pending clinical course     2. Headache  - Likely related to uncontrolled BP. Headache seems to improve when BP is WNL  - No nuchal rigidity, afebrile  - Fioricet PRN    3. Hypertension, Hypertensive urgency---improved  - Strict BP control, monitor per protocol  - Management per primary      SEIZURE/SYNCOPE INSTRUCTIONS:  -Recommended to observe all seizure precautions, including, but not limited to:   -No driving until seizure free for more than 3 months- as per State driving regulation / law;   -Avoid all high-risk activity, Take showers instead of baths, Avoid swimming without observation, Avoid open heat sources, Avoid working at heights, and Avoid engaging in all potentially hazardous activities.   -Patient expressed clear understanding.    Will follow       I discussed the patient's findings and my recommendations with patient, nursing staff and consulting provider    Lorrie Ling, BUTCH  11/12/22  11:11 EST

## 2022-11-11 NOTE — CASE MANAGEMENT/SOCIAL WORK
Discharge Planning Assessment   Chris     Patient Name: Monse Donnelly  MRN: [id number]  Today's Date: 11/11/2022    Admit Date: 11/10/2022    Plan: Home with family   Discharge Needs Assessment     Row Name 11/11/22 1324       Living Environment    People in Home child(letitia), adult    Name(s) of People in Home Adult son- Bobo Donnelly.    Current Living Arrangements home    Primary Care Provided by self    Provides Primary Care For no one    Family Caregiver if Needed child(letitia), adult    Family Caregiver Names Bobo Donnelly.    Quality of Family Relationships involved    Able to Return to Prior Arrangements yes    Living Arrangement Comments None       Resource/Environmental Concerns    Resource/Environmental Concerns none    Transportation Concerns none       Transition Planning    Patient/Family Anticipates Transition to home with family    Patient/Family Anticipated Services at Transition none    Transportation Anticipated family or friend will provide       Discharge Needs Assessment    Readmission Within the Last 30 Days no previous admission in last 30 days    Current Outpatient/Agency/Support Group --  None    Equipment Currently Used at Home nebulizer    Concerns to be Addressed denies needs/concerns at this time    Anticipated Changes Related to Illness none    Equipment Needed After Discharge none               Discharge Plan     Row Name 11/11/22 0035       Plan    Plan Home with family    Plan Comments  briefly spoke with patient.Please refer to social work notes for additional information.Pt reports she lives with her adult son who can assist her at dc if needed.Reports she is normally independent with all ADLs.Reports her brother will pick her up at dc.She confirms pcp and pharmacy and denies problems affording medications.She intends to return home at dc and currently denies dc needs.              Continued Care and Services - Admitted Since 11/10/2022    Coordination has not been started for  this encounter.       Expected Discharge Date and Time     Expected Discharge Date Expected Discharge Time    Nov 13, 2022          Demographic Summary     Row Name 11/11/22 1313       General Information    Admission Type inpatient    Arrived From emergency department    Referral Source admission list    Reason for Consult care coordination/care conference;discharge planning    Preferred Language English               Functional Status     Row Name 11/11/22 1321       Functional Status    Usual Activity Tolerance moderate    Current Activity Tolerance moderate       Functional Status, IADL    Medications independent    Meal Preparation independent    Housekeeping independent    Laundry independent    Shopping independent       Mental Status    General Appearance WDL WDL                 Patient Forms     Row Name 11/11/22 1336       Patient Forms    Important Message from Medicare (IMM) Delivered  11/11/22 per     Delivered to Patient    Method of delivery In person  IMM given and explained to patient.She verbalized understanding,but declines to sign at this time,stating she doesn't feel like it              Stephanei Redmond RN, [facility]  Office: [phone]  Fax: [phone]  [email or web address]      I met with patient in room wearing PPE: mask and goggles.     Maintained distance greater than six feet and spent </=15 minutes in the room      Stephanie Redmond RN

## 2022-11-12 ENCOUNTER — APPOINTMENT (OUTPATIENT)
Dept: CARDIOLOGY | Facility: HOSPITAL | Age: 72
End: 2022-11-12

## 2022-11-12 PROBLEM — I10 PRIMARY HYPERTENSION: Status: ACTIVE | Noted: 2022-11-12

## 2022-11-12 LAB
ALBUMIN SERPL-MCNC: 3.9 G/DL (ref 3.5–5.2)
ALBUMIN/GLOB SERPL: 1.7 G/DL
ALP SERPL-CCNC: 77 U/L (ref 39–117)
ALT SERPL W P-5'-P-CCNC: 15 U/L (ref 1–33)
ANION GAP SERPL CALCULATED.3IONS-SCNC: 11 MMOL/L (ref 5–15)
AST SERPL-CCNC: 17 U/L (ref 1–32)
BASOPHILS # BLD AUTO: 0 10*3/MM3 (ref 0–0.2)
BASOPHILS NFR BLD AUTO: 0.9 % (ref 0–1.5)
BILIRUB SERPL-MCNC: 0.3 MG/DL (ref 0–1.2)
BUN SERPL-MCNC: 10 MG/DL (ref 8–23)
BUN/CREAT SERPL: 17.9 (ref 7–25)
CALCIUM SPEC-SCNC: 9.2 MG/DL (ref 8.6–10.5)
CHLORIDE SERPL-SCNC: 105 MMOL/L (ref 98–107)
CHOLEST SERPL-MCNC: 164 MG/DL (ref 0–200)
CO2 SERPL-SCNC: 24 MMOL/L (ref 22–29)
CREAT SERPL-MCNC: 0.56 MG/DL (ref 0.57–1)
DEPRECATED RDW RBC AUTO: 47.3 FL (ref 37–54)
EGFRCR SERPLBLD CKD-EPI 2021: 97.7 ML/MIN/1.73
EOSINOPHIL # BLD AUTO: 0.1 10*3/MM3 (ref 0–0.4)
EOSINOPHIL NFR BLD AUTO: 2.2 % (ref 0.3–6.2)
ERYTHROCYTE [DISTWIDTH] IN BLOOD BY AUTOMATED COUNT: 14.3 % (ref 12.3–15.4)
GLOBULIN UR ELPH-MCNC: 2.3 GM/DL
GLUCOSE SERPL-MCNC: 92 MG/DL (ref 65–99)
HCT VFR BLD AUTO: 42.6 % (ref 34–46.6)
HDLC SERPL-MCNC: 53 MG/DL (ref 40–60)
HGB BLD-MCNC: 13.8 G/DL (ref 12–15.9)
LDLC SERPL CALC-MCNC: 90 MG/DL (ref 0–100)
LDLC/HDLC SERPL: 1.64 {RATIO}
LYMPHOCYTES # BLD AUTO: 2 10*3/MM3 (ref 0.7–3.1)
LYMPHOCYTES NFR BLD AUTO: 41.4 % (ref 19.6–45.3)
MAGNESIUM SERPL-MCNC: 2.2 MG/DL (ref 1.6–2.4)
MCH RBC QN AUTO: 30.5 PG (ref 26.6–33)
MCHC RBC AUTO-ENTMCNC: 32.4 G/DL (ref 31.5–35.7)
MCV RBC AUTO: 93.9 FL (ref 79–97)
MONOCYTES # BLD AUTO: 0.4 10*3/MM3 (ref 0.1–0.9)
MONOCYTES NFR BLD AUTO: 8.7 % (ref 5–12)
NEUTROPHILS NFR BLD AUTO: 2.3 10*3/MM3 (ref 1.7–7)
NEUTROPHILS NFR BLD AUTO: 46.8 % (ref 42.7–76)
NRBC BLD AUTO-RTO: 0.1 /100 WBC (ref 0–0.2)
PHOSPHATE SERPL-MCNC: 3 MG/DL (ref 2.5–4.5)
PLATELET # BLD AUTO: 197 10*3/MM3 (ref 140–450)
PMV BLD AUTO: 8.3 FL (ref 6–12)
POTASSIUM SERPL-SCNC: 3.6 MMOL/L (ref 3.5–5.2)
PROT SERPL-MCNC: 6.2 G/DL (ref 6–8.5)
RBC # BLD AUTO: 4.54 10*6/MM3 (ref 3.77–5.28)
SODIUM SERPL-SCNC: 140 MMOL/L (ref 136–145)
TRIGL SERPL-MCNC: 120 MG/DL (ref 0–150)
TSH SERPL DL<=0.05 MIU/L-ACNC: 1.48 UIU/ML (ref 0.27–4.2)
VLDLC SERPL-MCNC: 21 MG/DL (ref 5–40)
WBC NRBC COR # BLD: 4.9 10*3/MM3 (ref 3.4–10.8)

## 2022-11-12 PROCEDURE — 25010000002 LEVETIRACETAM IN NACL 0.82% 500 MG/100ML SOLUTION: Performed by: NURSE PRACTITIONER

## 2022-11-12 PROCEDURE — 93010 ELECTROCARDIOGRAM REPORT: CPT | Performed by: INTERNAL MEDICINE

## 2022-11-12 PROCEDURE — 93005 ELECTROCARDIOGRAM TRACING: CPT | Performed by: NURSE PRACTITIONER

## 2022-11-12 PROCEDURE — 82607 VITAMIN B-12: CPT | Performed by: NURSE PRACTITIONER

## 2022-11-12 PROCEDURE — 99233 SBSQ HOSP IP/OBS HIGH 50: CPT | Performed by: NURSE PRACTITIONER

## 2022-11-12 PROCEDURE — 25010000002 ENOXAPARIN PER 10 MG: Performed by: FAMILY MEDICINE

## 2022-11-12 PROCEDURE — 93880 EXTRACRANIAL BILAT STUDY: CPT

## 2022-11-12 RX ORDER — BUTALBITAL, ACETAMINOPHEN AND CAFFEINE 50; 325; 40 MG/1; MG/1; MG/1
2 TABLET ORAL 2 TIMES DAILY PRN
Status: DISCONTINUED | OUTPATIENT
Start: 2022-11-12 | End: 2022-11-14 | Stop reason: HOSPADM

## 2022-11-12 RX ORDER — BACLOFEN 10 MG/1
10 TABLET ORAL 2 TIMES DAILY PRN
Status: DISCONTINUED | OUTPATIENT
Start: 2022-11-12 | End: 2022-11-14 | Stop reason: HOSPADM

## 2022-11-12 RX ADMIN — Medication 3 ML: at 20:26

## 2022-11-12 RX ADMIN — Medication 3 ML: at 08:53

## 2022-11-12 RX ADMIN — MONTELUKAST 10 MG: 10 TABLET, FILM COATED ORAL at 20:22

## 2022-11-12 RX ADMIN — SUCRALFATE 1 G: 1 TABLET ORAL at 20:21

## 2022-11-12 RX ADMIN — CHOLESTYRAMINE 4 G: 4 POWDER, FOR SUSPENSION ORAL at 20:20

## 2022-11-12 RX ADMIN — PANTOPRAZOLE SODIUM 40 MG: 40 TABLET, DELAYED RELEASE ORAL at 08:50

## 2022-11-12 RX ADMIN — LEVETIRACETAM 500 MG: 5 INJECTION INTRAVENOUS at 20:20

## 2022-11-12 RX ADMIN — ENOXAPARIN SODIUM 40 MG: 100 INJECTION SUBCUTANEOUS at 15:05

## 2022-11-12 RX ADMIN — CHOLESTYRAMINE 4 G: 4 POWDER, FOR SUSPENSION ORAL at 10:23

## 2022-11-12 RX ADMIN — SERTRALINE 100 MG: 100 TABLET, FILM COATED ORAL at 08:50

## 2022-11-12 RX ADMIN — LISINOPRIL 40 MG: 20 TABLET ORAL at 20:21

## 2022-11-12 RX ADMIN — METOPROLOL SUCCINATE 50 MG: 50 TABLET, EXTENDED RELEASE ORAL at 08:50

## 2022-11-12 RX ADMIN — ACETAMINOPHEN 650 MG: 325 TABLET, FILM COATED ORAL at 12:01

## 2022-11-12 RX ADMIN — FAMOTIDINE 40 MG: 20 TABLET, FILM COATED ORAL at 20:21

## 2022-11-12 RX ADMIN — DULOXETINE HYDROCHLORIDE 30 MG: 30 CAPSULE, DELAYED RELEASE ORAL at 08:50

## 2022-11-12 RX ADMIN — GABAPENTIN 100 MG: 100 CAPSULE ORAL at 20:22

## 2022-11-12 RX ADMIN — ATORVASTATIN CALCIUM 20 MG: 20 TABLET, FILM COATED ORAL at 20:21

## 2022-11-12 RX ADMIN — ANASTROZOLE 1 MG: 1 TABLET ORAL at 08:50

## 2022-11-12 RX ADMIN — LEVETIRACETAM 500 MG: 5 INJECTION INTRAVENOUS at 08:49

## 2022-11-12 RX ADMIN — SUCRALFATE 1 G: 1 TABLET ORAL at 08:50

## 2022-11-12 NOTE — PLAN OF CARE
Goal Outcome Evaluation: pt a/ox4, family at bedside, neuro wanting to continue iv keppra to make sure pt has no more seizure activity, po baclofen, fioricet, pain medication ordered for headaches and possible visit to eye doctor for adjustment of glasses, no sticks or b/p to right arm due to hx breast cancer. Call light in reach., clean and dry

## 2022-11-12 NOTE — PROGRESS NOTES
LOS: 1 day     Chief Complaint:  Seizure        SUBJECTIVE:    History taken from: patient chart RN    Interval History: Monse Donnelly was admitted on 11/10/2022 after [facility] a [facility]. Pt was there to have a CT head due to recent headaches. She is known to have a hx of breast cancer and is s/p right mastectomy and chemo. Pt had a second witnessed generalized seizure on 11/11 per nursing staff.  Pt denies any hx of seizures. Labs have been unremarkable so far. No signs of infection. MRI brain negative. BP elevated at times, up to 182/98, but has improved overnight with medications.   - Portions of the above HPI were copied from previous encounters and edited as appropriate.    Patient Complaints: Pt continues to have a frontal headache. She describes this as a tension type headache, 5/10. It has been intermittent over the past 3 weeks. She denies any hx of migraines or significant headaches in the past. She denies any head or neck injury. No associated symptoms and she does not notice a pattern between her headache and blood pressure. She does report tension and tenderness in the occiput, especially on the left, which she feels contributes to the overall headache. She also reports worsening visual acuity for a long time and states she has a lot of trouble with her eyes due to cataracts. No new vision changes recently, no photophobia.     No seizure activity >24 hours.     Review of Systems   Constitutional: Negative for chills, diaphoresis and fatigue.   Eyes: Positive for visual disturbance. Negative for photophobia.   Neurological: Positive for headaches. Negative for dizziness, tremors, seizures, syncope, facial asymmetry, speech difficulty, weakness, light-headedness and numbness.          Pertinent PMH:  has a past medical history of Arthritis, Asthma, Boils of multiple sites (5/28), Breast cancer (HCC), Cancer (HCC), COPD (chronic obstructive pulmonary disease) ([facility]),  Coronary artery disease, Drug therapy, Elevated cholesterol, GERD (gastroesophageal reflux disease), H/O Bell's palsy, History of transfusion, Hypertension, Neuropathy, Sciatic nerve pain, and Sleep apnea.   ________________________________________________     OBJECTIVE:  Pt examined at . She is awake and alert.     Neurologic Exam    On exam:  GENERAL: NAD, awake and alert  HEENT: Normocephalic, Atraumatic. Oral mucosa clear and moist. Occipital tenderness bilaterally, left > right   RESP: Breathing unlabored, breath sounds normal  CARDIO: RRR  SKIN: Warm, dry. No apparent rash.   PSYCH: Mood/affect normal    NEURO:  Oriented x3  EOMI, PERRL, no visual field deficits  No facial asymmetry  Speech clear without dysarthria  Sensations intact and equal bilaterally  Strength 5/5 and equal in all extremities  No ataxia    ________________________________________________   RESULTS REVIEW    VITAL SIGNS:  Temp:  [97.4 °F (36.3 °C)-98.6 °F (37 °C)] 98.5 °F (36.9 °C)  Heart Rate:  [64-88] 69  Resp:  [13-18] 18  BP: (122-183)/() 131/90    LABS:       Lab 11/11/22  2307 11/11/22  1627 11/10/22  1453   WBC 4.90 4.50 6.30   HEMOGLOBIN 13.8 13.7 15.0   HEMATOCRIT 42.6 42.4 44.1   PLATELETS 197 209 240   NEUTROS ABS 2.30 2.30 4.10   LYMPHS ABS 2.00 1.70  --    MONOS ABS 0.40 0.30  --    EOS ABS 0.10 0.10  --    MCV 93.9 95.5 93.0         Lab 11/11/22 2307 11/11/22  1627 11/10/22  1453   SODIUM 140 140 145   POTASSIUM 3.6 4.1 4.2   CHLORIDE 105 104 107   CO2 24.0 24.0 26.0   ANION GAP 11.0 12.0 12.0   BUN 10 11 15   CREATININE 0.56* 0.59 0.84   EGFR 97.7 96.5 74.4   GLUCOSE 92 123* 122*   CALCIUM 9.2 9.3 10.4         Lab 11/11/22  2307 11/11/22  1627 11/10/22  1453   TOTAL PROTEIN 6.2 6.4 7.0   ALBUMIN 3.90 4.10 4.50   GLOBULIN 2.3 2.3 2.5   ALT (SGPT) 15 13 16   AST (SGOT) 17 18 15   BILIRUBIN 0.3 0.4 0.3   ALK PHOS 77 80 103                     UA    Urinalysis 11/11/22 11/11/22    1905 1905   Squamous Epithelial Cells,  UA  None Seen   Specific Gravity, UA <=1.005    Ketones, UA Negative    Blood, UA Negative    Leukocytes, UA Trace (A)    Nitrite, UA Negative    RBC, UA  None Seen   WBC, UA  0-2 (A)   Bacteria, UA  None Seen   (A) Abnormal value       Comments are available for some flowsheets but are not being displayed.             No results found for: TSH, LDL, HGBA1C, [id number], PHENYTOIN, PHENOBARB, VALPROATE, CBMZ      IMAGING STUDIES:  CT Head Without Contrast    Result Date: 11/10/2022  1.     An acute intracranial abnormality is not appreciated.  Electronically Signed By-Cole Perez MD On:11/10/2022 3:29 PM This report was finalized on [id number] by  Cole Perez MD.    MRI Brain With & Without Contrast    Result Date: 11/10/2022  1.     There are some T2 signal changes involving the cerebral hemispheres and toma which could reflect more chronic small vessel ischemic change. 2.     No definite evidence for metastatic disease.  Electronically Signed By-Cole Perez MD On:11/10/2022 6:34 PM This report was finalized on [id number] by  Cole Perez MD.      I reviewed the patient's new clinical results.    ________________________________________________      PROBLEM LIST:    New onset seizure (HCC)    Primary hypertension        ASSESSMENT/PLAN:  1. New onset seizures, 2 witnessed events. Etiology unclear, no obvious provoking factors except elevated blood pressure. MRI is unremarkable with no evidence of PRES, malignancy, stroke. Nothing to suggest CNS infection.   - Carotid duplex:  Report pending   - EKG: Sinus rhythm. Prolonged QT interval   - EEG is not currently available at this facility. Recommend outpt EEG.   - Labs: A1C: P, B12: P, LDL:  90, TSH:  1.48, ma.2, phos:  3.0, sodium:  140, calcium:  9.2, UA neg  - Seizures/Syncope precautions (see below)  - If pt continues to have seizure activity this admission, recommend transfer to a tertiary center with EEG capability.   - Start Keppra 500mg BID with  1000mg load now (ok to change to 500mg BID PO if pt tolerating)  - Follow up with outpt neurologist of choice  - Recommend pt contact her oncologist to determine if her medications could be a contributing factor.      2. Headache, pt reports more of a tension-type headache over the past 3 weeks. She also has findings consistent with occipital neuralgia which is likely contributing. No MRI findings that would explain this change.   - No nuchal rigidity, afebrile  - Fioricet PRN  - Baclofen 10mg BID PRN  - Recommend ophthalmologic exam as pt states her vision has decreased over the years and she has been straining   - Recommend heat/ice, massage, and stretching/PT exercises for occipital neuralgia pain.   - Occipital nerve blocks can be considered in the future if she does not have significant improvement with conservative measures.   - Discussed with pt.      3. Hypertension, Hypertensive urgency---improved  - Strict BP control, monitor per protocol  - Management per primary    **Please refer to previous notes for further details and recommendations.     Will sign off pending carotid duplex. Please call with questions or concerns.       I discussed the patient's findings and my recommendations with patient, nursing staff, primary care team and consulting provider    Lorrie Ling, BUTCH  11/12/22  11:21 EST

## 2022-11-12 NOTE — PLAN OF CARE
Problem: Adult Inpatient Plan of Care  Goal: Plan of Care Review  Outcome: Ongoing, Progressing  Flowsheets (Taken 11/12/2022 0115)  Progress: no change  Plan of Care Reviewed With: patient  Goal: Patient-Specific Goal (Individualized)  Outcome: Ongoing, Progressing  Goal: Absence of Hospital-Acquired Illness or Injury  Outcome: Ongoing, Progressing  Intervention: Identify and Manage Fall Risk  Description: Perform standard risk assessment on admission using a validated tool or comprehensive approach appropriate to the patient; reassess fall risk frequently, with change in status or transfer to another level of care.  Communicate fall injury risk to interprofessional healthcare team.  Determine need for increased observation, equipment and environmental modification, such as low bed, signage and supportive, nonskid footwear.  Adjust safety measures to individual developmental age, stage and identified risk factors.  Reinforce the importance of safety and physical activity with patient and family.  Perform regular intentional rounding to assess need for position change, pain assessment and personal needs, including assistance with toileting.  Recent Flowsheet Documentation  Taken 11/12/2022 0000 by Shazia Trent RN  Safety Promotion/Fall Prevention: safety round/check completed  Taken 11/11/2022 2216 by Shazia Trent, RN  Safety Promotion/Fall Prevention:   safety round/check completed   room organization consistent   nonskid shoes/slippers when out of bed   lighting adjusted   fall prevention program maintained   clutter free environment maintained   assistive device/personal items within reach  Taken 11/11/2022 2130 by Shazia Trent, RN  Safety Promotion/Fall Prevention:   safety round/check completed   room organization consistent   nonskid shoes/slippers when out of bed   mobility aid in reach   fall prevention program maintained   clutter free environment maintained   assistive device/personal items  within reach  Intervention: Prevent Skin Injury  Description: Perform a screening for skin injury risk, such as pressure or moisture associated skin damage on admission and at regular intervals throughout hospital stay.  Keep all areas of skin (especially folds) clean and dry.  Maintain adequate skin hydration.  Relieve and redistribute pressure and protect bony prominences; implement measures based on patient-specific risk factors.  Match turning and repositioning schedule to clinical condition.  Encourage weight shift frequently; assist with reposition if unable to complete independently.  Float heels off bed; avoid pressure on the Achilles tendon.  Keep skin free from extended contact with medical devices.  Encourage functional activity and mobility, as early as tolerated.  Use aids (e.g., slide boards, mechanical lift) during transfer.  Recent Flowsheet Documentation  Taken 11/12/2022 0000 by Shazia Trent RN  Body Position: position changed independently  Skin Protection: incontinence pads utilized  Taken 11/11/2022 2216 by Shazia Trent RN  Body Position: position changed independently  Taken 11/11/2022 2130 by Shazia Trent RN  Body Position: position changed independently  Skin Protection:   incontinence pads utilized   adhesive use limited  Intervention: Prevent and Manage VTE (Venous Thromboembolism) Risk  Description: Assess for VTE (venous thromboembolism) risk.  Encourage and assist with early ambulation.  Initiate and maintain compression or other therapy, as indicated, based on identified risk in accordance with organizational protocol and provider order.  Encourage both active and passive leg exercises while in bed, if unable to ambulate.  Recent Flowsheet Documentation  Taken 11/12/2022 0000 by Shazia Trent RN  Activity Management: up to bedside commode  VTE Prevention/Management:   bilateral   sequential compression devices on  Taken 11/11/2022 2216 by Shazia Trent  RN  Activity Management: up to bedside commode  Taken 11/11/2022 2130 by Shazia Trent RN  Activity Management: up to bedside commode  VTE Prevention/Management:   bilateral   sequential compression devices on  Intervention: Prevent Infection  Description: Maintain skin and mucous membrane integrity; promote hand, oral and pulmonary hygiene.  Optimize fluid balance, nutrition, sleep and glycemic control to maximize infection resistance.  Identify potential sources of infection early to prevent or mitigate progression of infection (e.g., wound, lines, devices).  Evaluate ongoing need for invasive devices; remove promptly when no longer indicated.  Recent Flowsheet Documentation  Taken 11/12/2022 0000 by Shazia Trent RN  Infection Prevention:   single patient room provided   rest/sleep promoted   hand hygiene promoted   environmental surveillance performed  Taken 11/11/2022 2216 by Shazia Trent RN  Infection Prevention:   single patient room provided   rest/sleep promoted   hand hygiene promoted   environmental surveillance performed  Taken 11/11/2022 2130 by Shazia Trent RN  Infection Prevention:   single patient room provided   rest/sleep promoted   hand hygiene promoted   environmental surveillance performed  Goal: Optimal Comfort and Wellbeing  Outcome: Ongoing, Progressing  Intervention: Monitor Pain and Promote Comfort  Description: Assess pain level, treatment efficacy and patient response at regular intervals using a consistent pain scale.  Consider the presence and impact of preexisting chronic pain.  Encourage patient and caregiver involvement in pain assessment, interventions and safety measures.  Recent Flowsheet Documentation  Taken 11/12/2022 0000 by Shazia Trent RN  Pain Management Interventions:   see MAR   quiet environment facilitated   care clustered  Intervention: Provide Person-Centered Care  Description: Use a family-focused approach to care.  Develop trust and rapport by  proactively providing information, encouraging questions, addressing concerns and offering reassurance.  Acknowledge emotional response to hospitalization.  Recognize and utilize personal coping strategies.  Honor spiritual and cultural preferences.  Recent Flowsheet Documentation  Taken 11/12/2022 0000 by Shazia Trent RN  Trust Relationship/Rapport: choices provided  Taken 11/11/2022 2130 by Shazia Trent RN  Trust Relationship/Rapport:   choices provided   reassurance provided  Goal: Readiness for Transition of Care  Outcome: Ongoing, Progressing     Problem: Fall Injury Risk  Goal: Absence of Fall and Fall-Related Injury  Outcome: Ongoing, Progressing  Intervention: Identify and Manage Contributors  Description: Develop a fall prevention plan with the patient and caregiver/family.  Provide reorientation, appropriate sensory stimulation and routines with changes in mental status to decrease risk of fall.  Promote use of personal vision and auditory aids.  Assess assistance level required for safe and effective self-care; provide support as needed, such as toileting, mobilization. For age 65 and older, implement timed toileting with assistance.  Encourage physical activity, such as performance of mobility and self-care at highest level of patient ability, multicomponent exercise program and provision of appropriate assistive devices.  If fall occurs, assess the severity of injury; implement fall injury protocol. Determine the cause and revise fall injury prevention plan.  Regularly review medication contribution to fall risk; adjust medication administration times to minimize risk of falling.  Consider risk related to polypharmacy and age.  Balance adequate pain management with potential for oversedation.  Recent Flowsheet Documentation  Taken 11/12/2022 0000 by Shazia Trent RN  Medication Review/Management: medications reviewed  Self-Care Promotion: independence encouraged  Taken 11/11/2022 2216 by  Shazia Trent, RN  Medication Review/Management: medications reviewed  Self-Care Promotion: independence encouraged  Taken 11/11/2022 2130 by Shazia Trent RN  Medication Review/Management: medications reviewed  Intervention: Promote Injury-Free Environment  Description: Provide a safe, barrier-free environment that encourages independent activity.  Keep care area uncluttered and well-lighted.  Determine need for increased observation or monitoring.  Avoid use of devices that minimize mobility, such as restraints or indwelling urinary catheter.  Recent Flowsheet Documentation  Taken 11/12/2022 0000 by Shazia Trent, RN  Safety Promotion/Fall Prevention: safety round/check completed  Taken 11/11/2022 2216 by Shazia Trent, RN  Safety Promotion/Fall Prevention:   safety round/check completed   room organization consistent   nonskid shoes/slippers when out of bed   lighting adjusted   fall prevention program maintained   clutter free environment maintained   assistive device/personal items within reach  Taken 11/11/2022 2130 by Shazia Trent, RN  Safety Promotion/Fall Prevention:   safety round/check completed   room organization consistent   nonskid shoes/slippers when out of bed   mobility aid in reach   fall prevention program maintained   clutter free environment maintained   assistive device/personal items within reach   Goal Outcome Evaluation:  Plan of Care Reviewed With: patient        Progress: no change

## 2022-11-12 NOTE — H&P
Patient Care Team:  Candi Benson MD as PCP - General (Family Medicine)  Kenrick Ragsdale MD as Surgeon (General Surgery)    Chief complaint New onset of seizure    Subjective     Patient is a 71 y.o. female presents with new onset of seizure, pt with Hx of breast cancer, recently with severe HA, made appointment for CT of head at [facility] radiology, while she was on the table to have it done she had a seizure witnessed by the staff sent the pt to [facility] ER for further evaluation and treatment      Review of Systems   Constitutional: Positive for activity change and fatigue.   Eyes: Negative.    Respiratory: Negative.    Cardiovascular: Negative.    Gastrointestinal: Negative.    Endocrine: Negative.    Genitourinary: Positive for frequency.   Musculoskeletal: Positive for back pain and neck pain.   Neurological: Positive for dizziness, light-headedness and headaches.   Psychiatric/Behavioral: The patient is nervous/anxious.            History  Past Medical History:   Diagnosis Date   • Arthritis    • Asthma    • Boils of multiple sites 5/28   • Breast cancer (HCC)    • Cancer (HCC)    • COPD (chronic obstructive pulmonary disease) (HCC)    • Coronary artery disease    • Drug therapy    • Elevated cholesterol    • GERD (gastroesophageal reflux disease)    • H/O Bell's palsy    • History of transfusion    • Hypertension    • Neuropathy     feet and hands post chemo   • Sciatic nerve pain    • Sleep apnea      Past Surgical History:   Procedure Laterality Date   • ABDOMINAL SURGERY     • ANKLE SURGERY     • BREAST BIOPSY     • BREAST LUMPECTOMY     • CERVICAL CONIZATION     • MASTECTOMY Right 6/10/2021    Procedure: MASTECTOMY MODIFIED RADICAL RIGHT;  Surgeon: Kenrick Ragsdale MD;  Location: [facility] MAIN OR;  Service: General;  Laterality: Right;   • SIMPLE MASTECTOMY Left 6/10/2021    Procedure: MASTECTOMY SIMPLE;  Surgeon: Kenrick Ragsdale MD;  Location: [facility] MAIN OR;  Service:  General;  Laterality: Left;   • TUBAL ABDOMINAL LIGATION     • VENOUS ACCESS DEVICE (PORT) INSERTION Left 7/30/2020    Procedure: INSERTION VENOUS ACCESS DEVICE LEFT internal jugular;  Surgeon: Taylor Kaye MD;  Location: [facility] MAIN OR;  Service: General;  Laterality: Left;   • VENOUS ACCESS DEVICE (PORT) REMOVAL Left 1/6/2022    Procedure: REMOVAL VENOUS ACCESS DEVICE;  Surgeon: Kenrick Ragsdale MD;  Location: [facility] MAIN OR;  Service: General;  Laterality: Left;     Family History   Problem Relation Age of Onset   • Heart disease Father    • Stroke Father    • Diabetes Father    • Diabetes Son      Social History     Tobacco Use   • Smoking status: Never   • Smokeless tobacco: Never   Vaping Use   • Vaping Use: Never used   Substance Use Topics   • Alcohol use: Never   • Drug use: Never     Medications Prior to Admission   Medication Sig Dispense Refill Last Dose   • atorvastatin (LIPITOR) 20 MG tablet Take 20 mg by mouth Daily.   11/9/2022   • colesevelam (WELCHOL) 625 MG tablet Take 625 mg by mouth 2 (Two) Times a Day With Meals.   11/9/2022   • DULoxetine (CYMBALTA) 30 MG capsule Take 30 mg by mouth Daily.   11/10/2022   • gabapentin (NEURONTIN) 100 MG capsule Take 100 mg by mouth Every Night.   11/9/2022   • lisinopril (PRINIVIL,ZESTRIL) 40 MG tablet Take 40 mg by mouth Every Night. LD 1/4   11/9/2022   • metoprolol succinate XL (TOPROL-XL) 50 MG 24 hr tablet Take 50 mg by mouth Daily. Take DOS   11/10/2022   • pantoprazole (PROTONIX) 40 MG EC tablet Take 40 mg by mouth Daily.   11/9/2022   • sertraline (ZOLOFT) 100 MG tablet Take 100 mg by mouth Daily. Take DOS   11/10/2022   • sucralfate (CARAFATE) 1 g tablet Take 1 g by mouth 2 (Two) Times a Day.   11/10/2022   • albuterol sulfate  (90 Base) MCG/ACT inhaler Inhale 2 puffs Every 6 (Six) Hours As Needed.   Unknown   • ALENDRONATE SODIUM PO Take 70 mg by mouth 1 (One) Time Per Week. Patient states takes on Wednesdays      • anastrozole  (ARIMIDEX) 1 MG tablet Take  by mouth Daily.      • azelastine (ASTELIN) 0.1 % nasal spray 2 sprays into the nostril(s) as directed by provider As Needed. Use in each nostril as directed      • famotidine (PEPCID) 40 MG tablet Take 40 mg by mouth Every Night.      • ipratropium-albuterol (DUO-NEB) 0.5-2.5 mg/3 ml nebulizer Take 3 mL by nebulization As Needed.   Unknown   • montelukast (SINGULAIR) 10 MG tablet Take 10 mg by mouth Every Night.   Unknown     Allergies:  Penicillins and Adhesive tape    Objective     Vital Signs  Temp:  [97.5 °F (36.4 °C)-98.6 °F (37 °C)] 98.6 °F (37 °C)  Heart Rate:  [68-88] 72  Resp:  [13-20] 16  BP: (127-183)/() 138/85    Physical Exam:       General Appearance:    Alert, cooperative, in no acute distress   Eyes:            Lids and lashes normal, conjunctivae and sclerae normal, no   icterus, no pallor, corneas clear, PERRLA   Ears:    Ears appear intact with no abnormalities noted   Throat:   No oral lesions, no thrush, oral mucosa moist   Neck:   No adenopathy, supple, trachea midline, no thyromegaly, no   carotid bruit, no JVD   Lungs:     Clear to auscultation,respirations regular, even and                  Unlabored     Heart:    Regular rhythm and normal rate, normal S1 and S2, no            murmur, no gallop, no rub, no click   Abdomen:     Normal bowel sounds, no masses, no organomegaly, soft        non-tender, non-distended, no guarding, no rebound                tenderness   Extremities:   Moves all extremities well, no edema, no cyanosis, no             redness   Pulses:   Pulses palpable and equal bilaterally   Skin:   No bleeding, bruising or rash   Neurologic:   Cranial nerves 2 - 12 grossly intact, sensation intact, DTR       present and equal bilaterally       Results Review:  Lab Results (last 48 hours)     Procedure Component Value Units Date/Time    Urinalysis With Culture If Indicated - Urine, Clean Catch [[id number]]  (Abnormal) Collected: 11/11/22 4499     Specimen: Urine, Clean Catch Updated: 11/11/22 1939     Color, UA Yellow     Appearance, UA Clear     pH, UA 7.5     Specific Gravity, UA <=1.005     Glucose, UA Negative     Ketones, UA Negative     Bilirubin, UA Negative     Blood, UA Negative     Protein, UA Negative     Leuk Esterase, UA Trace     Nitrite, UA Negative     Urobilinogen, UA 0.2 E.U./dL    Narrative:      In absence of clinical symptoms, the presence of pyuria, bacteria, and/or nitrites on the urinalysis result does not correlate with infection.    Urinalysis, Microscopic Only - Urine, Clean Catch [[id number]] Collected: 11/11/22 1905    Specimen: Urine, Clean Catch Updated: 11/11/22 1923    Comprehensive Metabolic Panel [[id number]]  (Abnormal) Collected: 11/11/22 1627    Specimen: Blood Updated: 11/11/22 1810     Glucose 123 mg/dL      BUN 11 mg/dL      Creatinine 0.59 mg/dL      Sodium 140 mmol/L      Potassium 4.1 mmol/L      Comment: Slight hemolysis detected by analyzer. Results may be affected.        Chloride 104 mmol/L      CO2 24.0 mmol/L      Calcium 9.3 mg/dL      Total Protein 6.4 g/dL      Albumin 4.10 g/dL      ALT (SGPT) 13 U/L      AST (SGOT) 18 U/L      Comment: Slight hemolysis detected by analyzer. Results may be affected.        Alkaline Phosphatase 80 U/L      Total Bilirubin 0.4 mg/dL      Globulin 2.3 gm/dL      A/G Ratio 1.8 g/dL      BUN/Creatinine Ratio 18.6     Anion Gap 12.0 mmol/L      eGFR 96.5 mL/min/1.73      Comment: National Kidney Foundation and American Society of Nephrology (ASN) Task Force recommended calculation based on the Chronic Kidney Disease Epidemiology Collaboration (CKD-EPI) equation refit without adjustment for race.       Narrative:      GFR Normal >60  Chronic Kidney Disease <60  Kidney Failure <15    The GFR formula is only valid for adults with stable renal function between ages 18 and 70.    CBC & Differential [[id number]]  (Normal) Collected: 11/11/22 1627    Specimen: Blood Updated: 11/11/22  1715    Narrative:      The following orders were created for panel order CBC & Differential.  Procedure                               Abnormality         Status                     ---------                               -----------         ------                     CBC Auto Differential[[id number]]        Normal              Final result                 Please view results for these tests on the individual orders.    CBC Auto Differential [[id number]]  (Normal) Collected: 11/11/22 1627    Specimen: Blood Updated: 11/11/22 1715     WBC 4.50 10*3/mm3      RBC 4.44 10*6/mm3      Hemoglobin 13.7 g/dL      Hematocrit 42.4 %      MCV 95.5 fL      MCH 30.8 pg      MCHC 32.2 g/dL      RDW 14.5 %      RDW-SD 48.1 fl      MPV 8.6 fL      Platelets 209 10*3/mm3      Neutrophil % 51.4 %      Lymphocyte % 37.9 %      Monocyte % 7.4 %      Eosinophil % 2.0 %      Basophil % 1.3 %      Neutrophils, Absolute 2.30 10*3/mm3      Lymphocytes, Absolute 1.70 10*3/mm3      Monocytes, Absolute 0.30 10*3/mm3      Eosinophils, Absolute 0.10 10*3/mm3      Basophils, Absolute 0.10 10*3/mm3      nRBC 0.1 /100 WBC     [place] Draw [[id number]] Collected: 11/10/22 1453    Specimen: Blood Updated: 11/10/22 1600    Narrative:      The following orders were created for panel order [place] Draw.  Procedure                               Abnormality         Status                     ---------                               -----------         ------                     Green Top (Gel)[[id number]]                                  Final result               Lavender Top[[id number]]                                     Final result               Gold Top - SST[[id number]]                                   Final result               Light Blue Top[[id number]]                                   Final result                 Please view results for these tests on the individual orders.    [place]ender Top [[id number]] Collected: 11/10/22 1453    Specimen: Blood  Updated: 11/10/22 1600     Extra Tube hold for add-on     Comment: Auto resulted       Green Top (Gel) [[id number]] Collected: 11/10/22 1453    Specimen: Blood Updated: 11/10/22 1600     Extra Tube Hold for add-ons.     Comment: Auto resulted.       Gold Top - SST [[id number]] Collected: 11/10/22 1453    Specimen: Blood Updated: 11/10/22 1600     Extra Tube Hold for add-ons.     Comment: Auto resulted.       Light Blue Top [[id number]] Collected: 11/10/22 1453    Specimen: Blood Updated: 11/10/22 1600     Extra Tube Hold for add-ons.     Comment: Auto resulted       Manual Differential [[id number]]  (Abnormal) Collected: 11/10/22 1453    Specimen: Blood Updated: 11/10/22 1527     Neutrophil % 63.0 %      Lymphocyte % 32.0 %      Monocyte % 2.0 %      Bands %  2.0 %      Metamyelocyte % 1.0 %      Neutrophils Absolute 4.10 10*3/mm3      Lymphocytes Absolute 2.02 10*3/mm3      Monocytes Absolute 0.13 10*3/mm3      Anisocytosis Slight/1+     Poikilocytes Slight/1+     WBC Morphology Normal     Platelet Morphology Normal    CBC & Differential [[id number]] Collected: 11/10/22 1453    Specimen: Blood Updated: 11/10/22 1527    Narrative:      The following orders were created for panel order CBC & Differential.  Procedure                               Abnormality         Status                     ---------                               -----------         ------                     CBC Auto Differential[[id number]]        Normal              Final result               Scan Slide[[id number]]                                       Final result                 Please view results for these tests on the individual orders.    CBC Auto Differential [[id number]]  (Normal) Collected: 11/10/22 1453    Specimen: Blood Updated: 11/10/22 1527     WBC 6.30 10*3/mm3      RBC 4.75 10*6/mm3      Hemoglobin 15.0 g/dL      Hematocrit 44.1 %      MCV 93.0 fL      MCH 31.6 pg      MCHC 34.0 g/dL      RDW 14.3 %      RDW-SD 49.0 fl      MPV 8.2 fL       Platelets 240 10*3/mm3     Scan Slide [[id number]] Collected: 11/10/22 1453    Specimen: Blood Updated: 11/10/22 1527     Scan Slide --     Comment: See Manual Differential Results       Comprehensive Metabolic Panel [[id number]]  (Abnormal) Collected: 11/10/22 1453    Specimen: Blood Updated: 11/10/22 1517     Glucose 122 mg/dL      BUN 15 mg/dL      Creatinine 0.84 mg/dL      Sodium 145 mmol/L      Potassium 4.2 mmol/L      Comment: Slight hemolysis detected by analyzer. Results may be affected.        Chloride 107 mmol/L      CO2 26.0 mmol/L      Calcium 10.4 mg/dL      Total Protein 7.0 g/dL      Albumin 4.50 g/dL      ALT (SGPT) 16 U/L      AST (SGOT) 15 U/L      Alkaline Phosphatase 103 U/L      Total Bilirubin 0.3 mg/dL      Globulin 2.5 gm/dL      A/G Ratio 1.8 g/dL      BUN/Creatinine Ratio 17.9     Anion Gap 12.0 mmol/L      eGFR 74.4 mL/min/1.73      Comment: National Kidney Foundation and American Society of Nephrology (ASN) Task Force recommended calculation based on the Chronic Kidney Disease Epidemiology Collaboration (CKD-EPI) equation refit without adjustment for race.       Narrative:      GFR Normal >60  Chronic Kidney Disease <60  Kidney Failure <15    The GFR formula is only valid for adults with stable renal function between ages 18 and 70.    POC Glucose Once [[id number]]  (Abnormal) Collected: 11/10/22 1458    Specimen: Blood Updated: 11/10/22 1459     Glucose 122 mg/dL      Comment: Serial Number: [id number]:  [id number]             Imaging Results (Last 24 Hours)     ** No results found for the last 24 hours. **           I reviewed the patient's new clinical results    Assessment & Plan     Principal Problem:    New onset seizure (HCC)  - Neurology consult    HA  -  Could be related to uncontrolled HTN    Uncontrolled HTN - start pt on hydralazine 10mg IV Q6 PRN    Hx of breast cancer    Stress ulcer/DVT prophylaxis             Plan for disposition: Home at discharge     Candi  MD Marcelino  11/11/22  19:41 EST

## 2022-11-12 NOTE — PROGRESS NOTES
LOS: 0 days   Patient Care Team:  Candi Benson MD as PCP - General (Family Medicine)  Kenrick Ragsdale MD as Surgeon (General Surgery)        Subjective  - lying on bed     Interval History:     Patient Complaints: c/o HA    Review of Systems   Constitutional: Positive for activity change and fatigue.   Eyes: Negative.    Respiratory: Negative.    Cardiovascular: Negative.    Gastrointestinal: Negative.    Endocrine: Negative.    Genitourinary: Positive for frequency.   Musculoskeletal: Positive for back pain and neck pain.   Neurological: Positive for dizziness, light-headedness and headaches.   Psychiatric/Behavioral: Positive for sleep disturbance. The patient is nervous/anxious.         Objective     Vital Signs  Temp:  [97.5 °F (36.4 °C)-98.6 °F (37 °C)] 98.6 °F (37 °C)  Heart Rate:  [68-88] 72  Resp:  [13-20] 16  BP: (127-183)/() 138/85    Physical Exam:     General Appearance:    Alert, cooperative, in no acute distress   Ears:    Ears appear intact with no abnormalities noted   Throat:   No oral lesions, no thrush, oral mucosa moist   Neck:   No adenopathy, supple, trachea midline, no thyromegaly, no   carotid bruit, no JVD   Lungs:     Clear to auscultation, respirations regular, even and                  Unlabored     Heart:    Regular rhythm and normal rate, normal S1 and S2, no            murmur, no gallop, no rub, no click   Abdomen:     Normal bowel sounds, no masses, no organomegaly, soft        nontender, nondistended, no guarding, no rebound                tenderness   Extremities:   Moves all extremities well, no edema, no cyanosis, no             redness   Skin:   No bleeding, bruising or rash   Neurologic:   Cranial nerves 2 - 12 grossly intact, sensation intact, DTR       present and equal bilaterally        Results Review:    Lab Results (last 24 hours)     Procedure Component Value Units Date/Time    Urinalysis With Culture If Indicated - Urine, Clean Catch  [[id number]]  (Abnormal) Collected: 11/11/22 1905    Specimen: Urine, Clean Catch Updated: 11/11/22 1939     Color, UA Yellow     Appearance, UA Clear     pH, UA 7.5     Specific Gravity, UA <=1.005     Glucose, UA Negative     Ketones, UA Negative     Bilirubin, UA Negative     Blood, UA Negative     Protein, UA Negative     Leuk Esterase, UA Trace     Nitrite, UA Negative     Urobilinogen, UA 0.2 E.U./dL    Narrative:      In absence of clinical symptoms, the presence of pyuria, bacteria, and/or nitrites on the urinalysis result does not correlate with infection.    Urinalysis, Microscopic Only - Urine, Clean Catch [[id number]] Collected: 11/11/22 1905    Specimen: Urine, Clean Catch Updated: 11/11/22 1923    Comprehensive Metabolic Panel [[id number]]  (Abnormal) Collected: 11/11/22 1627    Specimen: Blood Updated: 11/11/22 1810     Glucose 123 mg/dL      BUN 11 mg/dL      Creatinine 0.59 mg/dL      Sodium 140 mmol/L      Potassium 4.1 mmol/L      Comment: Slight hemolysis detected by analyzer. Results may be affected.        Chloride 104 mmol/L      CO2 24.0 mmol/L      Calcium 9.3 mg/dL      Total Protein 6.4 g/dL      Albumin 4.10 g/dL      ALT (SGPT) 13 U/L      AST (SGOT) 18 U/L      Comment: Slight hemolysis detected by analyzer. Results may be affected.        Alkaline Phosphatase 80 U/L      Total Bilirubin 0.4 mg/dL      Globulin 2.3 gm/dL      A/G Ratio 1.8 g/dL      BUN/Creatinine Ratio 18.6     Anion Gap 12.0 mmol/L      eGFR 96.5 mL/min/1.73      Comment: National Kidney Foundation and American Society of Nephrology (ASN) Task Force recommended calculation based on the Chronic Kidney Disease Epidemiology Collaboration (CKD-EPI) equation refit without adjustment for race.       Narrative:      GFR Normal >60  Chronic Kidney Disease <60  Kidney Failure <15    The GFR formula is only valid for adults with stable renal function between ages 18 and 70.    CBC & Differential [[id number]]  (Normal) Collected:  11/11/22 1627    Specimen: Blood Updated: 11/11/22 1715    Narrative:      The following orders were created for panel order CBC & Differential.  Procedure                               Abnormality         Status                     ---------                               -----------         ------                     CBC Auto Differential[[id number]]        Normal              Final result                 Please view results for these tests on the individual orders.    CBC Auto Differential [[id number]]  (Normal) Collected: 11/11/22 1627    Specimen: Blood Updated: 11/11/22 1715     WBC 4.50 10*3/mm3      RBC 4.44 10*6/mm3      Hemoglobin 13.7 g/dL      Hematocrit 42.4 %      MCV 95.5 fL      MCH 30.8 pg      MCHC 32.2 g/dL      RDW 14.5 %      RDW-SD 48.1 fl      MPV 8.6 fL      Platelets 209 10*3/mm3      Neutrophil % 51.4 %      Lymphocyte % 37.9 %      Monocyte % 7.4 %      Eosinophil % 2.0 %      Basophil % 1.3 %      Neutrophils, Absolute 2.30 10*3/mm3      Lymphocytes, Absolute 1.70 10*3/mm3      Monocytes, Absolute 0.30 10*3/mm3      Eosinophils, Absolute 0.10 10*3/mm3      Basophils, Absolute 0.10 10*3/mm3      nRBC 0.1 /100 WBC          Imaging Results (Last 24 Hours)     ** No results found for the last 24 hours. **           I reviewed the patient's other test results and agree with the interpretation    Medication Review:     Current Facility-Administered Medications:   •  acetaminophen (TYLENOL) tablet 650 mg, 650 mg, Oral, Q6H PRN, Candi Benson MD  •  albuterol sulfate HFA (PROVENTIL HFA;VENTOLIN HFA;PROAIR HFA) inhaler 2 puff, 2 puff, Inhalation, Q6H PRN, Candi Benson MD  •  aluminum-magnesium hydroxide-simethicone (MAALOX MAX) 400-400-40 MG/5ML suspension 15 mL, 15 mL, Oral, Q6H PRN, Candi Benson MD  •  [START ON 11/12/2022] anastrozole (ARIMIDEX) tablet 1 mg, 1 mg, Oral, Daily, Candi Benson MD  •  atorvastatin (LIPITOR) tablet 20 mg, 20 mg, Oral, Daily,  Candi Benson MD  •  azelastine (ASTELIN) nasal spray 2 spray, 2 spray, Each Nare, Daily PRN, Candi Benson MD  •  cholestyramine light packet 4 g, 1 packet, Oral, Q12H, Candi Benson MD  •  [START ON 11/12/2022] DULoxetine (CYMBALTA) DR capsule 30 mg, 30 mg, Oral, Daily, Candi Benson MD  •  Enoxaparin Sodium (LOVENOX) syringe 40 mg, 40 mg, Subcutaneous, Daily, Candi Benson MD, 40 mg at 11/11/22 1545  •  famotidine (PEPCID) tablet 40 mg, 40 mg, Oral, Nightly, Candi Benson MD  •  gabapentin (NEURONTIN) capsule 100 mg, 100 mg, Oral, Nightly, Candi Benson MD  •  hydrALAZINE (APRESOLINE) injection 10 mg, 10 mg, Intravenous, Q6H PRN, Candi Benson MD  •  ipratropium-albuterol (DUO-NEB) nebulizer solution 3 mL, 3 mL, Nebulization, Q6H PRN, Candi Benson MD  •  levETIRAcetam in NaCl 0.75% (KEPPRA) IVPB 1,000 mg, 1,000 mg, Intravenous, Once, Lorrie Ling APRN  •  [START ON 11/12/2022] levETIRAcetam in NaCl 0.82% (KEPPRA) IVPB 500 mg, 500 mg, Intravenous, Q12H, Lorrie Ling APRN  •  lisinopril (PRINIVIL,ZESTRIL) tablet 40 mg, 40 mg, Oral, Nightly, Candi Benson MD, 40 mg at 11/11/22 1545  •  [START ON 11/12/2022] metoprolol succinate XL (TOPROL-XL) 24 hr tablet 50 mg, 50 mg, Oral, Daily, Candi Benson MD  •  montelukast (SINGULAIR) tablet 10 mg, 10 mg, Oral, Nightly, Candi Benson MD  •  ondansetron (ZOFRAN) injection 4 mg, 4 mg, Intravenous, Q6H PRN, Candi Benson MD  •  [START ON 11/12/2022] pantoprazole (PROTONIX) EC tablet 40 mg, 40 mg, Oral, Daily, Candi Benson MD  •  [START ON 11/12/2022] sertraline (ZOLOFT) tablet 100 mg, 100 mg, Oral, Daily, Candi Benson MD  •  sodium chloride 0.9 % flush 3 mL, 3 mL, Intravenous, Q12H, Candi Benson MD  •  sodium chloride 0.9 % flush 3-10 mL, 3-10 mL, Intravenous, PRN, Candi Benson MD  •   sucralfate (CARAFATE) tablet 1 g, 1 g, Oral, BID, Candi Benson MD    I have reviewed medication list.    Assessment & Plan     Principal Problem:   New onset seizure (HCC)  - Neurology consult    HA  -  Could be related to uncontrolled HTN    Uncontrolled HTN - start pt on hydralazine 10mg IV Q6 PRN    Hx of breast cancer    Stress ulcer/DVT prophylaxis                Plan for disposition: Home at discharge     Candi Benson MD  11/11/22  20:05 EST

## 2022-11-13 LAB
ALBUMIN SERPL-MCNC: 3.8 G/DL (ref 3.5–5.2)
ALBUMIN/GLOB SERPL: 1.9 G/DL
ALP SERPL-CCNC: 84 U/L (ref 39–117)
ALT SERPL W P-5'-P-CCNC: 12 U/L (ref 1–33)
ANION GAP SERPL CALCULATED.3IONS-SCNC: 9 MMOL/L (ref 5–15)
AST SERPL-CCNC: 12 U/L (ref 1–32)
BASOPHILS # BLD AUTO: 0.1 10*3/MM3 (ref 0–0.2)
BASOPHILS NFR BLD AUTO: 1.4 % (ref 0–1.5)
BH CV XLRA MEAS LEFT DIST CCA EDV: 11.8 CM/SEC
BH CV XLRA MEAS LEFT DIST CCA PSV: 45.9 CM/SEC
BH CV XLRA MEAS LEFT DIST ICA EDV: -17.7 CM/SEC
BH CV XLRA MEAS LEFT DIST ICA PSV: -47 CM/SEC
BH CV XLRA MEAS LEFT ICA/CCA RATIO: -0.52
BH CV XLRA MEAS LEFT PROX CCA EDV: 22.4 CM/SEC
BH CV XLRA MEAS LEFT PROX CCA PSV: 91.1 CM/SEC
BH CV XLRA MEAS LEFT PROX ECA PSV: -53.8 CM/SEC
BH CV XLRA MEAS LEFT PROX ICA EDV: 14.6 CM/SEC
BH CV XLRA MEAS LEFT PROX ICA PSV: 43.9 CM/SEC
BH CV XLRA MEAS LEFT PROX SCLA PSV: 123 CM/SEC
BH CV XLRA MEAS LEFT VERTEBRAL A EDV: 12.4 CM/SEC
BH CV XLRA MEAS LEFT VERTEBRAL A PSV: 39.7 CM/SEC
BH CV XLRA MEAS RIGHT DIST CCA EDV: -12.5 CM/SEC
BH CV XLRA MEAS RIGHT DIST CCA PSV: -42.8 CM/SEC
BH CV XLRA MEAS RIGHT DIST ICA EDV: -15.7 CM/SEC
BH CV XLRA MEAS RIGHT DIST ICA PSV: -40.3 CM/SEC
BH CV XLRA MEAS RIGHT ICA/CCA RATIO: 0.89
BH CV XLRA MEAS RIGHT PROX CCA EDV: -14.8 CM/SEC
BH CV XLRA MEAS RIGHT PROX CCA PSV: -69.2 CM/SEC
BH CV XLRA MEAS RIGHT PROX ECA PSV: 50.8 CM/SEC
BH CV XLRA MEAS RIGHT PROX ICA EDV: -14.9 CM/SEC
BH CV XLRA MEAS RIGHT PROX ICA PSV: -61.5 CM/SEC
BH CV XLRA MEAS RIGHT PROX SCLA PSV: 91.8 CM/SEC
BH CV XLRA MEAS RIGHT VERTEBRAL A EDV: -11.8 CM/SEC
BH CV XLRA MEAS RIGHT VERTEBRAL A PSV: -39.8 CM/SEC
BILIRUB SERPL-MCNC: 0.2 MG/DL (ref 0–1.2)
BUN SERPL-MCNC: 12 MG/DL (ref 8–23)
BUN/CREAT SERPL: 18.5 (ref 7–25)
CALCIUM SPEC-SCNC: 9.1 MG/DL (ref 8.6–10.5)
CHLORIDE SERPL-SCNC: 109 MMOL/L (ref 98–107)
CO2 SERPL-SCNC: 24 MMOL/L (ref 22–29)
CREAT SERPL-MCNC: 0.65 MG/DL (ref 0.57–1)
DEPRECATED RDW RBC AUTO: 49.4 FL (ref 37–54)
EGFRCR SERPLBLD CKD-EPI 2021: 94.3 ML/MIN/1.73
EOSINOPHIL # BLD AUTO: 0.1 10*3/MM3 (ref 0–0.4)
EOSINOPHIL NFR BLD AUTO: 2 % (ref 0.3–6.2)
ERYTHROCYTE [DISTWIDTH] IN BLOOD BY AUTOMATED COUNT: 14.3 % (ref 12.3–15.4)
GLOBULIN UR ELPH-MCNC: 2 GM/DL
GLUCOSE SERPL-MCNC: 105 MG/DL (ref 65–99)
HCT VFR BLD AUTO: 40.3 % (ref 34–46.6)
HGB BLD-MCNC: 13.5 G/DL (ref 12–15.9)
LEFT ARM BP: NORMAL MMHG
LYMPHOCYTES # BLD AUTO: 1.5 10*3/MM3 (ref 0.7–3.1)
LYMPHOCYTES NFR BLD AUTO: 28.7 % (ref 19.6–45.3)
MAXIMAL PREDICTED HEART RATE: 149 BPM
MCH RBC QN AUTO: 31.4 PG (ref 26.6–33)
MCHC RBC AUTO-ENTMCNC: 33.5 G/DL (ref 31.5–35.7)
MCV RBC AUTO: 93.7 FL (ref 79–97)
MONOCYTES # BLD AUTO: 0.5 10*3/MM3 (ref 0.1–0.9)
MONOCYTES NFR BLD AUTO: 9.4 % (ref 5–12)
NEUTROPHILS NFR BLD AUTO: 3 10*3/MM3 (ref 1.7–7)
NEUTROPHILS NFR BLD AUTO: 58.5 % (ref 42.7–76)
NRBC BLD AUTO-RTO: 0.2 /100 WBC (ref 0–0.2)
PLATELET # BLD AUTO: 185 10*3/MM3 (ref 140–450)
PMV BLD AUTO: 8.2 FL (ref 6–12)
POTASSIUM SERPL-SCNC: 4.1 MMOL/L (ref 3.5–5.2)
PROT SERPL-MCNC: 5.8 G/DL (ref 6–8.5)
RBC # BLD AUTO: 4.31 10*6/MM3 (ref 3.77–5.28)
SODIUM SERPL-SCNC: 142 MMOL/L (ref 136–145)
STRESS TARGET HR: 127 BPM
VIT B12 BLD-MCNC: >2000 PG/ML (ref 211–946)
WBC NRBC COR # BLD: 5.2 10*3/MM3 (ref 3.4–10.8)

## 2022-11-13 PROCEDURE — 25010000002 ENOXAPARIN PER 10 MG: Performed by: FAMILY MEDICINE

## 2022-11-13 PROCEDURE — 80053 COMPREHEN METABOLIC PANEL: CPT | Performed by: FAMILY MEDICINE

## 2022-11-13 PROCEDURE — 85025 COMPLETE CBC W/AUTO DIFF WBC: CPT | Performed by: FAMILY MEDICINE

## 2022-11-13 PROCEDURE — 25010000002 LEVETIRACETAM IN NACL 0.82% 500 MG/100ML SOLUTION: Performed by: NURSE PRACTITIONER

## 2022-11-13 PROCEDURE — 25010000002 LEVETIRACETAM IN NACL 0.82% 500 MG/100ML SOLUTION: Performed by: FAMILY MEDICINE

## 2022-11-13 RX ADMIN — METOPROLOL SUCCINATE 50 MG: 50 TABLET, EXTENDED RELEASE ORAL at 09:47

## 2022-11-13 RX ADMIN — DULOXETINE HYDROCHLORIDE 30 MG: 30 CAPSULE, DELAYED RELEASE ORAL at 09:47

## 2022-11-13 RX ADMIN — LISINOPRIL 40 MG: 20 TABLET ORAL at 20:20

## 2022-11-13 RX ADMIN — ATORVASTATIN CALCIUM 20 MG: 20 TABLET, FILM COATED ORAL at 20:20

## 2022-11-13 RX ADMIN — LEVETIRACETAM 500 MG: 5 INJECTION INTRAVENOUS at 20:21

## 2022-11-13 RX ADMIN — CHOLESTYRAMINE 4 G: 4 POWDER, FOR SUSPENSION ORAL at 09:46

## 2022-11-13 RX ADMIN — ANASTROZOLE 1 MG: 1 TABLET ORAL at 09:46

## 2022-11-13 RX ADMIN — SERTRALINE 100 MG: 100 TABLET, FILM COATED ORAL at 09:47

## 2022-11-13 RX ADMIN — SUCRALFATE 1 G: 1 TABLET ORAL at 20:20

## 2022-11-13 RX ADMIN — FAMOTIDINE 40 MG: 20 TABLET, FILM COATED ORAL at 20:20

## 2022-11-13 RX ADMIN — MONTELUKAST 10 MG: 10 TABLET, FILM COATED ORAL at 20:20

## 2022-11-13 RX ADMIN — CHOLESTYRAMINE 4 G: 4 POWDER, FOR SUSPENSION ORAL at 20:19

## 2022-11-13 RX ADMIN — Medication 3 ML: at 09:48

## 2022-11-13 RX ADMIN — ENOXAPARIN SODIUM 40 MG: 100 INJECTION SUBCUTANEOUS at 14:59

## 2022-11-13 RX ADMIN — GABAPENTIN 100 MG: 100 CAPSULE ORAL at 20:20

## 2022-11-13 RX ADMIN — Medication 3 ML: at 20:21

## 2022-11-13 RX ADMIN — PANTOPRAZOLE SODIUM 40 MG: 40 TABLET, DELAYED RELEASE ORAL at 09:47

## 2022-11-13 RX ADMIN — LEVETIRACETAM 500 MG: 5 INJECTION INTRAVENOUS at 09:46

## 2022-11-13 RX ADMIN — Medication 3 ML: at 20:24

## 2022-11-13 RX ADMIN — SUCRALFATE 1 G: 1 TABLET ORAL at 09:48

## 2022-11-13 NOTE — PROGRESS NOTES
LOS: 2 days   Patient Care Team:  Candi Benson MD as PCP - General (Family Medicine)  Kenrick Ragsdale MD as Surgeon (General Surgery)        Subjective  - lying on bed     Interval History:     Patient Complaints: c/o HA    Review of Systems   Constitutional: Positive for activity change and fatigue.   Eyes: Negative.    Respiratory: Negative.    Cardiovascular: Negative.    Gastrointestinal: Negative.    Endocrine: Negative.    Genitourinary: Positive for frequency.   Musculoskeletal: Positive for back pain and neck pain.   Neurological: Positive for dizziness, light-headedness and headaches.   Psychiatric/Behavioral: Positive for sleep disturbance. The patient is nervous/anxious.         Objective     Vital Signs  Temp:  [97.3 °F (36.3 °C)-98.9 °F (37.2 °C)] 97.6 °F (36.4 °C)  Heart Rate:  [71-82] 71  Resp:  [15-21] 21  BP: (110-133)/(69-92) 128/92    Physical Exam:     General Appearance:    Alert, cooperative, in no acute distress   Ears:    Ears appear intact with no abnormalities noted   Throat:   No oral lesions, no thrush, oral mucosa moist   Neck:   No adenopathy, supple, trachea midline, no thyromegaly, no   carotid bruit, no JVD   Lungs:     Clear to auscultation, respirations regular, even and                  Unlabored     Heart:    Regular rhythm and normal rate, normal S1 and S2, no            murmur, no gallop, no rub, no click   Abdomen:     Normal bowel sounds, no masses, no organomegaly, soft        nontender, nondistended, no guarding, no rebound                tenderness   Extremities:   Moves all extremities well, no edema, no cyanosis, no             redness   Skin:   No bleeding, bruising or rash   Neurologic:   Cranial nerves 2 - 12 grossly intact, sensation intact, DTR       present and equal bilaterally        Results Review:    Lab Results (last 24 hours)     Procedure Component Value Units Date/Time    Comprehensive Metabolic Panel [[id number]]  (Abnormal)  Collected: 11/13/22 0242    Specimen: Blood Updated: 11/13/22 0331     Glucose 105 mg/dL      BUN 12 mg/dL      Creatinine 0.65 mg/dL      Sodium 142 mmol/L      Potassium 4.1 mmol/L      Chloride 109 mmol/L      CO2 24.0 mmol/L      Calcium 9.1 mg/dL      Total Protein 5.8 g/dL      Albumin 3.80 g/dL      ALT (SGPT) 12 U/L      AST (SGOT) 12 U/L      Alkaline Phosphatase 84 U/L      Total Bilirubin 0.2 mg/dL      Globulin 2.0 gm/dL      A/G Ratio 1.9 g/dL      BUN/Creatinine Ratio 18.5     Anion Gap 9.0 mmol/L      eGFR 94.3 mL/min/1.73      Comment: National Kidney Foundation and American Society of Nephrology (ASN) Task Force recommended calculation based on the Chronic Kidney Disease Epidemiology Collaboration (CKD-EPI) equation refit without adjustment for race.       Narrative:      GFR Normal >60  Chronic Kidney Disease <60  Kidney Failure <15    The GFR formula is only valid for adults with stable renal function between ages 18 and 70.    CBC & Differential [[id number]]  (Normal) Collected: 11/13/22 0242    Specimen: Blood Updated: 11/13/22 0302    Narrative:      The following orders were created for panel order CBC & Differential.  Procedure                               Abnormality         Status                     ---------                               -----------         ------                     CBC Auto Differential[[id number]]        Normal              Final result                 Please view results for these tests on the individual orders.    CBC Auto Differential [[id number]]  (Normal) Collected: 11/13/22 0242    Specimen: Blood Updated: 11/13/22 0302     WBC 5.20 10*3/mm3      RBC 4.31 10*6/mm3      Hemoglobin 13.5 g/dL      Hematocrit 40.3 %      MCV 93.7 fL      MCH 31.4 pg      MCHC 33.5 g/dL      RDW 14.3 %      RDW-SD 49.4 fl      MPV 8.2 fL      Platelets 185 10*3/mm3      Neutrophil % 58.5 %      Lymphocyte % 28.7 %      Monocyte % 9.4 %      Eosinophil % 2.0 %      Basophil % 1.4 %       Neutrophils, Absolute 3.00 10*3/mm3      Lymphocytes, Absolute 1.50 10*3/mm3      Monocytes, Absolute 0.50 10*3/mm3      Eosinophils, Absolute 0.10 10*3/mm3      Basophils, Absolute 0.10 10*3/mm3      nRBC 0.2 /100 WBC     Vitamin B12 [[id number]] Collected: 11/12/22 1434    Specimen: Blood Updated: 11/12/22 1610    TSH [[id number]]  (Normal) Collected: 11/11/22 2307    Specimen: Blood Updated: 11/12/22 1212     TSH 1.480 uIU/mL     Lipid Panel [[id number]] Collected: 11/11/22 2307    Specimen: Blood Updated: 11/12/22 1207     Total Cholesterol 164 mg/dL      Triglycerides 120 mg/dL      HDL Cholesterol 53 mg/dL      LDL Cholesterol  90 mg/dL      VLDL Cholesterol 21 mg/dL      LDL/HDL Ratio 1.64    Narrative:      Cholesterol Reference Ranges  (U.S. Department of Health and Human Services ATP III Classifications)    Desirable          <200 mg/dL  Borderline High    200-239 mg/dL  High Risk          >240 mg/dL      Triglyceride Reference Ranges  ([place].S. Department of Health and Human Services ATP III Classifications)    Normal           <150 mg/dL  Borderline High  150-199 mg/dL  High             200-499 mg/dL  Very High        >500 mg/dL    HDL Reference Ranges  ([place].S. Department of Health and Human Services ATP III Classifications)    Low     <40 mg/dl (major risk factor for CHD)  High    >60 mg/dl ('negative' risk factor for CHD)        LDL Reference Ranges  ([place].S. Department of Health and Human Services ATP III Classifications)    Optimal          <100 mg/dL  Near Optimal     100-129 mg/dL  Borderline High  130-159 mg/dL  High             160-189 mg/dL  Very High        >189 mg/dL    Phosphorus [[id number]]  (Normal) Collected: 11/11/22 2307    Specimen: Blood Updated: 11/12/22 1207     Phosphorus 3.0 mg/dL     Magnesium [[id number]]  (Normal) Collected: 11/11/22 2307    Specimen: Blood Updated: 11/12/22 1207     Magnesium 2.2 mg/dL     Hemoglobin A1c [[id number]] Collected: 11/11/22 2307    Specimen: Blood Updated:  11/12/22 1141         Imaging Results (Last 24 Hours)     ** No results found for the last 24 hours. **           I reviewed the patient's other test results and agree with the interpretation    Medication Review:     Current Facility-Administered Medications:   •  acetaminophen (TYLENOL) tablet 650 mg, 650 mg, Oral, Q6H PRN, Candi Benson MD, 650 mg at 11/12/22 1201  •  albuterol sulfate HFA (PROVENTIL HFA;VENTOLIN HFA;PROAIR HFA) inhaler 2 puff, 2 puff, Inhalation, Q6H PRN, Candi Benson MD  •  aluminum-magnesium hydroxide-simethicone (MAALOX MAX) 400-400-40 MG/5ML suspension 15 mL, 15 mL, Oral, Q6H PRN, Candi Benson MD  •  anastrozole (ARIMIDEX) tablet 1 mg, 1 mg, Oral, Daily, Candi Benson MD, 1 mg at 11/13/22 0946  •  atorvastatin (LIPITOR) tablet 20 mg, 20 mg, Oral, Daily, Candi Benson MD, 20 mg at 11/12/22 2021  •  azelastine (ASTELIN) nasal spray 2 spray, 2 spray, Each Nare, Daily PRN, Candi Benson MD  •  baclofen (LIORESAL) tablet 10 mg, 10 mg, Oral, BID PRN, Lorrie Ling, BUTCH  •  butalbital-acetaminophen-caffeine (FIORICET, ESGIC) -40 MG per tablet 2 tablet, 2 tablet, Oral, BID PRN, Lorrie Ling APRN  •  cholestyramine light packet 4 g, 1 packet, Oral, Q12H, Candi Benson MD, 4 g at 11/13/22 0946  •  DULoxetine (CYMBALTA) DR capsule 30 mg, 30 mg, Oral, Daily, Candi Benson MD, 30 mg at 11/13/22 0947  •  Enoxaparin Sodium (LOVENOX) syringe 40 mg, 40 mg, Subcutaneous, Daily, Candi Benson MD, 40 mg at 11/12/22 1505  •  famotidine (PEPCID) tablet 40 mg, 40 mg, Oral, Nightly, Candi Benson MD, 40 mg at 11/12/22 2021  •  gabapentin (NEURONTIN) capsule 100 mg, 100 mg, Oral, Nightly, Candi Benson MD, 100 mg at 11/12/22 2022  •  hydrALAZINE (APRESOLINE) injection 10 mg, 10 mg, Intravenous, Q6H PRN, Candi Benson MD  •  ipratropium-albuterol (DUO-NEB) nebulizer solution 3 mL,  3 mL, Nebulization, Q6H PRN, Candi Benson MD  •  levETIRAcetam in NaCl 0.82% (KEPPRA) IVPB 500 mg, 500 mg, Intravenous, Q12H, Lorrie Ling APRN, 500 mg at 11/13/22 0946  •  lisinopril (PRINIVIL,ZESTRIL) tablet 40 mg, 40 mg, Oral, Nightly, Candi Benson MD, 40 mg at 11/12/22 2021  •  metoprolol succinate XL (TOPROL-XL) 24 hr tablet 50 mg, 50 mg, Oral, Daily, Candi Benson MD, 50 mg at 11/13/22 0947  •  montelukast (SINGULAIR) tablet 10 mg, 10 mg, Oral, Nightly, Candi Benson MD, 10 mg at 11/12/22 2022  •  ondansetron (ZOFRAN) injection 4 mg, 4 mg, Intravenous, Q6H PRN, Candi Benson MD  •  pantoprazole (PROTONIX) EC tablet 40 mg, 40 mg, Oral, Daily, Candi Benson MD, 40 mg at 11/13/22 0947  •  sertraline (ZOLOFT) tablet 100 mg, 100 mg, Oral, Daily, Candi Benson MD, 100 mg at 11/13/22 0947  •  sodium chloride 0.9 % flush 3 mL, 3 mL, Intravenous, Q12H, Candi Benson MD, 3 mL at 11/12/22 2026  •  sodium chloride 0.9 % flush 3-10 mL, 3-10 mL, Intravenous, PRN, Candi Benson MD  •  sucralfate (CARAFATE) tablet 1 g, 1 g, Oral, BID, Candi Benson MD, 1 g at 11/13/22 0948    I have reviewed medication list.    Assessment & Plan     Principal Problem:   New onset seizure (HCC)  - Neurology consulted, started on keppra, no more seizures    HA  -  Could be related to uncontrolled HTN    Uncontrolled HTN - Much better, started pt on hydralazine 10mg IV Q6 PRN    Hx of breast cancer    Stress ulcer/DVT prophylaxis                Plan for disposition: Home at discharge     Candi Benson MD  11/13/22  09:48 EST

## 2022-11-13 NOTE — PLAN OF CARE
Goal Outcome Evaluation: pt a/ox4, continues on keppra IV, no seizure activity this shift, PT to eval pt on Monday for strengthening per dr mott. Pt sitting up in chair rosalie well. Call light in reach

## 2022-11-13 NOTE — PROGRESS NOTES
LOS: 2 days   Patient Care Team:  Candi Benson MD as PCP - General (Family Medicine)  Kenrick Ragsdale MD as Surgeon (General Surgery)        Subjective  - lying on bed     Interval History:     Patient Complaints:  Feeling better     Review of Systems   Constitutional: Positive for activity change and fatigue.   Eyes: Negative.    Respiratory: Negative.    Cardiovascular: Negative.    Gastrointestinal: Negative.    Endocrine: Negative.    Genitourinary: Positive for frequency.   Musculoskeletal: Positive for back pain and neck pain.   Neurological: Positive for dizziness, light-headedness and headaches.   Psychiatric/Behavioral: Positive for sleep disturbance. The patient is nervous/anxious.         Objective     Vital Signs  Temp:  [97.5 °F (36.4 °C)-98.9 °F (37.2 °C)] 98 °F (36.7 °C)  Heart Rate:  [71-88] 80  Resp:  [15-21] 18  BP: (121-167)/(73-94) 128/73    Physical Exam:     General Appearance:    Alert, cooperative, in no acute distress   Ears:    Ears appear intact with no abnormalities noted   Throat:   No oral lesions, no thrush, oral mucosa moist   Neck:   No adenopathy, supple, trachea midline, no thyromegaly, no   carotid bruit, no JVD   Lungs:     Clear to auscultation, respirations regular, even and                  Unlabored     Heart:    Regular rhythm and normal rate, normal S1 and S2, no            murmur, no gallop, no rub, no click   Abdomen:     Normal bowel sounds, no masses, no organomegaly, soft        nontender, nondistended, no guarding, no rebound                tenderness   Extremities:   Moves all extremities well, no edema, no cyanosis, no             redness   Skin:   No bleeding, bruising or rash   Neurologic:   Cranial nerves 2 - 12 grossly intact, sensation intact, DTR       present and equal bilaterally        Results Review:    Lab Results (last 24 hours)     Procedure Component Value Units Date/Time    Vitamin B12 [[id number]]  (Abnormal) Collected:  11/12/22 1434    Specimen: Blood Updated: 11/13/22 1306     Vitamin B-12 >2,000 pg/mL     Narrative:      Results may be falsely increased if patient taking Biotin.      Comprehensive Metabolic Panel [[id number]]  (Abnormal) Collected: 11/13/22 0242    Specimen: Blood Updated: 11/13/22 0331     Glucose 105 mg/dL      BUN 12 mg/dL      Creatinine 0.65 mg/dL      Sodium 142 mmol/L      Potassium 4.1 mmol/L      Chloride 109 mmol/L      CO2 24.0 mmol/L      Calcium 9.1 mg/dL      Total Protein 5.8 g/dL      Albumin 3.80 g/dL      ALT (SGPT) 12 U/L      AST (SGOT) 12 U/L      Alkaline Phosphatase 84 U/L      Total Bilirubin 0.2 mg/dL      Globulin 2.0 gm/dL      A/G Ratio 1.9 g/dL      BUN/Creatinine Ratio 18.5     Anion Gap 9.0 mmol/L      eGFR 94.3 mL/min/1.73      Comment: National Kidney Foundation and American Society of Nephrology (ASN) Task Force recommended calculation based on the Chronic Kidney Disease Epidemiology Collaboration (CKD-EPI) equation refit without adjustment for race.       Narrative:      GFR Normal >60  Chronic Kidney Disease <60  Kidney Failure <15    The GFR formula is only valid for adults with stable renal function between ages 18 and 70.    CBC & Differential [[id number]]  (Normal) Collected: 11/13/22 0242    Specimen: Blood Updated: 11/13/22 0302    Narrative:      The following orders were created for panel order CBC & Differential.  Procedure                               Abnormality         Status                     ---------                               -----------         ------                     CBC Auto Differential[[id number]]        Normal              Final result                 Please view results for these tests on the individual orders.    CBC Auto Differential [[id number]]  (Normal) Collected: 11/13/22 0242    Specimen: Blood Updated: 11/13/22 0302     WBC 5.20 10*3/mm3      RBC 4.31 10*6/mm3      Hemoglobin 13.5 g/dL      Hematocrit 40.3 %      MCV 93.7 fL      MCH 31.4 pg       MCHC 33.5 g/dL      RDW 14.3 %      RDW-SD 49.4 fl      MPV 8.2 fL      Platelets 185 10*3/mm3      Neutrophil % 58.5 %      Lymphocyte % 28.7 %      Monocyte % 9.4 %      Eosinophil % 2.0 %      Basophil % 1.4 %      Neutrophils, Absolute 3.00 10*3/mm3      Lymphocytes, Absolute 1.50 10*3/mm3      Monocytes, Absolute 0.50 10*3/mm3      Eosinophils, Absolute 0.10 10*3/mm3      Basophils, Absolute 0.10 10*3/mm3      nRBC 0.2 /100 WBC          Imaging Results (Last 24 Hours)     ** No results found for the last 24 hours. **           I reviewed the patient's other test results and agree with the interpretation    Medication Review:     Current Facility-Administered Medications:   •  acetaminophen (TYLENOL) tablet 650 mg, 650 mg, Oral, Q6H PRN, Candi Benson MD, 650 mg at 11/12/22 1201  •  albuterol sulfate HFA (PROVENTIL HFA;VENTOLIN HFA;PROAIR HFA) inhaler 2 puff, 2 puff, Inhalation, Q6H PRN, Candi Benson MD  •  aluminum-magnesium hydroxide-simethicone (MAALOX MAX) 400-400-40 MG/5ML suspension 15 mL, 15 mL, Oral, Q6H PRN, Candi Benson MD  •  anastrozole (ARIMIDEX) tablet 1 mg, 1 mg, Oral, Daily, Candi Benson MD, 1 mg at 11/13/22 0946  •  atorvastatin (LIPITOR) tablet 20 mg, 20 mg, Oral, Daily, Candi Benson MD, 20 mg at 11/12/22 2021  •  azelastine (ASTELIN) nasal spray 2 spray, 2 spray, Each Nare, Daily PRN, Candi Benson MD  •  baclofen (LIORESAL) tablet 10 mg, 10 mg, Oral, BID PRN, Candi Benson MD  •  butalbital-acetaminophen-caffeine (FIORICET, ESGIC) -40 MG per tablet 2 tablet, 2 tablet, Oral, BID PRN, Candi Benson MD  •  cholestyramine light packet 4 g, 1 packet, Oral, Q12H, Candi Benson MD, 4 g at 11/13/22 0946  •  DULoxetine (CYMBALTA) DR capsule 30 mg, 30 mg, Oral, Daily, Candi Benson MD, 30 mg at 11/13/22 0947  •  Enoxaparin Sodium (LOVENOX) syringe 40 mg, 40 mg, Subcutaneous, Daily,  Candi Benson MD, 40 mg at 11/13/22 1459  •  famotidine (PEPCID) tablet 40 mg, 40 mg, Oral, Nightly, Candi Benson MD, 40 mg at 11/12/22 2021  •  gabapentin (NEURONTIN) capsule 100 mg, 100 mg, Oral, Nightly, Candi Benson MD, 100 mg at 11/12/22 2022  •  hydrALAZINE (APRESOLINE) injection 10 mg, 10 mg, Intravenous, Q6H PRN, Candi Benson MD  •  ipratropium-albuterol (DUO-NEB) nebulizer solution 3 mL, 3 mL, Nebulization, Q6H PRN, Candi Benson MD  •  levETIRAcetam in NaCl 0.82% (KEPPRA) IVPB 500 mg, 500 mg, Intravenous, Q12H, Candi Benson MD, 500 mg at 11/13/22 0946  •  lisinopril (PRINIVIL,ZESTRIL) tablet 40 mg, 40 mg, Oral, Nightly, Candi Benson MD, 40 mg at 11/12/22 2021  •  metoprolol succinate XL (TOPROL-XL) 24 hr tablet 50 mg, 50 mg, Oral, Daily, Candi Benson MD, 50 mg at 11/13/22 0947  •  montelukast (SINGULAIR) tablet 10 mg, 10 mg, Oral, Nightly, Candi Benson MD, 10 mg at 11/12/22 2022  •  ondansetron (ZOFRAN) injection 4 mg, 4 mg, Intravenous, Q6H PRN, Candi Benson MD  •  pantoprazole (PROTONIX) EC tablet 40 mg, 40 mg, Oral, Daily, Candi Benson MD, 40 mg at 11/13/22 0947  •  sertraline (ZOLOFT) tablet 100 mg, 100 mg, Oral, Daily, Candi Benson MD, 100 mg at 11/13/22 0947  •  sodium chloride 0.9 % flush 3 mL, 3 mL, Intravenous, Q12H, Candi Benson MD, 3 mL at 11/13/22 0948  •  sodium chloride 0.9 % flush 3-10 mL, 3-10 mL, Intravenous, PRN, Candi Benson MD  •  sucralfate (CARAFATE) tablet 1 g, 1 g, Oral, BID, Candi Benson MD, 1 g at 11/13/22 0948    I have reviewed medication list.    Assessment & Plan     Principal Problem:   New onset seizure (HCC)  - Neurology consulted,  on keppra, no more seizures    HA  -  Could be related to uncontrolled HTN, better    Uncontrolled HTN - Much better, started pt on hydralazine 10mg IV Q6 PRN    Hx of breast cancer     Stress ulcer/DVT prophylaxis                Plan for disposition: Home at discharge     Candi Benson MD  11/13/22  18:01 EST

## 2022-11-13 NOTE — PLAN OF CARE
Problem: Adult Inpatient Plan of Care  Goal: Plan of Care Review  Outcome: Ongoing, Progressing  Flowsheets (Taken 11/13/2022 0439)  Progress: improving  Plan of Care Reviewed With: patient  Goal: Patient-Specific Goal (Individualized)  Outcome: Ongoing, Progressing  Goal: Absence of Hospital-Acquired Illness or Injury  Outcome: Ongoing, Progressing  Intervention: Identify and Manage Fall Risk  Description: Perform standard risk assessment on admission using a validated tool or comprehensive approach appropriate to the patient; reassess fall risk frequently, with change in status or transfer to another level of care.  Communicate fall injury risk to interprofessional healthcare team.  Determine need for increased observation, equipment and environmental modification, such as low bed, signage and supportive, nonskid footwear.  Adjust safety measures to individual developmental age, stage and identified risk factors.  Reinforce the importance of safety and physical activity with patient and family.  Perform regular intentional rounding to assess need for position change, pain assessment and personal needs, including assistance with toileting.  Recent Flowsheet Documentation  Taken 11/13/2022 0400 by Shazia Trent, RN  Safety Promotion/Fall Prevention:   safety round/check completed   room organization consistent   nonskid shoes/slippers when out of bed   lighting adjusted   fall prevention program maintained   clutter free environment maintained   assistive device/personal items within reach  Taken 11/13/2022 0200 by Shazia Trent, RN  Safety Promotion/Fall Prevention:   safety round/check completed   room organization consistent   nonskid shoes/slippers when out of bed   lighting adjusted   fall prevention program maintained   clutter free environment maintained   assistive device/personal items within reach  Taken 11/13/2022 0000 by Shazia Trent, RN  Safety Promotion/Fall Prevention:   safety round/check  completed   room organization consistent   nonskid shoes/slippers when out of bed   lighting adjusted   fall prevention program maintained   clutter free environment maintained   assistive device/personal items within reach  Taken 11/12/2022 2200 by Shazia Trent RN  Safety Promotion/Fall Prevention:   safety round/check completed   room organization consistent   nonskid shoes/slippers when out of bed   lighting adjusted   fall prevention program maintained   clutter free environment maintained   assistive device/personal items within reach  Taken 11/12/2022 2008 by Shazia Trent RN  Safety Promotion/Fall Prevention:   safety round/check completed   room organization consistent   clutter free environment maintained   assistive device/personal items within reach   fall prevention program maintained   nonskid shoes/slippers when out of bed  Intervention: Prevent Skin Injury  Description: Perform a screening for skin injury risk, such as pressure or moisture associated skin damage on admission and at regular intervals throughout hospital stay.  Keep all areas of skin (especially folds) clean and dry.  Maintain adequate skin hydration.  Relieve and redistribute pressure and protect bony prominences; implement measures based on patient-specific risk factors.  Match turning and repositioning schedule to clinical condition.  Encourage weight shift frequently; assist with reposition if unable to complete independently.  Float heels off bed; avoid pressure on the Achilles tendon.  Keep skin free from extended contact with medical devices.  Encourage functional activity and mobility, as early as tolerated.  Use aids (e.g., slide boards, mechanical lift) during transfer.  Recent Flowsheet Documentation  Taken 11/13/2022 0400 by Shazia Trent RN  Body Position: supine  Skin Protection: adhesive use limited  Taken 11/13/2022 0000 by Shazia Trent RN  Body Position: supine  Skin Protection: adhesive use  limited  Taken 11/12/2022 2008 by Shazia Trent RN  Body Position:   sitting up in bed   supine  Skin Protection: adhesive use limited  Intervention: Prevent and Manage VTE (Venous Thromboembolism) Risk  Description: Assess for VTE (venous thromboembolism) risk.  Encourage and assist with early ambulation.  Initiate and maintain compression or other therapy, as indicated, based on identified risk in accordance with organizational protocol and provider order.  Encourage both active and passive leg exercises while in bed, if unable to ambulate.  Recent Flowsheet Documentation  Taken 11/13/2022 0400 by Shazia Trent RN  Activity Management: activity adjusted per tolerance  VTE Prevention/Management:   sequential compression devices off   patient refused intervention  Range of Motion: active ROM (range of motion) encouraged  Taken 11/13/2022 0200 by Shazia Trent RN  Activity Management: activity adjusted per tolerance  Taken 11/13/2022 0000 by Shazia Trent RN  Activity Management: activity adjusted per tolerance  VTE Prevention/Management:   sequential compression devices off   patient refused intervention  Range of Motion: active ROM (range of motion) encouraged  Taken 11/12/2022 2008 by Shazia Trent RN  Activity Management: activity adjusted per tolerance  VTE Prevention/Management:   bilateral   sequential compression devices off   patient refused intervention  Range of Motion: active ROM (range of motion) encouraged  Intervention: Prevent Infection  Description: Maintain skin and mucous membrane integrity; promote hand, oral and pulmonary hygiene.  Optimize fluid balance, nutrition, sleep and glycemic control to maximize infection resistance.  Identify potential sources of infection early to prevent or mitigate progression of infection (e.g., wound, lines, devices).  Evaluate ongoing need for invasive devices; remove promptly when no longer indicated.  Recent Flowsheet Documentation  Taken  11/13/2022 0400 by Shazia Trent RN  Infection Prevention:   single patient room provided   rest/sleep promoted   hand hygiene promoted   environmental surveillance performed  Taken 11/13/2022 0200 by Shazia Trent RN  Infection Prevention:   rest/sleep promoted   single patient room provided   hand hygiene promoted   environmental surveillance performed  Taken 11/13/2022 0000 by Shazia Trent RN  Infection Prevention:   single patient room provided   rest/sleep promoted   hand hygiene promoted   environmental surveillance performed  Taken 11/12/2022 2200 by Shazia Trent RN  Infection Prevention:   single patient room provided   rest/sleep promoted   hand hygiene promoted   environmental surveillance performed  Taken 11/12/2022 2008 by Shazia Trent RN  Infection Prevention:   visitors restricted/screened   single patient room provided   rest/sleep promoted   hand hygiene promoted   environmental surveillance performed  Goal: Optimal Comfort and Wellbeing  Outcome: Ongoing, Progressing  Intervention: Monitor Pain and Promote Comfort  Description: Assess pain level, treatment efficacy and patient response at regular intervals using a consistent pain scale.  Consider the presence and impact of preexisting chronic pain.  Encourage patient and caregiver involvement in pain assessment, interventions and safety measures.  Recent Flowsheet Documentation  Taken 11/13/2022 0400 by Shazia Trent RN  Pain Management Interventions:   see MAR   quiet environment facilitated   care clustered  Taken 11/13/2022 0000 by Shazia Trent RN  Pain Management Interventions:   see MAR   quiet environment facilitated   care clustered  Taken 11/12/2022 2008 by Shazia Trent RN  Pain Management Interventions:   see MAR   quiet environment facilitated   care clustered  Intervention: Provide Person-Centered Care  Description: Use a family-focused approach to care.  Develop trust and rapport by proactively  providing information, encouraging questions, addressing concerns and offering reassurance.  Acknowledge emotional response to hospitalization.  Recognize and utilize personal coping strategies.  Honor spiritual and cultural preferences.  Recent Flowsheet Documentation  Taken 11/13/2022 0400 by Shazia Trent RN  Trust Relationship/Rapport:   care explained   choices provided   reassurance provided  Taken 11/12/2022 2008 by Shazia Trent RN  Trust Relationship/Rapport:   care explained   choices provided   reassurance provided  Goal: Readiness for Transition of Care  Outcome: Ongoing, Progressing     Problem: Fall Injury Risk  Goal: Absence of Fall and Fall-Related Injury  Outcome: Ongoing, Progressing  Intervention: Identify and Manage Contributors  Description: Develop a fall prevention plan with the patient and caregiver/family.  Provide reorientation, appropriate sensory stimulation and routines with changes in mental status to decrease risk of fall.  Promote use of personal vision and auditory aids.  Assess assistance level required for safe and effective self-care; provide support as needed, such as toileting, mobilization. For age 65 and older, implement timed toileting with assistance.  Encourage physical activity, such as performance of mobility and self-care at highest level of patient ability, multicomponent exercise program and provision of appropriate assistive devices.  If fall occurs, assess the severity of injury; implement fall injury protocol. Determine the cause and revise fall injury prevention plan.  Regularly review medication contribution to fall risk; adjust medication administration times to minimize risk of falling.  Consider risk related to polypharmacy and age.  Balance adequate pain management with potential for oversedation.  Recent Flowsheet Documentation  Taken 11/13/2022 0400 by Shazia Trent RN  Medication Review/Management: medications reviewed  Self-Care Promotion:  independence encouraged  Taken 11/13/2022 0200 by Shazia Trent RN  Medication Review/Management: medications reviewed  Taken 11/13/2022 0000 by Shazia Trent RN  Medication Review/Management: medications reviewed  Self-Care Promotion: independence encouraged  Taken 11/12/2022 2200 by Shazia Trent RN  Medication Review/Management: medications reviewed  Taken 11/12/2022 2008 by Shazia Trent RN  Medication Review/Management: medications reviewed  Self-Care Promotion: independence encouraged  Intervention: Promote Injury-Free Environment  Description: Provide a safe, barrier-free environment that encourages independent activity.  Keep care area uncluttered and well-lighted.  Determine need for increased observation or monitoring.  Avoid use of devices that minimize mobility, such as restraints or indwelling urinary catheter.  Recent Flowsheet Documentation  Taken 11/13/2022 0400 by Shazia Trent RN  Safety Promotion/Fall Prevention:   safety round/check completed   room organization consistent   nonskid shoes/slippers when out of bed   lighting adjusted   fall prevention program maintained   clutter free environment maintained   assistive device/personal items within reach  Taken 11/13/2022 0200 by Shazia Trent RN  Safety Promotion/Fall Prevention:   safety round/check completed   room organization consistent   nonskid shoes/slippers when out of bed   lighting adjusted   fall prevention program maintained   clutter free environment maintained   assistive device/personal items within reach  Taken 11/13/2022 0000 by Shazia Trent RN  Safety Promotion/Fall Prevention:   safety round/check completed   room organization consistent   nonskid shoes/slippers when out of bed   lighting adjusted   fall prevention program maintained   clutter free environment maintained   assistive device/personal items within reach  Taken 11/12/2022 2200 by Shazia Trent RN  Safety Promotion/Fall Prevention:    safety round/check completed   room organization consistent   nonskid shoes/slippers when out of bed   lighting adjusted   fall prevention program maintained   clutter free environment maintained   assistive device/personal items within reach  Taken 11/12/2022 2008 by Shazia Trent RN  Safety Promotion/Fall Prevention:   safety round/check completed   room organization consistent   clutter free environment maintained   assistive device/personal items within reach   fall prevention program maintained   nonskid shoes/slippers when out of bed   Goal Outcome Evaluation:  Plan of Care Reviewed With: patient        Progress: improving

## 2022-11-14 ENCOUNTER — READMISSION MANAGEMENT (OUTPATIENT)
Dept: CALL CENTER | Facility: HOSPITAL | Age: 72
End: 2022-11-14

## 2022-11-14 ENCOUNTER — APPOINTMENT (OUTPATIENT)
Dept: GENERAL RADIOLOGY | Facility: HOSPITAL | Age: 72
End: 2022-11-14

## 2022-11-14 VITALS
TEMPERATURE: 98.4 F | SYSTOLIC BLOOD PRESSURE: 127 MMHG | HEIGHT: 59 IN | OXYGEN SATURATION: 96 % | BODY MASS INDEX: 36.4 KG/M2 | WEIGHT: 180.56 LBS | HEART RATE: 81 BPM | DIASTOLIC BLOOD PRESSURE: 84 MMHG | RESPIRATION RATE: 21 BRPM

## 2022-11-14 PROBLEM — R56.9 NEW ONSET SEIZURE (HCC): Status: RESOLVED | Noted: 2022-11-11 | Resolved: 2022-11-14

## 2022-11-14 PROBLEM — I10 PRIMARY HYPERTENSION: Status: RESOLVED | Noted: 2022-11-12 | Resolved: 2022-11-14

## 2022-11-14 LAB
ALBUMIN SERPL-MCNC: 3.8 G/DL (ref 3.5–5.2)
ALBUMIN/GLOB SERPL: 1.7 G/DL
ALP SERPL-CCNC: 83 U/L (ref 39–117)
ALT SERPL W P-5'-P-CCNC: 15 U/L (ref 1–33)
ANION GAP SERPL CALCULATED.3IONS-SCNC: 11 MMOL/L (ref 5–15)
AST SERPL-CCNC: 15 U/L (ref 1–32)
BILIRUB SERPL-MCNC: 0.2 MG/DL (ref 0–1.2)
BUN SERPL-MCNC: 12 MG/DL (ref 8–23)
BUN/CREAT SERPL: 21.1 (ref 7–25)
CALCIUM SPEC-SCNC: 9.6 MG/DL (ref 8.6–10.5)
CHLORIDE SERPL-SCNC: 106 MMOL/L (ref 98–107)
CO2 SERPL-SCNC: 24 MMOL/L (ref 22–29)
CREAT SERPL-MCNC: 0.57 MG/DL (ref 0.57–1)
EGFRCR SERPLBLD CKD-EPI 2021: 97.3 ML/MIN/1.73
GLOBULIN UR ELPH-MCNC: 2.2 GM/DL
GLUCOSE SERPL-MCNC: 100 MG/DL (ref 65–99)
HBA1C MFR BLD: 5.4 % (ref 3.5–5.6)
POTASSIUM SERPL-SCNC: 4 MMOL/L (ref 3.5–5.2)
PROT SERPL-MCNC: 6 G/DL (ref 6–8.5)
QT INTERVAL: 472 MS
SODIUM SERPL-SCNC: 141 MMOL/L (ref 136–145)

## 2022-11-14 PROCEDURE — 94799 UNLISTED PULMONARY SVC/PX: CPT

## 2022-11-14 PROCEDURE — 80053 COMPREHEN METABOLIC PANEL: CPT | Performed by: FAMILY MEDICINE

## 2022-11-14 PROCEDURE — 25010000002 LEVETIRACETAM IN NACL 0.82% 500 MG/100ML SOLUTION: Performed by: FAMILY MEDICINE

## 2022-11-14 PROCEDURE — 71045 X-RAY EXAM CHEST 1 VIEW: CPT

## 2022-11-14 PROCEDURE — 94761 N-INVAS EAR/PLS OXIMETRY MLT: CPT

## 2022-11-14 RX ORDER — LEVETIRACETAM 500 MG/1
500 TABLET ORAL 2 TIMES DAILY
Status: DISCONTINUED | OUTPATIENT
Start: 2022-11-14 | End: 2022-11-14 | Stop reason: HOSPADM

## 2022-11-14 RX ORDER — LEVETIRACETAM 500 MG/1
500 TABLET ORAL 2 TIMES DAILY
Qty: 60 TABLET | Refills: 0 | Status: SHIPPED | OUTPATIENT
Start: 2022-11-14 | End: 2023-03-01

## 2022-11-14 RX ADMIN — ANASTROZOLE 1 MG: 1 TABLET ORAL at 09:23

## 2022-11-14 RX ADMIN — DULOXETINE HYDROCHLORIDE 30 MG: 30 CAPSULE, DELAYED RELEASE ORAL at 09:23

## 2022-11-14 RX ADMIN — Medication 3 ML: at 09:24

## 2022-11-14 RX ADMIN — IPRATROPIUM BROMIDE AND ALBUTEROL SULFATE 3 ML: .5; 3 SOLUTION RESPIRATORY (INHALATION) at 09:21

## 2022-11-14 RX ADMIN — CHOLESTYRAMINE 4 G: 4 POWDER, FOR SUSPENSION ORAL at 12:46

## 2022-11-14 RX ADMIN — SERTRALINE 100 MG: 100 TABLET, FILM COATED ORAL at 09:23

## 2022-11-14 RX ADMIN — LEVETIRACETAM 500 MG: 500 TABLET, FILM COATED ORAL at 12:49

## 2022-11-14 RX ADMIN — SUCRALFATE 1 G: 1 TABLET ORAL at 09:23

## 2022-11-14 RX ADMIN — PANTOPRAZOLE SODIUM 40 MG: 40 TABLET, DELAYED RELEASE ORAL at 09:23

## 2022-11-14 RX ADMIN — METOPROLOL SUCCINATE 50 MG: 50 TABLET, EXTENDED RELEASE ORAL at 09:23

## 2022-11-14 NOTE — PLAN OF CARE
Problem: Adult Inpatient Plan of Care  Goal: Plan of Care Review  Outcome: Ongoing, Progressing  Flowsheets (Taken 11/14/2022 0144)  Plan of Care Reviewed With: patient  Goal: Patient-Specific Goal (Individualized)  Outcome: Ongoing, Progressing  Goal: Absence of Hospital-Acquired Illness or Injury  Outcome: Ongoing, Progressing  Intervention: Identify and Manage Fall Risk  Description: Perform standard risk assessment on admission using a validated tool or comprehensive approach appropriate to the patient; reassess fall risk frequently, with change in status or transfer to another level of care.  Communicate fall injury risk to interprofessional healthcare team.  Determine need for increased observation, equipment and environmental modification, such as low bed, signage and supportive, nonskid footwear.  Adjust safety measures to individual developmental age, stage and identified risk factors.  Reinforce the importance of safety and physical activity with patient and family.  Perform regular intentional rounding to assess need for position change, pain assessment and personal needs, including assistance with toileting.  Recent Flowsheet Documentation  Taken 11/14/2022 0000 by Shazia Trent RN  Safety Promotion/Fall Prevention: clutter free environment maintained  Taken 11/13/2022 2200 by Shazia Trent, RN  Safety Promotion/Fall Prevention:   safety round/check completed   room organization consistent   nonskid shoes/slippers when out of bed   lighting adjusted   fall prevention program maintained   clutter free environment maintained   assistive device/personal items within reach  Taken 11/13/2022 2013 by Shazia Trent, RN  Safety Promotion/Fall Prevention:   assistive device/personal items within reach   clutter free environment maintained   lighting adjusted   nonskid shoes/slippers when out of bed   room organization consistent   safety round/check completed  Intervention: Prevent Skin  Injury  Description: Perform a screening for skin injury risk, such as pressure or moisture associated skin damage on admission and at regular intervals throughout hospital stay.  Keep all areas of skin (especially folds) clean and dry.  Maintain adequate skin hydration.  Relieve and redistribute pressure and protect bony prominences; implement measures based on patient-specific risk factors.  Match turning and repositioning schedule to clinical condition.  Encourage weight shift frequently; assist with reposition if unable to complete independently.  Float heels off bed; avoid pressure on the Achilles tendon.  Keep skin free from extended contact with medical devices.  Encourage functional activity and mobility, as early as tolerated.  Use aids (e.g., slide boards, mechanical lift) during transfer.  Recent Flowsheet Documentation  Taken 11/14/2022 0000 by Shazia Trent RN  Skin Protection: adhesive use limited  Taken 11/13/2022 2013 by Shazia Trent RN  Body Position: supine  Skin Protection: adhesive use limited  Intervention: Prevent and Manage VTE (Venous Thromboembolism) Risk  Description: Assess for VTE (venous thromboembolism) risk.  Encourage and assist with early ambulation.  Initiate and maintain compression or other therapy, as indicated, based on identified risk in accordance with organizational protocol and provider order.  Encourage both active and passive leg exercises while in bed, if unable to ambulate.  Recent Flowsheet Documentation  Taken 11/14/2022 0000 by Shazia Trent RN  VTE Prevention/Management: sequential compression devices on  Range of Motion: active ROM (range of motion) encouraged  Taken 11/13/2022 2013 by Shazia Trent RN  Activity Management: ambulated to bathroom  VTE Prevention/Management: sequential compression devices on  Intervention: Prevent Infection  Description: Maintain skin and mucous membrane integrity; promote hand, oral and pulmonary hygiene.  Optimize  fluid balance, nutrition, sleep and glycemic control to maximize infection resistance.  Identify potential sources of infection early to prevent or mitigate progression of infection (e.g., wound, lines, devices).  Evaluate ongoing need for invasive devices; remove promptly when no longer indicated.  Recent Flowsheet Documentation  Taken 11/13/2022 2200 by Shazia Trent RN  Infection Prevention:   single patient room provided   rest/sleep promoted   hand hygiene promoted   environmental surveillance performed  Taken 11/13/2022 2013 by Shazia Trent RN  Infection Prevention:   single patient room provided   rest/sleep promoted   hand hygiene promoted   environmental surveillance performed  Goal: Optimal Comfort and Wellbeing  Outcome: Ongoing, Progressing  Intervention: Monitor Pain and Promote Comfort  Description: Assess pain level, treatment efficacy and patient response at regular intervals using a consistent pain scale.  Consider the presence and impact of preexisting chronic pain.  Encourage patient and caregiver involvement in pain assessment, interventions and safety measures.  Recent Flowsheet Documentation  Taken 11/14/2022 0000 by Shazia Trent RN  Pain Management Interventions:   see MAR   quiet environment facilitated   care clustered  Taken 11/13/2022 2013 by Shazia Trent RN  Pain Management Interventions:   see MAR   quiet environment facilitated   care clustered  Intervention: Provide Person-Centered Care  Description: Use a family-focused approach to care.  Develop trust and rapport by proactively providing information, encouraging questions, addressing concerns and offering reassurance.  Acknowledge emotional response to hospitalization.  Recognize and utilize personal coping strategies.  Honor spiritual and cultural preferences.  Recent Flowsheet Documentation  Taken 11/14/2022 0000 by Shazia Trent RN  Trust Relationship/Rapport:   care explained   reassurance provided  Taken  11/13/2022 2013 by Shazia Trent, RN  Trust Relationship/Rapport:   care explained   reassurance provided   choices provided  Goal: Readiness for Transition of Care  Outcome: Ongoing, Progressing     Problem: Fall Injury Risk  Goal: Absence of Fall and Fall-Related Injury  Outcome: Ongoing, Progressing  Intervention: Identify and Manage Contributors  Description: Develop a fall prevention plan with the patient and caregiver/family.  Provide reorientation, appropriate sensory stimulation and routines with changes in mental status to decrease risk of fall.  Promote use of personal vision and auditory aids.  Assess assistance level required for safe and effective self-care; provide support as needed, such as toileting, mobilization. For age 65 and older, implement timed toileting with assistance.  Encourage physical activity, such as performance of mobility and self-care at highest level of patient ability, multicomponent exercise program and provision of appropriate assistive devices.  If fall occurs, assess the severity of injury; implement fall injury protocol. Determine the cause and revise fall injury prevention plan.  Regularly review medication contribution to fall risk; adjust medication administration times to minimize risk of falling.  Consider risk related to polypharmacy and age.  Balance adequate pain management with potential for oversedation.  Recent Flowsheet Documentation  Taken 11/14/2022 0000 by Shazia Trent, RN  Medication Review/Management: medications reviewed  Taken 11/13/2022 2200 by Shazia Trent, RN  Medication Review/Management: medications reviewed  Taken 11/13/2022 2013 by Shazia Trent, RN  Medication Review/Management: medications reviewed  Self-Care Promotion: independence encouraged  Intervention: Promote Injury-Free Environment  Description: Provide a safe, barrier-free environment that encourages independent activity.  Keep care area uncluttered and  well-lighted.  Determine need for increased observation or monitoring.  Avoid use of devices that minimize mobility, such as restraints or indwelling urinary catheter.  Recent Flowsheet Documentation  Taken 11/14/2022 0000 by Shazia Trent RN  Safety Promotion/Fall Prevention: clutter free environment maintained  Taken 11/13/2022 2200 by Shazia Trent RN  Safety Promotion/Fall Prevention:   safety round/check completed   room organization consistent   nonskid shoes/slippers when out of bed   lighting adjusted   fall prevention program maintained   clutter free environment maintained   assistive device/personal items within reach  Taken 11/13/2022 2013 by Shazia Trent, RN  Safety Promotion/Fall Prevention:   assistive device/personal items within reach   clutter free environment maintained   lighting adjusted   nonskid shoes/slippers when out of bed   room organization consistent   safety round/check completed   Goal Outcome Evaluation:  Plan of Care Reviewed With: patient        Progress: improving

## 2022-11-14 NOTE — ACP (ADVANCE CARE PLANNING)
Case Management/Social Work    Patient Name:  Monse Donnelly  YOB: 1950  MRN: [id number]  Admit Date:  11/10/2022      Plan: Home with adult son, Bobo Donnelly. Family will transport at d/c.   Discharge Plan       Row Name 11/14/22 2194       Plan    Plan Comments Pt requested information regarding Advance Care Planning. SW explained advance directives. Pt voiced interest in speaking with her PCP while filling out a POST form. SW gave Pt two blank POST forms with instructions and a DNR form to take to her PCP follow up appointment this week.             Brynn Tamayo, KEVINW, LSW, APHSW-C  Medical Social Worker  [place] [id number]  Office: [phone]  Fax: [phone]

## 2022-11-14 NOTE — CASE MANAGEMENT/SOCIAL WORK
Continued Stay Note  NIKI Perez     Patient Name: Monse Donnelly  MRN: [id number]  Today's Date: 11/14/2022    Admit Date: 11/10/2022    Plan: Home with adult son, Bobo Donnelly. Family will transport at d/c.   Discharge Plan     Row Name 11/14/22 1000       Plan    Plan Home with adult son, Bobo Donnelly. Family will transport at d/c.    Plan Comments D/C Barriers: c/o wheezing CXR ordered per MD Edith Franco RN

## 2022-11-15 NOTE — CASE MANAGEMENT/SOCIAL WORK
Case Management Discharge Note      Final Note: Home with son         Selected Continued Care - Discharged on 11/14/2022 Admission date: 11/10/2022 - Discharge disposition: Home or Self Care            Transportation Services  Private: Car    Final Discharge Disposition Code: 01 - home or self-care

## 2022-11-15 NOTE — OUTREACH NOTE
Prep Survey    Flowsheet Row Responses   [facility] discharged from? [facility]   Is LACE score < 7 ? No   Emergency Room discharge w/ pulse ox? No   Eligibility Readm Mgmt   Discharge diagnosis New onset seizure   Does the patient have one of the following disease processes/diagnoses(primary or secondary)? Other   Does the patient have Home health ordered? No   Is there a DME ordered? No   Prep survey completed? Yes          YISEL DELA CRUZ - Registered Nurse

## 2022-11-17 ENCOUNTER — READMISSION MANAGEMENT (OUTPATIENT)
Dept: CALL CENTER | Facility: HOSPITAL | Age: 72
End: 2022-11-17

## 2022-11-17 NOTE — OUTREACH NOTE
Medical Week 1 Survey    Flowsheet Row Responses   [facility] patient discharged from? [facility]   Does the patient have one of the following disease processes/diagnoses(primary or secondary)? Other   Week 1 attempt successful? Yes   Call start time 1449   Call end time 1455   Meds reviewed with patient/caregiver? Yes   Is the patient having any side effects they believe may be caused by any medication additions or changes? No   Does the patient have all medications ordered at discharge? Yes   Is the patient taking all medications as directed (includes completed medication regime)? Yes   Medication comments ATBS added since discharge   Does the patient have a primary care provider?  Yes   Does the patient have an appointment with their PCP within 7 days of discharge? Yes   Has the patient kept scheduled appointments due by today? Yes   Has home health visited the patient within 72 hours of discharge? N/A   Psychosocial issues? No   Did the patient receive a copy of their discharge instructions? Yes   Nursing interventions Reviewed instructions with patient   What is the patient's perception of their health status since discharge? New symptoms unrelated to diagnosis  [Denies any further seizure activity, no notes to f/u with neuro--encouraged to discuss with PCP.  Pt has developed a cough, congestion since discharge and f/u with PCP who prescribed ATB,cough aid.  Aware to monitor for worsening respiratory s/s]   Is the patient/caregiver able to teach back signs and symptoms related to disease process for when to call PCP? Yes   Is the patient/caregiver able to teach back signs and symptoms related to disease process for when to call 911? Yes   Week 1 call completed? Yes          ORLANDO CRESPO - Registered Nurse

## 2022-11-17 NOTE — DISCHARGE SUMMARY
Date of Discharge:  11/14/2022    Discharge Diagnosis:   New onset seizure (HCC)      HA      Uncontrolled HTN     Hx of breast cancer              Presenting Problem/History of Present Illness  Active Hospital Problems   No active problems to display.      Resolved Hospital Problems    Diagnosis Date Resolved POA   • **New onset seizure (HCC) [R56.9] 11/14/2022 Yes   • Primary hypertension [I10] 11/14/2022 Yes          Hospital Course  Patient is a 71 y.o. female presented with new onset of seizure, neurology consulted, all work up were negative, she was started  On keppra, and since admission she did not have any more seizures and she was able to ambulate well and all consultants cleared her to go home     Procedures Performed         Consults:   Consults     Date and Time Order Name Status Description    11/11/2022  2:53 PM Inpatient Neurology Consult General Completed           Pertinent Test Results:  Lab Results (last 24 hours)     Procedure Component Value Units Date/Time    Hemoglobin A1c [[id number]]  (Normal) Collected: 11/11/22 2307    Specimen: Blood Updated: 11/14/22 1020     Hemoglobin A1C 5.4 %     Narrative:      Hemoglobin A1C Reference Range:    <5.7 %        Normal  5.7-6.4 %     Increased risk for diabetes  > 6.4 %        Diabetes       These guidelines have been recommended by the American Diabetic Association for Hgb A1c.      The following 2010 guidelines have been recommended by the American Diabetes Association for Hemoglobin A1c.    HBA1c 5.7-6.4% Increased risk for future diabetes (pre-diabetes)  HBA1c     >6.4% Diabetes      Comprehensive Metabolic Panel [[id number]]  (Abnormal) Collected: 11/14/22 0410    Specimen: Blood Updated: 11/14/22 0500     Glucose 100 mg/dL      BUN 12 mg/dL      Creatinine 0.57 mg/dL      Sodium 141 mmol/L      Potassium 4.0 mmol/L      Comment: Slight hemolysis detected by analyzer. Results may be affected.        Chloride 106 mmol/L      CO2 24.0 mmol/L       Calcium 9.6 mg/dL      Total Protein 6.0 g/dL      Albumin 3.80 g/dL      ALT (SGPT) 15 U/L      AST (SGOT) 15 U/L      Alkaline Phosphatase 83 U/L      Total Bilirubin 0.2 mg/dL      Globulin 2.2 gm/dL      A/G Ratio 1.7 g/dL      BUN/Creatinine Ratio 21.1     Anion Gap 11.0 mmol/L      eGFR 97.3 mL/min/1.73      Comment: National Kidney Foundation and American Society of Nephrology (ASN) Task Force recommended calculation based on the Chronic Kidney Disease Epidemiology Collaboration (CKD-EPI) equation refit without adjustment for race.       Narrative:      GFR Normal >60  Chronic Kidney Disease <60  Kidney Failure <15    The GFR formula is only valid for adults with stable renal function between ages 18 and 70.         I have reviewed lab results.    Condition on Discharge:  Improved     Vital Signs       Physical Exam:     General Appearance:    Alert, cooperative, in no acute distress   Ears:    Ears appear intact with no abnormalities noted   Throat:   No oral lesions, no thrush, oral mucosa moist   Neck:   No adenopathy, supple, trachea midline, no thyromegaly, no   carotid bruit, no JVD   Lungs:     Clear to auscultation,respirations regular, even and                  Unlabored     Heart:    Regular rhythm and normal rate, normal S1 and S2, no            murmur, no gallop, no rub, no click   Abdomen:     Normal bowel sounds, no masses, no organomegaly, soft        non-tender, non-distended, no guarding, no rebound                tenderness   Extremities:   Moves all extremities well, no edema, no cyanosis, no             redness   Skin:   No bleeding, bruising or rash   Neurologic:   Cranial nerves 2 - 12 grossly intact, sensation intact, DTR       present and equal bilaterally       Discharge Disposition  Home or Self Care    Discharge Medications     Discharge Medications      New Medications      Instructions Start Date   levETIRAcetam 500 MG tablet  Commonly known as: KEPPRA   500 mg, Oral, 2 Times  Daily         Continue These Medications      Instructions Start Date   albuterol sulfate  (90 Base) MCG/ACT inhaler  Commonly known as: PROVENTIL HFA;VENTOLIN HFA;PROAIR HFA   2 puffs, Inhalation, Every 6 Hours PRN      ALENDRONATE SODIUM PO   70 mg, Oral, Weekly, Patient states takes on Wednesdays       anastrozole 1 MG tablet  Commonly known as: ARIMIDEX   Oral, Daily      atorvastatin 20 MG tablet  Commonly known as: LIPITOR   20 mg, Oral, Daily      azelastine 0.1 % nasal spray  Commonly known as: ASTELIN   2 sprays, Nasal, As Needed, Use in each nostril as directed       colesevelam 625 MG tablet  Commonly known as: WELCHOL   625 mg, Oral, 2 Times Daily With Meals      DULoxetine 30 MG capsule  Commonly known as: CYMBALTA   30 mg, Oral, Daily      famotidine 40 MG tablet  Commonly known as: PEPCID   40 mg, Oral, Nightly      gabapentin 100 MG capsule  Commonly known as: NEURONTIN   100 mg, Oral, Nightly      ipratropium-albuterol 0.5-2.5 mg/3 ml nebulizer  Commonly known as: DUO-NEB   3 mL, Nebulization, As Needed      lisinopril 40 MG tablet  Commonly known as: PRINIVIL,ZESTRIL   40 mg, Oral, Nightly, LD 1/4      metoprolol succinate XL 50 MG 24 hr tablet  Commonly known as: TOPROL-XL   50 mg, Oral, Daily, Take DOS      montelukast 10 MG tablet  Commonly known as: SINGULAIR   10 mg, Oral, Nightly      pantoprazole 40 MG EC tablet  Commonly known as: PROTONIX   40 mg, Oral, Daily      sertraline 100 MG tablet  Commonly known as: ZOLOFT   100 mg, Oral, Daily, Take DOS      sucralfate 1 g tablet  Commonly known as: CARAFATE   1 g, Oral, 2 Times Daily             Discharge Diet:     Activity at Discharge:     Follow-up Appointments  Future Appointments   Date Time Provider Department Center   1/16/2023 10:50 AM Kenrick Ragsdale MD [facility]     Additional Instructions for the Follow-ups that You Need to Schedule     Call MD With Problems / Concerns   As directed      Instructions: Called MD  [phone] if fever greater then 101, nausea/vomiting, chest pain, shortness of breath or abdominal pain    Order Comments: Instructions: Called [phone] if fever greater then 101, nausea/vomiting, chest pain, shortness of breath or abdominal pain          Discharge Follow-up with PCP   As directed       Currently Documented PCP:    Candi Benson MD    PCP Phone Number:    [phone]     Follow Up Details: Hospital Follow up appointment with Dr. Benson on Wednesday 11/16/2022 315 pm.   Office number [phone]               Test Results Pending at Discharge       Candi Benson MD  11/16/22  20:24 EST

## 2022-11-29 ENCOUNTER — READMISSION MANAGEMENT (OUTPATIENT)
Dept: CALL CENTER | Facility: HOSPITAL | Age: 72
End: 2022-11-29

## 2022-11-30 NOTE — OUTREACH NOTE
Medical Week 2 Survey    Flowsheet Row Responses   [facility] facility patient discharged from? [facility]   Does the patient have one of the following disease processes/diagnoses(primary or secondary)? Other   Week 2 attempt successful? No   Unsuccessful attempts Attempt 1          MARIELLA GALLARDO - Licensed Nurse

## 2022-12-06 ENCOUNTER — READMISSION MANAGEMENT (OUTPATIENT)
Dept: CALL CENTER | Facility: HOSPITAL | Age: 72
End: 2022-12-06

## 2022-12-06 NOTE — OUTREACH NOTE
Medical Week 3 Survey    Flowsheet Row Responses   [facility] facility patient discharged from? [facility]   Does the patient have one of the following disease processes/diagnoses(primary or secondary)? Other   Week 3 attempt successful? No   Unsuccessful attempts Attempt 1          RAJ BARLOW - Registered Nurse

## 2023-02-24 ENCOUNTER — TRANSCRIBE ORDERS (OUTPATIENT)
Dept: ADMINISTRATIVE | Facility: HOSPITAL | Age: 73
End: 2023-02-24
Payer: MEDICARE

## 2023-02-24 DIAGNOSIS — R07.9 CHEST PAIN, UNSPECIFIED TYPE: Primary | ICD-10-CM

## 2023-03-01 ENCOUNTER — OFFICE VISIT (OUTPATIENT)
Dept: SURGERY | Facility: CLINIC | Age: 73
End: 2023-03-01
Payer: MEDICARE

## 2023-03-01 VITALS
HEIGHT: 59 IN | BODY MASS INDEX: 34.03 KG/M2 | SYSTOLIC BLOOD PRESSURE: 156 MMHG | TEMPERATURE: 98 F | DIASTOLIC BLOOD PRESSURE: 100 MMHG | OXYGEN SATURATION: 97 % | RESPIRATION RATE: 20 BRPM | WEIGHT: 168.8 LBS | HEART RATE: 74 BPM

## 2023-03-01 DIAGNOSIS — Z17.0 MALIGNANT NEOPLASM OF UPPER-OUTER QUADRANT OF RIGHT BREAST IN FEMALE, ESTROGEN RECEPTOR POSITIVE: Primary | ICD-10-CM

## 2023-03-01 DIAGNOSIS — C50.411 MALIGNANT NEOPLASM OF UPPER-OUTER QUADRANT OF RIGHT BREAST IN FEMALE, ESTROGEN RECEPTOR POSITIVE: Primary | ICD-10-CM

## 2023-03-01 PROCEDURE — 99213 OFFICE O/P EST LOW 20 MIN: CPT | Performed by: SURGERY

## 2023-03-01 RX ORDER — TOPIRAMATE 50 MG/1
50 TABLET, FILM COATED ORAL 2 TIMES DAILY
COMMUNITY

## 2023-03-07 NOTE — PROGRESS NOTES
GENERAL SURGERY ESTABLISHED PATIENT NOTE    Patient Care Team:  Candi Benson MD as PCP - General (Family Medicine)  Kenrick Ragsdale MD as Surgeon (General Surgery)    Reason for consult: Follow-up from mastectomy    Subjective     Patient is a 72 y.o. female presents as a follow-up after undergoing a modified radical mastectomy on the right and left mastectomy on 6/10/2021 for invasive right breast cancer.  Overall, the patient is doing well.  She denies any issues.  She has not noticed any significant swelling of either arm.    Review of Systems   Constitutional: Negative for appetite change, chills and fever.   HENT: Negative for congestion and sore throat.    Respiratory: Negative for cough and shortness of breath.    Cardiovascular: Negative for chest pain and palpitations.   Gastrointestinal: Negative for abdominal pain, constipation, diarrhea, nausea, vomiting and GERD.   Genitourinary: Negative for breast lump, breast pain, difficulty urinating, dysuria and frequency.   Musculoskeletal: Negative for arthralgias and back pain.   Skin: Negative for rash and skin lesions.   Neurological: Negative for dizziness, seizures and memory problem.   Hematological: Negative for adenopathy. Does not bruise/bleed easily.   Psychiatric/Behavioral: Negative for sleep disturbance and depressed mood.        History  Past Medical History:   Diagnosis Date   • Arthritis    • Asthma    • Boils of multiple sites 5/28   • Breast cancer (HCC)    • Cancer (HCC)    • COPD (chronic obstructive pulmonary disease) (HCC)    • Coronary artery disease    • Drug therapy    • Elevated cholesterol    • GERD (gastroesophageal reflux disease)    • H/O Bell's palsy    • History of transfusion    • Hypertension    • Neuropathy     feet and hands post chemo   • Sciatic nerve pain    • Sleep apnea      Past Surgical History:   Procedure Laterality Date   • ABDOMINAL SURGERY     • ANKLE SURGERY     • BREAST BIOPSY     • BREAST  LUMPECTOMY     • CERVICAL CONIZATION     • MASTECTOMY Right 6/10/2021    Procedure: MASTECTOMY MODIFIED RADICAL RIGHT;  Surgeon: Kenrick Ragsdale MD;  Location: [facility] MAIN OR;  Service: General;  Laterality: Right;   • SIMPLE MASTECTOMY Left 6/10/2021    Procedure: MASTECTOMY SIMPLE;  Surgeon: Kenrick Ragsdale MD;  Location: [facility] MAIN OR;  Service: General;  Laterality: Left;   • TUBAL ABDOMINAL LIGATION     • VENOUS ACCESS DEVICE (PORT) INSERTION Left 7/30/2020    Procedure: INSERTION VENOUS ACCESS DEVICE LEFT internal jugular;  Surgeon: Taylor Kaye MD;  Location: [facility] MAIN OR;  Service: General;  Laterality: Left;   • VENOUS ACCESS DEVICE (PORT) REMOVAL Left 1/6/2022    Procedure: REMOVAL VENOUS ACCESS DEVICE;  Surgeon: Kenrick Ragsdale MD;  Location: [facility] MAIN OR;  Service: General;  Laterality: Left;     Family History   Problem Relation Age of Onset   • Heart disease Father    • Stroke Father    • Diabetes Father    • Diabetes Son      Social History     Tobacco Use   • Smoking status: Never     Passive exposure: Never   • Smokeless tobacco: Never   Vaping Use   • Vaping Use: Never used   Substance Use Topics   • Alcohol use: Never   • Drug use: Never     (Not in a hospital admission)    Allergies:  Penicillins and Adhesive tape    Objective     Vital Signs       Physical Exam  Vitals reviewed. Exam conducted with a chaperone present.   Constitutional:       General: She is not in acute distress.     Appearance: She is obese. She is not ill-appearing.   HENT:      Head: Normocephalic and atraumatic.   Cardiovascular:      Rate and Rhythm: Normal rate and regular rhythm.   Pulmonary:      Effort: Pulmonary effort is normal. No respiratory distress.   Chest:      Comments: Bilateral mastectomy incisions are well healed without any surrounding erythema, induration, or drainage to suggest infection.  There is no palpable masses beneath the subcutaneous skin flaps superiorly or  inferiorly bilaterally.  Port-A-Cath incision is well-healed  No significant swelling of either arm is noted bilaterally  Abdominal:      General: There is no distension.      Palpations: Abdomen is soft.      Tenderness: There is no abdominal tenderness.   Musculoskeletal:         General: No swelling. Normal range of motion.   Lymphadenopathy:      Upper Body:      Right upper body: No supraclavicular or axillary adenopathy.      Left upper body: No supraclavicular or axillary adenopathy.   Skin:     General: Skin is warm and dry.      Coloration: Skin is not jaundiced.   Neurological:      General: No focal deficit present.      Mental Status: She is alert and oriented to person, place, and time.   Psychiatric:         Mood and Affect: Mood normal.         Behavior: Behavior normal.         Thought Content: Thought content normal.         Judgment: Judgment normal.         Results Review:   Lab Results (last 24 hours)     ** No results found for the last 24 hours. **        No radiology results for the last day      I reviewed the patient's new imaging results and agree with the interpretation.  I reviewed the patient's other test results and agree with the interpretation    Assessment & Plan     Active Problems:  Right breast cancer    Recommend OT for lymphedema  Continue to follow-up with medical oncology as directed  Follow-up with me in 6 months    I discussed the patients findings and my recommendations with the patient.     Kenrick Ragsdale MD  03/07/23  10:24 EST

## 2023-03-21 ENCOUNTER — HOSPITAL ENCOUNTER (OUTPATIENT)
Dept: NUCLEAR MEDICINE | Facility: HOSPITAL | Age: 73
Discharge: HOME | End: 2023-03-21
Payer: MEDICARE

## 2023-03-21 DIAGNOSIS — R07.9 CHEST PAIN, UNSPECIFIED TYPE: ICD-10-CM

## 2023-03-21 LAB
BH CV REST NUCLEAR ISOTOPE DOSE: 10.3 MCI
BH CV STRESS BP STAGE 1: NORMAL
BH CV STRESS COMMENTS STAGE 1: NORMAL
BH CV STRESS DOSE REGADENOSON STAGE 1: 0.4
BH CV STRESS DURATION MIN STAGE 1: 0
BH CV STRESS DURATION SEC STAGE 1: 10
BH CV STRESS HR STAGE 1: 105
BH CV STRESS NUCLEAR ISOTOPE DOSE: 31.4 MCI
BH CV STRESS PROTOCOL 1: NORMAL
BH CV STRESS RECOVERY BP: NORMAL MMHG
BH CV STRESS RECOVERY HR: 100 BPM
BH CV STRESS STAGE 1: 1
LV EF NUC BP: 71 %
MAXIMAL PREDICTED HEART RATE: 148 BPM
PERCENT MAX PREDICTED HR: 77.03 %
STRESS BASELINE BP: NORMAL MMHG
STRESS BASELINE HR: 76 BPM
STRESS PERCENT HR: 91 %
STRESS POST PEAK BP: NORMAL MMHG
STRESS POST PEAK HR: 114 BPM
STRESS TARGET HR: 126 BPM

## 2023-03-21 PROCEDURE — 25010000002 REGADENOSON 0.4 MG/5ML SOLUTION: Performed by: FAMILY MEDICINE

## 2023-03-21 PROCEDURE — A9502 TC99M TETROFOSMIN: HCPCS | Performed by: FAMILY MEDICINE

## 2023-03-21 PROCEDURE — 93017 CV STRESS TEST TRACING ONLY: CPT

## 2023-03-21 PROCEDURE — 78452 HT MUSCLE IMAGE SPECT MULT: CPT | Performed by: INTERNAL MEDICINE

## 2023-03-21 PROCEDURE — 78452 HT MUSCLE IMAGE SPECT MULT: CPT

## 2023-03-21 PROCEDURE — 93016 CV STRESS TEST SUPVJ ONLY: CPT | Performed by: INTERNAL MEDICINE

## 2023-03-21 PROCEDURE — 93018 CV STRESS TEST I&R ONLY: CPT | Performed by: INTERNAL MEDICINE

## 2023-03-21 PROCEDURE — 0 TECHNETIUM TETROFOSMIN KIT: Performed by: FAMILY MEDICINE

## 2023-03-21 RX ADMIN — TETROFOSMIN 1 DOSE: 1.38 INJECTION, POWDER, LYOPHILIZED, FOR SOLUTION INTRAVENOUS at 13:20

## 2023-03-21 RX ADMIN — REGADENOSON 0.4 MG: 0.08 INJECTION, SOLUTION INTRAVENOUS at 14:22

## 2023-03-21 RX ADMIN — TETROFOSMIN 1 DOSE: 1.38 INJECTION, POWDER, LYOPHILIZED, FOR SOLUTION INTRAVENOUS at 14:22

## 2023-04-04 ENCOUNTER — TELEPHONE (OUTPATIENT)
Dept: CARDIOLOGY | Facility: CLINIC | Age: 73
End: 2023-04-04

## 2023-04-04 ENCOUNTER — OFFICE VISIT (OUTPATIENT)
Dept: CARDIOLOGY | Facility: CLINIC | Age: 73
End: 2023-04-04
Payer: MEDICARE

## 2023-04-04 VITALS
WEIGHT: 168 LBS | SYSTOLIC BLOOD PRESSURE: 137 MMHG | HEART RATE: 73 BPM | DIASTOLIC BLOOD PRESSURE: 88 MMHG | BODY MASS INDEX: 33.87 KG/M2 | HEIGHT: 59 IN | OXYGEN SATURATION: 97 %

## 2023-04-04 DIAGNOSIS — E78.5 DYSLIPIDEMIA: ICD-10-CM

## 2023-04-04 DIAGNOSIS — I10 ESSENTIAL HYPERTENSION: ICD-10-CM

## 2023-04-04 DIAGNOSIS — R94.39 ABNORMAL NUCLEAR STRESS TEST: ICD-10-CM

## 2023-04-04 DIAGNOSIS — R06.09 DYSPNEA ON EXERTION: ICD-10-CM

## 2023-04-04 DIAGNOSIS — R07.2 PRECORDIAL PAIN: Primary | ICD-10-CM

## 2023-04-04 PROCEDURE — 1159F MED LIST DOCD IN RCRD: CPT | Performed by: INTERNAL MEDICINE

## 2023-04-04 PROCEDURE — 99204 OFFICE O/P NEW MOD 45 MIN: CPT | Performed by: INTERNAL MEDICINE

## 2023-04-04 PROCEDURE — 1160F RVW MEDS BY RX/DR IN RCRD: CPT | Performed by: INTERNAL MEDICINE

## 2023-04-04 RX ORDER — ISOSORBIDE MONONITRATE 30 MG/1
30 TABLET, EXTENDED RELEASE ORAL EVERY MORNING
Qty: 90 TABLET | Refills: 0 | Status: SHIPPED | OUTPATIENT
Start: 2023-04-04 | End: 2023-04-06 | Stop reason: SINTOL

## 2023-04-04 RX ORDER — ASPIRIN 81 MG/1
81 TABLET ORAL ONCE
Status: DISCONTINUED | OUTPATIENT
Start: 2023-04-04 | End: 2023-04-04

## 2023-04-04 RX ORDER — ANASTROZOLE 1 MG/1
TABLET ORAL DAILY
COMMUNITY

## 2023-04-04 RX ORDER — LORATADINE 10 MG/1
CAPSULE, LIQUID FILLED ORAL
COMMUNITY

## 2023-04-04 RX ORDER — ISOSORBIDE MONONITRATE 30 MG/1
30 TABLET, EXTENDED RELEASE ORAL EVERY MORNING
Qty: 30 TABLET | Refills: 11 | Status: SHIPPED | OUTPATIENT
Start: 2023-04-04 | End: 2023-04-04 | Stop reason: SDUPTHER

## 2023-04-04 RX ORDER — ASPIRIN 81 MG/1
81 TABLET ORAL DAILY
Qty: 90 TABLET | Refills: 3 | Status: SHIPPED | OUTPATIENT
Start: 2023-04-04

## 2023-04-04 NOTE — H&P (VIEW-ONLY)
Cardiology Consult Note    Patient Identification:  Name: Monse Donnelly  Age: 72 y.o.  Sex: female  :  1950  MRN: [id number]             Requesting Physician :  Candi Benson MD    Reason for Consultation / Chief Complaint : Abnormal stress test, CAD    History of Present Illness:      Ms. Monse Donnelly has PMH of    CAD  Hypertension  Dyslipidemia  COPD, YESSICA  CA breast and left mastectomy  Positive family history of CAD in father  Allergy/intolerance to adhesive tape    Here for evaluation of abnormal stress test.  Patient has been having decreasing energy dyspnea on exertion chest pain palpitations underwent a stress test which was abnormal sent here for evaluation.    Patient's arterial blood pressure is 137/88, heart rate 73, O2 sat of 97% on room air.  BMI is over 33.    Lexiscan Cardiolite 3/21/2023 reveals moderate-sized inferior ischemia EF over 70%.    Labs from 2022 reveal CMP with a glucose of 100, normal CBC.  Lipid profile with cholesterol 164, triglycerides 120, HDL 53, LDL 90    Assessment:  :    Chest pain  Dyspnea on exertion  Palpitations  Fatigue  Abnormal stress test  CAD  Hypertension  Dyslipidemia  COPD      Recommendations / Plan:        Reviewed patient's records and stress test.  We will add aspirin and isosorbide mononitrate.  Continue metoprolol lisinopril and atorvastatin.  Patient's likelihood of having CAD is high.  We will schedule her for cardiac cath.  Risk benefits alternatives explained.  We will follow-up and consider further evaluation and treatment.           Diagnosis Plan   1. Precordial pain  Case Request Cath Lab: Left Heart Cath    CBC (No Diff)    Basic Metabolic Panel    Protime-INR    XR Chest 2 View      2. Dyspnea on exertion  Case Request Cath Lab: Left Heart Cath    CBC (No Diff)    Basic Metabolic Panel    Protime-INR    XR Chest 2 View      3. Abnormal nuclear stress test  Case Request Cath Lab: Left Heart Cath    CBC (No Diff)    Basic  Metabolic Panel    Protime-INR    XR Chest 2 View      4. Essential hypertension  Case Request Cath Lab: Left Heart Cath    CBC (No Diff)    Basic Metabolic Panel    Protime-INR    XR Chest 2 View      5. Dyslipidemia  Case Request Cath Lab: Left Heart Cath    CBC (No Diff)    Basic Metabolic Panel    Protime-INR    XR Chest 2 View                 Past Medical History:  Past Medical History:   Diagnosis Date   • Arthritis    • Asthma    • Boils of multiple sites 5/28   • Breast cancer    • Cancer    • COPD (chronic obstructive pulmonary disease)    • Coronary artery disease    • Drug therapy    • Elevated cholesterol    • GERD (gastroesophageal reflux disease)    • H/O Bell's palsy    • History of transfusion    • Hypertension    • Neuropathy     feet and hands post chemo   • Sciatic nerve pain    • Sleep apnea      Past Surgical History:  Past Surgical History:   Procedure Laterality Date   • ABDOMINAL SURGERY     • ANKLE SURGERY     • BREAST BIOPSY     • BREAST LUMPECTOMY     • CERVICAL CONIZATION     • MASTECTOMY Right 6/10/2021    Procedure: MASTECTOMY MODIFIED RADICAL RIGHT;  Surgeon: Kenrick Ragsdale MD;  Location: [facility];  Service: General;  Laterality: Right;   • SIMPLE MASTECTOMY Left 6/10/2021    Procedure: MASTECTOMY SIMPLE;  Surgeon: Kenrick Ragsdale MD;  Location: [facility];  Service: General;  Laterality: Left;   • TUBAL ABDOMINAL LIGATION     • VENOUS ACCESS DEVICE (PORT) INSERTION Left 7/30/2020    Procedure: INSERTION VENOUS ACCESS DEVICE LEFT internal jugular;  Surgeon: Taylor Kaye MD;  Location: [facility] OR;  Service: General;  Laterality: Left;   • VENOUS ACCESS DEVICE (PORT) REMOVAL Left 1/6/2022    Procedure: REMOVAL VENOUS ACCESS DEVICE;  Surgeon: Kenrick Ragsdale MD;  Location: [facility] OR;  Service: General;  Laterality: Left;      Allergies:  Allergies   Allergen Reactions   • Penicillins Anaphylaxis   • Adhesive Tape Rash     Blisters       Home  Meds:  Current Meds:     Current Outpatient Medications:   •  albuterol sulfate  (90 Base) MCG/ACT inhaler, Inhale 2 puffs Every 6 (Six) Hours As Needed., Disp: , Rfl:   •  ALENDRONATE SODIUM PO, Take 70 mg by mouth 1 (One) Time Per Week. Patient states takes on Wednesdays, Disp: , Rfl:   •  anastrozole (ARIMIDEX) 1 MG tablet, Take  by mouth Daily., Disp: , Rfl:   •  atorvastatin (LIPITOR) 20 MG tablet, Take 1 tablet by mouth Daily., Disp: , Rfl:   •  lisinopril (PRINIVIL,ZESTRIL) 40 MG tablet, Take 1 tablet by mouth Every Night. LD 1/4, Disp: , Rfl:   •  Loratadine 10 MG capsule, Take  by mouth., Disp: , Rfl:   •  metoprolol succinate XL (TOPROL-XL) 50 MG 24 hr tablet, Take 1 tablet by mouth Daily. Take DOS, Disp: , Rfl:   •  montelukast (SINGULAIR) 10 MG tablet, Take 1 tablet by mouth Every Night., Disp: , Rfl:   •  pantoprazole (PROTONIX) 40 MG EC tablet, Take 1 tablet by mouth Daily., Disp: , Rfl:   •  sertraline (ZOLOFT) 100 MG tablet, Take 1 tablet by mouth Daily. Take DOS, Disp: , Rfl:   •  topiramate (TOPAMAX) 50 MG tablet, Take 1 tablet by mouth 2 (Two) Times a Day., Disp: , Rfl:   •  aspirin 81 MG EC tablet, Take 1 tablet by mouth Daily., Disp: 90 tablet, Rfl: 3  •  isosorbide mononitrate (IMDUR) 30 MG 24 hr tablet, Take 1 tablet by mouth Every Morning., Disp: 90 tablet, Rfl: 0  No current facility-administered medications for this visit.  Social History:   Social History     Tobacco Use   • Smoking status: Never     Passive exposure: Never   • Smokeless tobacco: Never   Substance Use Topics   • Alcohol use: Never      Family History:  Family History   Problem Relation Age of Onset   • Heart disease Father    • Stroke Father    • Diabetes Father    • Diabetes Son         Review of Systems : Review of Systems   Constitutional: Positive for malaise/fatigue. Negative for fever.   HENT: Negative for ear pain and nosebleeds.    Eyes: Negative for blurred vision and double vision.   Cardiovascular:  "Positive for chest pain and dyspnea on exertion. Negative for palpitations.   Respiratory: Positive for shortness of breath. Negative for cough.    Skin: Negative for rash.   Musculoskeletal: Negative for joint pain.   Gastrointestinal: Negative for abdominal pain, nausea and vomiting.   Neurological: Positive for dizziness, focal weakness and headaches.   Psychiatric/Behavioral: Negative for depression. The patient is not nervous/anxious.    All other systems reviewed and are negative.            Constitutional:  Heart Rate:  [73] 73  BP: (137)/(88) 137/88    Physical Exam   /88   Pulse 73   Ht 149.9 cm (59\")   Wt 76.2 kg (168 lb)   SpO2 97%   BMI 33.93 kg/m²   Physical Exam  General:  Appears in no acute distress  Eyes: Sclerae are anicteric,  conjunctivae are clear   HEENT:  No JVD. Thyroid not visibly enlarged. No mucosal pallor or cyanosis  Respiratory: Respirations regular and unlabored at rest.  Bilaterally good breath sounds with good air entry in all fields. No crackles, rubs or wheezes auscultated  Cardiovascular: S1,S2 Regular rate and rhythm. No murmur, rub or gallop auscultated. No pretibial pitting edema  Gastrointestinal: Abdomen soft, flat, nontender. Bowel sounds present.   Musculoskeletal:  No abnormal movements  Extremities: No digital clubbing or cyanosis  Skin: Color pink. Skin warm and dry to touch. No rashes  No xanthoma  Neuro: Alert and awake, no lateralizing deficits appreciated    Cardiographics  ECG: EKG tracing was  personally reviewed/interpreted by me EKG done 11/12/2022 reviewed/interpreted by me reveals sinus rhythm with a rate of 72 bpm with anterior Q waves.    Echocardiogram:   Results for orders placed during the hospital encounter of 09/30/20    Adult Transthoracic Echo Complete W/ Cont if Necessary Per Protocol    Interpretation Summary  Normal LV size and contractility EF of 60 to 65%  Normal RV size  Normal atrial size  Aortic valve, mitral valve, tricuspid valve " appears structurally normal, mild to moderate mitral regurgitation seen.  Trace tricuspid regurgitation seen, calculated RV systolic pressure of 29 mmHg  No pericardial effusion seen.  Proximal aorta appears normal in size.      Imaging  Chest X-ray:   Imaging Results (Last 24 Hours)     ** No results found for the last 24 hours. **          Lab Review: I have reviewed the labs                                      Ryder Agosto MD  4/4/2023, 12:57 EDT      EMR Dragon/Transcription:   Dictated utilizing Dragon dictation

## 2023-04-04 NOTE — TELEPHONE ENCOUNTER
Pt seen today , pt said to send meds to [facility] that's where they were sent. She changed her mind and wants them sent to [facility].    I called [facility] to CX spoke to Chel     Sent meds to [facility]

## 2023-04-04 NOTE — PROGRESS NOTES
Cardiology Consult Note    Patient Identification:  Name: Monse Donnelly  Age: 72 y.o.  Sex: female  :  1950  MRN: [id number]             Requesting Physician :  Candi Benson MD    Reason for Consultation / Chief Complaint : Abnormal stress test, CAD    History of Present Illness:      Ms. Monse Donnelly has PMH of    CAD  Hypertension  Dyslipidemia  COPD, YESSICA  CA breast and left mastectomy  Positive family history of CAD in father  Allergy/intolerance to adhesive tape    Here for evaluation of abnormal stress test.  Patient has been having decreasing energy dyspnea on exertion chest pain palpitations underwent a stress test which was abnormal sent here for evaluation.    Patient's arterial blood pressure is 137/88, heart rate 73, O2 sat of 97% on room air.  BMI is over 33.    Lexiscan Cardiolite 3/21/2023 reveals moderate-sized inferior ischemia EF over 70%.    Labs from 2022 reveal CMP with a glucose of 100, normal CBC.  Lipid profile with cholesterol 164, triglycerides 120, HDL 53, LDL 90    Assessment:  :    Chest pain  Dyspnea on exertion  Palpitations  Fatigue  Abnormal stress test  CAD  Hypertension  Dyslipidemia  COPD      Recommendations / Plan:        Reviewed patient's records and stress test.  We will add aspirin and isosorbide mononitrate.  Continue metoprolol lisinopril and atorvastatin.  Patient's likelihood of having CAD is high.  We will schedule her for cardiac cath.  Risk benefits alternatives explained.  We will follow-up and consider further evaluation and treatment.           Diagnosis Plan   1. Precordial pain  Case Request Cath Lab: Left Heart Cath    CBC (No Diff)    Basic Metabolic Panel    Protime-INR    XR Chest 2 View      2. Dyspnea on exertion  Case Request Cath Lab: Left Heart Cath    CBC (No Diff)    Basic Metabolic Panel    Protime-INR    XR Chest 2 View      3. Abnormal nuclear stress test  Case Request Cath Lab: Left Heart Cath    CBC (No Diff)    Basic  Metabolic Panel    Protime-INR    XR Chest 2 View      4. Essential hypertension  Case Request Cath Lab: Left Heart Cath    CBC (No Diff)    Basic Metabolic Panel    Protime-INR    XR Chest 2 View      5. Dyslipidemia  Case Request Cath Lab: Left Heart Cath    CBC (No Diff)    Basic Metabolic Panel    Protime-INR    XR Chest 2 View                 Past Medical History:  Past Medical History:   Diagnosis Date   • Arthritis    • Asthma    • Boils of multiple sites 5/28   • Breast cancer    • Cancer    • COPD (chronic obstructive pulmonary disease)    • Coronary artery disease    • Drug therapy    • Elevated cholesterol    • GERD (gastroesophageal reflux disease)    • H/O Bell's palsy    • History of transfusion    • Hypertension    • Neuropathy     feet and hands post chemo   • Sciatic nerve pain    • Sleep apnea      Past Surgical History:  Past Surgical History:   Procedure Laterality Date   • ABDOMINAL SURGERY     • ANKLE SURGERY     • BREAST BIOPSY     • BREAST LUMPECTOMY     • CERVICAL CONIZATION     • MASTECTOMY Right 6/10/2021    Procedure: MASTECTOMY MODIFIED RADICAL RIGHT;  Surgeon: Kenrick Ragsdale MD;  Location: [facility];  Service: General;  Laterality: Right;   • SIMPLE MASTECTOMY Left 6/10/2021    Procedure: MASTECTOMY SIMPLE;  Surgeon: Kenrick Ragsdale MD;  Location: [facility];  Service: General;  Laterality: Left;   • TUBAL ABDOMINAL LIGATION     • VENOUS ACCESS DEVICE (PORT) INSERTION Left 7/30/2020    Procedure: INSERTION VENOUS ACCESS DEVICE LEFT internal jugular;  Surgeon: Taylor Kaye MD;  Location: [facility] OR;  Service: General;  Laterality: Left;   • VENOUS ACCESS DEVICE (PORT) REMOVAL Left 1/6/2022    Procedure: REMOVAL VENOUS ACCESS DEVICE;  Surgeon: Kenrick Ragsdale MD;  Location: [facility] OR;  Service: General;  Laterality: Left;      Allergies:  Allergies   Allergen Reactions   • Penicillins Anaphylaxis   • Adhesive Tape Rash     Blisters       Home  Meds:  Current Meds:     Current Outpatient Medications:   •  albuterol sulfate  (90 Base) MCG/ACT inhaler, Inhale 2 puffs Every 6 (Six) Hours As Needed., Disp: , Rfl:   •  ALENDRONATE SODIUM PO, Take 70 mg by mouth 1 (One) Time Per Week. Patient states takes on Wednesdays, Disp: , Rfl:   •  anastrozole (ARIMIDEX) 1 MG tablet, Take  by mouth Daily., Disp: , Rfl:   •  atorvastatin (LIPITOR) 20 MG tablet, Take 1 tablet by mouth Daily., Disp: , Rfl:   •  lisinopril (PRINIVIL,ZESTRIL) 40 MG tablet, Take 1 tablet by mouth Every Night. LD 1/4, Disp: , Rfl:   •  Loratadine 10 MG capsule, Take  by mouth., Disp: , Rfl:   •  metoprolol succinate XL (TOPROL-XL) 50 MG 24 hr tablet, Take 1 tablet by mouth Daily. Take DOS, Disp: , Rfl:   •  montelukast (SINGULAIR) 10 MG tablet, Take 1 tablet by mouth Every Night., Disp: , Rfl:   •  pantoprazole (PROTONIX) 40 MG EC tablet, Take 1 tablet by mouth Daily., Disp: , Rfl:   •  sertraline (ZOLOFT) 100 MG tablet, Take 1 tablet by mouth Daily. Take DOS, Disp: , Rfl:   •  topiramate (TOPAMAX) 50 MG tablet, Take 1 tablet by mouth 2 (Two) Times a Day., Disp: , Rfl:   •  aspirin 81 MG EC tablet, Take 1 tablet by mouth Daily., Disp: 90 tablet, Rfl: 3  •  isosorbide mononitrate (IMDUR) 30 MG 24 hr tablet, Take 1 tablet by mouth Every Morning., Disp: 90 tablet, Rfl: 0  No current facility-administered medications for this visit.  Social History:   Social History     Tobacco Use   • Smoking status: Never     Passive exposure: Never   • Smokeless tobacco: Never   Substance Use Topics   • Alcohol use: Never      Family History:  Family History   Problem Relation Age of Onset   • Heart disease Father    • Stroke Father    • Diabetes Father    • Diabetes Son         Review of Systems : Review of Systems   Constitutional: Positive for malaise/fatigue. Negative for fever.   HENT: Negative for ear pain and nosebleeds.    Eyes: Negative for blurred vision and double vision.   Cardiovascular:  "Positive for chest pain and dyspnea on exertion. Negative for palpitations.   Respiratory: Positive for shortness of breath. Negative for cough.    Skin: Negative for rash.   Musculoskeletal: Negative for joint pain.   Gastrointestinal: Negative for abdominal pain, nausea and vomiting.   Neurological: Positive for dizziness, focal weakness and headaches.   Psychiatric/Behavioral: Negative for depression. The patient is not nervous/anxious.    All other systems reviewed and are negative.            Constitutional:  Heart Rate:  [73] 73  BP: (137)/(88) 137/88    Physical Exam   /88   Pulse 73   Ht 149.9 cm (59\")   Wt 76.2 kg (168 lb)   SpO2 97%   BMI 33.93 kg/m²   Physical Exam  General:  Appears in no acute distress  Eyes: Sclerae are anicteric,  conjunctivae are clear   HEENT:  No JVD. Thyroid not visibly enlarged. No mucosal pallor or cyanosis  Respiratory: Respirations regular and unlabored at rest.  Bilaterally good breath sounds with good air entry in all fields. No crackles, rubs or wheezes auscultated  Cardiovascular: S1,S2 Regular rate and rhythm. No murmur, rub or gallop auscultated. No pretibial pitting edema  Gastrointestinal: Abdomen soft, flat, nontender. Bowel sounds present.   Musculoskeletal:  No abnormal movements  Extremities: No digital clubbing or cyanosis  Skin: Color pink. Skin warm and dry to touch. No rashes  No xanthoma  Neuro: Alert and awake, no lateralizing deficits appreciated    Cardiographics  ECG: EKG tracing was  personally reviewed/interpreted by me EKG done 11/12/2022 reviewed/interpreted by me reveals sinus rhythm with a rate of 72 bpm with anterior Q waves.    Echocardiogram:   Results for orders placed during the hospital encounter of 09/30/20    Adult Transthoracic Echo Complete W/ Cont if Necessary Per Protocol    Interpretation Summary  Normal LV size and contractility EF of 60 to 65%  Normal RV size  Normal atrial size  Aortic valve, mitral valve, tricuspid valve " appears structurally normal, mild to moderate mitral regurgitation seen.  Trace tricuspid regurgitation seen, calculated RV systolic pressure of 29 mmHg  No pericardial effusion seen.  Proximal aorta appears normal in size.      Imaging  Chest X-ray:   Imaging Results (Last 24 Hours)     ** No results found for the last 24 hours. **          Lab Review: I have reviewed the labs                                      Ryder Agosto MD  4/4/2023, 12:57 EDT      EMR Dragon/Transcription:   Dictated utilizing Dragon dictation

## 2023-04-05 ENCOUNTER — TELEPHONE (OUTPATIENT)
Dept: CARDIOLOGY | Facility: CLINIC | Age: 73
End: 2023-04-05
Payer: MEDICARE

## 2023-04-05 NOTE — TELEPHONE ENCOUNTER
Caller: Monse Donnelly    Relationship to patient: Self    Best call back number: [phone]    Patient is needing: PT RECEIVED CALL ABOUT HEART CATH, PT THOUGHT THAT DR. ENRIQUEZ TOLD HER DURING APPT THAT CATH WOULD BE DELAYED UNTIL AFTER April. PLEASE CALL BACK TO DISCUSS. ALSO PT WOULD LIKE TO DISCUSS HER MEDICATION REGIMEN IN THE MORNING.

## 2023-04-06 NOTE — TELEPHONE ENCOUNTER
Isosorbide caused vomiting and headache. Pt advised to d/c.     Pt is asking for cath date/time and instructions. Please call.

## 2023-04-12 ENCOUNTER — HOSPITAL ENCOUNTER (OUTPATIENT)
Facility: HOSPITAL | Age: 73
Setting detail: HOSPITAL OUTPATIENT SURGERY
Discharge: HOME | End: 2023-04-12
Attending: INTERNAL MEDICINE | Admitting: INTERNAL MEDICINE
Payer: MEDICARE

## 2023-04-12 ENCOUNTER — LAB (OUTPATIENT)
Dept: LAB | Facility: HOSPITAL | Age: 73
End: 2023-04-12
Payer: MEDICARE

## 2023-04-12 VITALS
HEIGHT: 58 IN | BODY MASS INDEX: 35.22 KG/M2 | OXYGEN SATURATION: 99 % | HEART RATE: 76 BPM | WEIGHT: 167.77 LBS | RESPIRATION RATE: 15 BRPM | SYSTOLIC BLOOD PRESSURE: 179 MMHG | DIASTOLIC BLOOD PRESSURE: 88 MMHG | TEMPERATURE: 98.4 F

## 2023-04-12 DIAGNOSIS — R06.09 DYSPNEA ON EXERTION: ICD-10-CM

## 2023-04-12 DIAGNOSIS — I10 ESSENTIAL HYPERTENSION: ICD-10-CM

## 2023-04-12 DIAGNOSIS — R07.2 PRECORDIAL PAIN: ICD-10-CM

## 2023-04-12 DIAGNOSIS — E78.5 DYSLIPIDEMIA: ICD-10-CM

## 2023-04-12 DIAGNOSIS — R94.39 ABNORMAL NUCLEAR STRESS TEST: ICD-10-CM

## 2023-04-12 PROBLEM — I11.0 HYPERTENSIVE HEART DISEASE WITH CHRONIC DIASTOLIC CONGESTIVE HEART FAILURE: Chronic | Status: ACTIVE | Noted: 2023-04-12

## 2023-04-12 PROBLEM — I11.0 HYPERTENSIVE HEART DISEASE WITH CHRONIC DIASTOLIC CONGESTIVE HEART FAILURE: Status: ACTIVE | Noted: 2023-04-12

## 2023-04-12 PROBLEM — I50.32 HYPERTENSIVE HEART DISEASE WITH CHRONIC DIASTOLIC CONGESTIVE HEART FAILURE: Status: ACTIVE | Noted: 2023-04-12

## 2023-04-12 PROBLEM — I50.32 HYPERTENSIVE HEART DISEASE WITH CHRONIC DIASTOLIC CONGESTIVE HEART FAILURE: Chronic | Status: ACTIVE | Noted: 2023-04-12

## 2023-04-12 PROBLEM — I25.708 CORONARY ARTERY DISEASE OF BYPASS GRAFT OF NATIVE HEART WITH STABLE ANGINA PECTORIS: Chronic | Status: ACTIVE | Noted: 2023-04-12

## 2023-04-12 PROBLEM — I25.708 CORONARY ARTERY DISEASE OF BYPASS GRAFT OF NATIVE HEART WITH STABLE ANGINA PECTORIS: Status: ACTIVE | Noted: 2023-04-12

## 2023-04-12 LAB
ANION GAP SERPL CALCULATED.3IONS-SCNC: 9 MMOL/L (ref 5–15)
BUN SERPL-MCNC: 17 MG/DL (ref 8–23)
BUN/CREAT SERPL: 20 (ref 7–25)
CALCIUM SPEC-SCNC: 9.9 MG/DL (ref 8.6–10.5)
CHLORIDE SERPL-SCNC: 107 MMOL/L (ref 98–107)
CO2 SERPL-SCNC: 26 MMOL/L (ref 22–29)
CREAT SERPL-MCNC: 0.85 MG/DL (ref 0.57–1)
DEPRECATED RDW RBC AUTO: 49 FL (ref 37–54)
EGFRCR SERPLBLD CKD-EPI 2021: 72.9 ML/MIN/1.73
ERYTHROCYTE [DISTWIDTH] IN BLOOD BY AUTOMATED COUNT: 14.5 % (ref 12.3–15.4)
GLUCOSE SERPL-MCNC: 93 MG/DL (ref 65–99)
HCT VFR BLD AUTO: 41.8 % (ref 34–46.6)
HGB BLD-MCNC: 13.6 G/DL (ref 12–15.9)
INR PPP: 0.99 (ref 0.93–1.1)
MCH RBC QN AUTO: 29.9 PG (ref 26.6–33)
MCHC RBC AUTO-ENTMCNC: 32.5 G/DL (ref 31.5–35.7)
MCV RBC AUTO: 92 FL (ref 79–97)
PLATELET # BLD AUTO: 226 10*3/MM3 (ref 140–450)
PMV BLD AUTO: 8.4 FL (ref 6–12)
POTASSIUM SERPL-SCNC: 4.5 MMOL/L (ref 3.5–5.2)
PROTHROMBIN TIME: 10.2 SECONDS (ref 9.6–11.7)
RBC # BLD AUTO: 4.54 10*6/MM3 (ref 3.77–5.28)
SODIUM SERPL-SCNC: 142 MMOL/L (ref 136–145)
WBC NRBC COR # BLD: 5.5 10*3/MM3 (ref 3.4–10.8)

## 2023-04-12 PROCEDURE — 36415 COLL VENOUS BLD VENIPUNCTURE: CPT

## 2023-04-12 PROCEDURE — 85027 COMPLETE CBC AUTOMATED: CPT

## 2023-04-12 PROCEDURE — C1769 GUIDE WIRE: HCPCS | Performed by: INTERNAL MEDICINE

## 2023-04-12 PROCEDURE — C1760 CLOSURE DEV, VASC: HCPCS | Performed by: INTERNAL MEDICINE

## 2023-04-12 PROCEDURE — 93458 L HRT ARTERY/VENTRICLE ANGIO: CPT | Performed by: INTERNAL MEDICINE

## 2023-04-12 PROCEDURE — 80048 BASIC METABOLIC PNL TOTAL CA: CPT

## 2023-04-12 PROCEDURE — 25010000002 MIDAZOLAM PER 1 MG: Performed by: INTERNAL MEDICINE

## 2023-04-12 PROCEDURE — 85610 PROTHROMBIN TIME: CPT

## 2023-04-12 PROCEDURE — 25510000001 IOPAMIDOL PER 1 ML: Performed by: INTERNAL MEDICINE

## 2023-04-12 PROCEDURE — C1894 INTRO/SHEATH, NON-LASER: HCPCS | Performed by: INTERNAL MEDICINE

## 2023-04-12 PROCEDURE — 25010000002 FENTANYL CITRATE (PF) 100 MCG/2ML SOLUTION: Performed by: INTERNAL MEDICINE

## 2023-04-12 RX ORDER — ACETAMINOPHEN 325 MG/1
650 TABLET ORAL EVERY 4 HOURS PRN
Status: DISCONTINUED | OUTPATIENT
Start: 2023-04-12 | End: 2023-04-12 | Stop reason: HOSPADM

## 2023-04-12 RX ORDER — DIPHENHYDRAMINE HCL 25 MG
25 CAPSULE ORAL NIGHTLY
COMMUNITY

## 2023-04-12 RX ORDER — SODIUM CHLORIDE 9 MG/ML
1 INJECTION, SOLUTION INTRAVENOUS CONTINUOUS
Status: DISCONTINUED | OUTPATIENT
Start: 2023-04-12 | End: 2023-04-12 | Stop reason: HOSPADM

## 2023-04-12 RX ORDER — MIDAZOLAM HYDROCHLORIDE 1 MG/ML
INJECTION INTRAMUSCULAR; INTRAVENOUS
Status: DISCONTINUED | OUTPATIENT
Start: 2023-04-12 | End: 2023-04-12 | Stop reason: HOSPADM

## 2023-04-12 RX ORDER — FENTANYL CITRATE 50 UG/ML
INJECTION, SOLUTION INTRAMUSCULAR; INTRAVENOUS
Status: DISCONTINUED | OUTPATIENT
Start: 2023-04-12 | End: 2023-04-12 | Stop reason: HOSPADM

## 2023-04-12 RX ORDER — LIDOCAINE HYDROCHLORIDE 20 MG/ML
INJECTION, SOLUTION INFILTRATION; PERINEURAL
Status: DISCONTINUED | OUTPATIENT
Start: 2023-04-12 | End: 2023-04-12 | Stop reason: HOSPADM

## 2023-04-12 NOTE — Clinical Note
The left DP pulse is +1. The right DP pulse is +2. The left PT pulse is +1. The right PT pulse is +2.

## 2023-04-12 NOTE — DISCHARGE INSTRUCTIONS
Post Cath Instructions      Drink plenty of fluids for the next 24 hours.  This helps to eliminate the dye used in your procedure through urination.  You may resume a normal diet; however, try to avoid foods that would cause gas or constipation.    Sedative medication given to you during your catheterization may decrease your judgement and reaction time for up to 24-48 hours.  Therefore:  DO NOT drive or operate hazardous machinery (48 hours)  DO NOT consume alcoholic beverages  DO NOT make any important/legal decisions  Have someone stay with you for at least 24 hours    To allow proper healing and prevent bleeding, the following activities are to be strictly avoided for the next 24-48 hours:  Excessive bending at wound site  Straining (anything that would tense up muscles around the affected puncture site)  Lifting objects greater than 5 pounds, pushing, or pulling for 5 days  For Arm Cases:  No flexing at the puncture site, such as hammering, golfing, bowling, or swinging any objects  For Groin Cases:  Refrain from sexual activity  Refrain from running or vigorous walking  No prolonged sitting or standing  Limit stair climbing as much as possible    Keep the puncture site clean and dry.  You may remove the dressing tomorrow and replace it with a band-aid for at least one additional day.  Gently clean the site with mild soap and water.  No scrubbing/rubbing and lightly pat the area dry.  Showers are acceptable; however, avoid submerging in water (tub baths, hot tubs, swimming pools, dishwater, etc…) for at least one week.  The site should be completely healed before resuming these activities to reduce the risk of infection.  Check the site often.  Watch for signs and symptoms of infection and notify your physician if any of the following occur:  Bleeding or an increase in swelling at the puncture site  Fever  Increased soreness around puncture site  Foul odor or significant drainage from the puncture  site  Swelling, redness, or warmth at the puncture site    **A bruise or small “pea sized” lump under the skin at the puncture site is not unusual.  This should disappear within 3-4 weeks.**  CONTACT YOUR PHYSICIAN OR CALL 911 IF YOU EXPERIENCE ANY OF THE FOLLOWING:  Increased angina (chest pain) or frequent sensations of pressure, burning, pain, or other discomfort in the chest, arm, jaws, or stomach  Lightheadedness, dizziness, faint feeling, sweating, or difficulty breathing  Odd sensation changes like numbness, tingling, coldness, or pain in the arm or leg in which the catheter was inserted  Limb in which the catheter was inserted becomes pale/bluish in color    IMPORTANT:  Although this occurs very rarely, if you should develop bright red or excessive bleeding, feel a “pop” inside at the insertion site, or notice a sudden increase in swelling larger than a walnut, you should call 911.  Hold continuous firm pressure to the access site until emergency personnel arrive.  It is best if someone else can do this for you.

## 2023-04-17 ENCOUNTER — TELEPHONE (OUTPATIENT)
Dept: CARDIOLOGY | Facility: CLINIC | Age: 73
End: 2023-04-17
Payer: MEDICARE

## 2023-04-17 NOTE — TELEPHONE ENCOUNTER
"Called spoke to pt, bad connection    Bruising , swelling, \"heaviness\" in stomach groin area     Denies knot     No new Rx's were sent in     Suggestion warm compress, call us if gets worse   "

## 2023-04-17 NOTE — TELEPHONE ENCOUNTER
Caller: Monse Donnelly    Relationship: Self    Best call back number: [phone]    What is the best time to reach you: ANYTIME    Who are you requesting to speak with (clinical staff, provider,  specific staff member): CLINICAL        What was the call regarding: PT HAD PROCEDURE 04/12/2023, SHE IS HAVING HEAVINESS  AND HURTING IN GROIN AND RIGHT SIDE ALSO LOWER PART OF STOMACH.  IS THIS NORMAL?  DID DR ENRIQUEZ WRITE ANY PRESCRIPTS WHEN SHE LEFT HOSPITAL?    Do you require a callback: YES

## 2023-06-06 ENCOUNTER — OFFICE VISIT (OUTPATIENT)
Dept: CARDIOLOGY | Facility: CLINIC | Age: 73
End: 2023-06-06
Payer: MEDICARE

## 2023-06-06 VITALS
BODY MASS INDEX: 34.49 KG/M2 | HEART RATE: 88 BPM | DIASTOLIC BLOOD PRESSURE: 87 MMHG | WEIGHT: 165 LBS | OXYGEN SATURATION: 97 % | SYSTOLIC BLOOD PRESSURE: 138 MMHG

## 2023-06-06 DIAGNOSIS — I25.118 CORONARY ARTERY DISEASE OF NATIVE ARTERY OF NATIVE HEART WITH STABLE ANGINA PECTORIS: Primary | ICD-10-CM

## 2023-06-06 DIAGNOSIS — E78.5 DYSLIPIDEMIA: ICD-10-CM

## 2023-06-06 DIAGNOSIS — I10 ESSENTIAL HYPERTENSION: ICD-10-CM

## 2023-06-06 DIAGNOSIS — J43.1 PANLOBULAR EMPHYSEMA: ICD-10-CM

## 2023-06-06 PROCEDURE — 99214 OFFICE O/P EST MOD 30 MIN: CPT | Performed by: INTERNAL MEDICINE

## 2023-06-06 PROCEDURE — 3075F SYST BP GE 130 - 139MM HG: CPT | Performed by: INTERNAL MEDICINE

## 2023-06-06 PROCEDURE — 1159F MED LIST DOCD IN RCRD: CPT | Performed by: INTERNAL MEDICINE

## 2023-06-06 PROCEDURE — 1160F RVW MEDS BY RX/DR IN RCRD: CPT | Performed by: INTERNAL MEDICINE

## 2023-06-06 PROCEDURE — 3079F DIAST BP 80-89 MM HG: CPT | Performed by: INTERNAL MEDICINE

## 2023-06-06 RX ORDER — LISINOPRIL 40 MG/1
40 TABLET ORAL NIGHTLY
Qty: 90 TABLET | Refills: 3 | Status: SHIPPED | OUTPATIENT
Start: 2023-06-06

## 2023-06-06 RX ORDER — METOPROLOL SUCCINATE 50 MG/1
50 TABLET, EXTENDED RELEASE ORAL DAILY
Qty: 90 TABLET | Refills: 3 | Status: SHIPPED | OUTPATIENT
Start: 2023-06-06

## 2023-06-06 RX ORDER — ATORVASTATIN CALCIUM 20 MG/1
20 TABLET, FILM COATED ORAL DAILY
Qty: 90 TABLET | Refills: 3 | Status: SHIPPED | OUTPATIENT
Start: 2023-06-06

## 2023-06-06 RX ORDER — ASPIRIN 81 MG/1
81 TABLET ORAL DAILY
Qty: 90 TABLET | Refills: 3 | Status: SHIPPED | OUTPATIENT
Start: 2023-06-06

## 2023-06-06 NOTE — PROGRESS NOTES
Subjective:     Encounter Date:06/06/2023      Patient ID: Monse Donnelly is a 72 y.o. female.    Chief Complaint and history of present illness:     Abnormal stress test, CAD    History of Present Illness:      Ms. Mosne Donnelly has PMH of    CAD, cath 4/12/2023 revealed 50% ostial LAD disease  Hypertension  Dyslipidemia  COPD, YESSICA  CA breast and left mastectomy  Positive family history of CAD in father  Allergy/intolerance to adhesive tape    Here for follow-up.  Patient had cardiac cath done 4/12/2023 which revealed borderline ostial LAD disease.  Denies any chest pain or shortness of breath.    Patient's arterial blood pressure is 138/87, heart rate 88, O2 sat of 97% on room air.  BMI is over 33.    Lexiscan Cardiolite 3/21/2023 reveals moderate-sized inferior ischemia EF over 70%.  Cardiac cath 4/12/2023 revealed 50% ostial LAD disease    Labs from 11/14/2022 reveal CMP with a glucose of 100, normal CBC.  Lipid profile with cholesterol 164, triglycerides 120, HDL 53, LDL 90    Assessment:  :      CAD  Hypertension  Dyslipidemia  COPD      Recommendations / Plan:        Reviewed cardiac cath results with patient  We will continue aspirin, isosorbide mononitrate, metoprolol, lisinopril and atorvastatin to help with CAD, hypertension, dyslipidemia as tolerated.    We will follow-up and consider further evaluation and treatment.    Follow-up with PMD for labs and dyslipidemia.    Procedures    Copied text in this portion of the note has been reviewed and is accurate as of 6/6/2023  The following portions of the patient's history were reviewed and updated as appropriate: allergies, current medications, past family history, past medical history, past social history, past surgical history and problem list.    Assessment:         [facility]       Diagnosis Plan   1. Coronary artery disease of native artery of native heart with stable angina pectoris        2. Essential hypertension        3. Dyslipidemia        4. Panlobular  emphysema               Plan:               Past Medical History:  Past Medical History:   Diagnosis Date    Arthritis     Asthma     Boils of multiple sites 5/28    Breast cancer     Cancer     COPD (chronic obstructive pulmonary disease)     Coronary artery disease     Drug therapy     Elevated cholesterol     GERD (gastroesophageal reflux disease)     H/O Bell's palsy     History of transfusion     Hypertension     Hypertensive heart disease with chronic diastolic congestive heart failure 4/12/2023    Neuropathy     feet and hands post chemo    Sciatic nerve pain     Sleep apnea      Past Surgical History:  Past Surgical History:   Procedure Laterality Date    ABDOMINAL SURGERY      ANKLE SURGERY      BREAST BIOPSY      BREAST LUMPECTOMY      CARDIAC CATHETERIZATION Right 4/12/2023    Procedure: Left Heart Cath;  Surgeon: Ryder Agosto MD;  Location: [facility] CATH INVASIVE LOCATION;  Service: Cardiovascular;  Laterality: Right;    CERVICAL CONIZATION      MASTECTOMY Right 6/10/2021    Procedure: MASTECTOMY MODIFIED RADICAL RIGHT;  Surgeon: Kenrick Ragsdale MD;  Location: [facility] MAIN OR;  Service: General;  Laterality: Right;    SIMPLE MASTECTOMY Left 6/10/2021    Procedure: MASTECTOMY SIMPLE;  Surgeon: Kenrick Ragsdale MD;  Location: [facility] MAIN OR;  Service: General;  Laterality: Left;    TUBAL ABDOMINAL LIGATION      VENOUS ACCESS DEVICE (PORT) INSERTION Left 7/30/2020    Procedure: INSERTION VENOUS ACCESS DEVICE LEFT internal jugular;  Surgeon: Taylor Kaye MD;  Location: [facility] MAIN OR;  Service: General;  Laterality: Left;    VENOUS ACCESS DEVICE (PORT) REMOVAL Left 1/6/2022    Procedure: REMOVAL VENOUS ACCESS DEVICE;  Surgeon: Kenrick Ragsdale MD;  Location: [facility] MAIN OR;  Service: General;  Laterality: Left;      Allergies:  Allergies   Allergen Reactions    Penicillins Anaphylaxis    Doxycycline Unknown - High Severity    Adhesive Tape Rash     Blisters       Home  Meds:  Current Meds:     Current Outpatient Medications:     albuterol sulfate  (90 Base) MCG/ACT inhaler, Inhale 2 puffs Every 6 (Six) Hours As Needed., Disp: , Rfl:     ALENDRONATE SODIUM PO, Take 70 mg by mouth 1 (One) Time Per Week. Patient states takes on Wednesdays, Disp: , Rfl:     anastrozole (ARIMIDEX) 1 MG tablet, Take  by mouth Daily., Disp: , Rfl:     aspirin 81 MG EC tablet, Take 1 tablet by mouth Daily., Disp: 90 tablet, Rfl: 3    atorvastatin (LIPITOR) 20 MG tablet, Take 1 tablet by mouth Daily., Disp: , Rfl:     lisinopril (PRINIVIL,ZESTRIL) 40 MG tablet, Take 1 tablet by mouth Every Night. LD 1/4, Disp: , Rfl:     metoprolol succinate XL (TOPROL-XL) 50 MG 24 hr tablet, Take 1 tablet by mouth Daily. Take DOS, Disp: , Rfl:     pantoprazole (PROTONIX) 40 MG EC tablet, Take 1 tablet by mouth Daily., Disp: , Rfl:     sertraline (ZOLOFT) 100 MG tablet, Take 1 tablet by mouth Daily. Take DOS, Disp: , Rfl:     topiramate (TOPAMAX) 50 MG tablet, Take 1 tablet by mouth 2 (Two) Times a Day., Disp: , Rfl:   Social History:   Social History     Tobacco Use    Smoking status: Never     Passive exposure: Never    Smokeless tobacco: Never   Substance Use Topics    Alcohol use: Never      Family History:  Family History   Problem Relation Age of Onset    Heart disease Father     Stroke Father     Diabetes Father     Diabetes Son               ROS  All other systems are negative         Objective:     Physical Exam  /87 (BP Location: Left arm, Patient Position: Sitting, Cuff Size: Large Adult)   Pulse 88   Wt 74.8 kg (165 lb)   SpO2 97%   BMI 34.49 kg/m²   General:  Appears in no acute distress  Eyes: Sclera is anicteric,  conjunctiva is clear   HEENT:  No JVD.  No carotid bruits  Respiratory: Respirations regular and unlabored at rest.  Clear to auscultation  Cardiovascular: S1,S2 Regular rate and rhythm. No murmur, rub or gallop auscultated.   Extremities: No digital clubbing or cyanosis, no  "edema  Skin: Color pink. Skin warm and dry to touch. No rashes  No xanthoma  Neuro: Alert and awake.    Lab Reviewed:         Ryder Agosto MD  6/6/2023 12:05 EDT      EMR Dragon/Transcription:   \"Dictated utilizing Dragon dictation\".        "

## 2023-07-02 PROBLEM — B34.0 ADENOVIRUS INFECTION: Status: ACTIVE | Noted: 2023-07-02

## 2023-07-02 PROBLEM — J18.9 PNEUMONIA: Status: RESOLVED | Noted: 2020-01-07 | Resolved: 2023-07-02

## 2023-07-02 PROBLEM — J44.1 COPD WITH ACUTE EXACERBATION: Status: ACTIVE | Noted: 2023-07-02

## 2023-07-02 PROBLEM — J44.1 COPD WITH ACUTE EXACERBATION: Status: RESOLVED | Noted: 2023-07-02 | Resolved: 2023-07-02

## 2023-07-02 PROBLEM — B34.0 ADENOVIRUS INFECTION: Status: RESOLVED | Noted: 2023-07-02 | Resolved: 2023-07-02

## 2023-09-06 ENCOUNTER — OFFICE VISIT (OUTPATIENT)
Dept: SURGERY | Facility: CLINIC | Age: 73
End: 2023-09-06
Payer: MEDICARE

## 2023-09-06 VITALS
OXYGEN SATURATION: 96 % | TEMPERATURE: 97.5 F | BODY MASS INDEX: 34.09 KG/M2 | WEIGHT: 162.4 LBS | HEART RATE: 71 BPM | SYSTOLIC BLOOD PRESSURE: 135 MMHG | DIASTOLIC BLOOD PRESSURE: 87 MMHG | RESPIRATION RATE: 19 BRPM | HEIGHT: 58 IN

## 2023-09-06 DIAGNOSIS — Z17.0 MALIGNANT NEOPLASM OF UPPER-OUTER QUADRANT OF RIGHT BREAST IN FEMALE, ESTROGEN RECEPTOR POSITIVE: Primary | ICD-10-CM

## 2023-09-06 DIAGNOSIS — C50.411 MALIGNANT NEOPLASM OF UPPER-OUTER QUADRANT OF RIGHT BREAST IN FEMALE, ESTROGEN RECEPTOR POSITIVE: Primary | ICD-10-CM

## 2023-09-06 PROCEDURE — 3079F DIAST BP 80-89 MM HG: CPT | Performed by: SURGERY

## 2023-09-06 PROCEDURE — 3075F SYST BP GE 130 - 139MM HG: CPT | Performed by: SURGERY

## 2023-09-06 PROCEDURE — 1159F MED LIST DOCD IN RCRD: CPT | Performed by: SURGERY

## 2023-09-06 PROCEDURE — 1160F RVW MEDS BY RX/DR IN RCRD: CPT | Performed by: SURGERY

## 2023-09-06 PROCEDURE — 99213 OFFICE O/P EST LOW 20 MIN: CPT | Performed by: SURGERY

## 2023-09-14 NOTE — PROGRESS NOTES
GENERAL SURGERY ESTABLISHED PATIENT NOTE    Patient Care Team:  Candi Benson MD as PCP - General (Family Medicine)  Kenrick Ragsdale MD as Surgeon (General Surgery)  Ryder Agosto MD as Consulting Physician (Cardiology)    Reason for consult: Follow-up from mastectomy    Subjective     Patient is a 72 y.o. female presents as a follow-up after undergoing a modified radical mastectomy on the right and left mastectomy on 6/10/2021 for invasive right breast cancer.  Overall, the patient is doing well.  She denies any issues.  She has not noticed any significant swelling of either arm.    Review of Systems   Constitutional:  Negative for appetite change, chills and fever.   HENT:  Negative for congestion and sore throat.    Respiratory:  Negative for cough and shortness of breath.    Cardiovascular:  Negative for chest pain and palpitations.   Gastrointestinal:  Negative for abdominal pain, constipation, diarrhea, nausea, vomiting and GERD.   Genitourinary:  Negative for breast lump, breast pain, difficulty urinating, dysuria and frequency.   Musculoskeletal:  Negative for arthralgias and back pain.   Skin:  Negative for rash and skin lesions.   Neurological:  Negative for dizziness, seizures and memory problem.   Hematological:  Negative for adenopathy. Does not bruise/bleed easily.   Psychiatric/Behavioral:  Negative for sleep disturbance and depressed mood.       History  Past Medical History:   Diagnosis Date    Arthritis     Asthma     Boils of multiple sites 5/28    Breast cancer     Cancer     COPD (chronic obstructive pulmonary disease)     Coronary artery disease     Drug therapy     Elevated cholesterol     GERD (gastroesophageal reflux disease)     H/O Bell's palsy     History of transfusion     Hypertension     Hypertensive heart disease with chronic diastolic congestive heart failure 4/12/2023    Neuropathy     feet and hands post chemo    Sciatic nerve pain     Sleep apnea       Past Surgical History:   Procedure Laterality Date    ABDOMINAL SURGERY      ANKLE SURGERY      BREAST BIOPSY      BREAST LUMPECTOMY      CARDIAC CATHETERIZATION Right 4/12/2023    Procedure: Left Heart Cath;  Surgeon: Ryder Agosto MD;  Location: [facility] CATH INVASIVE LOCATION;  Service: Cardiovascular;  Laterality: Right;    CERVICAL CONIZATION      MASTECTOMY Right 6/10/2021    Procedure: MASTECTOMY MODIFIED RADICAL RIGHT;  Surgeon: Kenrick Ragsdale MD;  Location: [facility] MAIN OR;  Service: General;  Laterality: Right;    SIMPLE MASTECTOMY Left 6/10/2021    Procedure: MASTECTOMY SIMPLE;  Surgeon: Kenrick Ragsdale MD;  Location: [facility] MAIN OR;  Service: General;  Laterality: Left;    TUBAL ABDOMINAL LIGATION      VENOUS ACCESS DEVICE (PORT) INSERTION Left 7/30/2020    Procedure: INSERTION VENOUS ACCESS DEVICE LEFT internal jugular;  Surgeon: Taylor Kaye MD;  Location: [facility] MAIN OR;  Service: General;  Laterality: Left;    VENOUS ACCESS DEVICE (PORT) REMOVAL Left 1/6/2022    Procedure: REMOVAL VENOUS ACCESS DEVICE;  Surgeon: Kenrick Ragsdale MD;  Location: [facility] MAIN OR;  Service: General;  Laterality: Left;     Family History   Problem Relation Age of Onset    Heart disease Father     Stroke Father     Diabetes Father     Diabetes Son      Social History     Tobacco Use    Smoking status: Never     Passive exposure: Never    Smokeless tobacco: Never   Vaping Use    Vaping Use: Never used   Substance Use Topics    Alcohol use: Never    Drug use: Never     (Not in a hospital admission)    Allergies:  Penicillins, Doxycycline, and Adhesive tape    Objective     Vital Signs       Physical Exam  Vitals reviewed. Exam conducted with a chaperone present.   Constitutional:       General: She is not in acute distress.     Appearance: She is obese. She is not ill-appearing.   HENT:      Head: Normocephalic and atraumatic.   Cardiovascular:      Rate and Rhythm: Normal rate and  regular rhythm.   Pulmonary:      Effort: Pulmonary effort is normal. No respiratory distress.   Chest:      Comments: Bilateral mastectomy incisions are well healed without any surrounding erythema, induration, or drainage to suggest infection.  There is no palpable masses beneath the subcutaneous skin flaps superiorly or inferiorly bilaterally.  Port-A-Cath incision is well-healed  No significant swelling of either arm is noted bilaterally  Abdominal:      General: There is no distension.      Palpations: Abdomen is soft.      Tenderness: There is no abdominal tenderness.   Musculoskeletal:         General: No swelling. Normal range of motion.   Lymphadenopathy:      Upper Body:      Right upper body: No supraclavicular or axillary adenopathy.      Left upper body: No supraclavicular or axillary adenopathy.   Skin:     General: Skin is warm and dry.      Coloration: Skin is not jaundiced.   Neurological:      General: No focal deficit present.      Mental Status: She is alert and oriented to person, place, and time.   Psychiatric:         Mood and Affect: Mood normal.         Behavior: Behavior normal.         Thought Content: Thought content normal.         Judgment: Judgment normal.       Results Review:   Lab Results (last 24 hours)       ** No results found for the last 24 hours. **          No radiology results for the last day      I reviewed the patient's new imaging results and agree with the interpretation.  I reviewed the patient's other test results and agree with the interpretation    Assessment & Plan     Active Problems:  Right breast cancer    Recommend OT for lymphedema  Continue to follow-up with medical oncology as directed  The patient has followed up with me for 2 years and does not need to continue to follow-up with me.  I did discuss with her that I will be happy to see her going forward if any issues arise.    I discussed the patients findings and my recommendations with the patient.      Kenrick Ragsdale MD  09/14/23  17:03 EDT

## 2023-12-20 ENCOUNTER — APPOINTMENT (OUTPATIENT)
Dept: GENERAL RADIOLOGY | Facility: HOSPITAL | Age: 73
End: 2023-12-20
Payer: MEDICARE

## 2023-12-20 ENCOUNTER — HOSPITAL ENCOUNTER (INPATIENT)
Facility: HOSPITAL | Age: 73
LOS: 2 days | Discharge: HOME | End: 2023-12-22
Attending: FAMILY MEDICINE | Admitting: FAMILY MEDICINE
Payer: MEDICARE

## 2023-12-20 PROBLEM — J18.9 PNEUMONIA: Status: ACTIVE | Noted: 2023-12-20

## 2023-12-20 LAB
ALBUMIN SERPL-MCNC: 4 G/DL (ref 3.5–5.2)
ALBUMIN/GLOB SERPL: 1.4 G/DL
ALP SERPL-CCNC: 85 U/L (ref 39–117)
ALT SERPL W P-5'-P-CCNC: 19 U/L (ref 1–33)
ANION GAP SERPL CALCULATED.3IONS-SCNC: 9 MMOL/L (ref 5–15)
AST SERPL-CCNC: 21 U/L (ref 1–32)
B PARAPERT DNA SPEC QL NAA+PROBE: NOT DETECTED
B PERT DNA SPEC QL NAA+PROBE: NOT DETECTED
BASOPHILS # BLD AUTO: 0 10*3/MM3 (ref 0–0.2)
BASOPHILS NFR BLD AUTO: 0.7 % (ref 0–1.5)
BILIRUB SERPL-MCNC: 0.2 MG/DL (ref 0–1.2)
BUN SERPL-MCNC: 13 MG/DL (ref 8–23)
BUN/CREAT SERPL: 17.6 (ref 7–25)
C PNEUM DNA NPH QL NAA+NON-PROBE: NOT DETECTED
CALCIUM SPEC-SCNC: 9.9 MG/DL (ref 8.6–10.5)
CHLORIDE SERPL-SCNC: 111 MMOL/L (ref 98–107)
CO2 SERPL-SCNC: 24 MMOL/L (ref 22–29)
CREAT SERPL-MCNC: 0.74 MG/DL (ref 0.57–1)
CRP SERPL-MCNC: 4.87 MG/DL (ref 0–0.5)
D-LACTATE SERPL-SCNC: 0.8 MMOL/L (ref 0.5–2)
DEPRECATED RDW RBC AUTO: 53.4 FL (ref 37–54)
EGFRCR SERPLBLD CKD-EPI 2021: 85.6 ML/MIN/1.73
EOSINOPHIL # BLD AUTO: 0.1 10*3/MM3 (ref 0–0.4)
EOSINOPHIL NFR BLD AUTO: 1.9 % (ref 0.3–6.2)
ERYTHROCYTE [DISTWIDTH] IN BLOOD BY AUTOMATED COUNT: 15.8 % (ref 12.3–15.4)
ERYTHROCYTE [SEDIMENTATION RATE] IN BLOOD: 31 MM/HR (ref 0–30)
FLUAV SUBTYP SPEC NAA+PROBE: NOT DETECTED
FLUBV RNA ISLT QL NAA+PROBE: NOT DETECTED
GLOBULIN UR ELPH-MCNC: 2.8 GM/DL
GLUCOSE SERPL-MCNC: 96 MG/DL (ref 65–99)
HADV DNA SPEC NAA+PROBE: NOT DETECTED
HCOV 229E RNA SPEC QL NAA+PROBE: NOT DETECTED
HCOV HKU1 RNA SPEC QL NAA+PROBE: NOT DETECTED
HCOV NL63 RNA SPEC QL NAA+PROBE: NOT DETECTED
HCOV OC43 RNA SPEC QL NAA+PROBE: NOT DETECTED
HCT VFR BLD AUTO: 41.4 % (ref 34–46.6)
HGB BLD-MCNC: 13.3 G/DL (ref 12–15.9)
HMPV RNA NPH QL NAA+NON-PROBE: NOT DETECTED
HPIV1 RNA ISLT QL NAA+PROBE: NOT DETECTED
HPIV2 RNA SPEC QL NAA+PROBE: NOT DETECTED
HPIV3 RNA NPH QL NAA+PROBE: NOT DETECTED
HPIV4 P GENE NPH QL NAA+PROBE: NOT DETECTED
LYMPHOCYTES # BLD AUTO: 1.4 10*3/MM3 (ref 0.7–3.1)
LYMPHOCYTES NFR BLD AUTO: 22.5 % (ref 19.6–45.3)
M PNEUMO IGG SER IA-ACNC: NOT DETECTED
MCH RBC QN AUTO: 31 PG (ref 26.6–33)
MCHC RBC AUTO-ENTMCNC: 32.2 G/DL (ref 31.5–35.7)
MCV RBC AUTO: 96.1 FL (ref 79–97)
MONOCYTES # BLD AUTO: 0.7 10*3/MM3 (ref 0.1–0.9)
MONOCYTES NFR BLD AUTO: 11 % (ref 5–12)
NEUTROPHILS NFR BLD AUTO: 4 10*3/MM3 (ref 1.7–7)
NEUTROPHILS NFR BLD AUTO: 63.9 % (ref 42.7–76)
NRBC BLD AUTO-RTO: 0 /100 WBC (ref 0–0.2)
PLATELET # BLD AUTO: 179 10*3/MM3 (ref 140–450)
PMV BLD AUTO: 8.4 FL (ref 6–12)
POTASSIUM SERPL-SCNC: 4.4 MMOL/L (ref 3.5–5.2)
PROT SERPL-MCNC: 6.8 G/DL (ref 6–8.5)
RBC # BLD AUTO: 4.31 10*6/MM3 (ref 3.77–5.28)
RHINOVIRUS RNA SPEC NAA+PROBE: NOT DETECTED
RSV RNA NPH QL NAA+NON-PROBE: NOT DETECTED
SARS-COV-2 RNA NPH QL NAA+NON-PROBE: DETECTED
SODIUM SERPL-SCNC: 144 MMOL/L (ref 136–145)
WBC NRBC COR # BLD AUTO: 6.3 10*3/MM3 (ref 3.4–10.8)

## 2023-12-20 PROCEDURE — 86140 C-REACTIVE PROTEIN: CPT | Performed by: FAMILY MEDICINE

## 2023-12-20 PROCEDURE — 25810000003 SODIUM CHLORIDE 0.9 % SOLUTION 250 ML FLEX CONT: Performed by: FAMILY MEDICINE

## 2023-12-20 PROCEDURE — 94799 UNLISTED PULMONARY SVC/PX: CPT

## 2023-12-20 PROCEDURE — 0202U NFCT DS 22 TRGT SARS-COV-2: CPT | Performed by: FAMILY MEDICINE

## 2023-12-20 PROCEDURE — 80053 COMPREHEN METABOLIC PANEL: CPT | Performed by: FAMILY MEDICINE

## 2023-12-20 PROCEDURE — 25010000002 METHYLPREDNISOLONE PER 125 MG: Performed by: FAMILY MEDICINE

## 2023-12-20 PROCEDURE — 85025 COMPLETE CBC W/AUTO DIFF WBC: CPT | Performed by: FAMILY MEDICINE

## 2023-12-20 PROCEDURE — 71046 X-RAY EXAM CHEST 2 VIEWS: CPT

## 2023-12-20 PROCEDURE — 87040 BLOOD CULTURE FOR BACTERIA: CPT | Performed by: FAMILY MEDICINE

## 2023-12-20 PROCEDURE — 85652 RBC SED RATE AUTOMATED: CPT | Performed by: FAMILY MEDICINE

## 2023-12-20 PROCEDURE — 94640 AIRWAY INHALATION TREATMENT: CPT

## 2023-12-20 PROCEDURE — 25010000002 AZITHROMYCIN PER 500 MG: Performed by: FAMILY MEDICINE

## 2023-12-20 PROCEDURE — 83605 ASSAY OF LACTIC ACID: CPT | Performed by: FAMILY MEDICINE

## 2023-12-20 RX ORDER — BISACODYL 10 MG
10 SUPPOSITORY, RECTAL RECTAL DAILY PRN
Status: DISCONTINUED | OUTPATIENT
Start: 2023-12-20 | End: 2023-12-22 | Stop reason: HOSPADM

## 2023-12-20 RX ORDER — BISACODYL 5 MG/1
5 TABLET, DELAYED RELEASE ORAL DAILY PRN
Status: DISCONTINUED | OUTPATIENT
Start: 2023-12-20 | End: 2023-12-22 | Stop reason: HOSPADM

## 2023-12-20 RX ORDER — ALUMINA, MAGNESIA, AND SIMETHICONE 2400; 2400; 240 MG/30ML; MG/30ML; MG/30ML
15 SUSPENSION ORAL EVERY 6 HOURS PRN
Status: DISCONTINUED | OUTPATIENT
Start: 2023-12-20 | End: 2023-12-22 | Stop reason: HOSPADM

## 2023-12-20 RX ORDER — ENOXAPARIN SODIUM 100 MG/ML
40 INJECTION SUBCUTANEOUS EVERY 24 HOURS
Status: DISCONTINUED | OUTPATIENT
Start: 2023-12-21 | End: 2023-12-22 | Stop reason: HOSPADM

## 2023-12-20 RX ORDER — SERTRALINE HYDROCHLORIDE 100 MG/1
100 TABLET, FILM COATED ORAL DAILY
Status: DISCONTINUED | OUTPATIENT
Start: 2023-12-20 | End: 2023-12-22 | Stop reason: HOSPADM

## 2023-12-20 RX ORDER — ALBUTEROL SULFATE 90 UG/1
2 AEROSOL, METERED RESPIRATORY (INHALATION)
Status: DISCONTINUED | OUTPATIENT
Start: 2023-12-20 | End: 2023-12-22 | Stop reason: HOSPADM

## 2023-12-20 RX ORDER — ONDANSETRON 2 MG/ML
4 INJECTION INTRAMUSCULAR; INTRAVENOUS EVERY 6 HOURS PRN
Status: DISCONTINUED | OUTPATIENT
Start: 2023-12-20 | End: 2023-12-22 | Stop reason: HOSPADM

## 2023-12-20 RX ORDER — ASPIRIN 81 MG/1
81 TABLET ORAL DAILY
Status: DISCONTINUED | OUTPATIENT
Start: 2023-12-21 | End: 2023-12-22 | Stop reason: HOSPADM

## 2023-12-20 RX ORDER — ANASTROZOLE 1 MG/1
1 TABLET ORAL DAILY
Status: DISCONTINUED | OUTPATIENT
Start: 2023-12-21 | End: 2023-12-22 | Stop reason: HOSPADM

## 2023-12-20 RX ORDER — ATORVASTATIN CALCIUM 20 MG/1
20 TABLET, FILM COATED ORAL DAILY
Status: DISCONTINUED | OUTPATIENT
Start: 2023-12-21 | End: 2023-12-21

## 2023-12-20 RX ORDER — LISINOPRIL 20 MG/1
40 TABLET ORAL NIGHTLY
Status: DISCONTINUED | OUTPATIENT
Start: 2023-12-20 | End: 2023-12-22 | Stop reason: HOSPADM

## 2023-12-20 RX ORDER — NITROGLYCERIN 0.4 MG/1
0.4 TABLET SUBLINGUAL
Status: DISCONTINUED | OUTPATIENT
Start: 2023-12-20 | End: 2023-12-22 | Stop reason: HOSPADM

## 2023-12-20 RX ORDER — FAMOTIDINE 20 MG/1
40 TABLET, FILM COATED ORAL DAILY
Status: DISCONTINUED | OUTPATIENT
Start: 2023-12-20 | End: 2023-12-22 | Stop reason: HOSPADM

## 2023-12-20 RX ORDER — MONTELUKAST SODIUM 10 MG/1
10 TABLET ORAL NIGHTLY
Status: DISCONTINUED | OUTPATIENT
Start: 2023-12-20 | End: 2023-12-22 | Stop reason: HOSPADM

## 2023-12-20 RX ORDER — SODIUM CHLORIDE 0.9 % (FLUSH) 0.9 %
10 SYRINGE (ML) INJECTION EVERY 12 HOURS SCHEDULED
Status: DISCONTINUED | OUTPATIENT
Start: 2023-12-20 | End: 2023-12-22 | Stop reason: HOSPADM

## 2023-12-20 RX ORDER — IPRATROPIUM BROMIDE AND ALBUTEROL SULFATE 2.5; .5 MG/3ML; MG/3ML
3 SOLUTION RESPIRATORY (INHALATION) EVERY 6 HOURS PRN
Status: DISCONTINUED | OUTPATIENT
Start: 2023-12-20 | End: 2023-12-22 | Stop reason: HOSPADM

## 2023-12-20 RX ORDER — METHYLPREDNISOLONE SODIUM SUCCINATE 125 MG/2ML
60 INJECTION, POWDER, LYOPHILIZED, FOR SOLUTION INTRAMUSCULAR; INTRAVENOUS EVERY 8 HOURS
Status: DISCONTINUED | OUTPATIENT
Start: 2023-12-20 | End: 2023-12-22 | Stop reason: HOSPADM

## 2023-12-20 RX ORDER — AMOXICILLIN 250 MG
2 CAPSULE ORAL 2 TIMES DAILY
Status: DISCONTINUED | OUTPATIENT
Start: 2023-12-20 | End: 2023-12-22 | Stop reason: HOSPADM

## 2023-12-20 RX ORDER — DILTIAZEM HYDROCHLORIDE 120 MG/1
120 CAPSULE, COATED, EXTENDED RELEASE ORAL
Status: DISCONTINUED | OUTPATIENT
Start: 2023-12-21 | End: 2023-12-22 | Stop reason: HOSPADM

## 2023-12-20 RX ORDER — BUDESONIDE AND FORMOTEROL FUMARATE DIHYDRATE 160; 4.5 UG/1; UG/1
2 AEROSOL RESPIRATORY (INHALATION)
Status: DISCONTINUED | OUTPATIENT
Start: 2023-12-20 | End: 2023-12-22 | Stop reason: HOSPADM

## 2023-12-20 RX ORDER — SODIUM CHLORIDE 0.9 % (FLUSH) 0.9 %
10 SYRINGE (ML) INJECTION AS NEEDED
Status: DISCONTINUED | OUTPATIENT
Start: 2023-12-20 | End: 2023-12-22 | Stop reason: HOSPADM

## 2023-12-20 RX ORDER — TOPIRAMATE 25 MG/1
50 TABLET ORAL 2 TIMES DAILY
Status: DISCONTINUED | OUTPATIENT
Start: 2023-12-20 | End: 2023-12-22 | Stop reason: HOSPADM

## 2023-12-20 RX ORDER — POLYETHYLENE GLYCOL 3350 17 G/17G
17 POWDER, FOR SOLUTION ORAL DAILY PRN
Status: DISCONTINUED | OUTPATIENT
Start: 2023-12-20 | End: 2023-12-22 | Stop reason: HOSPADM

## 2023-12-20 RX ORDER — METOPROLOL SUCCINATE 50 MG/1
50 TABLET, EXTENDED RELEASE ORAL DAILY
Status: DISCONTINUED | OUTPATIENT
Start: 2023-12-20 | End: 2023-12-22 | Stop reason: HOSPADM

## 2023-12-20 RX ORDER — SODIUM CHLORIDE 9 MG/ML
40 INJECTION, SOLUTION INTRAVENOUS AS NEEDED
Status: DISCONTINUED | OUTPATIENT
Start: 2023-12-20 | End: 2023-12-22 | Stop reason: HOSPADM

## 2023-12-20 RX ADMIN — ALBUTEROL SULFATE 2 PUFF: 108 AEROSOL, METERED RESPIRATORY (INHALATION) at 20:09

## 2023-12-20 RX ADMIN — FAMOTIDINE 40 MG: 20 TABLET ORAL at 18:10

## 2023-12-20 RX ADMIN — LISINOPRIL 40 MG: 20 TABLET ORAL at 21:53

## 2023-12-20 RX ADMIN — BUDESONIDE AND FORMOTEROL FUMARATE DIHYDRATE 2 PUFF: 160; 4.5 AEROSOL RESPIRATORY (INHALATION) at 20:12

## 2023-12-20 RX ADMIN — SERTRALINE 100 MG: 100 TABLET, FILM COATED ORAL at 18:10

## 2023-12-20 RX ADMIN — METOPROLOL SUCCINATE 50 MG: 50 TABLET, EXTENDED RELEASE ORAL at 18:10

## 2023-12-20 RX ADMIN — Medication 10 ML: at 21:56

## 2023-12-20 RX ADMIN — MONTELUKAST 10 MG: 10 TABLET, FILM COATED ORAL at 21:53

## 2023-12-20 RX ADMIN — METHYLPREDNISOLONE SODIUM SUCCINATE 60 MG: 125 INJECTION, POWDER, FOR SOLUTION INTRAMUSCULAR; INTRAVENOUS at 18:09

## 2023-12-20 RX ADMIN — TOPIRAMATE 50 MG: 25 TABLET, FILM COATED ORAL at 21:53

## 2023-12-20 RX ADMIN — AZITHROMYCIN MONOHYDRATE 500 MG: 500 INJECTION, POWDER, LYOPHILIZED, FOR SOLUTION INTRAVENOUS at 22:48

## 2023-12-21 LAB
ALBUMIN SERPL-MCNC: 4 G/DL (ref 3.5–5.2)
ALBUMIN/GLOB SERPL: 1.6 G/DL
ALP SERPL-CCNC: 83 U/L (ref 39–117)
ALT SERPL W P-5'-P-CCNC: 19 U/L (ref 1–33)
ANION GAP SERPL CALCULATED.3IONS-SCNC: 12 MMOL/L (ref 5–15)
AST SERPL-CCNC: 21 U/L (ref 1–32)
BILIRUB SERPL-MCNC: 0.2 MG/DL (ref 0–1.2)
BILIRUB UR QL STRIP: NEGATIVE
BUN SERPL-MCNC: 12 MG/DL (ref 8–23)
BUN/CREAT SERPL: 19.4 (ref 7–25)
CALCIUM SPEC-SCNC: 9.5 MG/DL (ref 8.6–10.5)
CHLORIDE SERPL-SCNC: 109 MMOL/L (ref 98–107)
CLARITY UR: CLEAR
CO2 SERPL-SCNC: 21 MMOL/L (ref 22–29)
COLOR UR: YELLOW
CREAT SERPL-MCNC: 0.62 MG/DL (ref 0.57–1)
CRP SERPL-MCNC: 5.12 MG/DL (ref 0–0.5)
EGFRCR SERPLBLD CKD-EPI 2021: 94.2 ML/MIN/1.73
GLOBULIN UR ELPH-MCNC: 2.5 GM/DL
GLUCOSE SERPL-MCNC: 168 MG/DL (ref 65–99)
GLUCOSE UR STRIP-MCNC: NEGATIVE MG/DL
HGB UR QL STRIP.AUTO: NEGATIVE
KETONES UR QL STRIP: NEGATIVE
LEUKOCYTE ESTERASE UR QL STRIP.AUTO: NEGATIVE
NITRITE UR QL STRIP: NEGATIVE
PH UR STRIP.AUTO: 7.5 [PH] (ref 5–8)
POTASSIUM SERPL-SCNC: 3.9 MMOL/L (ref 3.5–5.2)
PROT SERPL-MCNC: 6.5 G/DL (ref 6–8.5)
PROT UR QL STRIP: NEGATIVE
SODIUM SERPL-SCNC: 142 MMOL/L (ref 136–145)
SP GR UR STRIP: 1.01 (ref 1–1.03)
UROBILINOGEN UR QL STRIP: NORMAL

## 2023-12-21 PROCEDURE — 81003 URINALYSIS AUTO W/O SCOPE: CPT | Performed by: FAMILY MEDICINE

## 2023-12-21 PROCEDURE — 25010000002 METHYLPREDNISOLONE PER 125 MG: Performed by: FAMILY MEDICINE

## 2023-12-21 PROCEDURE — 94761 N-INVAS EAR/PLS OXIMETRY MLT: CPT

## 2023-12-21 PROCEDURE — 94799 UNLISTED PULMONARY SVC/PX: CPT

## 2023-12-21 PROCEDURE — 86140 C-REACTIVE PROTEIN: CPT | Performed by: INTERNAL MEDICINE

## 2023-12-21 PROCEDURE — 94664 DEMO&/EVAL PT USE INHALER: CPT

## 2023-12-21 PROCEDURE — 25010000002 ENOXAPARIN PER 10 MG: Performed by: FAMILY MEDICINE

## 2023-12-21 PROCEDURE — 80053 COMPREHEN METABOLIC PANEL: CPT | Performed by: FAMILY MEDICINE

## 2023-12-21 RX ORDER — HYDROXYZINE HYDROCHLORIDE 25 MG/1
25 TABLET, FILM COATED ORAL NIGHTLY PRN
Status: DISCONTINUED | OUTPATIENT
Start: 2023-12-21 | End: 2023-12-22 | Stop reason: HOSPADM

## 2023-12-21 RX ADMIN — ALBUTEROL SULFATE 2 PUFF: 108 AEROSOL, METERED RESPIRATORY (INHALATION) at 19:08

## 2023-12-21 RX ADMIN — ASPIRIN 81 MG: 81 TABLET, COATED ORAL at 08:49

## 2023-12-21 RX ADMIN — Medication 10 ML: at 20:01

## 2023-12-21 RX ADMIN — DOCUSATE SODIUM 50MG AND SENNOSIDES 8.6MG 2 TABLET: 8.6; 5 TABLET, FILM COATED ORAL at 08:49

## 2023-12-21 RX ADMIN — METHYLPREDNISOLONE SODIUM SUCCINATE 60 MG: 125 INJECTION, POWDER, FOR SOLUTION INTRAMUSCULAR; INTRAVENOUS at 19:59

## 2023-12-21 RX ADMIN — METHYLPREDNISOLONE SODIUM SUCCINATE 60 MG: 125 INJECTION, POWDER, FOR SOLUTION INTRAMUSCULAR; INTRAVENOUS at 11:20

## 2023-12-21 RX ADMIN — METOPROLOL SUCCINATE 50 MG: 50 TABLET, EXTENDED RELEASE ORAL at 08:49

## 2023-12-21 RX ADMIN — LISINOPRIL 40 MG: 20 TABLET ORAL at 20:51

## 2023-12-21 RX ADMIN — TOPIRAMATE 50 MG: 25 TABLET, FILM COATED ORAL at 08:49

## 2023-12-21 RX ADMIN — Medication 10 ML: at 03:38

## 2023-12-21 RX ADMIN — HYDROXYZINE HYDROCHLORIDE 25 MG: 25 TABLET, FILM COATED ORAL at 20:51

## 2023-12-21 RX ADMIN — BUDESONIDE AND FORMOTEROL FUMARATE DIHYDRATE 2 PUFF: 160; 4.5 AEROSOL RESPIRATORY (INHALATION) at 19:11

## 2023-12-21 RX ADMIN — Medication 10 ML: at 08:52

## 2023-12-21 RX ADMIN — ATORVASTATIN CALCIUM 20 MG: 20 TABLET, FILM COATED ORAL at 08:49

## 2023-12-21 RX ADMIN — ANASTROZOLE 1 MG: 1 TABLET ORAL at 08:49

## 2023-12-21 RX ADMIN — ENOXAPARIN SODIUM 40 MG: 100 INJECTION SUBCUTANEOUS at 17:06

## 2023-12-21 RX ADMIN — FAMOTIDINE 40 MG: 20 TABLET ORAL at 08:49

## 2023-12-21 RX ADMIN — SERTRALINE 100 MG: 100 TABLET, FILM COATED ORAL at 08:51

## 2023-12-21 RX ADMIN — MONTELUKAST 10 MG: 10 TABLET, FILM COATED ORAL at 21:24

## 2023-12-21 RX ADMIN — DILTIAZEM HYDROCHLORIDE 120 MG: 120 CAPSULE, COATED, EXTENDED RELEASE ORAL at 08:49

## 2023-12-21 RX ADMIN — BUDESONIDE AND FORMOTEROL FUMARATE DIHYDRATE 2 PUFF: 160; 4.5 AEROSOL RESPIRATORY (INHALATION) at 08:37

## 2023-12-21 RX ADMIN — ALBUTEROL SULFATE 2 PUFF: 108 AEROSOL, METERED RESPIRATORY (INHALATION) at 12:20

## 2023-12-21 RX ADMIN — METHYLPREDNISOLONE SODIUM SUCCINATE 60 MG: 125 INJECTION, POWDER, FOR SOLUTION INTRAMUSCULAR; INTRAVENOUS at 03:37

## 2023-12-21 RX ADMIN — TOPIRAMATE 50 MG: 25 TABLET, FILM COATED ORAL at 20:51

## 2023-12-21 RX ADMIN — NIRMATRELVIR AND RITONAVIR 3 TABLET: KIT at 20:51

## 2023-12-21 RX ADMIN — ALBUTEROL SULFATE 2 PUFF: 108 AEROSOL, METERED RESPIRATORY (INHALATION) at 08:33

## 2023-12-21 RX ADMIN — ALBUTEROL SULFATE 2 PUFF: 108 AEROSOL, METERED RESPIRATORY (INHALATION) at 15:23

## 2023-12-21 NOTE — PLAN OF CARE
Problem: Adult Inpatient Plan of Care  Goal: Plan of Care Review  12/21/2023 1754 by Morena Gaines LPN  Outcome: Ongoing, Progressing  Flowsheets (Taken 12/21/2023 1754)  Progress: improving  Plan of Care Reviewed With: patient  Outcome Evaluation: Pt on room air, remains independent. she complains of jittery feeling, relayed to physician. See new orders. Care continues  12/21/2023 1754 by Morena Gaines LPN  Outcome: Ongoing, Progressing  Flowsheets (Taken 12/21/2023 1754)  Progress: improving  Plan of Care Reviewed With: patient  Outcome Evaluation: Pt on room air, remains independent. she complains of jittery feeling, relayed to physician. See new orders. Care continues  Goal: Patient-Specific Goal (Individualized)  12/21/2023 1754 by Morena Gaines LPN  Outcome: Ongoing, Progressing  12/21/2023 1754 by Morena Gaines LPN  Outcome: Ongoing, Progressing  Goal: Absence of Hospital-Acquired Illness or Injury  12/21/2023 1754 by Morena Gaines LPN  Outcome: Ongoing, Progressing  12/21/2023 1754 by Morena Gaines LPN  Outcome: Ongoing, Progressing  Intervention: Identify and Manage Fall Risk  Recent Flowsheet Documentation  Taken 12/21/2023 1615 by Morena Gaines LPN  Safety Promotion/Fall Prevention:   safety round/check completed   fall prevention program maintained  Taken 12/21/2023 1155 by Morena Gaines LPN  Safety Promotion/Fall Prevention:   safety round/check completed   fall prevention program maintained  Taken 12/21/2023 0849 by Morena Gaines LPN  Safety Promotion/Fall Prevention:   safety round/check completed   fall prevention program maintained  Intervention: Prevent Skin Injury  Recent Flowsheet Documentation  Taken 12/21/2023 1615 by Morena Gaines LPN  Body Position: position changed independently  Taken 12/21/2023 1155 by Morena Gaines LPN  Body Position: position changed independently  Taken 12/21/2023 0849 by Morena Gaines LPN  Body Position: position changed independently  Intervention:  Prevent and Manage VTE (Venous Thromboembolism) Risk  Recent Flowsheet Documentation  Taken 12/21/2023 1615 by Morena Gaines LPN  Activity Management: up ad taty  Range of Motion: active ROM (range of motion) encouraged  Taken 12/21/2023 1155 by Morena Gaines LPN  Activity Management: up ad taty  Range of Motion: ROM (range of motion) performed  Taken 12/21/2023 0849 by Morena Gaines LPN  Activity Management: up ad taty  Range of Motion: ROM (range of motion) performed  Goal: Optimal Comfort and Wellbeing  12/21/2023 1754 by Morena Gaines LPN  Outcome: Ongoing, Progressing  12/21/2023 1754 by Morena Gaines LPN  Outcome: Ongoing, Progressing  Intervention: Provide Person-Centered Care  Recent Flowsheet Documentation  Taken 12/21/2023 1615 by Morena Gaines LPN  Trust Relationship/Rapport:   care explained   questions encouraged   questions answered   thoughts/feelings acknowledged  Taken 12/21/2023 1155 by Morena Gaines LPN  Trust Relationship/Rapport:   care explained   questions answered   questions encouraged   thoughts/feelings acknowledged  Taken 12/21/2023 0849 by Morena Gaines LPN  Trust Relationship/Rapport:   care explained   questions encouraged   questions answered   thoughts/feelings acknowledged  Goal: Readiness for Transition of Care  12/21/2023 1754 by Morena Gaines LPN  Outcome: Ongoing, Progressing  12/21/2023 1754 by Morena Gaines LPN  Outcome: Ongoing, Progressing     Problem: Skin Injury Risk Increased  Goal: Skin Health and Integrity  12/21/2023 1754 by Morena Gaines LPN  Outcome: Ongoing, Progressing  12/21/2023 1754 by Morena Gaines LPN  Outcome: Ongoing, Progressing  Intervention: Optimize Skin Protection  Recent Flowsheet Documentation  Taken 12/21/2023 0849 by Morena Gaines LPN  Head of Bed (HOB) Positioning: HOB elevated     Problem: COPD (Chronic Obstructive Pulmonary Disease) Comorbidity  Goal: Maintenance of COPD Symptom Control  12/21/2023 1754 by Morena Gaines LPN  Outcome:  Ongoing, Progressing  12/21/2023 1754 by Morena Gaines LPN  Outcome: Ongoing, Progressing  Intervention: Maintain COPD-Symptom Control  Recent Flowsheet Documentation  Taken 12/21/2023 1155 by Morena Gaines LPN  Medication Review/Management: medications reviewed  Taken 12/21/2023 0849 by Morena Gaines LPN  Medication Review/Management: medications reviewed     Problem: Hypertension Comorbidity  Goal: Blood Pressure in Desired Range  12/21/2023 1754 by Morena Gaines LPN  Outcome: Ongoing, Progressing  12/21/2023 1754 by Morena Gaines LPN  Outcome: Ongoing, Progressing  Intervention: Maintain Blood Pressure Management  Recent Flowsheet Documentation  Taken 12/21/2023 1155 by Morena Gaines LPN  Medication Review/Management: medications reviewed  Taken 12/21/2023 0849 by Morena Gaines LPN  Medication Review/Management: medications reviewed     Problem: Obstructive Sleep Apnea Risk or Actual Comorbidity Management  Goal: Unobstructed Breathing During Sleep  12/21/2023 1754 by Morena Gaines LPN  Outcome: Ongoing, Progressing  12/21/2023 1754 by Morena Gaines LPN  Outcome: Ongoing, Progressing     Problem: Osteoarthritis Comorbidity  Goal: Maintenance of Osteoarthritis Symptom Control  12/21/2023 1754 by Morena Gaines LPN  Outcome: Ongoing, Progressing  12/21/2023 1754 by Morena Gaines LPN  Outcome: Ongoing, Progressing  Intervention: Maintain Osteoarthritis Symptom Control  Recent Flowsheet Documentation  Taken 12/21/2023 1615 by Morena Gaines LPN  Activity Management: up ad taty  Taken 12/21/2023 1155 by Morena Gaines LPN  Activity Management: up ad taty  Medication Review/Management: medications reviewed  Taken 12/21/2023 0849 by Morena Gaines LPN  Activity Management: up ad taty  Medication Review/Management: medications reviewed     Problem: Seizure Disorder Comorbidity  Goal: Maintenance of Seizure Control  12/21/2023 1754 by Morena Gaines LPN  Outcome: Ongoing, Progressing  12/21/2023 1754 by Eder  MARLENE Berg  Outcome: Ongoing, Progressing     Problem: Fall Injury Risk  Goal: Absence of Fall and Fall-Related Injury  12/21/2023 1754 by Morena Gaines LPN  Outcome: Ongoing, Progressing  12/21/2023 1754 by Morena Gaines LPN  Outcome: Ongoing, Progressing  Intervention: Identify and Manage Contributors  Recent Flowsheet Documentation  Taken 12/21/2023 1155 by Morena Gaines LPN  Medication Review/Management: medications reviewed  Taken 12/21/2023 0849 by Morena Gaines LPN  Medication Review/Management: medications reviewed  Intervention: Promote Injury-Free Environment  Recent Flowsheet Documentation  Taken 12/21/2023 1615 by Morena Gaines LPN  Safety Promotion/Fall Prevention:   safety round/check completed   fall prevention program maintained  Taken 12/21/2023 1155 by Morena Gaines LPN  Safety Promotion/Fall Prevention:   safety round/check completed   fall prevention program maintained  Taken 12/21/2023 0849 by Morena Gaines LPN  Safety Promotion/Fall Prevention:   safety round/check completed   fall prevention program maintained   Goal Outcome Evaluation:  Plan of Care Reviewed With: patient        Progress: improving  Outcome Evaluation: Pt on room air, remains independent. she complains of jittery feeling, relayed to physician. See new orders. Care continues

## 2023-12-21 NOTE — H&P
Patient Care Team:  Candi Benson MD as PCP - General (Family Medicine)  Kenrick Ragsdale MD as Surgeon (General Surgery)  Ryder Agosto MD as Consulting Physician (Cardiology)    Chief complaint  Fever, HA, SOB and cough    Subjective     Patient is a 73 y.o. female presents with fever, severe HA, SOB and cough , since yesterday, when she arrived her O2 sat was 88% and HR of 118, so pt is admitted for further evaluation and treatment.    Review of Systems   Constitutional:  Positive for activity change, appetite change, chills and fatigue.   Eyes: Negative.    Respiratory:  Positive for cough, shortness of breath and wheezing.    Cardiovascular: Negative.    Gastrointestinal:  Positive for nausea.   Endocrine: Negative.    Genitourinary: Negative.    Musculoskeletal:  Positive for arthralgias, back pain, gait problem, myalgias and neck pain.   Neurological:  Positive for dizziness, light-headedness and headaches.   Psychiatric/Behavioral:  The patient is nervous/anxious.            History  Past Medical History:   Diagnosis Date    Arthritis     Asthma     Boils of multiple sites 5/28    Breast cancer     Cancer     COPD (chronic obstructive pulmonary disease)     Coronary artery disease     Drug therapy     Elevated cholesterol     GERD (gastroesophageal reflux disease)     H/O Bell's palsy     History of transfusion     Hypertension     Hypertensive heart disease with chronic diastolic congestive heart failure 4/12/2023    Neuropathy     feet and hands post chemo    Sciatic nerve pain     Sleep apnea      Past Surgical History:   Procedure Laterality Date    ABDOMINAL SURGERY      ANKLE SURGERY      BREAST BIOPSY      BREAST LUMPECTOMY      CARDIAC CATHETERIZATION Right 4/12/2023    Procedure: Left Heart Cath;  Surgeon: Ryder Agosto MD;  Location: [facility] CATH INVASIVE LOCATION;  Service: Cardiovascular;  Laterality: Right;    CERVICAL CONIZATION      MASTECTOMY Right  6/10/2021    Procedure: MASTECTOMY MODIFIED RADICAL RIGHT;  Surgeon: Kenrick Ragsdale MD;  Location: [facility] MAIN OR;  Service: General;  Laterality: Right;    SIMPLE MASTECTOMY Left 6/10/2021    Procedure: MASTECTOMY SIMPLE;  Surgeon: Kenrick Ragsdale MD;  Location: [facility] MAIN OR;  Service: General;  Laterality: Left;    TUBAL ABDOMINAL LIGATION      VENOUS ACCESS DEVICE (PORT) INSERTION Left 7/30/2020    Procedure: INSERTION VENOUS ACCESS DEVICE LEFT internal jugular;  Surgeon: Taylor Kaye MD;  Location: [facility] MAIN OR;  Service: General;  Laterality: Left;    VENOUS ACCESS DEVICE (PORT) REMOVAL Left 1/6/2022    Procedure: REMOVAL VENOUS ACCESS DEVICE;  Surgeon: Kenrick Ragsdale MD;  Location: [facility] MAIN OR;  Service: General;  Laterality: Left;     Family History   Problem Relation Age of Onset    Heart disease Father     Stroke Father     Diabetes Father     Diabetes Son      Social History     Tobacco Use    Smoking status: Never     Passive exposure: Never    Smokeless tobacco: Never   Vaping Use    Vaping Use: Never used   Substance Use Topics    Alcohol use: Never    Drug use: Never     Medications Prior to Admission   Medication Sig Dispense Refill Last Dose    albuterol sulfate  (90 Base) MCG/ACT inhaler Inhale 2 puffs Every 6 (Six) Hours As Needed.   Past Week    anastrozole (ARIMIDEX) 1 MG tablet Take  by mouth Daily.   12/20/2023    aspirin 81 MG EC tablet Take 1 tablet by mouth Daily. 90 tablet 3 12/20/2023    dilTIAZem CD (CARDIZEM CD) 120 MG 24 hr capsule Take 1 capsule by mouth Daily for 30 days. 30 capsule 0 12/20/2023    famotidine (PEPCID) 40 MG tablet Take 1 tablet by mouth Daily.   12/19/2023    ipratropium-albuterol (DUO-NEB) 0.5-2.5 mg/3 ml nebulizer Take 3 mL by nebulization Every 6 (Six) Hours.   12/20/2023    lisinopril (PRINIVIL,ZESTRIL) 40 MG tablet Take 1 tablet by mouth Every Night. LD 1/4 90 tablet 3 12/19/2023    metoprolol succinate XL (TOPROL-XL) 50  MG 24 hr tablet Take 1 tablet by mouth Daily. Take DOS (Patient taking differently: Take 1 tablet by mouth Every Evening. Take DOS) 90 tablet 3 12/19/2023    montelukast (SINGULAIR) 10 MG tablet Take 1 tablet by mouth Every Night.   12/19/2023    sertraline (ZOLOFT) 100 MG tablet Take 1 tablet by mouth Daily. Take DOS   12/19/2023    topiramate (TOPAMAX) 50 MG tablet Take 1 tablet by mouth 2 (Two) Times a Day.   12/19/2023    ALENDRONATE SODIUM PO Take 70 mg by mouth 1 (One) Time Per Week. Patient states takes on Wednesdays       atorvastatin (LIPITOR) 20 MG tablet Take 1 tablet by mouth Daily. 90 tablet 3 Unknown    azelastine (ASTELIN) 0.1 % nasal spray 2 sprays into the nostril(s) as directed by provider 2 (Two) Times a Day. Use in each nostril as directed       pantoprazole (PROTONIX) 40 MG EC tablet Take 1 tablet by mouth Daily.        Allergies:  Penicillins, Doxycycline, and Adhesive tape    Objective     Vital Signs  Temp:  [98.7 °F (37.1 °C)] 98.7 °F (37.1 °C)  Heart Rate:  [89-99] 91  Resp:  [16-18] 16  BP: (138)/(98) 138/98    Physical Exam:       General Appearance:    Alert, cooperative, in mild acute distress   Eyes:            Lids and lashes normal, conjunctivae and sclerae normal, no   icterus, no pallor, corneas clear, PERRLA   Ears:    Ears appear intact with no abnormalities noted   Throat:   No oral lesions, no thrush, oral mucosa moist   Neck:   No adenopathy, supple, trachea midline, no thyromegaly, no   carotid bruit, no JVD   Lungs:     Wheezing to auscultation,respirations regular, and even but  mildly labored using accessory muscles to breath    Heart:    Regular rhythm and normal rate, normal S1 and S2, no          murmur, no gallop, no rub, no click   Abdomen:     Normal bowel sounds, no masses, no organomegaly, soft      non-tender, non-distended, no guarding, no rebound                tenderness   Extremities:   Moves all extremities well, no edema, no cyanosis, no           redness    Pulses:   Pulses palpable and equal bilaterally   Skin:   No bleeding, bruising or rash   Neurologic:   Cranial nerves 2 - 12 grossly intact, sensation intact, DTR       present and equal bilaterally       Results Review:  Lab Results (last 48 hours)       Procedure Component Value Units Date/Time    Respiratory Panel PCR w/COVID-19(SARS-CoV-2) DERECK/ROBIN/CARTER/PAD/COR/MIKE In-House, NP Swab in UTM/VTM, 2 HR TAT - Swab, Nasopharynx [[id number]]  (Abnormal) Collected: 12/20/23 1808    Specimen: Swab from Nasopharynx Updated: 12/20/23 2017     ADENOVIRUS, PCR Not Detected     Coronavirus 229E Not Detected     Coronavirus HKU1 Not Detected     Coronavirus NL63 Not Detected     Coronavirus OC43 Not Detected     COVID19 Detected     Human Metapneumovirus Not Detected     Human Rhinovirus/Enterovirus Not Detected     Influenza A PCR Not Detected     Influenza B PCR Not Detected     Parainfluenza Virus 1 Not Detected     Parainfluenza Virus 2 Not Detected     Parainfluenza Virus 3 Not Detected     Parainfluenza Virus 4 Not Detected     RSV, PCR Not Detected     Bordetella pertussis pcr Not Detected     Bordetella parapertussis PCR Not Detected     Chlamydophila pneumoniae PCR Not Detected     Mycoplasma pneumo by PCR Not Detected    Narrative:      In the setting of a positive respiratory panel with a viral infection PLUS a negative procalcitonin without other underlying concern for bacterial infection, consider observing off antibiotics or discontinuation of antibiotics and continue supportive care. If the respiratory panel is positive for atypical bacterial infection (Bordetella pertussis, Chlamydophila pneumoniae, or Mycoplasma pneumoniae), consider antibiotic de-escalation to target atypical bacterial infection.    Sedimentation Rate [[id number]]  (Abnormal) Collected: 12/20/23 1810    Specimen: Blood Updated: 12/20/23 1926     Sed Rate 31 mm/hr     Comprehensive Metabolic Panel [[id number]]  (Abnormal) Collected: 12/20/23  1810    Specimen: Blood Updated: 12/20/23 1855     Glucose 96 mg/dL      BUN 13 mg/dL      Creatinine 0.74 mg/dL      Sodium 144 mmol/L      Potassium 4.4 mmol/L      Chloride 111 mmol/L      CO2 24.0 mmol/L      Calcium 9.9 mg/dL      Total Protein 6.8 g/dL      Albumin 4.0 g/dL      ALT (SGPT) 19 U/L      AST (SGOT) 21 U/L      Alkaline Phosphatase 85 U/L      Total Bilirubin 0.2 mg/dL      Globulin 2.8 gm/dL      A/G Ratio 1.4 g/dL      BUN/Creatinine Ratio 17.6     Anion Gap 9.0 mmol/L      eGFR 85.6 mL/min/1.73     Narrative:      GFR Normal >60  Chronic Kidney Disease <60  Kidney Failure <15    The GFR formula is only valid for adults with stable renal function between ages 18 and 70.    C-reactive Protein [[id number]]  (Abnormal) Collected: 12/20/23 1810    Specimen: Blood Updated: 12/20/23 1855     C-Reactive Protein 4.87 mg/dL     Lactic Acid, Plasma [[id number]]  (Normal) Collected: 12/20/23 1810    Specimen: Blood Updated: 12/20/23 1850     Lactate 0.8 mmol/L     CBC Auto Differential [[id number]]  (Abnormal) Collected: 12/20/23 1810    Specimen: Blood Updated: 12/20/23 1822     WBC 6.30 10*3/mm3      RBC 4.31 10*6/mm3      Hemoglobin 13.3 g/dL      Hematocrit 41.4 %      MCV 96.1 fL      MCH 31.0 pg      MCHC 32.2 g/dL      RDW 15.8 %      RDW-SD 53.4 fl      MPV 8.4 fL      Platelets 179 10*3/mm3      Neutrophil % 63.9 %      Lymphocyte % 22.5 %      Monocyte % 11.0 %      Eosinophil % 1.9 %      Basophil % 0.7 %      Neutrophils, Absolute 4.00 10*3/mm3      Lymphocytes, Absolute 1.40 10*3/mm3      Monocytes, Absolute 0.70 10*3/mm3      Eosinophils, Absolute 0.10 10*3/mm3      Basophils, Absolute 0.00 10*3/mm3      nRBC 0.0 /100 WBC     Blood Culture - Blood, Arm, Left [[id number]] Collected: 12/20/23 1810    Specimen: Blood from Arm, Left Updated: 12/20/23 1815    Blood Culture - Blood, Arm, Left [[id number]] Collected: 12/20/23 1810    Specimen: Blood from Arm, Left Updated: 12/20/23 1813             Imaging Results (Last 24 Hours)       Procedure Component Value Units Date/Time    XR Chest PA & Lateral [[id number]] Resulted: 12/20/23 1949     Updated: 12/1950                 Assessment & Plan     Active Problems:    Pneumonia  -  IV Abx, CxR Pending, Pulmonary consulted    COVID 19 Positive - ID consulted    Acute respiratory distress - IV Steroid and O2 PRN    COPD Exacerbation - Steroid, MDI    HTN - continue Home Medicine    Hx of Breast Cancer - continue Home medicine    Stress ulcer/DVT prophylaxis            Plan for disposition:  Home at discharge     Candi Benson MD  12/20/23  20:49 EST

## 2023-12-21 NOTE — CASE MANAGEMENT/SOCIAL WORK
Continued Stay Note  NIKI Perez     Patient Name: Monse Donnelly  MRN: [id number]  Today's Date: 12/21/2023    Admit Date: 12/20/2023    Plan: Home.  Covid +   Discharge Plan       Row Name 12/21/23 1749       Plan    Plan Home.  Covid +    Plan Comments Attempted to speak to on phone. Patient stated she was going to restroom and would have to talk to me later.  Per chart review lives at home. [facility].   Barriers to discharge: IV steroids.           Phone communication or documentation only - no physical contact with patient or family.   Anuja Du RN     Office Phone [phone]  Office Cell [phone]

## 2023-12-21 NOTE — CONSULTS
Infectious Diseases Consult Note    Referring Provider: Candi Benson, *    Reason for Consultation: COVID    Patient Care Team:  Candi Benson MD as PCP - General (Family Medicine)  Kenrick Ragsdale MD as Surgeon (General Surgery)  Ryder Agosto MD as Consulting Physician (Cardiology)    Chief complaint shortness of breath, wheezing    Subjective     The patient has been afebrile for the last 24 hours.  The patient is on room air, hemodynamically stable, and is tolerating antimicrobial therapy.    History of present illness:      This is a 73-year-old female who presents to the hospital on 12/20/2023 due to reported fever, shortness of breath, cough and headache.  Patient was tested positive for COVID-19 but denies any previous vaccinations.  Patient does says she has asthma but does not normally wear oxygen at home.  Denies GI symptoms or urinary symptoms.    Review of Systems   Review of Systems   Constitutional: Negative.  Positive for fever.   HENT: Negative.     Eyes: Negative.    Respiratory: Negative.  Positive for cough and shortness of breath.    Cardiovascular: Negative.    Gastrointestinal: Negative.    Endocrine: Negative.    Genitourinary: Negative.    Musculoskeletal: Negative.    Skin: Negative.    Neurological: Negative.  Positive for headaches.   Psychiatric/Behavioral: Negative.     All other systems reviewed and are negative.      Medications  Medications Prior to Admission   Medication Sig Dispense Refill Last Dose    albuterol sulfate  (90 Base) MCG/ACT inhaler Inhale 2 puffs Every 6 (Six) Hours As Needed.   Past Week    anastrozole (ARIMIDEX) 1 MG tablet Take  by mouth Daily.   12/20/2023    aspirin 81 MG EC tablet Take 1 tablet by mouth Daily. 90 tablet 3 12/20/2023    dilTIAZem CD (CARDIZEM CD) 120 MG 24 hr capsule Take 1 capsule by mouth Daily for 30 days. 30 capsule 0 12/20/2023    famotidine (PEPCID) 40 MG tablet Take 1 tablet by mouth Daily.    12/19/2023    ipratropium-albuterol (DUO-NEB) 0.5-2.5 mg/3 ml nebulizer Take 3 mL by nebulization Every 6 (Six) Hours.   12/20/2023    lisinopril (PRINIVIL,ZESTRIL) 40 MG tablet Take 1 tablet by mouth Every Night. LD 1/4 90 tablet 3 12/19/2023    metoprolol succinate XL (TOPROL-XL) 50 MG 24 hr tablet Take 1 tablet by mouth Daily. Take DOS (Patient taking differently: Take 1 tablet by mouth Every Evening. Take DOS) 90 tablet 3 12/19/2023    montelukast (SINGULAIR) 10 MG tablet Take 1 tablet by mouth Every Night.   12/19/2023    sertraline (ZOLOFT) 100 MG tablet Take 1 tablet by mouth Daily. Take DOS   12/19/2023    topiramate (TOPAMAX) 50 MG tablet Take 1 tablet by mouth 2 (Two) Times a Day.   12/19/2023    ALENDRONATE SODIUM PO Take 70 mg by mouth 1 (One) Time Per Week. Patient states takes on Wednesdays       atorvastatin (LIPITOR) 20 MG tablet Take 1 tablet by mouth Daily. 90 tablet 3 Unknown    azelastine (ASTELIN) 0.1 % nasal spray 2 sprays into the nostril(s) as directed by provider 2 (Two) Times a Day. Use in each nostril as directed       pantoprazole (PROTONIX) 40 MG EC tablet Take 1 tablet by mouth Daily.          History  Past Medical History:   Diagnosis Date    Arthritis     Asthma     Boils of multiple sites 5/28    Breast cancer     Cancer     COPD (chronic obstructive pulmonary disease)     Coronary artery disease     Drug therapy     Elevated cholesterol     GERD (gastroesophageal reflux disease)     H/O Bell's palsy     History of transfusion     Hypertension     Hypertensive heart disease with chronic diastolic congestive heart failure 4/12/2023    Neuropathy     feet and hands post chemo    Sciatic nerve pain     Sleep apnea      Past Surgical History:   Procedure Laterality Date    ABDOMINAL SURGERY      ANKLE SURGERY      BREAST BIOPSY      BREAST LUMPECTOMY      CARDIAC CATHETERIZATION Right 4/12/2023    Procedure: Left Heart Cath;  Surgeon: Ryder Agosto MD;  Location: [facility]  INVASIVE LOCATION;  Service: Cardiovascular;  Laterality: Right;    CERVICAL CONIZATION      MASTECTOMY Right 6/10/2021    Procedure: MASTECTOMY MODIFIED RADICAL RIGHT;  Surgeon: Kenrick Ragsdale MD;  Location: [facility] MAIN OR;  Service: General;  Laterality: Right;    SIMPLE MASTECTOMY Left 6/10/2021    Procedure: MASTECTOMY SIMPLE;  Surgeon: Kenrick Ragsdale MD;  Location: [facility] MAIN OR;  Service: General;  Laterality: Left;    TUBAL ABDOMINAL LIGATION      VENOUS ACCESS DEVICE (PORT) INSERTION Left 7/30/2020    Procedure: INSERTION VENOUS ACCESS DEVICE LEFT internal jugular;  Surgeon: Taylor Kaye MD;  Location: [facility] MAIN OR;  Service: General;  Laterality: Left;    VENOUS ACCESS DEVICE (PORT) REMOVAL Left 1/6/2022    Procedure: REMOVAL VENOUS ACCESS DEVICE;  Surgeon: Kenrick Ragsdale MD;  Location: [facility] MAIN OR;  Service: General;  Laterality: Left;       Family History  Family History   Problem Relation Age of Onset    Heart disease Father     Stroke Father     Diabetes Father     Diabetes Son        Social History   reports that she has never smoked. She has never been exposed to tobacco smoke. She has never used smokeless tobacco. She reports that she does not drink alcohol and does not use drugs.    Allergies  Penicillins, Doxycycline, and Adhesive tape    Objective     Vital Signs   Vital Signs (last 24 hours)         12/20 0700  12/21 0659 12/21 0700  12/21 1613   Most Recent      Temp (°F) 98.1 -  98.9    97.5 -  98.9     97.5 (36.4) 12/21 1557    Heart Rate 87 -  99    69 -  96     88 12/21 1525    Resp 16 -  20    18 -  25     25 12/21 1557    /81 -  140/87    98/69 -  133/79     131/74 12/21 1557    SpO2 (%) 93 -  97    92 -  98     96 12/21 1557            Physical Exam:  Physical Exam  Vitals and nursing note reviewed.   Constitutional:       General: She is not in acute distress.     Appearance: Normal appearance. She is well-developed and normal weight. She is not  diaphoretic.   HENT:      Head: Normocephalic and atraumatic.   Eyes:      General: No scleral icterus.     Extraocular Movements: Extraocular movements intact.      Conjunctiva/sclera: Conjunctivae normal.      Pupils: Pupils are equal, round, and reactive to light.   Cardiovascular:      Rate and Rhythm: Normal rate and regular rhythm.      Heart sounds: Normal heart sounds, S1 normal and S2 normal. No murmur heard.  Pulmonary:      Effort: Pulmonary effort is normal. No respiratory distress.      Breath sounds: No stridor. Wheezing present. No rales.   Chest:      Chest wall: No tenderness.   Abdominal:      General: Bowel sounds are normal. There is no distension.      Palpations: Abdomen is soft. There is no mass.      Tenderness: There is no abdominal tenderness. There is no guarding.   Musculoskeletal:         General: No swelling, tenderness or deformity. Normal range of motion.      Cervical back: Neck supple.   Skin:     General: Skin is warm and dry.      Coloration: Skin is not pale.      Findings: No bruising, erythema or rash.   Neurological:      General: No focal deficit present.      Mental Status: She is alert and oriented to person, place, and time. Mental status is at baseline.      Cranial Nerves: No cranial nerve deficit.   Psychiatric:         Mood and Affect: Mood normal.         Microbiology  Microbiology Results (last 10 days)       Procedure Component Value - Date/Time    Respiratory Panel PCR w/COVID-19(SARS-CoV-2) DERECK/ROBIN/CARTER/PAD/COR/MIKE In-House, NP Swab in UTM/VTM, 2 HR TAT - Swab, Nasopharynx [[id number]]  (Abnormal) Collected: 12/20/23 5194    Lab Status: Final result Specimen: Swab from Nasopharynx Updated: 12/20/23 2017     ADENOVIRUS, PCR Not Detected     Coronavirus 229E Not Detected     Coronavirus HKU1 Not Detected     Coronavirus NL63 Not Detected     Coronavirus OC43 Not Detected     COVID19 Detected     Human Metapneumovirus Not Detected     Human Rhinovirus/Enterovirus Not  Detected     Influenza A PCR Not Detected     Influenza B PCR Not Detected     Parainfluenza Virus 1 Not Detected     Parainfluenza Virus 2 Not Detected     Parainfluenza Virus 3 Not Detected     Parainfluenza Virus 4 Not Detected     RSV, PCR Not Detected     Bordetella pertussis pcr Not Detected     Bordetella parapertussis PCR Not Detected     Chlamydophila pneumoniae PCR Not Detected     Mycoplasma pneumo by PCR Not Detected    Narrative:      In the setting of a positive respiratory panel with a viral infection PLUS a negative procalcitonin without other underlying concern for bacterial infection, consider observing off antibiotics or discontinuation of antibiotics and continue supportive care. If the respiratory panel is positive for atypical bacterial infection (Bordetella pertussis, Chlamydophila pneumoniae, or Mycoplasma pneumoniae), consider antibiotic de-escalation to target atypical bacterial infection.            Laboratory  Results from last 7 days   Lab Units 12/20/23  1810   WBC 10*3/mm3 6.30   HEMOGLOBIN g/dL 13.3   HEMATOCRIT % 41.4   PLATELETS 10*3/mm3 179     Results from last 7 days   Lab Units 12/21/23  0007   SODIUM mmol/L 142   POTASSIUM mmol/L 3.9   CHLORIDE mmol/L 109*   CO2 mmol/L 21.0*   BUN mg/dL 12   CREATININE mg/dL 0.62   GLUCOSE mg/dL 168*   CALCIUM mg/dL 9.5     Results from last 7 days   Lab Units 12/21/23  0007   SODIUM mmol/L 142   POTASSIUM mmol/L 3.9   CHLORIDE mmol/L 109*   CO2 mmol/L 21.0*   BUN mg/dL 12   CREATININE mg/dL 0.62   GLUCOSE mg/dL 168*   CALCIUM mg/dL 9.5         Results from last 7 days   Lab Units 12/20/23  1810   SED RATE mm/hr 31*           Radiology  Imaging Results (Last 72 Hours)       Procedure Component Value Units Date/Time    XR Chest PA & Lateral [[id number]] Collected: 12/20/23 2159     Updated: 12/20/23 2202    Narrative:      XR CHEST PA AND LATERAL    Date of Exam: 12/20/2023 7:34 PM EST    Indication: shortness of breath    Comparison: Chest  radiograph performed on June 30, 2023    Findings:  Overlying monitoring wires limit diagnostic sensitivity. Low lung volumes. Mild left basilar atelectasis visualized. No significant pleural effusion is seen. There is suggestion of a small hiatal hernia. The heart is not enlarged. No acute osseous   abnormalities.      Impression:      Impression:  Low lung volumes. Mild left basilar atelectasis.      Electronically Signed: Santiago Marin MD    12/20/2023 10:00 PM EST    Workstation ID: [id number]            Cardiology      Results Review:  I have reviewed all clinical data, test, lab, and imaging results.       Schedule Meds  albuterol sulfate HFA, 2 puff, Inhalation, 4x Daily - RT  anastrozole, 1 mg, Oral, Daily  aspirin, 81 mg, Oral, Daily  budesonide-formoterol, 2 puff, Inhalation, BID - RT  dilTIAZem CD, 120 mg, Oral, Q24H  enoxaparin, 40 mg, Subcutaneous, Q24H  famotidine, 40 mg, Oral, Daily  lisinopril, 40 mg, Oral, Nightly  methylPREDNISolone sodium succinate, 60 mg, Intravenous, Q8H  metoprolol succinate XL, 50 mg, Oral, Daily  montelukast, 10 mg, Oral, Nightly  Nirmatrelvir & Ritonavir (300mg/100mg), 3 tablet, Oral, BID  senna-docusate sodium, 2 tablet, Oral, BID  sertraline, 100 mg, Oral, Daily  sodium chloride, 10 mL, Intravenous, Q12H  topiramate, 50 mg, Oral, BID        Infusion Meds       PRN Meds    aluminum-magnesium hydroxide-simethicone    senna-docusate sodium **AND** polyethylene glycol **AND** bisacodyl **AND** bisacodyl    ipratropium-albuterol    nitroglycerin    ondansetron    sodium chloride    sodium chloride      Assessment & Plan       Assessment    COVID-19 infection in an unvaccinated patient.  Currently on room air.  Reports fevers at home but has been afebrile since admission.  Chest x-ray is relatively unremarkable.    Asthma, COPD    History of breast cancer in 2021.  Did have chemotherapy and radiation at that time but the port has been removed.    Plan    Start p.o. Paxlovid 3  tablets, 2 times daily for 5 days, with using Paxlovid as a prophylaxis since patient is high risk to have severe disease  Discontinue azithromycin  No need for steroids- recommend they be discontinued  Continue supportive care  Okay to discharge from Infectious Disease standpoint  Not much more to add from infectious disease standpoint-we will sign off at this time-please call with any questions.  Case discussed with RN    Appropriate PPE was used during this assessment  Patient needs to be in enhanced airborne isolation for 10 days from the first positive COVID screen    Odessa Gutierres, APRN  12/21/23  16:13 EST    Note is dictated utilizing voice recognition software/Dragon

## 2023-12-21 NOTE — PLAN OF CARE
Goal Outcome Evaluation:  Plan of Care Reviewed With: patient        Progress: no change  Outcome Evaluation: pt on room air, covid test came back +, meds were tolerated well, urine sample sent to the lab, still waiting on pt for sputum but not curretly coughing up anything, ID consult was called in

## 2023-12-22 ENCOUNTER — READMISSION MANAGEMENT (OUTPATIENT)
Dept: CALL CENTER | Facility: HOSPITAL | Age: 73
End: 2023-12-22
Payer: MEDICARE

## 2023-12-22 VITALS
DIASTOLIC BLOOD PRESSURE: 88 MMHG | WEIGHT: 158.73 LBS | RESPIRATION RATE: 22 BRPM | SYSTOLIC BLOOD PRESSURE: 128 MMHG | TEMPERATURE: 98.5 F | BODY MASS INDEX: 32 KG/M2 | HEIGHT: 59 IN | HEART RATE: 86 BPM | OXYGEN SATURATION: 96 %

## 2023-12-22 PROBLEM — D89.831 CYTOKINE RELEASE SYNDROME, GRADE 1: Status: ACTIVE | Noted: 2023-12-22

## 2023-12-22 LAB
ALBUMIN SERPL-MCNC: 3.9 G/DL (ref 3.5–5.2)
ALBUMIN/GLOB SERPL: 1.5 G/DL
ALP SERPL-CCNC: 92 U/L (ref 39–117)
ALT SERPL W P-5'-P-CCNC: 18 U/L (ref 1–33)
ANION GAP SERPL CALCULATED.3IONS-SCNC: 10 MMOL/L (ref 5–15)
AST SERPL-CCNC: 17 U/L (ref 1–32)
BILIRUB SERPL-MCNC: <0.2 MG/DL (ref 0–1.2)
BUN SERPL-MCNC: 21 MG/DL (ref 8–23)
BUN/CREAT SERPL: 35.6 (ref 7–25)
CALCIUM SPEC-SCNC: 9.9 MG/DL (ref 8.6–10.5)
CHLORIDE SERPL-SCNC: 112 MMOL/L (ref 98–107)
CO2 SERPL-SCNC: 22 MMOL/L (ref 22–29)
CREAT SERPL-MCNC: 0.59 MG/DL (ref 0.57–1)
EGFRCR SERPLBLD CKD-EPI 2021: 95.3 ML/MIN/1.73
GLOBULIN UR ELPH-MCNC: 2.6 GM/DL
GLUCOSE SERPL-MCNC: 145 MG/DL (ref 65–99)
POTASSIUM SERPL-SCNC: 4.1 MMOL/L (ref 3.5–5.2)
PROT SERPL-MCNC: 6.5 G/DL (ref 6–8.5)
SODIUM SERPL-SCNC: 144 MMOL/L (ref 136–145)

## 2023-12-22 PROCEDURE — 25010000002 METHYLPREDNISOLONE PER 125 MG: Performed by: FAMILY MEDICINE

## 2023-12-22 PROCEDURE — 94761 N-INVAS EAR/PLS OXIMETRY MLT: CPT

## 2023-12-22 PROCEDURE — 94799 UNLISTED PULMONARY SVC/PX: CPT

## 2023-12-22 PROCEDURE — 80053 COMPREHEN METABOLIC PANEL: CPT | Performed by: FAMILY MEDICINE

## 2023-12-22 PROCEDURE — 94664 DEMO&/EVAL PT USE INHALER: CPT

## 2023-12-22 PROCEDURE — 94618 PULMONARY STRESS TESTING: CPT

## 2023-12-22 RX ORDER — BUDESONIDE AND FORMOTEROL FUMARATE DIHYDRATE 160; 4.5 UG/1; UG/1
2 AEROSOL RESPIRATORY (INHALATION)
Qty: 1 EACH | Refills: 0 | Status: SHIPPED | OUTPATIENT
Start: 2023-12-22 | End: 2024-01-21

## 2023-12-22 RX ORDER — PREDNISONE 20 MG/1
20 TABLET ORAL DAILY
Qty: 5 TABLET | Refills: 0 | Status: SHIPPED | OUTPATIENT
Start: 2023-12-22 | End: 2023-12-27

## 2023-12-22 RX ADMIN — Medication 10 ML: at 08:57

## 2023-12-22 RX ADMIN — NIRMATRELVIR AND RITONAVIR 3 TABLET: KIT at 08:56

## 2023-12-22 RX ADMIN — SERTRALINE 100 MG: 100 TABLET, FILM COATED ORAL at 08:56

## 2023-12-22 RX ADMIN — ALBUTEROL SULFATE 2 PUFF: 108 AEROSOL, METERED RESPIRATORY (INHALATION) at 08:30

## 2023-12-22 RX ADMIN — FAMOTIDINE 40 MG: 20 TABLET ORAL at 08:56

## 2023-12-22 RX ADMIN — DILTIAZEM HYDROCHLORIDE 120 MG: 120 CAPSULE, COATED, EXTENDED RELEASE ORAL at 08:56

## 2023-12-22 RX ADMIN — ANASTROZOLE 1 MG: 1 TABLET ORAL at 08:56

## 2023-12-22 RX ADMIN — TOPIRAMATE 50 MG: 25 TABLET, FILM COATED ORAL at 08:56

## 2023-12-22 RX ADMIN — ASPIRIN 81 MG: 81 TABLET, COATED ORAL at 08:56

## 2023-12-22 RX ADMIN — BUDESONIDE AND FORMOTEROL FUMARATE DIHYDRATE 2 PUFF: 160; 4.5 AEROSOL RESPIRATORY (INHALATION) at 08:35

## 2023-12-22 RX ADMIN — METHYLPREDNISOLONE SODIUM SUCCINATE 60 MG: 125 INJECTION, POWDER, FOR SOLUTION INTRAMUSCULAR; INTRAVENOUS at 04:31

## 2023-12-22 RX ADMIN — METOPROLOL SUCCINATE 50 MG: 50 TABLET, EXTENDED RELEASE ORAL at 08:56

## 2023-12-22 NOTE — PROGRESS NOTES
LOS: 1 day   Patient Care Team:  Candi Benson MD as PCP - General (Family Medicine)  Kenrick Ragsdale MD as Surgeon (General Surgery)  Ryder Agosto MD as Consulting Physician (Cardiology)        Subjective  Lying on bed    Interval History: None    Patient Complaints: SOB and cough    Review of Systems     Objective     Vital Signs  Temp:  [97.5 °F (36.4 °C)-98.9 °F (37.2 °C)] 97.5 °F (36.4 °C)  Heart Rate:  [] 106  Resp:  [16-25] 21  BP: ()/(69-87) 131/74    Physical Exam:     General Appearance:    Alert, cooperative, in no acute distress   Ears:    Ears appear intact with no abnormalities noted   Throat:   No oral lesions, no thrush, oral mucosa moist   Neck:   No adenopathy, supple, trachea midline, no thyromegaly, no   carotid bruit, no JVD   Lungs:    Wheezing to auscultation, respirations regular, even and  unlabored                    Heart:    Regular rhythm and normal rate, normal S1 and S2, no          murmur, no gallop, no rub, no click   Abdomen:     Normal bowel sounds, no masses, no organomegaly, soft      nontender, nondistended, no guarding, no rebound                tenderness   Extremities:   Moves all extremities well, no edema, no cyanosis, no           redness   Skin:   No bleeding, bruising or rash   Neurologic:   Cranial nerves 2 - 12 grossly intact, sensation intact, DTR       present and equal bilaterally        Results Review:    Lab Results (last 24 hours)       Procedure Component Value Units Date/Time    Blood Culture - Blood, Arm, Left [[id number]]  (Normal) Collected: 12/20/23 1810    Specimen: Blood from Arm, Left Updated: 12/21/23 1830     Blood Culture No growth at 24 hours    Blood Culture - Blood, Arm, Left [[id number]]  (Normal) Collected: 12/20/23 1810    Specimen: Blood from Arm, Left Updated: 12/21/23 1815     Blood Culture No growth at 24 hours    C-reactive Protein [[id number]]  (Abnormal) Collected: 12/21/23 0007    Specimen:  Blood Updated: 12/21/23 1450     C-Reactive Protein 5.12 mg/dL     Urinalysis With Culture If Indicated - Urine, Clean Catch [[id number]]  (Normal) Collected: 12/21/23 0346    Specimen: Urine, Clean Catch Updated: 12/21/23 0356     Color, UA Yellow     Appearance, UA Clear     pH, UA 7.5     Specific Gravity, UA 1.012     Glucose, UA Negative     Ketones, UA Negative     Bilirubin, UA Negative     Blood, UA Negative     Protein, UA Negative     Leuk Esterase, UA Negative     Nitrite, UA Negative     Urobilinogen, UA 0.2 E.U./dL    Narrative:      In absence of clinical symptoms, the presence of pyuria, bacteria, and/or nitrites on the urinalysis result does not correlate with infection.  Urine microscopic not indicated.    Comprehensive Metabolic Panel [[id number]]  (Abnormal) Collected: 12/21/23 0007    Specimen: Blood Updated: 12/21/23 0138     Glucose 168 mg/dL      BUN 12 mg/dL      Creatinine 0.62 mg/dL      Sodium 142 mmol/L      Potassium 3.9 mmol/L      Chloride 109 mmol/L      CO2 21.0 mmol/L      Calcium 9.5 mg/dL      Total Protein 6.5 g/dL      Albumin 4.0 g/dL      ALT (SGPT) 19 U/L      AST (SGOT) 21 U/L      Alkaline Phosphatase 83 U/L      Total Bilirubin 0.2 mg/dL      Globulin 2.5 gm/dL      A/G Ratio 1.6 g/dL      BUN/Creatinine Ratio 19.4     Anion Gap 12.0 mmol/L      eGFR 94.2 mL/min/1.73     Narrative:      GFR Normal >60  Chronic Kidney Disease <60  Kidney Failure <15    The GFR formula is only valid for adults with stable renal function between ages 18 and 70.    Respiratory Panel PCR w/COVID-19(SARS-CoV-2) DERECK/ROBIN/CARTER/PAD/COR/MIKE In-House, NP Swab in UTM/VTM, 2 HR TAT - Swab, Nasopharynx [[id number]]  (Abnormal) Collected: 12/20/23 1808    Specimen: Swab from Nasopharynx Updated: 12/20/23 2017     ADENOVIRUS, PCR Not Detected     Coronavirus 229E Not Detected     Coronavirus HKU1 Not Detected     Coronavirus NL63 Not Detected     Coronavirus OC43 Not Detected     COVID19 Detected     Human  Metapneumovirus Not Detected     Human Rhinovirus/Enterovirus Not Detected     Influenza A PCR Not Detected     Influenza B PCR Not Detected     Parainfluenza Virus 1 Not Detected     Parainfluenza Virus 2 Not Detected     Parainfluenza Virus 3 Not Detected     Parainfluenza Virus 4 Not Detected     RSV, PCR Not Detected     Bordetella pertussis pcr Not Detected     Bordetella parapertussis PCR Not Detected     Chlamydophila pneumoniae PCR Not Detected     Mycoplasma pneumo by PCR Not Detected    Narrative:      In the setting of a positive respiratory panel with a viral infection PLUS a negative procalcitonin without other underlying concern for bacterial infection, consider observing off antibiotics or discontinuation of antibiotics and continue supportive care. If the respiratory panel is positive for atypical bacterial infection (Bordetella pertussis, Chlamydophila pneumoniae, or Mycoplasma pneumoniae), consider antibiotic de-escalation to target atypical bacterial infection.           Imaging Results (Last 24 Hours)       Procedure Component Value Units Date/Time    XR Chest PA & Lateral [[id number]] Collected: 12/20/23 2159     Updated: 12/20/23 2202    Narrative:      XR CHEST PA AND LATERAL    Date of Exam: 12/20/2023 7:34 PM EST    Indication: shortness of breath    Comparison: Chest radiograph performed on June 30, 2023    Findings:  Overlying monitoring wires limit diagnostic sensitivity. Low lung volumes. Mild left basilar atelectasis visualized. No significant pleural effusion is seen. There is suggestion of a small hiatal hernia. The heart is not enlarged. No acute osseous   abnormalities.      Impression:      Impression:  Low lung volumes. Mild left basilar atelectasis.      Electronically Signed: Santiago Marin MD    12/20/2023 10:00 PM EST    Workstation ID: [id number]             I reviewed the patient's other test results and agree with the interpretation    Medication Review:     Current  Facility-Administered Medications:     albuterol sulfate HFA (PROVENTIL HFA;VENTOLIN HFA;PROAIR HFA) inhaler 2 puff, 2 puff, Inhalation, 4x Daily - RT, Candi Benson MD, 2 puff at 12/21/23 1908    aluminum-magnesium hydroxide-simethicone (MAALOX MAX) 400-400-40 MG/5ML suspension 15 mL, 15 mL, Oral, Q6H PRN, Candi Benson MD    anastrozole (ARIMIDEX) tablet 1 mg, 1 mg, Oral, Daily, Candi Benson MD, 1 mg at 12/21/23 0849    aspirin EC tablet 81 mg, 81 mg, Oral, Daily, Candi Benson MD, 81 mg at 12/21/23 0849    sennosides-docusate (PERICOLACE) 8.6-50 MG per tablet 2 tablet, 2 tablet, Oral, BID, 2 tablet at 12/21/23 0849 **AND** polyethylene glycol (MIRALAX) packet 17 g, 17 g, Oral, Daily PRN **AND** bisacodyl (DULCOLAX) EC tablet 5 mg, 5 mg, Oral, Daily PRN **AND** bisacodyl (DULCOLAX) suppository 10 mg, 10 mg, Rectal, Daily PRN, Candi Benson MD    budesonide-formoterol (SYMBICORT) 160-4.5 MCG/ACT inhaler 2 puff, 2 puff, Inhalation, BID - RT, Candi Benson MD, 2 puff at 12/21/23 1911    dilTIAZem CD (CARDIZEM CD) 24 hr capsule 120 mg, 120 mg, Oral, Q24H, Candi Benson MD, 120 mg at 12/21/23 0849    Enoxaparin Sodium (LOVENOX) syringe 40 mg, 40 mg, Subcutaneous, Q24H, Candi Benson MD, 40 mg at 12/21/23 1706    famotidine (PEPCID) tablet 40 mg, 40 mg, Oral, Daily, Candi Benson MD, 40 mg at 12/21/23 0849    hydrOXYzine (ATARAX) tablet 25 mg, 25 mg, Oral, Nightly PRN, Candi Benson MD    ipratropium-albuterol (DUO-NEB) nebulizer solution 3 mL, 3 mL, Nebulization, Q6H PRN, Candi Benson MD    lisinopril (PRINIVIL,ZESTRIL) tablet 40 mg, 40 mg, Oral, Nightly, Candi Benson MD, 40 mg at 12/20/23 2153    methylPREDNISolone sodium succinate (SOLU-Medrol) injection 60 mg, 60 mg, Intravenous, Q8H, Candi Benson MD, 60 mg at 12/21/23 1959    metoprolol succinate XL (TOPROL-XL) 24 hr tablet 50 mg, 50  mg, Oral, Daily, Candi Benson MD, 50 mg at 12/21/23 0849    montelukast (SINGULAIR) tablet 10 mg, 10 mg, Oral, Nightly, Candi Benson MD, 10 mg at 12/20/23 2153    nirmatrelvir and ritonavir (300mg/100mg)(PAXLOVID) 3 tablet pack, 3 tablet, Oral, BID, Marylin Decker MD    nitroglycerin (NITROSTAT) SL tablet 0.4 mg, 0.4 mg, Sublingual, Q5 Min PRN, Candi Benson MD    ondansetron (ZOFRAN) injection 4 mg, 4 mg, Intravenous, Q6H PRN, Candi Benson MD    sertraline (ZOLOFT) tablet 100 mg, 100 mg, Oral, Daily, Candi Benson MD, 100 mg at 12/21/23 0851    sodium chloride 0.9 % flush 10 mL, 10 mL, Intravenous, Q12H, Candi Benson MD, 10 mL at 12/21/23 2001    sodium chloride 0.9 % flush 10 mL, 10 mL, Intravenous, PRN, Candi Benson MD, 10 mL at 12/21/23 0338    sodium chloride 0.9 % infusion 40 mL, 40 mL, Intravenous, PRN, Candi Benson MD    topiramate (TOPAMAX) tablet 50 mg, 50 mg, Oral, BID, Candi Benson MD, 50 mg at 12/21/23 0849    I have reviewed medication list.    Assessment & Plan     Active Problems:    Bronchitis  -  IV Abx, CxR Negative, Pulmonary consulted    COVID 19 Positive - ID consulted    Acute respiratory distress - Much better with IV Steroid and O2 PRN    COPD Exacerbation - Steroid, MDI    HTN - continue Home Medicine    Hx of Breast Cancer - continue Home medicine    Stress ulcer/DVT prophylaxis             Plan for disposition: Home at discharge    Candi Benson MD  12/21/23  20:04 EST

## 2023-12-22 NOTE — PROGRESS NOTES
Exercise Oximetry    Patient Name:Monse Donnelly   MRN: [id number]   Date: 12/22/23             ROOM AIR BASELINE   SpO2% 97   Heart Rate 85   Blood Pressure      EXERCISE ON ROOM AIR SpO2% EXERCISE ON O2 @ LPM SpO2%   1 MINUTE 97 1 MINUTE    2 MINUTES 97 2 MINUTES    3 MINUTES 98 3 MINUTES    4 MINUTES 95 4 MINUTES    5 MINUTES 97 5 MINUTES    6 MINUTES 98 6 MINUTES               Distance Walked  6 minutes in room Distance Walked   Dyspnea (Alejandra Scale)   Dyspnea (Alejandra Scale)   Fatigue (Alejandra Scale)   Fatigue (Alejandra Scale)   SpO2% Post Exercise  95 SpO2% Post Exercise   HR Post Exercise  92 HR Post Exercise   Time to Recovery  0 Time to Recovery     Comments: Patient walked entire 6 minutes without pausing or stopping.

## 2023-12-22 NOTE — PLAN OF CARE
Problem: Adult Inpatient Plan of Care  Goal: Absence of Hospital-Acquired Illness or Injury  Intervention: Identify and Manage Fall Risk  Description: Perform standard risk assessment on admission using a validated tool or comprehensive approach appropriate to the patient; reassess fall risk frequently, with change in status or transfer to another level of care.  Communicate fall injury risk to interprofessional healthcare team.  Determine need for increased observation, equipment and environmental modification, such as low bed, signage and supportive, nonskid footwear.  Adjust safety measures to individual developmental age, stage and identified risk factors.  Reinforce the importance of safety and physical activity with patient and family.  Perform regular intentional rounding to assess need for position change, pain assessment and personal needs, including assistance with toileting.  Recent Flowsheet Documentation  Taken 12/22/2023 0430 by Abdon Morel LPN  Safety Promotion/Fall Prevention:   safety round/check completed   room organization consistent   nonskid shoes/slippers when out of bed   muscle strengthening facilitated   mobility aid in reach   lighting adjusted   clutter free environment maintained   assistive device/personal items within reach  Taken 12/22/2023 0200 by Abdon Morel LPN  Safety Promotion/Fall Prevention:   safety round/check completed   room organization consistent   nonskid shoes/slippers when out of bed   muscle strengthening facilitated   mobility aid in reach   lighting adjusted   clutter free environment maintained   assistive device/personal items within reach  Taken 12/22/2023 0030 by Abdon Morel LPN  Safety Promotion/Fall Prevention:   safety round/check completed   room organization consistent   nonskid shoes/slippers when out of bed   muscle strengthening facilitated   mobility aid in reach   lighting adjusted   fall prevention program maintained    clutter free environment maintained   assistive device/personal items within reach   activity supervised  Taken 12/21/2023 2200 by Abdon Morel LPN  Safety Promotion/Fall Prevention:   safety round/check completed   room organization consistent   nonskid shoes/slippers when out of bed   muscle strengthening facilitated   mobility aid in reach   lighting adjusted   fall prevention program maintained   clutter free environment maintained   assistive device/personal items within reach   activity supervised  Taken 12/21/2023 2005 by Abdon Morel LPN  Safety Promotion/Fall Prevention:   safety round/check completed   room organization consistent   nonskid shoes/slippers when out of bed   muscle strengthening facilitated   mobility aid in reach   lighting adjusted   fall prevention program maintained   clutter free environment maintained   assistive device/personal items within reach   activity supervised  Intervention: Prevent Skin Injury  Description: Perform a screening for skin injury risk, such as pressure or moisture associated skin damage on admission and at regular intervals throughout hospital stay.  Keep all areas of skin (especially folds) clean and dry.  Maintain adequate skin hydration.  Relieve and redistribute pressure and protect bony prominences; implement measures based on patient-specific risk factors.  Match turning and repositioning schedule to clinical condition.  Encourage weight shift frequently; assist with reposition if unable to complete independently.  Float heels off bed; avoid pressure on the Achilles tendon.  Keep skin free from extended contact with medical devices.  Encourage functional activity and mobility, as early as tolerated.  Use aids (e.g., slide boards, mechanical lift) during transfer.  Recent Flowsheet Documentation  Taken 12/22/2023 0430 by Abdon Morel LPN  Body Position: position changed independently  Taken 12/22/2023 0300 by Abdon Morel  LPN  Body Position: position changed independently  Taken 12/22/2023 0200 by Abdon Morel LPN  Body Position: position changed independently  Taken 12/22/2023 0030 by Abdon Morel LPN  Body Position: position changed independently  Taken 12/21/2023 2200 by Abdon Mroel LPN  Body Position: position changed independently  Taken 12/21/2023 2005 by Abdon Morel LPN  Body Position: position changed independently  Intervention: Prevent and Manage VTE (Venous Thromboembolism) Risk  Description: Assess for VTE (venous thromboembolism) risk.  Encourage and assist with early ambulation.  Initiate and maintain compression or other therapy, as indicated, based on identified risk in accordance with organizational protocol and provider order.  Encourage both active and passive leg exercises while in bed, if unable to ambulate.  Recent Flowsheet Documentation  Taken 12/22/2023 0430 by Abdon Morel LPN  Activity Management: activity encouraged  Taken 12/22/2023 0300 by Abdon Morel LPN  Activity Management:   up ad taty   ambulated to bathroom  Taken 12/22/2023 0200 by Abdon Morel LPN  Activity Management: up ad taty  Taken 12/22/2023 0030 by Abdon Morel LPN  Activity Management: activity encouraged  Taken 12/21/2023 2200 by Abdon Morel LPN  Activity Management: activity encouraged  Taken 12/21/2023 2005 by Abdon Morel LPN  Activity Management:   activity encouraged   ambulated to bathroom   up ad taty  Intervention: Prevent Infection  Description: Maintain skin and mucous membrane integrity; promote hand, oral and pulmonary hygiene.  Optimize fluid balance, nutrition, sleep and glycemic control to maximize infection resistance.  Identify potential sources of infection early to prevent or mitigate progression of infection (e.g., wound, lines, devices).  Evaluate ongoing need for invasive devices; remove promptly when no longer indicated.  Recent  Flowsheet Documentation  Taken 12/22/2023 0430 by Abdon Morel LPN  Infection Prevention:   visitors restricted/screened   single patient room provided   rest/sleep promoted   personal protective equipment utilized   hand hygiene promoted  Taken 12/22/2023 0200 by Abdon Morel LPN  Infection Prevention:   visitors restricted/screened   single patient room provided   rest/sleep promoted   personal protective equipment utilized   hand hygiene promoted  Taken 12/22/2023 0030 by Abdon Morel LPN  Infection Prevention:   visitors restricted/screened   single patient room provided   rest/sleep promoted   personal protective equipment utilized   hand hygiene promoted  Taken 12/21/2023 2200 by Abdon Morel LPN  Infection Prevention:   visitors restricted/screened   single patient room provided   rest/sleep promoted   personal protective equipment utilized   hand hygiene promoted  Taken 12/21/2023 2005 by Abdon Morel LPN  Infection Prevention:   visitors restricted/screened   single patient room provided   rest/sleep promoted   personal protective equipment utilized   hand hygiene promoted  Goal: Optimal Comfort and Wellbeing  Intervention: Monitor Pain and Promote Comfort  Description: Assess pain level, treatment efficacy and patient response at regular intervals using a consistent pain scale.  Consider the presence and impact of preexisting chronic pain.  Encourage patient and caregiver involvement in pain assessment, interventions and safety measures.  Recent Flowsheet Documentation  Taken 12/22/2023 0430 by Abdon Morel LPN  Pain Management Interventions:   see MAR   quiet environment facilitated   position adjusted   pain management plan reviewed with patient/caregiver   massage provided  Taken 12/22/2023 0030 by Abdon Morel LPN  Pain Management Interventions:   see MAR   quiet environment facilitated   position adjusted   pain management plan reviewed with  patient/caregiver   Goal Outcome Evaluation:progress on going

## 2023-12-23 NOTE — OUTREACH NOTE
Prep Survey      Flowsheet Row Responses   [facility] discharged from? [facility]   Is LACE score < 7 ? No   Eligibility Readm Mgmt   Discharge diagnosis acute resp distress, bronchitis, COVID-19, PNA   Does the patient have one of the following disease processes/diagnoses(primary or secondary)? Pneumonia   Does the patient have Home health ordered? No   Is there a DME ordered? No   Prep survey completed? Yes            Charmaine PICKENS - Registered Nurse

## 2023-12-25 LAB
BACTERIA SPEC AEROBE CULT: NORMAL
BACTERIA SPEC AEROBE CULT: NORMAL

## 2023-12-26 ENCOUNTER — READMISSION MANAGEMENT (OUTPATIENT)
Dept: CALL CENTER | Facility: HOSPITAL | Age: 73
End: 2023-12-26
Payer: MEDICARE

## 2023-12-27 NOTE — DISCHARGE SUMMARY
Date of Discharge:  12/22/2023    Discharge Diagnosis:   Bronchitis      COVID 19 Positive     Acute respiratory distress     COPD Exacerbation    HTN    Hx of Breast Cancer       Presenting Problem/History of Present Illness  Active Hospital Problems    Diagnosis  POA    Cytokine release syndrome, grade 1 [D89.831]  No    Pneumonia [J18.9]  Yes      Resolved Hospital Problems   No resolved problems to display.          Hospital Course  Patient is a 73 y.o. female presented with F/C, SOB and cough, Dx with COVID 19 positive, started on IV Steroid, Abx, nebs and O2, ID consulted pt was started on po anti-viral and her O2 sat improved, and ID cleared her to be discharged home on po medicine and pt passed 6 minutes walk so pt discharged to home.      Procedures Performed         Consults:   Consults       Date and Time Order Name Status Description    12/20/2023  9:10 PM Inpatient Infectious Diseases Consult Completed             Pertinent Test Results:  Lab Results (last 24 hours)       Procedure Component Value Units Date/Time    Comprehensive Metabolic Panel [[id number]]  (Abnormal) Collected: 12/22/23 0047    Specimen: Blood Updated: 12/22/23 0146     Glucose 145 mg/dL      BUN 21 mg/dL      Creatinine 0.59 mg/dL      Sodium 144 mmol/L      Potassium 4.1 mmol/L      Chloride 112 mmol/L      CO2 22.0 mmol/L      Calcium 9.9 mg/dL      Total Protein 6.5 g/dL      Albumin 3.9 g/dL      ALT (SGPT) 18 U/L      AST (SGOT) 17 U/L      Alkaline Phosphatase 92 U/L      Total Bilirubin <0.2 mg/dL      Globulin 2.6 gm/dL      A/G Ratio 1.5 g/dL      BUN/Creatinine Ratio 35.6     Anion Gap 10.0 mmol/L      eGFR 95.3 mL/min/1.73     Narrative:      GFR Normal >60  Chronic Kidney Disease <60  Kidney Failure <15    The GFR formula is only valid for adults with stable renal function between ages 18 and 70.    C-reactive Protein [[id number]]  (Abnormal) Collected: 12/21/23 0007    Specimen: Blood Updated: 12/21/23 1450      C-Reactive Protein 5.12 mg/dL            I have reviewed lab results.    Condition on Discharge:  Improved     Vital Signs       Physical Exam:     General Appearance:    Alert, cooperative, in no acute distress   Ears:    Ears appear intact with no abnormalities noted   Throat:   No oral lesions, no thrush, oral mucosa moist   Neck:   No adenopathy, supple, trachea midline, no thyromegaly, no   carotid bruit, no JVD   Lungs:     Occasional wheezing to auscultation,respirations regular, even and  Unlabored     Heart:    Regular rhythm and normal rate, normal S1 and S2, no          murmur, no gallop, no rub, no click   Abdomen:     Normal bowel sounds, no masses, no organomegaly, soft      non-tender, non-distended, no guarding, no rebound                tenderness   Extremities:   Moves all extremities well, no edema, no cyanosis, no           redness   Skin:   No bleeding, bruising or rash   Neurologic:   Cranial nerves 2 - 12 grossly intact, sensation intact, DTR       present and equal bilaterally       Discharge Disposition  Home or Self Care    Discharge Medications     Discharge Medications        New Medications        Instructions Start Date   budesonide-formoterol 160-4.5 MCG/ACT inhaler  Commonly known as: SYMBICORT   2 puffs, Inhalation, 2 Times Daily - RT      Nirmatrelvir & Ritonavir (300mg/100mg) 20 x 150 MG & 10 x 100MG tablet therapy pack tablet  Commonly known as: PAXLOVID   3 tablets, Oral, 2 Times Daily      predniSONE 20 MG tablet  Commonly known as: DELTASONE   20 mg, Oral, Daily             Changes to Medications        Instructions Start Date   metoprolol succinate XL 50 MG 24 hr tablet  Commonly known as: TOPROL-XL  What changed: when to take this   50 mg, Oral, Daily, Take DOS             Continue These Medications        Instructions Start Date   albuterol sulfate  (90 Base) MCG/ACT inhaler  Commonly known as: PROVENTIL HFA;VENTOLIN HFA;PROAIR HFA   2 puffs, Inhalation, Every 6 Hours  PRN      ALENDRONATE SODIUM PO   70 mg, Oral, Weekly, Patient states takes on Wednesdays       anastrozole 1 MG tablet  Commonly known as: ARIMIDEX   1 mg, Oral, Daily      aspirin 81 MG EC tablet   81 mg, Oral, Daily      atorvastatin 20 MG tablet  Commonly known as: LIPITOR   20 mg, Oral, Daily      azelastine 0.1 % nasal spray  Commonly known as: ASTELIN   2 sprays, Nasal, 2 Times Daily, Use in each nostril as directed      dilTIAZem  MG 24 hr capsule  Commonly known as: CARDIZEM CD   120 mg, Oral, Every 24 Hours Scheduled      famotidine 40 MG tablet  Commonly known as: PEPCID   40 mg, Oral, Daily      ipratropium-albuterol 0.5-2.5 mg/3 ml nebulizer  Commonly known as: DUO-NEB   3 mL, Nebulization, Every 6 Hours      lisinopril 40 MG tablet  Commonly known as: PRINIVIL,ZESTRIL   40 mg, Oral, Nightly, LD 1/4      montelukast 10 MG tablet  Commonly known as: SINGULAIR   10 mg, Oral, Nightly      pantoprazole 40 MG EC tablet  Commonly known as: PROTONIX   40 mg, Oral, Daily      sertraline 100 MG tablet  Commonly known as: ZOLOFT   100 mg, Oral, Daily, Take DOS      topiramate 50 MG tablet  Commonly known as: TOPAMAX   50 mg, Oral, 2 Times Daily               Discharge Diet:   Diet Instructions       Diet: Cardiac Diets; Healthy Heart (2-3 Na+); Thin (IDDSI 0)      Discharge Diet: Cardiac Diets    Cardiac Diet: Healthy Heart (2-3 Na+)    Fluid Consistency: Thin (IDDSI 0)            Activity at Discharge:     Follow-up Appointments  Future Appointments   Date Time Provider Department Center   6/4/2024  1:30 PM Ryder Agosto MD [facility]     Additional Instructions for the Follow-ups that You Need to Schedule       Call MD With Problems / Concerns   As directed      Instructions: Called [phone] if fever greater then 101, nausea/vomiting, chest pain, shortness of breath or abdominal pain    Order Comments: Instructions: Called [phone] if fever greater then 101, nausea/vomiting,  chest pain, shortness of breath or abdominal pain         Discharge Follow-up with PCP   As directed       Currently Documented PCP:    Candi Benson MD    PCP Phone Number:    [phone]     Follow Up Details: Hospital Follow up appointment with Dr. Benson on Wednesday 12/27/2023 230 pm .   Office number [phone]                Test Results Pending at Discharge       Candi Benson MD  12/26/23  20:30 EST

## 2023-12-27 NOTE — OUTREACH NOTE
COPD/PN Week 1 Survey      Flowsheet Row Responses   [facility] [place] patient discharged from? [facility]   Does the patient have one of the following disease processes/diagnoses(primary or secondary)? Pneumonia   Week 1 attempt successful? Yes   Call start time 1900   Call end time 1913   Discharge diagnosis acute resp distress, bronchitis, COVID-19, PNA   Meds reviewed with patient/caregiver? Yes   Does the patient have all medications ordered at discharge? No   Prescription comments Patient states pharmacy came to home to deliver but she didn't hear them.  States then they were closed on Sun and Mon.  Advised to contact them to have them deliver.   Is the patient taking all medications as directed (includes completed medication regime)? No   Medication comments States she has been taking Tylenol severe cold and flu.   Does the patient have a primary care provider?  Yes   Does the patient have an appointment with their PCP or specialist within 7 days of discharge? No   What is preventing the patient from scheduling follow up appointments within 7 days of discharge? Haven't had time   Nursing Interventions Advised patient to make appointment   Has the patient kept scheduled appointments due by today? N/A   Psychosocial issues? No   Did the patient receive a copy of their discharge instructions? Yes   Nursing interventions Reviewed instructions with patient   What is the patient's perception of their health status since discharge? Improving   Is the patient/caregiver able to teach back the hierarchy of who to call/visit for symptoms/problems? PCP, Specialist, Home health nurse, Urgent Care, ED, 911 Yes   Patient reports what zone on this call? Green Zone   Green Zone Breathing without shortness of breath, Reports doing well, Usual activity and exercise level, Slept well last night   Green Zone interventions: Take daily medications   Is the patient/caregiver able to teach back signs and symptoms of worsening condition:  Fever/chills, Shortness of breath, Chest pain   Week 1 call completed? Yes   Wrap up additional comments Pt to contact Dr. Benson regarding appt and that she has not been taking new mediations. Also to tell her she has been taking Tylenol Severe Cold and Flu in place.   Also, to contact [facility] to have them deliver medication. Pt does say she is improving with no issues with soa.   Call end time 1913            Shannon GALLARDO - Licensed Nurse

## 2024-01-05 ENCOUNTER — READMISSION MANAGEMENT (OUTPATIENT)
Dept: CALL CENTER | Facility: HOSPITAL | Age: 74
End: 2024-01-05
Payer: MEDICARE

## 2024-01-05 NOTE — OUTREACH NOTE
"COPD/PN Week 2 Survey      Flowsheet Row Responses   [facility]? [facility]   Does the patient have one of the following disease processes/diagnoses(primary or secondary)? Pneumonia   Week 2 attempt successful? No  [Reached son, Bobo, who has not seen patient \"in a few days, but she's doing okay\".]   Unsuccessful attempts Attempt 1            Vannessa AVALOS - Registered Nurse  "

## 2024-01-10 ENCOUNTER — READMISSION MANAGEMENT (OUTPATIENT)
Dept: CALL CENTER | Facility: HOSPITAL | Age: 74
End: 2024-01-10
Payer: MEDICARE

## 2024-01-10 NOTE — OUTREACH NOTE
COPD/PN Week 2 Survey      Flowsheet Row Responses   [facility] patient discharged from? [facility]   Does the patient have one of the following disease processes/diagnoses(primary or secondary)? Pneumonia   Week 2 attempt successful? Yes   Call start time 1323   Call end time 1326   Discharge diagnosis acute resp distress, bronchitis, COVID-19, PNA   Person spoke with today (if not patient) and relationship patient   Meds reviewed with patient/caregiver? Yes   Does the patient have a primary care provider?  Yes   Comments regarding PCP Sees pcp friday 01-12   Has home health visited the patient within 72 hours of discharge? N/A   Psychosocial issues? No   Did the patient receive a copy of their discharge instructions? Yes   Nursing interventions Reviewed instructions with patient   What is the patient's perception of their health status since discharge? Improving   Nursing Interventions Nurse provided patient education   Is the patient/caregiver able to teach back the hierarchy of who to call/visit for symptoms/problems? PCP, Specialist, Home health nurse, Urgent Care, ED, 911 Yes   Is the patient/caregiver able to teach back signs and symptoms of worsening condition: Fever/chills, Shortness of breath, Chest pain   Is the patient/caregiver able to teach back importance of completing antibiotic course of treatment? Yes   Week 2 call completed? Yes   Graduated Yes   Is the patient interested in additional calls from an ambulatory ? No   Would this patient benefit from a Referral to Amb Social Work? No   Wrap up additional comments patient states she is doing well. She will see her pcp this week. no concerns or questions noted.   Call end time 1326            Kecia AVALOS - Registered Nurse

## 2024-07-02 ENCOUNTER — TELEPHONE (OUTPATIENT)
Dept: CARDIOLOGY | Facility: CLINIC | Age: 74
End: 2024-07-02

## 2024-07-02 ENCOUNTER — OFFICE VISIT (OUTPATIENT)
Dept: CARDIOLOGY | Facility: CLINIC | Age: 74
End: 2024-07-02
Payer: MEDICARE

## 2024-07-02 VITALS
BODY MASS INDEX: 32.86 KG/M2 | WEIGHT: 163 LBS | SYSTOLIC BLOOD PRESSURE: 144 MMHG | HEIGHT: 59 IN | HEART RATE: 63 BPM | DIASTOLIC BLOOD PRESSURE: 94 MMHG | OXYGEN SATURATION: 96 %

## 2024-07-02 DIAGNOSIS — I10 ESSENTIAL HYPERTENSION: ICD-10-CM

## 2024-07-02 DIAGNOSIS — I25.10 CORONARY ARTERY DISEASE INVOLVING NATIVE CORONARY ARTERY OF NATIVE HEART WITHOUT ANGINA PECTORIS: Primary | ICD-10-CM

## 2024-07-02 DIAGNOSIS — E66.9 OBESITY (BMI 30-39.9): ICD-10-CM

## 2024-07-02 DIAGNOSIS — E78.5 DYSLIPIDEMIA: ICD-10-CM

## 2024-07-02 DIAGNOSIS — R73.9 HYPERGLYCEMIA: ICD-10-CM

## 2024-07-02 PROCEDURE — 1160F RVW MEDS BY RX/DR IN RCRD: CPT | Performed by: INTERNAL MEDICINE

## 2024-07-02 PROCEDURE — 1159F MED LIST DOCD IN RCRD: CPT | Performed by: INTERNAL MEDICINE

## 2024-07-02 PROCEDURE — 93000 ELECTROCARDIOGRAM COMPLETE: CPT | Performed by: INTERNAL MEDICINE

## 2024-07-02 PROCEDURE — 3077F SYST BP >= 140 MM HG: CPT | Performed by: INTERNAL MEDICINE

## 2024-07-02 PROCEDURE — 3080F DIAST BP >= 90 MM HG: CPT | Performed by: INTERNAL MEDICINE

## 2024-07-02 PROCEDURE — 99214 OFFICE O/P EST MOD 30 MIN: CPT | Performed by: INTERNAL MEDICINE

## 2024-07-02 RX ORDER — ATORVASTATIN CALCIUM 20 MG/1
20 TABLET, FILM COATED ORAL DAILY
Qty: 90 TABLET | Refills: 3 | Status: SHIPPED | OUTPATIENT
Start: 2024-07-02

## 2024-07-02 RX ORDER — LISINOPRIL 40 MG/1
40 TABLET ORAL NIGHTLY
Qty: 90 TABLET | Refills: 3 | Status: SHIPPED | OUTPATIENT
Start: 2024-07-02

## 2024-07-02 RX ORDER — ASPIRIN 81 MG/1
81 TABLET ORAL DAILY
Qty: 90 TABLET | Refills: 3 | Status: SHIPPED | OUTPATIENT
Start: 2024-07-02

## 2024-07-02 RX ORDER — DILTIAZEM HYDROCHLORIDE 120 MG/1
120 CAPSULE, COATED, EXTENDED RELEASE ORAL
Qty: 30 CAPSULE | Refills: 11 | Status: SHIPPED | OUTPATIENT
Start: 2024-07-02 | End: 2025-06-27

## 2024-07-02 RX ORDER — METOPROLOL SUCCINATE 50 MG/1
50 TABLET, EXTENDED RELEASE ORAL DAILY
Qty: 90 TABLET | Refills: 3 | Status: SHIPPED | OUTPATIENT
Start: 2024-07-02

## 2024-07-02 NOTE — PROGRESS NOTES
Subjective:     Encounter Date:07/02/2024      Patient ID: Monse Donnelly is a 73 y.o. female.    Chief Complaint and history of present illness:     Abnormal stress test, CAD     History of Present Illness:       Ms. Monse Donnelly has PMH of     CAD, cath 4/12/2023 revealed 50% ostial LAD disease  Hypertension  Dyslipidemia  COPD, YESSICA  CA breast and left mastectomy  Positive family history of CAD in father  Allergy/intolerance to adhesive tape     Here for follow-up.  Patient had cardiac cath done 4/12/2023 which revealed borderline ostial LAD disease.  Denies any chest pain or shortness of breath.     Patient's arterial blood pressure is 144/74, heart rate 63 beats per minute, O2 sat of 96% on room air.  BMI is over 30.     Lexiscan Cardiolite 3/21/2023 reveals moderate-sized inferior ischemia EF over 70%.  Cardiac cath 4/12/2023 revealed 50% ostial LAD disease     Labs from 11/14/2022 reveal CMP with a glucose of 100, normal CBC.  Lipid profile with cholesterol 164, triglycerides 120, HDL 53, LDL 90.  Labs from 12/22/2023 reveal CMP with a glucose of 145     Assessment:  :        CAD  Hypertension  Dyslipidemia  COPD  Hyperglycemia        Recommendations / Plan:         Reviewed EKG results with patient  Patient's blood pressure is elevated will increase medication.  But patient does not know the doses of medication.  Will send the prescriptions and see if pharmacy changes in doses.  Including diltiazem 120 CD.  We will continue aspirin, metoprolol, lisinopril and atorvastatin to help with CAD, hypertension, dyslipidemia as tolerated.  Follow-up with PMD for labs including hyperglycemia.  Reviewed BMI over 30 counseled on weight loss diet and exercise per  We will follow-up and consider further evaluation and treatment.     Follow-up with PMD for labs and dyslipidemia.             ECG 12 Lead    Date/Time: 7/2/2024 12:03 PM  Performed by: Ryder Agosto MD    Authorized by: Ryder Agosto MD   Comparison: compared with previous ECG from 6/30/2023  Comparison to previous ECG: EKG done today reviewed/interpreted by me reveals sinus rhythm with a rate of 65 bpm with Q waves in V1 V2, no significant change compared EKG from 6/30/2023          Copied text in this portion of the note has been reviewed and is accurate as of 7/2/2024  The following portions of the patient's history were reviewed and updated as appropriate: allergies, current medications, past family history, past medical history, past social history, past surgical history and problem list.    Assessment:         [facility]       Diagnosis Plan   1. Coronary artery disease involving native coronary artery of native heart without angina pectoris        2. Essential hypertension        3. Dyslipidemia        4. Hyperglycemia        5. Obesity (BMI 30-39.9)               Plan:               Past Medical History:  Past Medical History:   Diagnosis Date    Arthritis     Asthma     Boils of multiple sites 5/28    Breast cancer     Cancer     COPD (chronic obstructive pulmonary disease)     Coronary artery disease     Drug therapy     Elevated cholesterol     GERD (gastroesophageal reflux disease)     H/O Bell's palsy     History of transfusion     Hypertension     Hypertensive heart disease with chronic diastolic congestive heart failure 4/12/2023    Neuropathy     feet and hands post chemo    Sciatic nerve pain     Sleep apnea      Past Surgical History:  Past Surgical History:   Procedure Laterality Date    ABDOMINAL SURGERY      ANKLE SURGERY      BREAST BIOPSY      BREAST LUMPECTOMY      CARDIAC CATHETERIZATION Right 4/12/2023    Procedure: Left Heart Cath;  Surgeon: Ryder Agosto MD;  Location: [facility] CATH INVASIVE LOCATION;  Service: Cardiovascular;  Laterality: Right;    CERVICAL CONIZATION      MASTECTOMY Right 6/10/2021    Procedure: MASTECTOMY MODIFIED RADICAL RIGHT;  Surgeon: Kenrick Ragsdale MD;  Location: [facility] OR;   Service: General;  Laterality: Right;    SIMPLE MASTECTOMY Left 6/10/2021    Procedure: MASTECTOMY SIMPLE;  Surgeon: Kenrick Ragsdale MD;  Location: [facility] MAIN OR;  Service: General;  Laterality: Left;    TUBAL ABDOMINAL LIGATION      VENOUS ACCESS DEVICE (PORT) INSERTION Left 7/30/2020    Procedure: INSERTION VENOUS ACCESS DEVICE LEFT internal jugular;  Surgeon: Taylor Kaye MD;  Location: [facility] MAIN OR;  Service: General;  Laterality: Left;    VENOUS ACCESS DEVICE (PORT) REMOVAL Left 1/6/2022    Procedure: REMOVAL VENOUS ACCESS DEVICE;  Surgeon: Kenrick Ragsdale MD;  Location: [facility] MAIN OR;  Service: General;  Laterality: Left;      Allergies:  Allergies   Allergen Reactions    Penicillins Anaphylaxis    Doxycycline Unknown - High Severity    Adhesive Tape Rash     Blisters       Home Meds:  Current Meds:     Current Outpatient Medications:     albuterol sulfate  (90 Base) MCG/ACT inhaler, Inhale 2 puffs Every 6 (Six) Hours As Needed., Disp: , Rfl:     ALENDRONATE SODIUM PO, Take 70 mg by mouth 1 (One) Time Per Week. Patient states takes on Wednesdays, Disp: , Rfl:     anastrozole (ARIMIDEX) 1 MG tablet, Take 1 tablet by mouth Daily., Disp: , Rfl:     aspirin 81 MG EC tablet, Take 1 tablet by mouth Daily., Disp: 90 tablet, Rfl: 3    atorvastatin (LIPITOR) 20 MG tablet, Take 1 tablet by mouth Daily., Disp: 90 tablet, Rfl: 3    famotidine (PEPCID) 40 MG tablet, Take 1 tablet by mouth Daily., Disp: , Rfl:     lisinopril (PRINIVIL,ZESTRIL) 40 MG tablet, Take 1 tablet by mouth Every Night. LD 1/4, Disp: 90 tablet, Rfl: 3    metoprolol succinate XL (TOPROL-XL) 50 MG 24 hr tablet, Take 1 tablet by mouth Daily. Take DOS, Disp: 90 tablet, Rfl: 3    montelukast (SINGULAIR) 10 MG tablet, Take 1 tablet by mouth Every Night., Disp: , Rfl:     pantoprazole (PROTONIX) 40 MG EC tablet, Take 1 tablet by mouth Daily., Disp: , Rfl:     sertraline (ZOLOFT) 100 MG tablet, Take 1 tablet by mouth Daily. Take  "DOS, Disp: , Rfl:     azelastine (ASTELIN) 0.1 % nasal spray, 2 sprays into the nostril(s) as directed by provider 2 (Two) Times a Day. Use in each nostril as directed, Disp: , Rfl:     budesonide-formoterol (SYMBICORT) 160-4.5 MCG/ACT inhaler, Inhale 2 puffs 2 (Two) Times a Day for 30 days., Disp: 1 each, Rfl: 0    dilTIAZem CD (CARDIZEM CD) 120 MG 24 hr capsule, Take 1 capsule by mouth Daily for 30 days., Disp: 30 capsule, Rfl: 0    ipratropium-albuterol (DUO-NEB) 0.5-2.5 mg/3 ml nebulizer, Take 3 mL by nebulization Every 6 (Six) Hours. (Patient not taking: Reported on 7/2/2024), Disp: , Rfl:     topiramate (TOPAMAX) 50 MG tablet, Take 1 tablet by mouth 2 (Two) Times a Day., Disp: , Rfl:   Social History:   Social History     Tobacco Use    Smoking status: Never     Passive exposure: Never    Smokeless tobacco: Never   Substance Use Topics    Alcohol use: Never      Family History:  Family History   Problem Relation Age of Onset    Heart disease Father     Stroke Father     Diabetes Father     Diabetes Son               Review of Systems   Constitutional: Positive for malaise/fatigue.   Cardiovascular:  Positive for leg swelling. Negative for chest pain and palpitations.   Respiratory:  Negative for shortness of breath.    Skin:  Negative for rash.   Neurological:  Negative for dizziness, light-headedness and numbness.     All other systems are negative         Objective:     Physical Exam  /94   Pulse 63   Ht 149.9 cm (59\")   Wt 73.9 kg (163 lb)   SpO2 96%   BMI 32.92 kg/m²   General:  Appears in no acute distress  Eyes: Sclera is anicteric,  conjunctiva is clear   HEENT:  No JVD.  No carotid bruits  Respiratory: Respirations regular and unlabored at rest.  Clear to auscultation  Cardiovascular: S1,S2 Regular rate and rhythm. No murmur, rub or gallop auscultated.   Extremities: No digital clubbing or cyanosis, no edema  Skin: Color pink. Skin warm and dry to touch. No rashes  No xanthoma  Neuro: Alert " "and awake.    Lab Reviewed:         Ryder Agosto MD  7/2/2024 12:10 EDT      EMR Dragon/Transcription:   \"Dictated utilizing Dragon dictation\".        "

## 2024-07-02 NOTE — TELEPHONE ENCOUNTER
Pt wants PCP to refill her meds     They were sent to [facility] today   Need to call them and CX

## 2025-06-30 NOTE — PROGRESS NOTES
Subjective:     Encounter Date:07/01/2025      Patient ID: Monse Donnelly is a 74 y.o. female.    Chief Complaint and history of present illness:     Follow-up for CAD, hypertension, dyslipidemia     History of Present Illness:       Ms. Monse Donnelly has PMH of     CAD, cath 4/12/2023 revealed 50% ostial LAD disease  Hypertension  Dyslipidemia  COPD, YESSICA  CA breast and left mastectomy  Positive family history of CAD in father  Allergy/intolerance to adhesive tape     Here for follow-up.  Patient had cardiac cath done 4/12/2023 which revealed borderline ostial LAD disease.  Patient is complaining of intermittent substernal chest pain at nighttime.  Chest pain is like heaviness.  And tightness.  Patient has shortness of breath and dyspnea on exertion.  Patient gets out of breath walking from the car to the house.       Patient's arterial blood pressure is 136/85, heart rate 72 beats per minute, O2 sat of 96% on room air.  BMI is over 30.     Lexiscan Cardiolite 3/21/2023 reveals moderate-sized inferior ischemia EF over 70%.  Cardiac cath 4/12/2023 revealed 50% ostial LAD disease     Labs from 11/14/2022 reveal CMP with a glucose of 100, normal CBC.  Lipid profile with cholesterol 164, triglycerides 120, HDL 53, LDL 90.  Labs from 12/22/2023 reveal CMP with a glucose of 145.  Labs from 5/5/2025 reveal CMP with a glucose of 141.  BNP mildly elevated at 115.  Normal CBC     Assessment:  :     Chest pain  BERMEO  CAD  Hypertension  Dyslipidemia  COPD  Hyperglycemia        Recommendations / Plan:        Reviewed EKG results with patient  Will schedule a stress test patient says she cannot walk due to shortness of breath.  Will do Lexiscan Cardiolite.  Will check an echocardiogram to evaluate right heart and left heart function.  Continue medical management with aspirin, atorvastatin, diltiazem CD, metoprolol succinate, lisinopril     Follow-up with PMD for labs including hyperglycemia.  Reviewed BMI over 30 counseled on weight  loss diet and exercise per  We will follow-up and consider further evaluation and treatment.     Follow-up with PMD for labs and dyslipidemia.          ECG 12 Lead    Date/Time: 7/1/2025 11:59 AM  Performed by: Ryder Agosto MD    Authorized by: Ryder Agosto MD  Comparison: compared with previous ECG from 7/2/2024  Comparison to previous ECG: EKG done today reviewed/interpreted by me reveals sinus rhythm with rate of 71 bpm, no significant change compared to EKG from 7-2-24          ECHOCARDIOGRAM:  Results for orders placed during the hospital encounter of 09/30/20    Adult Transthoracic Echo Complete W/ Cont if Necessary Per Protocol 09/30/2020  1:55 PM    Interpretation Summary  Normal LV size and contractility EF of 60 to 65%  Normal RV size  Normal atrial size  Aortic valve, mitral valve, tricuspid valve appears structurally normal, mild to moderate mitral regurgitation seen.  Trace tricuspid regurgitation seen, calculated RV systolic pressure of 29 mmHg  No pericardial effusion seen.  Proximal aorta appears normal in size.      STRESS TEST  Results for orders placed during the hospital encounter of 03/21/23    Stress test with myocardial perfusion one day 03/21/2023  5:18 PM    Interpretation Summary    Left ventricular ejection fraction is hyperdynamic (Calculated EF > 70%).    Myocardial perfusion imaging indicates a moderate-sized area of ischemia .    Impressions are consistent with a high risk study.    Findings consistent with a normal ECG stress test.    Patient underwent pharmacological stress test with Lexiscan followed by myocardial perfusion scintigraphy for evaluation of ischemia.  Baseline EKG showed sinus rhythm with nonspecific ST-T wave changes during Lexiscan infusion patient's blood pressure response was hypertensive.  No significant arrhythmias.  Myocardial perfusion scintigraphy revealed moderate inferolateral ischemia with EF of 71% with SDS score of 7.    Abnormal  stress test with moderate inferolateral ischemia      Electronically signed by Juan Cormier MD, 23, 5:18 PM EDT.          HEART CATHETERIZATION  Results for orders placed during the hospital encounter of 23    Cardiac Catheterization/Vascular Study    Conclusion  Table formatting from the original result was not included.  2023      Heart Cath Report    NAME:              Monse Donnelly  :                1950  AGE/SEX:        72 y.o. female  MRN:                [id number]        Procedures Performed    Left heart catheterization  Selective coronary angiography  Left ventriculography  Mynx closure device    :   Ryder Agosto MD    Vascular Access Site: Femoral    Indication for procedure: Chest pain, dyspnea on exertion, fatigue, palpitations abnormal stress test, CAD, hypertension, dyslipidemia, COPD      Procedure Note    After discussing the risks, benefits, and alternatives of the procedure, informed consent was obtained.  Timeout was done before the procedure.  Moderate conscious sedation was given utilizing IV Versed and fentanyl administered by RN with continuous EKG oximetry and hemodynamic monitoring supervised by me throughout the entire case, conscious sedation time was 30 minutes.  I was present with the patient for the duration of moderate sedation and supervised staff who had no other duties and monitored the patient for the entire procedure patient had [place] 2-3 sedation scale. the vascular access site was prepped and draped in the usual sterile fashion.  2% lidocaine was used for local anesthesia. Appropriate landmarks were assessed.  A 6 Kinyarwanda short sheath was inserted in the artery using the modified Seldinger technique.    Selective coronary angiography was performed with JL4 and JR4 diagnostic catheters. Left ventriculogram  was performed with an angled pigtail catheter.  All exchanges were performed over the wire.  No specimens were removed.   There were no apparent acute or early complications.  The patient tolerated the procedure well and was transferred to the recovery area in stable condition.      Closure device: Mynx device was deployed successfully after right iliofemoral angiogram was performed. Good hemostasis was achieved.    Complications:  None  Blood Loss: minimal    Hemodynamics    Pressures    Ao:    171/100 mmHg  LV:    155/31 mmHg  End-diastolic pressure:  31  mmHg  No significant aortic valvular gradient on pullback    Coronary Angiography    Left Main :  The left main   separate ostia    Left Anterior Descending : 50% ostial disease, best visualized in Swedish cranial views.    Left Circumflex : Dominant vessel.  Without disease    Right Coronary Artery :  The right coronary artery   codominant vessel with 50% ostial narrowing    Dominance:  []  Left  []  Right  [x]  Co-Dominant      Left Ventriculography:    Estimated Ejection Fraction: 55 %  Wall motion abnormalities:  None  Mitral Regurgitation:  None    Impression:    Borderline ostial LAD disease which comes off from a separate ostium.  Elevated LVEDP    Recommendations:    Medical management and risk factor modification.        I sincerely appreciate the opportunity to participate in your patient's care. Please feel free to contact me anytime if I can be of assistance in this or any other way.      Pertinent History    Past Medical History:  Diagnosis Date   Arthritis   Asthma   Boils of multiple sites 5/28   Breast cancer   Cancer   COPD (chronic obstructive pulmonary disease)   Coronary artery disease   Drug therapy   Elevated cholesterol   GERD (gastroesophageal reflux disease)   H/O Bell's palsy   History of transfusion   Hypertension   Neuropathy  feet and hands post chemo   Sciatic nerve pain   Sleep apnea    Past Surgical History:  Procedure Laterality Date   ABDOMINAL SURGERY   ANKLE SURGERY   BREAST BIOPSY   BREAST LUMPECTOMY   CERVICAL CONIZATION   MASTECTOMY Right  6/10/2021  Procedure: MASTECTOMY MODIFIED RADICAL RIGHT;  Surgeon: Kenrick Ragsdale MD;  Location: [facility] MAIN OR;  Service: General;  Laterality: Right;   SIMPLE MASTECTOMY Left 6/10/2021  Procedure: MASTECTOMY SIMPLE;  Surgeon: Kenrick Ragsdale MD;  Location: [facility] MAIN OR;  Service: General;  Laterality: Left;   TUBAL ABDOMINAL LIGATION   VENOUS ACCESS DEVICE (PORT) INSERTION Left 7/30/2020  Procedure: INSERTION VENOUS ACCESS DEVICE LEFT internal jugular;  Surgeon: Taylor Kaye MD;  Location: [facility] MAIN OR;  Service: General;  Laterality: Left;   VENOUS ACCESS DEVICE (PORT) REMOVAL Left 1/6/2022  Procedure: REMOVAL VENOUS ACCESS DEVICE;  Surgeon: Kenrick Ragsdale MD;  Location: [facility] MAIN OR;  Service: General;  Laterality: Left;    Prior to Admission medications  Medication Sig Start Date End Date Taking? Authorizing Provider  anastrozole (ARIMIDEX) 1 MG tablet Take  by mouth Daily.   Yes Esperanza Watters MD  aspirin 81 MG EC tablet Take 1 tablet by mouth Daily. 4/4/23  Yes Ryder Agosto MD  atorvastatin (LIPITOR) 20 MG tablet Take 1 tablet by mouth Daily.   Yes Esperanza Watters MD  diphenhydrAMINE (BENADRYL) 25 mg capsule Take 1 capsule by mouth Every Night.   Yes Esperanza Watters MD  lisinopril (PRINIVIL,ZESTRIL) 40 MG tablet Take 1 tablet by mouth Every Night. LD 1/4   Yes Esperanza Watters MD  Loratadine 10 MG capsule Take  by mouth.   Yes Esperanza Watters MD  metoprolol succinate XL (TOPROL-XL) 50 MG 24 hr tablet Take 1 tablet by mouth Daily. Take DOS   Yes Esperanza Watters MD  montelukast (SINGULAIR) 10 MG tablet Take 1 tablet by mouth Every Night.   Yes Esperanza Watters MD  pantoprazole (PROTONIX) 40 MG EC tablet Take 1 tablet by mouth Daily.   Yes Esperanza Watters MD  sertraline (ZOLOFT) 100 MG tablet Take 1 tablet by mouth Daily. Take DOS   Yes Esperanza Watters MD  topiramate (TOPAMAX) 50 MG tablet Take 1 tablet by mouth  2 (Two) Times a Day.   Yes Esperanza Watters MD  albuterol sulfate  (90 Base) MCG/ACT inhaler Inhale 2 puffs Every 6 (Six) Hours As Needed.    Esperanza Watters MD  ALENDRONATE SODIUM PO Take 70 mg by mouth 1 (One) Time Per Week. Patient states takes on Wednesdays    Esperanza Watters MD      Pre-Procedure Notes  H&P Performed  [x]  Yes []  No       []  N/A    Indications:  []  ACS <= 24 HRS  []  ACS >24 HRS  []  New Onset Angina <= 2 mos  []  Worsening Angina  []  Resuscitated Cardiac Arrest  []  Angina on Exertion:  []  Suspected CAD  []  Valvular Disease  []  Pericardial Disease  []  Cardiac Arrythmia  []  Cardiomyopathy  []  LV Dysfunction  []  Syncope  []  Post Cardiac Transplant  []  Eval. For Exercise Clearance  []  Other  []  Pre-Operative Evaluation  If Pre-Op Eval:  Evaluation for Surgery Type:  []  Cardiac Surgery   []  Non-Cardiac Surgery  Functional Capacity:  []  <4 METS  []  >=4 METS w/o symptoms  []  >= 4 METS with symptoms  []  Unknown  Surgical Risk:  []  Low  []  Intermediate  []  High Risk: Vascular  []  High Risk Non-Vascular    Risks, Benefits, & Complications Discussed:  [x]  Yes  []  No  []  N/A    Questions Answered:  [x]  Yes  []  No  []  N/A    Consent Obtained:  [x]  Yes  []  No  []  N/A    CHF: []  Yes  [x]  No  If Yes:  Newly Diagnosed?  []  Yes  []  No  If Yes:  HF Type:  []  Diastolic  []  Systolic  []  Unknown      Ryder Agosto MD  4/12/2023  14:30 EDT  Electronically signed by Ryder Agosto MD, 04/12/23, 2:30 PM EDT.      Copied text in this portion of the note has been reviewed and is accurate as of 7/1/2025  The following portions of the patient's history were reviewed and updated as appropriate: allergies, current medications, past family history, past medical history, past social history, past surgical history and problem list.    Assessment:         [facility]       Diagnosis Plan   1. Coronary artery disease of bypass graft of native heart  with stable angina pectoris  ECG 12 Lead    Adult Transthoracic Echo Complete W/ Cont if Necessary Per Protocol    Stress Test With Myocardial Perfusion One Day    BNP      2. Essential hypertension  ECG 12 Lead    Adult Transthoracic Echo Complete W/ Cont if Necessary Per Protocol    Stress Test With Myocardial Perfusion One Day    BNP      3. Dyslipidemia  ECG 12 Lead    Adult Transthoracic Echo Complete W/ Cont if Necessary Per Protocol    Stress Test With Myocardial Perfusion One Day    BNP      4. Obesity (BMI 30-39.9)  ECG 12 Lead    Adult Transthoracic Echo Complete W/ Cont if Necessary Per Protocol    Stress Test With Myocardial Perfusion One Day    BNP      5. Hyperglycemia  ECG 12 Lead    Adult Transthoracic Echo Complete W/ Cont if Necessary Per Protocol    Stress Test With Myocardial Perfusion One Day    BNP      6. Dyspnea on exertion  Adult Transthoracic Echo Complete W/ Cont if Necessary Per Protocol    Stress Test With Myocardial Perfusion One Day    BNP             Plan:               Past Medical History:  Past Medical History:   Diagnosis Date    Arthritis     Asthma     Boils of multiple sites 5/28    Breast cancer     Cancer     COPD (chronic obstructive pulmonary disease)     Coronary artery disease     Drug therapy     Elevated cholesterol     GERD (gastroesophageal reflux disease)     H/O Bell's palsy     History of transfusion     Hypertension     Hypertensive heart disease with chronic diastolic congestive heart failure 4/12/2023    Neuropathy     feet and hands post chemo    Sciatic nerve pain     Sleep apnea      Past Surgical History:  Past Surgical History:   Procedure Laterality Date    ABDOMINAL SURGERY      ANKLE SURGERY      BREAST BIOPSY      BREAST LUMPECTOMY      CARDIAC CATHETERIZATION Right 4/12/2023    Procedure: Left Heart Cath;  Surgeon: Ryder Agosto MD;  Location: [facility] CATH INVASIVE LOCATION;  Service: Cardiovascular;  Laterality: Right;    CERVICAL  CONIZATION      MASTECTOMY Right 6/10/2021    Procedure: MASTECTOMY MODIFIED RADICAL RIGHT;  Surgeon: Kenrick Ragsdale MD;  [facility];  Service: General;  Laterality: Right;    SIMPLE MASTECTOMY Left 6/10/2021    Procedure: MASTECTOMY SIMPLE;  Surgeon: Kenrick Ragsdale MD;  [facility];  Service: General;  Laterality: Left;    TUBAL ABDOMINAL LIGATION      VENOUS ACCESS DEVICE (PORT) INSERTION Left 7/30/2020    Procedure: INSERTION VENOUS ACCESS DEVICE LEFT internal jugular;  Surgeon: Taylor Kaye MD;  [facility];  Service: General;  Laterality: Left;    VENOUS ACCESS DEVICE (PORT) REMOVAL Left 1/6/2022    Procedure: REMOVAL VENOUS ACCESS DEVICE;  Surgeon: Kenrick Ragsdale MD;  [facility];  Service: General;  Laterality: Left;      Allergies:  Allergies   Allergen Reactions    Penicillins Anaphylaxis    Doxycycline Unknown - High Severity    Adhesive Tape Rash     Blisters       Home Meds:  Current Meds:     Current Outpatient Medications:     ALENDRONATE SODIUM PO, Take 70 mg by mouth 1 (One) Time Per Week. Patient states takes on Wednesdays, Disp: , Rfl:     anastrozole (ARIMIDEX) 1 MG tablet, Take 1 tablet by mouth Daily., Disp: , Rfl:     aspirin 81 MG EC tablet, Take 1 tablet by mouth Daily., Disp: 90 tablet, Rfl: 3    atorvastatin (LIPITOR) 20 MG tablet, Take 1 tablet by mouth Daily., Disp: 90 tablet, Rfl: 3    denosumab (Prolia) 60 MG/ML solution prefilled syringe syringe, Inject  under the skin into the appropriate area as directed 1 (One) Time., Disp: , Rfl:     dilTIAZem CD (CARDIZEM CD) 120 MG 24 hr capsule, Take 1 capsule by mouth Daily for 360 days., Disp: 30 capsule, Rfl: 11    famotidine (PEPCID) 40 MG tablet, Take 1 tablet by mouth Daily., Disp: , Rfl:     lisinopril (PRINIVIL,ZESTRIL) 40 MG tablet, Take 1 tablet by mouth Every Night. LD 1/4, Disp: 90 tablet, Rfl: 3    metoprolol succinate XL (TOPROL-XL) 50 MG 24 hr  "tablet, Take 1 tablet by mouth Daily. Take DOS, Disp: 90 tablet, Rfl: 3    montelukast (SINGULAIR) 10 MG tablet, Take 1 tablet by mouth Every Night., Disp: , Rfl:     sertraline (ZOLOFT) 100 MG tablet, Take 1 tablet by mouth Daily. Take DOS, Disp: , Rfl:     albuterol sulfate  (90 Base) MCG/ACT inhaler, Inhale 2 puffs Every 6 (Six) Hours As Needed., Disp: , Rfl:     azelastine (ASTELIN) 0.1 % nasal spray, 2 sprays into the nostril(s) as directed by provider 2 (Two) Times a Day. Use in each nostril as directed, Disp: , Rfl:     budesonide-formoterol (SYMBICORT) 160-4.5 MCG/ACT inhaler, Inhale 2 puffs 2 (Two) Times a Day for 30 days., Disp: 1 each, Rfl: 0    ipratropium-albuterol (DUO-NEB) 0.5-2.5 mg/3 ml nebulizer, Take 3 mL by nebulization Every 6 (Six) Hours. (Patient not taking: Reported on 7/2/2024), Disp: , Rfl:     pantoprazole (PROTONIX) 40 MG EC tablet, Take 1 tablet by mouth Daily., Disp: , Rfl:     topiramate (TOPAMAX) 50 MG tablet, Take 1 tablet by mouth 2 (Two) Times a Day., Disp: , Rfl:   Social History:   Social History     Tobacco Use    Smoking status: Never     Passive exposure: Never    Smokeless tobacco: Never   Substance Use Topics    Alcohol use: Never      Family History:  Family History   Problem Relation Age of Onset    Heart disease Father     Stroke Father     Diabetes Father     Diabetes Son               Review of Systems   Constitutional: Negative for malaise/fatigue.   Cardiovascular:  Positive for chest pain and leg swelling. Negative for palpitations.   Respiratory:  Positive for shortness of breath.    Skin:  Negative for rash.   Neurological:  Positive for dizziness and light-headedness. Negative for numbness.     All other systems are negative         Objective:     Physical Exam  /85   Pulse 72   Ht 149.9 cm (59\")   Wt 74.8 kg (165 lb)   SpO2 96%   BMI 33.33 kg/m²   General:  Appears in no acute distress  Eyes: Sclera is anicteric,  conjunctiva is clear   HEENT:  " "No JVD.  No carotid bruits  Respiratory: Respirations regular and unlabored at rest.  Clear to auscultation  Cardiovascular: S1,S2 Regular rate and rhythm. .   Extremities: No digital clubbing or cyanosis, no edema  Skin: Color pink. Skin warm and dry to touch. No rashes  No xanthoma  Neuro: Alert and awake.    Lab Reviewed:         Ryder Agosto MD  7/1/2025 12:06 EDT      EMR Dragon/Transcription:   \"Dictated utilizing Dragon dictation\".        "

## 2025-07-01 ENCOUNTER — OFFICE VISIT (OUTPATIENT)
Dept: CARDIOLOGY | Facility: CLINIC | Age: 75
End: 2025-07-01
Payer: MEDICARE

## 2025-07-01 VITALS
HEIGHT: 59 IN | HEART RATE: 72 BPM | DIASTOLIC BLOOD PRESSURE: 85 MMHG | BODY MASS INDEX: 33.26 KG/M2 | SYSTOLIC BLOOD PRESSURE: 136 MMHG | WEIGHT: 165 LBS | OXYGEN SATURATION: 96 %

## 2025-07-01 DIAGNOSIS — I25.708 CORONARY ARTERY DISEASE OF BYPASS GRAFT OF NATIVE HEART WITH STABLE ANGINA PECTORIS: Primary | Chronic | ICD-10-CM

## 2025-07-01 DIAGNOSIS — I10 ESSENTIAL HYPERTENSION: ICD-10-CM

## 2025-07-01 DIAGNOSIS — R06.09 DYSPNEA ON EXERTION: ICD-10-CM

## 2025-07-01 DIAGNOSIS — R73.9 HYPERGLYCEMIA: ICD-10-CM

## 2025-07-01 DIAGNOSIS — E66.9 OBESITY (BMI 30-39.9): ICD-10-CM

## 2025-07-01 DIAGNOSIS — E78.5 DYSLIPIDEMIA: ICD-10-CM

## 2025-07-01 PROCEDURE — 3075F SYST BP GE 130 - 139MM HG: CPT | Performed by: INTERNAL MEDICINE

## 2025-07-01 PROCEDURE — 3079F DIAST BP 80-89 MM HG: CPT | Performed by: INTERNAL MEDICINE

## 2025-07-01 PROCEDURE — 99214 OFFICE O/P EST MOD 30 MIN: CPT | Performed by: INTERNAL MEDICINE

## 2025-07-01 PROCEDURE — 1160F RVW MEDS BY RX/DR IN RCRD: CPT | Performed by: INTERNAL MEDICINE

## 2025-07-01 PROCEDURE — 1159F MED LIST DOCD IN RCRD: CPT | Performed by: INTERNAL MEDICINE

## 2025-07-01 PROCEDURE — 93000 ELECTROCARDIOGRAM COMPLETE: CPT | Performed by: INTERNAL MEDICINE

## 2025-07-01 RX ORDER — DENOSUMAB 60 MG/ML
INJECTION SUBCUTANEOUS ONCE
COMMUNITY

## 2025-07-17 ENCOUNTER — APPOINTMENT (OUTPATIENT)
Dept: CARDIOLOGY | Facility: HOSPITAL | Age: 75
End: 2025-07-17
Payer: MEDICARE

## 2025-07-17 ENCOUNTER — HOSPITAL ENCOUNTER (OUTPATIENT)
Dept: CARDIOLOGY | Facility: HOSPITAL | Age: 75
Discharge: HOME | End: 2025-07-17
Payer: MEDICARE

## 2025-07-17 VITALS
DIASTOLIC BLOOD PRESSURE: 89 MMHG | HEART RATE: 71 BPM | HEIGHT: 59 IN | BODY MASS INDEX: 33.24 KG/M2 | SYSTOLIC BLOOD PRESSURE: 151 MMHG | WEIGHT: 164.9 LBS

## 2025-07-17 DIAGNOSIS — E66.9 OBESITY (BMI 30-39.9): ICD-10-CM

## 2025-07-17 DIAGNOSIS — E78.5 DYSLIPIDEMIA: ICD-10-CM

## 2025-07-17 DIAGNOSIS — I10 ESSENTIAL HYPERTENSION: ICD-10-CM

## 2025-07-17 DIAGNOSIS — R73.9 HYPERGLYCEMIA: ICD-10-CM

## 2025-07-17 DIAGNOSIS — R06.09 DYSPNEA ON EXERTION: ICD-10-CM

## 2025-07-17 DIAGNOSIS — I25.708 CORONARY ARTERY DISEASE OF BYPASS GRAFT OF NATIVE HEART WITH STABLE ANGINA PECTORIS: Chronic | ICD-10-CM

## 2025-07-17 LAB
AORTIC DIMENSIONLESS INDEX: 0.87 (DI)
AV MEAN PRESS GRAD SYS DOP V1V2: 5.3 MMHG
AV VMAX SYS DOP: 144.6 CM/SEC
BH CV ECHO LEFT VENTRICLE GLOBAL LONGITUDINAL STRAIN: -16.4 %
BH CV ECHO MEAS - ACS: 1.53 CM
BH CV ECHO MEAS - AI P1/2T: 310 MSEC
BH CV ECHO MEAS - AO MAX PG: 8.4 MMHG
BH CV ECHO MEAS - AO ROOT DIAM: 2.7 CM
BH CV ECHO MEAS - AO V2 VTI: 33.2 CM
BH CV ECHO MEAS - AVA(I,D): 2.43 CM2
BH CV ECHO MEAS - EDV(CUBED): 33.6 ML
BH CV ECHO MEAS - EDV(MOD-SP4): 63.3 ML
BH CV ECHO MEAS - EF(MOD-SP4): 49 %
BH CV ECHO MEAS - ESV(CUBED): 15.3 ML
BH CV ECHO MEAS - ESV(MOD-SP4): 32.3 ML
BH CV ECHO MEAS - FS: 23 %
BH CV ECHO MEAS - IVS/LVPW: 1.05 CM
BH CV ECHO MEAS - IVSD: 0.94 CM
BH CV ECHO MEAS - LA DIMENSION: 2.46 CM
BH CV ECHO MEAS - LAT PEAK E' VEL: 7.7 CM/SEC
BH CV ECHO MEAS - LV DIASTOLIC VOL/BSA (35-75): 37.2 CM2
BH CV ECHO MEAS - LV MASS(C)D: 80.7 GRAMS
BH CV ECHO MEAS - LV MAX PG: 4.9 MMHG
BH CV ECHO MEAS - LV MEAN PG: 3.1 MMHG
BH CV ECHO MEAS - LV SYSTOLIC VOL/BSA (12-30): 19 CM2
BH CV ECHO MEAS - LV V1 MAX: 110.9 CM/SEC
BH CV ECHO MEAS - LV V1 VTI: 28.8 CM
BH CV ECHO MEAS - LVIDD: 3.2 CM
BH CV ECHO MEAS - LVIDS: 2.48 CM
BH CV ECHO MEAS - LVOT AREA: 2.8 CM2
BH CV ECHO MEAS - LVOT DIAM: 1.89 CM
BH CV ECHO MEAS - LVPWD: 0.9 CM
BH CV ECHO MEAS - MED PEAK E' VEL: 5.8 CM/SEC
BH CV ECHO MEAS - MR MAX PG: 64.7 MMHG
BH CV ECHO MEAS - MR MAX VEL: 383.9 CM/SEC
BH CV ECHO MEAS - MR MEAN PG: 82.8 MMHG
BH CV ECHO MEAS - MR MEAN VEL: 443.1 CM/SEC
BH CV ECHO MEAS - MR VTI: 227.7 CM
BH CV ECHO MEAS - MV A MAX VEL: 85.3 CM/SEC
BH CV ECHO MEAS - MV DEC SLOPE: 256.3 CM/SEC2
BH CV ECHO MEAS - MV DEC TIME: 0.27 SEC
BH CV ECHO MEAS - MV E MAX VEL: 68.1 CM/SEC
BH CV ECHO MEAS - MV E/A: 0.8
BH CV ECHO MEAS - MV MAX PG: 3.7 MMHG
BH CV ECHO MEAS - MV MEAN PG: 1.66 MMHG
BH CV ECHO MEAS - MV V2 VTI: 35.9 CM
BH CV ECHO MEAS - MVA(VTI): 2.25 CM2
BH CV ECHO MEAS - PA ACC TIME: 0.13 SEC
BH CV ECHO MEAS - PA V2 MAX: 78.2 CM/SEC
BH CV ECHO MEAS - PI END-D VEL: 101.7 CM/SEC
BH CV ECHO MEAS - RAP SYSTOLE: 3 MMHG
BH CV ECHO MEAS - RV MAX PG: 1.15 MMHG
BH CV ECHO MEAS - RV V1 MAX: 53.7 CM/SEC
BH CV ECHO MEAS - RV V1 VTI: 15.1 CM
BH CV ECHO MEAS - RVDD: 3 CM
BH CV ECHO MEAS - RVSP: 29.4 MMHG
BH CV ECHO MEAS - SV(LVOT): 80.8 ML
BH CV ECHO MEAS - SV(MOD-SP4): 31 ML
BH CV ECHO MEAS - SVI(LVOT): 47.6 ML/M2
BH CV ECHO MEAS - SVI(MOD-SP4): 18.3 ML/M2
BH CV ECHO MEAS - TAPSE (>1.6): 1.73 CM
BH CV ECHO MEAS - TR MAX PG: 26.4 MMHG
BH CV ECHO MEAS - TR MAX VEL: 256.8 CM/SEC
BH CV ECHO MEASUREMENTS AVERAGE E/E' RATIO: 10.09
BH CV REST NUCLEAR ISOTOPE DOSE: 9.9 MCI
BH CV STRESS BP STAGE 1: NORMAL
BH CV STRESS COMMENTS STAGE 1: NORMAL
BH CV STRESS DOSE REGADENOSON STAGE 1: 0.4
BH CV STRESS DURATION MIN STAGE 1: 0
BH CV STRESS DURATION SEC STAGE 1: 10
BH CV STRESS HR STAGE 1: 69
BH CV STRESS NUCLEAR ISOTOPE DOSE: 32 MCI
BH CV STRESS PROTOCOL 1: NORMAL
BH CV STRESS RECOVERY BP: NORMAL MMHG
BH CV STRESS RECOVERY HR: 100 BPM
BH CV STRESS STAGE 1: 1
LV EF 2D ECHO EST: 50 %
MAXIMAL PREDICTED HEART RATE: 146 BPM
SPECT HRT GATED+EF W RNC IV: 88 %
STRESS BASELINE BP: NORMAL MMHG
STRESS BASELINE HR: 69 BPM
STRESS TARGET HR: 124 BPM

## 2025-07-17 PROCEDURE — 93017 CV STRESS TEST TRACING ONLY: CPT

## 2025-07-17 PROCEDURE — A9500 TC99M SESTAMIBI: HCPCS | Performed by: INTERNAL MEDICINE

## 2025-07-17 PROCEDURE — 93356 MYOCRD STRAIN IMG SPCKL TRCK: CPT

## 2025-07-17 PROCEDURE — 25010000002 REGADENOSON 0.4 MG/5ML SOLUTION: Performed by: INTERNAL MEDICINE

## 2025-07-17 PROCEDURE — 93306 TTE W/DOPPLER COMPLETE: CPT

## 2025-07-17 PROCEDURE — 34310000005 TECHNETIUM SESTAMIBI: Performed by: INTERNAL MEDICINE

## 2025-07-17 PROCEDURE — 78452 HT MUSCLE IMAGE SPECT MULT: CPT

## 2025-07-17 RX ORDER — REGADENOSON 0.08 MG/ML
0.4 INJECTION, SOLUTION INTRAVENOUS
Status: COMPLETED | OUTPATIENT
Start: 2025-07-17 | End: 2025-07-17

## 2025-07-17 RX ADMIN — REGADENOSON 0.4 MG: 0.08 INJECTION, SOLUTION INTRAVENOUS at 14:35

## 2025-07-17 RX ADMIN — TECHNETIUM TC 99M SESTAMIBI 1 DOSE: 1 INJECTION INTRAVENOUS at 12:51

## 2025-07-17 RX ADMIN — TECHNETIUM TC 99M SESTAMIBI 1 DOSE: 1 INJECTION INTRAVENOUS at 14:35

## 2025-07-18 ENCOUNTER — RESULTS FOLLOW-UP (OUTPATIENT)
Dept: CARDIOLOGY | Facility: CLINIC | Age: 75
End: 2025-07-18
Payer: MEDICARE

## 2025-07-18 DIAGNOSIS — I25.10 CORONARY ARTERY DISEASE INVOLVING NATIVE CORONARY ARTERY OF NATIVE HEART WITHOUT ANGINA PECTORIS: Primary | ICD-10-CM

## 2025-07-18 RX ORDER — METOPROLOL TARTRATE 25 MG/1
150 TABLET, FILM COATED ORAL ONCE
OUTPATIENT
Start: 2025-07-18

## 2025-07-18 RX ORDER — NITROGLYCERIN 0.4 MG/1
0.8 TABLET SUBLINGUAL
OUTPATIENT
Start: 2025-07-18

## 2025-07-18 RX ORDER — METOPROLOL TARTRATE 1 MG/ML
5 INJECTION, SOLUTION INTRAVENOUS
OUTPATIENT
Start: 2025-07-18

## 2025-07-18 RX ORDER — SODIUM CHLORIDE 9 MG/ML
40 INJECTION, SOLUTION INTRAVENOUS AS NEEDED
OUTPATIENT
Start: 2025-07-18

## 2025-07-18 RX ORDER — SODIUM CHLORIDE 0.9 % (FLUSH) 0.9 %
10 SYRINGE (ML) INJECTION EVERY 12 HOURS SCHEDULED
OUTPATIENT
Start: 2025-07-18

## 2025-07-18 RX ORDER — METOPROLOL TARTRATE 25 MG/1
50 TABLET, FILM COATED ORAL ONCE
OUTPATIENT
Start: 2025-07-18

## 2025-07-18 RX ORDER — METOPROLOL TARTRATE 25 MG/1
200 TABLET, FILM COATED ORAL ONCE
OUTPATIENT
Start: 2025-07-18 | End: 2025-07-18

## 2025-07-18 RX ORDER — METOPROLOL TARTRATE 25 MG/1
100 TABLET, FILM COATED ORAL ONCE
OUTPATIENT
Start: 2025-07-18

## 2025-07-18 RX ORDER — NITROGLYCERIN 0.4 MG/1
0.4 TABLET SUBLINGUAL
OUTPATIENT
Start: 2025-07-18 | End: 2025-07-18

## 2025-07-18 RX ORDER — SODIUM CHLORIDE 0.9 % (FLUSH) 0.9 %
10 SYRINGE (ML) INJECTION AS NEEDED
OUTPATIENT
Start: 2025-07-18

## 2025-07-18 RX ORDER — METOPROLOL TARTRATE 50 MG
50 TABLET ORAL
OUTPATIENT
Start: 2025-07-18

## 2025-07-18 NOTE — TELEPHONE ENCOUNTER
Called and spoke to patient about test results.  Will order CTA for further evaluation of valvular dysfunction per Dr. Agosto.

## 2025-07-21 ENCOUNTER — TELEPHONE (OUTPATIENT)
Dept: CARDIOLOGY | Facility: CLINIC | Age: 75
End: 2025-07-21
Payer: MEDICARE

## 2025-08-05 ENCOUNTER — TELEPHONE (OUTPATIENT)
Dept: CARDIOLOGY | Facility: CLINIC | Age: 75
End: 2025-08-05
Payer: MEDICARE

## (undated) DEVICE — SUT MONOCRYL 4/0 PS2 27IN Y426H ETY426H

## (undated) DEVICE — SUT VIC 3/0 SH 27IN J416H

## (undated) DEVICE — 3M™ STERI-STRIP™ REINFORCED ADHESIVE SKIN CLOSURES, R1547, 1/2 IN X 4 IN (12 MM X 100 MM), 6 STRIPS/ENVELOPE: Brand: 3M™ STERI-STRIP™

## (undated) DEVICE — DRN WND EVAC BULB 100CC

## (undated) DEVICE — UNDERGLV SURG BIOGEL/PI PF SYNTH SURG SZ8.5 BLU 50/BX

## (undated) DEVICE — SOL IRRIG H2O 1000ML STRL

## (undated) DEVICE — ADHS SKIN PREMIERPRO EXOFIN TOPICAL HI/VISC .5ML

## (undated) DEVICE — GLV SURG BIOGEL LTX PF 8

## (undated) DEVICE — GOWN,REINFORCE,POLY,SIRUS,BREATH SLV,XLG: Brand: MEDLINE

## (undated) DEVICE — GOWN,REINFRCE,POLY,SIRUS,BREATH SLV,XXLG: Brand: MEDLINE

## (undated) DEVICE — MAYO STAND COVER: Brand: CONVERTORS

## (undated) DEVICE — SUT SILK 2/0 FS BLK 18IN 685G

## (undated) DEVICE — PK MINOR LAPAROTOMY 50

## (undated) DEVICE — MYNXGRIP 6F/7F: Brand: MYNXGRIP

## (undated) DEVICE — UNDERGLV SURG BIOGEL INDICAT PF 8 GRN

## (undated) DEVICE — SUT SILK 2/0 SH 30IN K833H

## (undated) DEVICE — SUT ETHLN 2/0 PS 18IN 585H

## (undated) DEVICE — CUFF SCD HEMOFORCE SEQ CALF STD MD

## (undated) DEVICE — C-ARM: Brand: UNBRANDED

## (undated) DEVICE — SPNG LAP PREWSH SFTPK 18X18IN STRL PK/5

## (undated) DEVICE — GLV SURG SIGNATURE ESSENTIAL PF LTX SZ8

## (undated) DEVICE — KT SURG TURNOVER 050

## (undated) DEVICE — BLAKE SILICONE DRAIN, 19 FR ROUND, HUBLESS WITH 1/4" BENDABLE TROCAR: Brand: BLAKE

## (undated) DEVICE — Device

## (undated) DEVICE — PENCL EVAC ULTRAVAC SMOKE W/BLD

## (undated) DEVICE — ELECTRD DEFIB M/FUNC PROPADZ RADIOL 2PK

## (undated) DEVICE — SYR LL TP 10ML STRL

## (undated) DEVICE — SUT VIC 2/0 CT2 27IN J269H

## (undated) DEVICE — PINNACLE INTRODUCER SHEATH: Brand: PINNACLE

## (undated) DEVICE — SKIN AFFIX SURG ADHESIVE 72/CS 0.55ML: Brand: MEDLINE

## (undated) DEVICE — STERILE SAFETY PINS #2 2/PK: Brand: MEDLINE

## (undated) DEVICE — SPNG GZ AVANT 6PLY 4X4IN STRL PK/2

## (undated) DEVICE — 3M™ PATIENT PLATE, CORDED, SPLIT, LARGE, 40 PER CASE, 1179: Brand: 3M™

## (undated) DEVICE — CATH DIAG IMPULSE FL4 6F 100CM

## (undated) DEVICE — PK TRY HEART CATH 50

## (undated) DEVICE — PK PROC TURNOVER

## (undated) DEVICE — DRSNG WND BORDR/ADHS NONADHR/GZ LF 4X4IN STRL

## (undated) DEVICE — CATH DIAG IMPULSE FR4 6F 100CM

## (undated) DEVICE — 9165 UNIVERSAL PATIENT PLATE: Brand: 3M™

## (undated) DEVICE — SOLUTION,WATER,IRRIGATION,1000ML,STERILE: Brand: MEDLINE

## (undated) DEVICE — GAUZE,SPONGE,FLUFF,6"X6.75",STRL,5/TRAY: Brand: MEDLINE

## (undated) DEVICE — SUT SILK 3/0 SH CR8 18IN C013D

## (undated) DEVICE — GLV SURG SIGNATURE ESSENTIAL PF LTX SZ7.5

## (undated) DEVICE — GARMENT COMPR 16V 1ST STAGE VEST LG 38IN BGE

## (undated) DEVICE — PACK,UNIVERSAL,NO GOWNS: Brand: MEDLINE

## (undated) DEVICE — GW PTFE EMERALD HEPCOAT FC J TIP STD .035 3MM 150CM

## (undated) DEVICE — ADHS LIQ MASTISOL 2/3ML

## (undated) DEVICE — PAD,ABDOMINAL,5"X9",STERILE,LF,1/PK: Brand: MEDLINE INDUSTRIES, INC.